# Patient Record
Sex: FEMALE | Race: BLACK OR AFRICAN AMERICAN | Employment: FULL TIME | URBAN - METROPOLITAN AREA
[De-identification: names, ages, dates, MRNs, and addresses within clinical notes are randomized per-mention and may not be internally consistent; named-entity substitution may affect disease eponyms.]

---

## 2017-01-19 ENCOUNTER — OFFICE VISIT (OUTPATIENT)
Dept: FAMILY MEDICINE CLINIC | Age: 27
End: 2017-01-19

## 2017-01-19 VITALS
OXYGEN SATURATION: 100 % | WEIGHT: 213.4 LBS | HEART RATE: 75 BPM | SYSTOLIC BLOOD PRESSURE: 112 MMHG | RESPIRATION RATE: 18 BRPM | TEMPERATURE: 98.7 F | DIASTOLIC BLOOD PRESSURE: 74 MMHG | HEIGHT: 61 IN | BODY MASS INDEX: 40.29 KG/M2

## 2017-01-19 DIAGNOSIS — F41.1 GAD (GENERALIZED ANXIETY DISORDER): Primary | ICD-10-CM

## 2017-01-19 DIAGNOSIS — Z23 ENCOUNTER FOR IMMUNIZATION: ICD-10-CM

## 2017-01-19 RX ORDER — CLONAZEPAM 0.5 MG/1
0.5 TABLET ORAL 2 TIMES DAILY
Qty: 60 TAB | Refills: 0 | Status: SHIPPED | OUTPATIENT
Start: 2017-01-19 | End: 2017-02-23 | Stop reason: SDUPTHER

## 2017-01-19 NOTE — PATIENT INSTRUCTIONS
Learning About Anxiety Disorders  What are anxiety disorders? Anxiety disorders are a type of medical problem. They cause severe anxiety. When you feel anxious, you feel that something bad is about to happen. This feeling interferes with your life. These disorders include:  · Generalized anxiety disorder. You feel worried and stressed about many everyday events and activities. This goes on for several months and disrupts your life on most days. · Panic disorder. You have repeated panic attacks. A panic attack is a sudden, intense fear or anxiety. It may make you feel short of breath. Your heart may pound. · Social anxiety disorder. You feel very anxious about what you will say or do in front of people. For example, you may be scared to talk or eat in public. This problem affects your daily life. · Phobias. You are very scared of a specific object, situation, or activity. For example, you may fear spiders, high places, or small spaces. What are the symptoms? Generalized anxiety disorder  Symptoms may include:  · Feeling worried and stressed about many things almost every day. · Feeling tired or irritable. You may have a hard time concentrating. · Having headaches or muscle aches. · Having a hard time swallowing. · Feeling shaky, sweating, or having hot flashes. Panic disorder  You may have repeated panic attacks when there is no reason for feeling afraid. You may change your daily activities because you worry that you will have another attack. Symptoms may include:  · Intense fear, terror, or anxiety. · Trouble breathing or very fast breathing. · Chest pain or tightness. · A heartbeat that races or is not regular. Social anxiety disorder  Symptoms may include:  · Fear about a social situation, such as eating in front of others or speaking in public. You may worry a lot. Or you may be afraid that something bad will happen. · Anxiety that can cause you to blush, sweat, and feel shaky.   · A heartbeat that is faster than normal.  · A hard time focusing. Phobias  Symptoms may include:  · More fear than most people of being around an object, being in a situation, or doing an activity. You might also be stressed about the chance of being around the thing you fear. · Worry about losing control, panicking, fainting, or having physical symptoms like a faster heartbeat when you are around the situation or object. How are these disorders treated? Anxiety disorders can be treated with medicines or counseling. A combination of both may be used. Medicines may include:  · Antidepressants. These may help your symptoms by keeping chemicals in your brain in balance. · Benzodiazepines. These may give you short-term relief of your symptoms. Some people use cognitive-behavioral therapy. A therapist helps you learn to change stressful or bad thoughts into helpful thoughts. Lead a healthy lifestyle  A healthy lifestyle may help you feel better. · Get at least 30 minutes of exercise on most days of the week. Walking is a good choice. · Eat a healthy diet. Include fruits, vegetables, lean proteins, and whole grains in your diet each day. · Try to go to bed at the same time every night. Try for 8 hours of sleep a night. · Find ways to manage stress. Try relaxation exercises. · Avoid alcohol and illegal drugs. Follow-up care is a key part of your treatment and safety. Be sure to make and go to all appointments, and call your doctor if you are having problems. It's also a good idea to know your test results and keep a list of the medicines you take. Where can you learn more? Go to http://alhaji-tia.info/. Enter Z796 in the search box to learn more about \"Learning About Anxiety Disorders. \"  Current as of: July 26, 2016  Content Version: 11.1  © 0689-3724 [employer].  Care instructions adapted under license by [employer] (which disclaims liability or warranty for this information). If you have questions about a medical condition or this instruction, always ask your healthcare professional. [address] any warranty or liability for your use of this information. Vaccine Information Statement    Influenza (Flu) Vaccine (Inactivated or Recombinant): What you need to know    Many Vaccine Information Statements are available in [place] and other languages. See www.immunize.org/vis  Hojas de Información Sobre Vacunas están disponibles en Español y en muchos otros idiomas. Visite www.immunize.org/vis    1. Why get vaccinated? Influenza (flu) is a contagious disease that spreads around the [place] every year, usually between October and May. Flu is caused by influenza viruses, and is spread mainly by coughing, sneezing, and close contact. Anyone can get flu. Flu strikes suddenly and can last several days. Symptoms vary by age, but can include:   fever/chills   sore throat   muscle aches   fatigue   cough   headache    runny or stuffy nose    Flu can also lead to pneumonia and blood infections, and cause diarrhea and seizures in children. If you have a medical condition, such as heart or lung disease, flu can make it worse. Flu is more dangerous for some people. Infants and young children, people 72years of age and older, pregnant women, and people with certain health conditions or a weakened immune system are at greatest risk. Each year thousands of people in the [facility] die from flu, and many more are hospitalized. Flu vaccine can:   keep you from getting flu,   make flu less severe if you do get it, and   keep you from spreading flu to your family and other people. 2. Inactivated and recombinant flu vaccines    A dose of flu vaccine is recommended every flu season. Children 6 months through 6years of age may need two doses during the same flu season. Everyone else needs only one dose each flu season. Some inactivated flu vaccines contain a very small amount of a mercury-based preservative called thimerosal. Studies have not shown thimerosal in vaccines to be harmful, but flu vaccines that do not contain thimerosal are available. There is no live flu virus in flu shots. They cannot cause the flu. There are many flu viruses, and they are always changing. Each year a new flu vaccine is made to protect against three or four viruses that are likely to cause disease in the upcoming flu season. But even when the vaccine doesnt exactly match these viruses, it may still provide some protection    Flu vaccine cannot prevent:   flu that is caused by a virus not covered by the vaccine, or   illnesses that look like flu but are not. It takes about 2 weeks for protection to develop after vaccination, and protection lasts through the flu season. 3. Some people should not get this vaccine    Tell the person who is giving you the vaccine:     If you have any severe, life-threatening allergies. If you ever had a life-threatening allergic reaction after a dose of flu vaccine, or have a severe allergy to any part of this vaccine, you may be advised not to get vaccinated. Most, but not all, types of flu vaccine contain a small amount of egg protein.  If you ever had Guillain-Barré Syndrome (also called GBS). Some people with a history of GBS should not get this vaccine. This should be discussed with your doctor.  If you are not feeling well. It is usually okay to get flu vaccine when you have a mild illness, but you might be asked to come back when you feel better. 4. Risks of a vaccine reaction    With any medicine, including vaccines, there is a chance of reactions. These are usually mild and go away on their own, but serious reactions are also possible. Most people who get a flu shot do not have any problems with it.      Minor problems following a flu shot include:  soreness, redness, or swelling where the shot was given     hoarseness   sore, red or itchy eyes   cough   fever   aches   headache   itching   fatigue  If these problems occur, they usually begin soon after the shot and last 1 or 2 days. More serious problems following a flu shot can include the following:     There may be a small increased risk of Guillain-Barré Syndrome (GBS) after inactivated flu vaccine. This risk has been estimated at 1 or 2 additional cases per million people vaccinated. This is much lower than the risk of severe complications from flu, which can be prevented by flu vaccine.  Young children who get the flu shot along with pneumococcal vaccine (PCV13) and/or DTaP vaccine at the same time might be slightly more likely to have a seizure caused by fever. Ask your doctor for more information. Tell your doctor if a child who is getting flu vaccine has ever had a seizure. Problems that could happen after any injected vaccine:      People sometimes faint after a medical procedure, including vaccination. Sitting or lying down for about 15 minutes can help prevent fainting, and injuries caused by a fall. Tell your doctor if you feel dizzy, or have vision changes or ringing in the ears.  Some people get severe pain in the shoulder and have difficulty moving the arm where a shot was given. This happens very rarely.  Any medication can cause a severe allergic reaction. Such reactions from a vaccine are very rare, estimated at about 1 in a million doses, and would happen within a few minutes to a few hours after the vaccination. As with any medicine, there is a very remote chance of a vaccine causing a serious injury or death. The safety of vaccines is always being monitored. For more information, visit: www.cdc.gov/vaccinesafety/    5. What if there is a serious reaction? What should I look for?      Look for anything that concerns you, such as signs of a severe allergic reaction, very high fever, or unusual behavior. Signs of a severe allergic reaction can include hives, swelling of the face and throat, difficulty breathing, a fast heartbeat, dizziness, and weakness  usually within a few minutes to a few hours after the vaccination. What should I do?  If you think it is a severe allergic reaction or other emergency that cant wait, call 9-1-1 and get the person to the nearest hospital. Otherwise, call your doctor.  Reactions should be reported to the Vaccine Adverse Event Reporting System (VAERS). Your doctor should file this report, or you can do it yourself through  the VAERS web site at www.vaers. [facility].gov, or by calling 1-562.651.3893. VAERS does not give medical advice. 6. The National Vaccine Injury Compensation Program    The [facility] Vaccine Injury Compensation Program (VICP) is a federal program that was created to compensate people who may have been injured by certain vaccines. Persons who believe they may have been injured by a vaccine can learn about the program and about filing a claim by calling 7-819.770.3926 or visiting the [address] [place], [place] [address] website at [email or web address].gov/vaccinecompensation. There is a time limit to file a claim for compensation. 7. How can I learn more?  Ask your healthcare provider. He or she can give you the vaccine package insert or suggest other sources of information.  Call your local or state health department.  Contact the Centers for Disease Control and Prevention (CDC):  - Call 6-773.860.4833 (1-525-BGY-INFO) or  - Visit CDCs website at www.cdc.gov/flu    Vaccine Information Statement   Inactivated Influenza Vaccine   8/7/2015  [address] [id number]    Department of Health and Human Services  Centers for Disease Control and Prevention    Office Use Only       Learning About Anxiety Disorders  What are anxiety disorders? Anxiety disorders are a type of medical problem. They cause severe anxiety.  When you feel anxious, you feel that something bad is about to happen. This feeling interferes with your life. These disorders include:  · Generalized anxiety disorder. You feel worried and stressed about many everyday events and activities. This goes on for several months and disrupts your life on most days. · Panic disorder. You have repeated panic attacks. A panic attack is a sudden, intense fear or anxiety. It may make you feel short of breath. Your heart may pound. · Social anxiety disorder. You feel very anxious about what you will say or do in front of people. For example, you may be scared to talk or eat in public. This problem affects your daily life. · Phobias. You are very scared of a specific object, situation, or activity. For example, you may fear spiders, high places, or small spaces. What are the symptoms? Generalized anxiety disorder  Symptoms may include:  · Feeling worried and stressed about many things almost every day. · Feeling tired or irritable. You may have a hard time concentrating. · Having headaches or muscle aches. · Having a hard time swallowing. · Feeling shaky, sweating, or having hot flashes. Panic disorder  You may have repeated panic attacks when there is no reason for feeling afraid. You may change your daily activities because you worry that you will have another attack. Symptoms may include:  · Intense fear, terror, or anxiety. · Trouble breathing or very fast breathing. · Chest pain or tightness. · A heartbeat that races or is not regular. Social anxiety disorder  Symptoms may include:  · Fear about a social situation, such as eating in front of others or speaking in public. You may worry a lot. Or you may be afraid that something bad will happen. · Anxiety that can cause you to blush, sweat, and feel shaky. · A heartbeat that is faster than normal.  · A hard time focusing.   Phobias  Symptoms may include:  · More fear than most people of being around an object, being in a situation, or doing an activity. You might also be stressed about the chance of being around the thing you fear. · Worry about losing control, panicking, fainting, or having physical symptoms like a faster heartbeat when you are around the situation or object. How are these disorders treated? Anxiety disorders can be treated with medicines or counseling. A combination of both may be used. Medicines may include:  · Antidepressants. These may help your symptoms by keeping chemicals in your brain in balance. · Benzodiazepines. These may give you short-term relief of your symptoms. Some people use cognitive-behavioral therapy. A therapist helps you learn to change stressful or bad thoughts into helpful thoughts. Lead a healthy lifestyle  A healthy lifestyle may help you feel better. · Get at least 30 minutes of exercise on most days of the week. Walking is a good choice. · Eat a healthy diet. Include fruits, vegetables, lean proteins, and whole grains in your diet each day. · Try to go to bed at the same time every night. Try for 8 hours of sleep a night. · Find ways to manage stress. Try relaxation exercises. · Avoid alcohol and illegal drugs. Follow-up care is a key part of your treatment and safety. Be sure to make and go to all appointments, and call your doctor if you are having problems. It's also a good idea to know your test results and keep a list of the medicines you take. Where can you learn more? Go to http://alhaji-tia.info/. Enter Z257 in the search box to learn more about \"Learning About Anxiety Disorders. \"  Current as of: July 26, 2016  Content Version: 11.1  © 1914-8910 [employer]. Care instructions adapted under license by [employer] (which disclaims liability or warranty for this information).  If you have questions about a medical condition or this instruction, always ask your healthcare professional. [address] any warranty or liability for your use of this information. Influenza (Flu) Vaccine (Inactivated or Recombinant): What You Need to Know  Why get vaccinated? Influenza (\"flu\") is a contagious disease that spreads around the [place] every winter, usually between October and May. Flu is caused by influenza viruses and is spread mainly by coughing, sneezing, and close contact. Anyone can get flu. Flu strikes suddenly and can last several days. Symptoms vary by age, but can include:  · Fever/chills. · Sore throat. · Muscle aches. · Fatigue. · Cough. · Headache. · Runny or stuffy nose. Flu can also lead to pneumonia and blood infections, and cause diarrhea and seizures in children. If you have a medical condition, such as heart or lung disease, flu can make it worse. Flu is more dangerous for some people. Infants and young children, people 72years of age and older, pregnant women, and people with certain health conditions or a weakened immune system are at greatest risk. Each year thousands of people in the [facility] die from flu, and many more are hospitalized. Flu vaccine can:  · Keep you from getting flu. · Make flu less severe if you do get it. · Keep you from spreading flu to your family and other people. Inactivated and recombinant flu vaccines  A dose of flu vaccine is recommended every flu season. Children 6 months through 6years of age may need two doses during the same flu season. Everyone else needs only one dose each flu season. Some inactivated flu vaccines contain a very small amount of a mercury-based preservative called thimerosal. Studies have not shown thimerosal in vaccines to be harmful, but flu vaccines that do not contain thimerosal are available. There is no live flu virus in flu shots. They cannot cause the flu. There are many flu viruses, and they are always changing.  Each year a new flu vaccine is made to protect against three or four viruses that are likely to cause disease in the upcoming flu season. But even when the vaccine doesn't exactly match these viruses, it may still provide some protection. Flu vaccine cannot prevent:  · Flu that is caused by a virus not covered by the vaccine. · Illnesses that look like flu but are not. Some people should not get this vaccine  Tell the person who is giving you the vaccine:  · If you have any severe (life-threatening) allergies. If you ever had a life-threatening allergic reaction after a dose of flu vaccine, or have a severe allergy to any part of this vaccine, you may be advised not to get vaccinated. Most, but not all, types of flu vaccine contain a small amount of egg protein. · If you ever had Guillain-Barré syndrome (also called GBS) Some people with a history of GBS should not get this vaccine. This should be discussed with your doctor. · If you are not feeling well. It is usually okay to get flu vaccine when you have a mild illness, but you might be asked to come back when you feel better. Risks of a vaccine reaction  With any medicine, including vaccines, there is a chance of reactions. These are usually mild and go away on their own, but serious reactions are also possible. Most people who get a flu shot do not have any problems with it. Minor problems following a flu shot include:  · Soreness, redness, or swelling where the shot was given  · Hoarseness  · Sore, red or itchy eyes  · Cough  · Fever  · Aches  · Headache  · Itching  · Fatigue  If these problems occur, they usually begin soon after the shot and last 1 or 2 days. More serious problems following a flu shot can include the following:  · There may be a small increased risk of Guillain-Barré Syndrome (GBS) after inactivated flu vaccine. This risk has been estimated at 1 or 2 additional cases per million people vaccinated. This is much lower than the risk of severe complications from flu, which can be prevented by flu vaccine.   · [facility] children who get the flu shot along with pneumococcal vaccine (PCV13) and/or DTaP vaccine at the same time might be slightly more likely to have a seizure caused by fever. Ask your doctor for more information. Tell your doctor if a child who is getting flu vaccine has ever had a seizure  Problems that could happen after any injected vaccine:  · People sometimes faint after a medical procedure, including vaccination. Sitting or lying down for about 15 minutes can help prevent fainting, and injuries caused by a fall. Tell your doctor if you feel dizzy, or have vision changes or ringing in the ears. · Some people get severe pain in the shoulder and have difficulty moving the arm where a shot was given. This happens very rarely. · Any medication can cause a severe allergic reaction. Such reactions from a vaccine are very rare, estimated at about 1 in a million doses, and would happen within a few minutes to a few hours after the vaccination. As with any medicine, there is a very remote chance of a vaccine causing a serious injury or death. The safety of vaccines is always being monitored. For more information, visit: www.cdc.gov/vaccinesafety/. What if there is a serious reaction? What should I look for? · Look for anything that concerns you, such as signs of a severe allergic reaction, very high fever, or unusual behavior. Signs of a severe allergic reaction can include hives, swelling of the face and throat, difficulty breathing, a fast heartbeat, dizziness, and weakness  usually within a few minutes to a few hours after the vaccination. What should I do? · If you think it is a severe allergic reaction or other emergency that can't wait, call 9-1-1 and get the person to the nearest hospital. Otherwise, call your doctor. · Reactions should be reported to the \"Vaccine Adverse Event Reporting System\" (VAERS). Your doctor should file this report, or you can do it yourself through the VAERS website at www.vaers. [facility]hs.gov, or by calling 6-175.386.2915. [facility] does not give medical advice. The National Vaccine Injury Compensation Program  The National Vaccine Injury Compensation Program (VICP) is a federal program that was created to compensate people who may have been injured by certain vaccines. Persons who believe they may have been injured by a vaccine can learn about the program and about filing a claim by calling 2-674.966.7951 or visiting the [employer] website at www.[facility].gov/vaccinecompensation. There is a time limit to file a claim for compensation. How can I learn more? · Ask your healthcare provider. He or she can give you the vaccine package insert or suggest other sources of information. · Call your local or state health department. · Contact the Centers for Disease Control and Prevention (CDC):  ¨ Call 0-413.282.2377 (1-800-CDC-INFO) or  ¨ Visit CDC's website at www.cdc.gov/flu  Vaccine Information Statement  Inactivated Influenza Vaccine  8/7/2015)  [address] [id number]  Department of Health and Human Services  Centers for Disease Control and Prevention  Many Vaccine Information Statements are available in [place] and other languages. See www.immunize.org/vis. Muchas hojas de información sobre vacunas están disponibles en español y en otros idiomas. Visite www.immunize.org/vis. Care instructions adapted under license by your healthcare professional. If you have questions about a medical condition or this instruction, always ask your healthcare professional. [place] [address] any warranty or liability for your use of this information.

## 2017-01-19 NOTE — PROGRESS NOTES
HISTORY OF PRESENT ILLNESS  Alexia Martínez is a 31 y.o. female. HPI   Depression with Anxiety state    Patient state that thins are not getting better: pt states that  unable to sleep, , unable to concentrate at work and at home at this time, +feeling anxius, no guilty feeling, has nl interest to do things, not depressed, nl appetite,  No Hoplessness, not wanted to heart self or others, no weight gain or loss, doesnot want to take a meds every day wanted to use it PRN, aware of side effects    In addition has excessive worry about a number of events and activities out of proportion, these have been presented for at least 6 months, In addition pt states that there is no etoh or illicit drug use, not the first time,  no hx of physical nor of mental abuse, and currently is feeling safe in general.    Current Outpatient Prescriptions   Medication Sig Dispense Refill    Blood-Glucose Meter (TRUERESULT BLOOD GLUCOSE SYSTM) monitoring kit Test blood sugar before breakfast, lunch dinner and before bedtime 1 Kit 11    glucose blood VI test strips (TRUETEST TEST STRIPS) strip Test blood sugar before breakfast, lunch, dinner and before bedtime. 100 Strip 11    glucose blood VI test strips (ADVANCED GLUC METER TEST STRIP) strip Test blood sugar before breakfast, lunch , dinner and bedtime 100 Strip 6    Lancets misc Test blood sugar before breakfast, lunch, dinner and bedtime. 1 Package 11    cyclobenzaprine (FLEXERIL) 10 mg tablet Take 1 Tab by mouth nightly. Indications: MUSCLE SPASM 30 Tab 0    naproxen (NAPROSYN) 500 mg tablet Take 1 Tab by mouth two (2) times daily (with meals). Indications: PAIN 30 Tab 0    norgestimate-ethinyl estradiol (SPRINTEC, 28,) 0.25-35 mg-mcg per tablet Take 1 tablet by mouth daily.  1 Package 12     No Known Allergies  Past Medical History   Diagnosis Date    Chronic back pain greater than 3 months duration 7/29/2015    HX OTHER MEDICAL      chlamydia age 24, treated & negative now    Hypoglycemia 3/30/2016    Ill-defined condition     Insulinoma 4/6/2016    Morbid obesity ([facility] [address]     Syncopal seizure ([facility] [address] 11/19/2015    Thyromegaly 3/30/2016    Urinary frequency 11/19/2015     Past Surgical History   Procedure Laterality Date    Pr revision cervix w preg,vag apprch  2013    Hx cholecystectomy  10/6/2014     lap. tracee    Hx other surgical       D&C with SAB 6/2011    Hx other surgical       wisdom extraction 15 years    Hx dilation and curettage  6/2011     Family History   Problem Relation Age of Onset    Hypertension Mother     Diabetes Mother     High Cholesterol Father     Other Father      high cholesterole, herniated disc    Diabetes Paternal Aunt     Hypertension Maternal Grandmother     COPD Maternal Grandmother     Emphysema Maternal Grandmother     Diabetes Paternal Grandmother      Social History   Substance Use Topics    Smoking status: Never Smoker    Smokeless tobacco: Never Used    Alcohol use No      Lab Results  Component Value Date/Time   WBC 5.7 03/30/2016 05:01 PM   Hemoglobin (POC) 12.9 06/06/2015 06:39 PM   HGB 11.3 03/30/2016 05:01 PM   Hgb, External 12.4 10/14/2013   Hematocrit (POC) 38 06/06/2015 06:39 PM   HCT 34.5 03/30/2016 05:01 PM   Hct, External 36.7 10/14/2013   PLATELET 335 73/26/7879 05:01 PM   Platelet cnt., External 304 10/14/2013   MCV 81 03/30/2016 05:01 PM       Lab Results  Component Value Date/Time   TSH 1.940 04/06/2016 09:31 AM   T4, Free 1.04 04/06/2016 09:31 AM         Review of Systems   Constitutional: Positive for malaise/fatigue. Negative for chills and fever. HENT: Negative for ear pain and nosebleeds. Eyes: Negative for blurred vision, pain and discharge. Respiratory: Negative for shortness of breath. Cardiovascular: Negative for chest pain and leg swelling. Gastrointestinal: Negative for constipation, diarrhea, nausea and vomiting. Genitourinary: Negative for frequency.    Musculoskeletal: Negative for joint pain. Skin: Negative for itching and rash. Neurological: Negative for headaches. Psychiatric/Behavioral: Positive for depression. Negative for hallucinations, memory loss, substance abuse and suicidal ideas. The patient is nervous/anxious. The patient does not have insomnia. Physical Exam   Constitutional: She is oriented to person, place, and time. She appears well-developed and well-nourished. HENT:   Head: Normocephalic and atraumatic. Eyes: Conjunctivae and EOM are normal.   Neck: Normal range of motion. Neck supple. Cardiovascular: Normal rate, regular rhythm and normal heart sounds. No murmur heard. Pulmonary/Chest: Effort normal and breath sounds normal.   Abdominal: Soft. Bowel sounds are normal. She exhibits no distension. Musculoskeletal: Normal range of motion. She exhibits no edema. Lymphadenopathy:     She has no cervical adenopathy. Neurological: She is alert and oriented to person, place, and time. Skin: No erythema. Psychiatric: Her behavior is normal.   Nursing note and vitals reviewed. ASSESSMENT and PLAN  Marsha was seen today for anxiety. Diagnoses and all orders for this visit:    TASIA (generalized anxiety disorder)  -     clonazePAM (KLONOPIN) 0.5 mg tablet; Take 1 Tab by mouth two (2) times a day. Max Daily Amount: 1 mg.  Indications: ESSENTIAL TREMOR, PANIC DISORDER  -     CBC W/O DIFF  -     TSH 3RD GENERATION    Encounter for immunization  -     Influenza virus vaccine (QUADRIVALENT PRES FREE SYRINGE) IM 3 years and older  -     MO IMMUNIZ ADMIN,1 SINGLE/COMB VAC/TOXOID    Depression with anxiety in a stable condition, not suicidal, start antianxiety and calming medication for now will reevaluate in 2 weeks for progression or the side effects of the medication, in addition Counseling , social support, spiritual belonging, inc physical activity, all offered,  compliance with meds advised, was told to call for any concern

## 2017-01-19 NOTE — MR AVS SNAPSHOT
Visit Information Date & Time Provider Department Dept. Phone Encounter #  
 1/19/2017 10:15 AM Hina Strong MD [address] [facility] [phone] [id number] Follow-up Instructions Return in about 2 weeks (around 2/2/2017), or if symptoms worsen or fail to improve. Your Appointments 2/23/2017 11:00 AM  
PHYSICAL PRE OP with Hina Strong MD  
[address] [facility] ([facility]) Appt Note: [phone] [id number] [phone] [address] [phone] Upcoming Health Maintenance Date Due INFLUENZA AGE 9 TO ADULT 8/1/2016 PAP AKA CERVICAL CYTOLOGY 1/28/2019 DTaP/Tdap/Td series (2 - Td) 2/10/2024 Allergies as of 1/19/2017  Review Complete On: 1/19/2017 By: Liu Ortiz LPN No Known Allergies Current Immunizations  Reviewed on 8/19/2015 Name Date Influenza Vaccine 1/27/2014 Influenza Vaccine (Quad) PF  Incomplete Tdap 2/10/2014 Not reviewed this visit You Were Diagnosed With   
  
 Codes Comments TASIA (generalized anxiety disorder)    -  Primary ICD-10-CM: F41.1 ICD-9-CM: 300.02 Encounter for immunization     ICD-10-CM: F33 ICD-9-CM: V03.89 Vitals BP Pulse Temp Resp Height(growth percentile) Weight(growth percentile) 112/74 (BP 1 Location: Left arm, BP Patient Position: Sitting) 75 98.7 °F (37.1 °C) (Oral) 18 5' 1\" (1.549 m) 213 lb 6.4 oz (96.8 kg) SpO2 Breastfeeding? BMI OB Status Smoking Status 100% No 40.32 kg/m2 IUD Never Smoker Vitals History BMI and BSA Data Body Mass Index Body Surface Area  
 40.32 kg/m 2 2.04 m 2 Preferred Pharmacy Pharmacy Name Phone [id number], [place] - [address] [phone] Your Updated Medication List  
  
   
 This list is accurate as of: 1/19/17 10:27 AM.  Always use your most recent med list.  
  
  
  
  
 Blood-Glucose Meter monitoring kit Commonly known as:  TRUERESULT BLOOD GLUCOSE SYSTM Test blood sugar before breakfast, lunch dinner and before bedtime  
  
 clonazePAM 0.5 mg tablet Commonly known as:  Sukhwinder Bourgeois Take 1 Tab by mouth two (2) times a day. Max Daily Amount: 1 mg. Indications: ESSENTIAL TREMOR, PANIC DISORDER  
  
 cyclobenzaprine 10 mg tablet Commonly known as:  FLEXERIL Take 1 Tab by mouth nightly. Indications: MUSCLE SPASM * glucose blood VI test strips strip Commonly known as:  ADVANCED GLUC METER TEST STRIP Test blood sugar before breakfast, lunch , dinner and bedtime * glucose blood VI test strips strip Commonly known as:  TRUETEST TEST STRIPS Test blood sugar before breakfast, lunch, dinner and before bedtime. Lancets Misc Test blood sugar before breakfast, lunch, dinner and bedtime. naproxen 500 mg tablet Commonly known as:  NAPROSYN Take 1 Tab by mouth two (2) times daily (with meals). Indications: PAIN  
  
 norgestimate-ethinyl estradiol 0.25-35 mg-mcg Tab Commonly known as:  [facility] (28) Take 1 tablet by mouth daily. * Notice: This list has 2 medication(s) that are the same as other medications prescribed for you. Read the directions carefully, and ask your doctor or other care provider to review them with you. Prescriptions Printed Refills  
 clonazePAM (KLONOPIN) 0.5 mg tablet 0 Sig: Take 1 Tab by mouth two (2) times a day. Max Daily Amount: 1 mg. Indications: ESSENTIAL TREMOR, PANIC DISORDER Class: Print Route: Oral  
  
We Performed the Following CBC W/O DIFF [26645 CPT(R)] INFLUENZA VIRUS VAC QUAD,SPLIT,PRESV FREE SYRINGE 3/> YRS IM [id number] CPT(R)] IN IMMUNIZ ADMIN,1 SINGLE/COMB VAC/TOXOID [id number] CPT(R)] TSH 3RD GENERATION [41855 CPT(R)] Follow-up Instructions Return in about 2 weeks (around 2/2/2017), or if symptoms worsen or fail to improve. Patient Instructions Learning About Anxiety Disorders What are anxiety disorders? Anxiety disorders are a type of medical problem. They cause severe anxiety. When you feel anxious, you feel that something bad is about to happen. This feeling interferes with your life. These disorders include: · Generalized anxiety disorder. You feel worried and stressed about many everyday events and activities. This goes on for several months and disrupts your life on most days. · Panic disorder. You have repeated panic attacks. A panic attack is a sudden, intense fear or anxiety. It may make you feel short of breath. Your heart may pound. · Social anxiety disorder. You feel very anxious about what you will say or do in front of people. For example, you may be scared to talk or eat in public. This problem affects your daily life. · Phobias. You are very scared of a specific object, situation, or activity. For example, you may fear spiders, high places, or small spaces. What are the symptoms? Generalized anxiety disorder Symptoms may include: · Feeling worried and stressed about many things almost every day. · Feeling tired or irritable. You may have a hard time concentrating. · Having headaches or muscle aches. · Having a hard time swallowing. · Feeling shaky, sweating, or having hot flashes. Panic disorder You may have repeated panic attacks when there is no reason for feeling afraid. You may change your daily activities because you worry that you will have another attack. Symptoms may include: 
· Intense fear, terror, or anxiety. · Trouble breathing or very fast breathing. · Chest pain or tightness. · A heartbeat that races or is not regular. Social anxiety disorder Symptoms may include: · Fear about a social situation, such as eating in front of others or speaking in public. You may worry a lot. Or you may be afraid that something bad will happen. · Anxiety that can cause you to blush, sweat, and feel shaky. · A heartbeat that is faster than normal. 
· A hard time focusing. Phobias Symptoms may include: · More fear than most people of being around an object, being in a situation, or doing an activity. You might also be stressed about the chance of being around the thing you fear. · Worry about losing control, panicking, fainting, or having physical symptoms like a faster heartbeat when you are around the situation or object. How are these disorders treated? Anxiety disorders can be treated with medicines or counseling. A combination of both may be used. Medicines may include: · Antidepressants. These may help your symptoms by keeping chemicals in your brain in balance. · Benzodiazepines. These may give you short-term relief of your symptoms. Some people use cognitive-behavioral therapy. A therapist helps you learn to change stressful or bad thoughts into helpful thoughts. Lead a healthy lifestyle A healthy lifestyle may help you feel better. · Get at least 30 minutes of exercise on most days of the week. Walking is a good choice. · Eat a healthy diet. Include fruits, vegetables, lean proteins, and whole grains in your diet each day. · Try to go to bed at the same time every night. Try for 8 hours of sleep a night. · Find ways to manage stress. Try relaxation exercises. · Avoid alcohol and illegal drugs. Follow-up care is a key part of your treatment and safety. Be sure to make and go to all appointments, and call your doctor if you are having problems. It's also a good idea to know your test results and keep a list of the medicines you take. Where can you learn more? Go to http://alhaji-tia.info/. Enter R019 in the search box to learn more about \"Learning About Anxiety Disorders. \" Current as of: July 26, 2016 Content Version: 11.1 © 0800-4819 [employer]. Care instructions adapted under license by [employer] (which disclaims liability or warranty for this information). If you have questions about a medical condition or this instruction, always ask your healthcare professional. [address] any warranty or liability for your use of this information. Vaccine Information Statement Influenza (Flu) Vaccine (Inactivated or Recombinant): What you need to know Many Vaccine Information Statements are available in [place]er and other languages. See www.immunize.org/vis Hojas de Información Sobre Vacunas están disponibles en Español y en muchos otros idiomas. Visite www.immunize.org/vis 1. Why get vaccinated? Influenza (flu) is a contagious disease that spreads around the [place] every year, usually between October and May. Flu is caused by influenza viruses, and is spread mainly by coughing, sneezing, and close contact. Anyone can get flu. Flu strikes suddenly and can last several days. Symptoms vary by age, but can include: 
 fever/chills  sore throat  muscle aches  fatigue  cough  headache  runny or stuffy nose Flu can also lead to pneumonia and blood infections, and cause diarrhea and seizures in children. If you have a medical condition, such as heart or lung disease, flu can make it worse. Flu is more dangerous for some people. Infants and young children, people 72years of age and older, pregnant women, and people with certain health conditions or a weakened immune system are at greatest risk. Each year thousands of people in the [facility] die from flu, and many more are hospitalized. Flu vaccine can: 
 keep you from getting flu, 
 make flu less severe if you do get it, and 
 keep you from spreading flu to your family and other people. 2. Inactivated and recombinant flu vaccines A dose of flu vaccine is recommended every flu season. Children 6 months through 6years of age may need two doses during the same flu season. Everyone else needs only one dose each flu season. Some inactivated flu vaccines contain a very small amount of a mercury-based preservative called thimerosal. Studies have not shown thimerosal in vaccines to be harmful, but flu vaccines that do not contain thimerosal are available. There is no live flu virus in flu shots. They cannot cause the flu. There are many flu viruses, and they are always changing. Each year a new flu vaccine is made to protect against three or four viruses that are likely to cause disease in the upcoming flu season. But even when the vaccine doesnt exactly match these viruses, it may still provide some protection Flu vaccine cannot prevent: 
 flu that is caused by a virus not covered by the vaccine, or 
 illnesses that look like flu but are not. It takes about 2 weeks for protection to develop after vaccination, and protection lasts through the flu season. 3. Some people should not get this vaccine Tell the person who is giving you the vaccine:  If you have any severe, life-threatening allergies. If you ever had a life-threatening allergic reaction after a dose of flu vaccine, or have a severe allergy to any part of this vaccine, you may be advised not to get vaccinated. Most, but not all, types of flu vaccine contain a small amount of egg protein.  If you ever had Guillain-Barré Syndrome (also called GBS). Some people with a history of GBS should not get this vaccine. This should be discussed with your doctor.  If you are not feeling well. It is usually okay to get flu vaccine when you have a mild illness, but you might be asked to come back when you feel better. 4. Risks of a vaccine reaction With any medicine, including vaccines, there is a chance of reactions. These are usually mild and go away on their own, but serious reactions are also possible. Most people who get a flu shot do not have any problems with it. Minor problems following a flu shot include:  
 soreness, redness, or swelling where the shot was given  hoarseness  sore, red or itchy eyes  cough  fever  aches  headache  itching  fatigue If these problems occur, they usually begin soon after the shot and last 1 or 2 days. More serious problems following a flu shot can include the following:  There may be a small increased risk of Guillain-Barré Syndrome (GBS) after inactivated flu vaccine. This risk has been estimated at 1 or 2 additional cases per million people vaccinated. This is much lower than the risk of severe complications from flu, which can be prevented by flu vaccine.  Young children who get the flu shot along with pneumococcal vaccine (PCV13) and/or DTaP vaccine at the same time might be slightly more likely to have a seizure caused by fever. Ask your doctor for more information. Tell your doctor if a child who is getting flu vaccine has ever had a seizure. Problems that could happen after any injected vaccine:  People sometimes faint after a medical procedure, including vaccination. Sitting or lying down for about 15 minutes can help prevent fainting, and injuries caused by a fall. Tell your doctor if you feel dizzy, or have vision changes or ringing in the ears.  Some people get severe pain in the shoulder and have difficulty moving the arm where a shot was given. This happens very rarely.  Any medication can cause a severe allergic reaction. Such reactions from a vaccine are very rare, estimated at about 1 in a million doses, and would happen within a few minutes to a few hours after the vaccination. As with any medicine, there is a very remote chance of a vaccine causing a serious injury or death. The safety of vaccines is always being monitored. For more information, visit: www.cdc.gov/vaccinesafety/ 
 
5. What if there is a serious reaction? What should I look for?  Look for anything that concerns you, such as signs of a severe allergic reaction, very high fever, or unusual behavior. Signs of a severe allergic reaction can include hives, swelling of the face and throat, difficulty breathing, a fast heartbeat, dizziness, and weakness  usually within a few minutes to a few hours after the vaccination. What should I do?  If you think it is a severe allergic reaction or other emergency that cant wait, call 9-1-1 and get the person to the nearest hospital. Otherwise, call your doctor.  Reactions should be reported to the Vaccine Adverse Event Reporting System (VAERS). Your doctor should file this report, or you can do it yourself through  the VAERS web site at www.vaers. [employer].gov, or by calling 0-120.198.7789. VAERS does not give medical advice. 6. The National Vaccine Injury Compensation Program 
 
The [facility] Vaccine Injury Compensation Program (VICP) is a federal program that was created to compensate people who may have been injured by certain vaccines. Persons who believe they may have been injured by a vaccine can learn about the program and about filing a claim by calling 5-478.870.5575 or visiting the [address] website at www.[facility].gov/vaccinecompensation. There is a time limit to file a claim for compensation. 7. How can I learn more?  Ask your healthcare provider. He or she can give you the vaccine package insert or suggest other sources of information.  Call your local or state health department.  Contact the Centers for Disease Control and Prevention (CDC): 
- Call 1-979.268.6180 (1-800-CDC-INFO) or 
- Visit CDCs website at www.cdc.gov/flu Vaccine Information Statement Inactivated Influenza Vaccine 8/7/2015 
42 [employer]ana Sample [id number]-[id number] Department of Health and [employer] Centers for Disease Control and Prevention Office Use Only Learning About Anxiety Disorders What are anxiety disorders? Anxiety disorders are a type of medical problem. They cause severe anxiety. When you feel anxious, you feel that something bad is about to happen. This feeling interferes with your life. These disorders include: · Generalized anxiety disorder. You feel worried and stressed about many everyday events and activities. This goes on for several months and disrupts your life on most days. · Panic disorder. You have repeated panic attacks. A panic attack is a sudden, intense fear or anxiety. It may make you feel short of breath. Your heart may pound. · Social anxiety disorder. You feel very anxious about what you will say or do in front of people. For example, you may be scared to talk or eat in public. This problem affects your daily life. · Phobias. You are very scared of a specific object, situation, or activity. For example, you may fear spiders, high places, or small spaces. What are the symptoms? Generalized anxiety disorder Symptoms may include: · Feeling worried and stressed about many things almost every day. · Feeling tired or irritable. You may have a hard time concentrating. · Having headaches or muscle aches. · Having a hard time swallowing. · Feeling shaky, sweating, or having hot flashes. Panic disorder You may have repeated panic attacks when there is no reason for feeling afraid. You may change your daily activities because you worry that you will have another attack. Symptoms may include: 
· Intense fear, terror, or anxiety. · Trouble breathing or very fast breathing. · Chest pain or tightness. · A heartbeat that races or is not regular. Social anxiety disorder Symptoms may include: · Fear about a social situation, such as eating in front of others or speaking in public. You may worry a lot. Or you may be afraid that something bad will happen. · Anxiety that can cause you to blush, sweat, and feel shaky. · A heartbeat that is faster than normal. 
· A hard time focusing. Phobias Symptoms may include: · More fear than most people of being around an object, being in a situation, or doing an activity. You might also be stressed about the chance of being around the thing you fear. · Worry about losing control, panicking, fainting, or having physical symptoms like a faster heartbeat when you are around the situation or object. How are these disorders treated? Anxiety disorders can be treated with medicines or counseling. A combination of both may be used. Medicines may include: · Antidepressants. These may help your symptoms by keeping chemicals in your brain in balance. · Benzodiazepines. These may give you short-term relief of your symptoms. Some people use cognitive-behavioral therapy. A therapist helps you learn to change stressful or bad thoughts into helpful thoughts. Lead a healthy lifestyle A healthy lifestyle may help you feel better. · Get at least 30 minutes of exercise on most days of the week. Walking is a good choice. · Eat a healthy diet. Include fruits, vegetables, lean proteins, and whole grains in your diet each day. · Try to go to bed at the same time every night. Try for 8 hours of sleep a night. · Find ways to manage stress. Try relaxation exercises. · Avoid alcohol and illegal drugs. Follow-up care is a key part of your treatment and safety. Be sure to make and go to all appointments, and call your doctor if you are having problems. It's also a good idea to know your test results and keep a list of the medicines you take. Where can you learn more? Go to http://alhaji-tia.info/. Enter G571 in the search box to learn more about \"Learning About Anxiety Disorders. \" Current as of: July 26, 2016 Content Version: 11.1 © 4727-6824 [employer]. Care instructions adapted under license by [employer] (which disclaims liability or warranty for this information). If you have questions about a medical condition or this instruction, always ask your healthcare professional. [address] any warranty or liability for your use of this information. Influenza (Flu) Vaccine (Inactivated or Recombinant): What You Need to Know Why get vaccinated? Influenza (\"flu\") is a contagious disease that spreads around the [place] every winter, usually between October and May. Flu is caused by influenza viruses and is spread mainly by coughing, sneezing, and close contact. Anyone can get flu. Flu strikes suddenly and can last several days. Symptoms vary by age, but can include: · Fever/chills. · Sore throat. · Muscle aches. · Fatigue. · Cough. · Headache. · Runny or stuffy nose. Flu can also lead to pneumonia and blood infections, and cause diarrhea and seizures in children. If you have a medical condition, such as heart or lung disease, flu can make it worse. Flu is more dangerous for some people. Infants and young children, people 72years of age and older, pregnant women, and people with certain health conditions or a weakened immune system are at greatest risk. Each year thousands of people in the [facility] die from flu, and many more are hospitalized. Flu vaccine can: · Keep you from getting flu. · Make flu less severe if you do get it. · Keep you from spreading flu to your family and other people. Inactivated and recombinant flu vaccines A dose of flu vaccine is recommended every flu season. Children 6 months through 6years of age may need two doses during the same flu season. Everyone else needs only one dose each flu season.  
Some inactivated flu vaccines contain a very small amount of a mercury-based preservative called thimerosal. Studies have not shown thimerosal in vaccines to be harmful, but flu vaccines that do not contain thimerosal are available. There is no live flu virus in flu shots. They cannot cause the flu. There are many flu viruses, and they are always changing. Each year a new flu vaccine is made to protect against three or four viruses that are likely to cause disease in the upcoming flu season. But even when the vaccine doesn't exactly match these viruses, it may still provide some protection. Flu vaccine cannot prevent: · Flu that is caused by a virus not covered by the vaccine. · Illnesses that look like flu but are not. Some people should not get this vaccine Tell the person who is giving you the vaccine: · If you have any severe (life-threatening) allergies. If you ever had a life-threatening allergic reaction after a dose of flu vaccine, or have a severe allergy to any part of this vaccine, you may be advised not to get vaccinated. Most, but not all, types of flu vaccine contain a small amount of egg protein. · If you ever had Guillain-Barré syndrome (also called GBS) Some people with a history of GBS should not get this vaccine. This should be discussed with your doctor. · If you are not feeling well. It is usually okay to get flu vaccine when you have a mild illness, but you might be asked to come back when you feel better. Risks of a vaccine reaction With any medicine, including vaccines, there is a chance of reactions. These are usually mild and go away on their own, but serious reactions are also possible. Most people who get a flu shot do not have any problems with it. Minor problems following a flu shot include: · Soreness, redness, or swelling where the shot was given · Hoarseness · Sore, red or itchy eyes · Cough · Fever · Aches · Headache · Itching · Fatigue If these problems occur, they usually begin soon after the shot and last 1 or 2 days. More serious problems following a flu shot can include the following: · There may be a small increased risk of Guillain-Barré Syndrome (GBS) after inactivated flu vaccine. This risk has been estimated at 1 or 2 additional cases per million people vaccinated. This is much lower than the risk of severe complications from flu, which can be prevented by flu vaccine. · Jailene Recio children who get the flu shot along with pneumococcal vaccine (PCV13) and/or DTaP vaccine at the same time might be slightly more likely to have a seizure caused by fever. Ask your doctor for more information. Tell your doctor if a child who is getting flu vaccine has ever had a seizure Problems that could happen after any injected vaccine: · People sometimes faint after a medical procedure, including vaccination. Sitting or lying down for about 15 minutes can help prevent fainting, and injuries caused by a fall. Tell your doctor if you feel dizzy, or have vision changes or ringing in the ears. · Some people get severe pain in the shoulder and have difficulty moving the arm where a shot was given. This happens very rarely. · Any medication can cause a severe allergic reaction. Such reactions from a vaccine are very rare, estimated at about 1 in a million doses, and would happen within a few minutes to a few hours after the vaccination. As with any medicine, there is a very remote chance of a vaccine causing a serious injury or death. The safety of vaccines is always being monitored. For more information, visit: www.cdc.gov/vaccinesafety/. What if there is a serious reaction? What should I look for? · Look for anything that concerns you, such as signs of a severe allergic reaction, very high fever, or unusual behavior.  
Signs of a severe allergic reaction can include hives, swelling of the face and throat, difficulty breathing, a fast heartbeat, dizziness, and weakness  usually within a few minutes to a few hours after the vaccination. What should I do? · If you think it is a severe allergic reaction or other emergency that can't wait, call 9-1-1 and get the person to the nearest hospital. Otherwise, call your doctor. · Reactions should be reported to the \"Vaccine Adverse Event Reporting System\" (VAERS). Your doctor should file this report, or you can do it yourself through the VAERS website at www.vaers. [facility].gov, or by calling 9-472.216.8632. VAERS does not give medical advice. The National Vaccine Injury Compensation Program 
The National Vaccine Injury Compensation Program (VICP) is a federal program that was created to compensate people who may have been injured by certain vaccines. Persons who believe they may have been injured by a vaccine can learn about the program and about filing a claim by calling 3-571.382.4593 or visiting the Sharely.Us website at www.Zenoss.gov/vaccinecompensation. There is a time limit to file a claim for compensation. How can I learn more? · Ask your healthcare provider. He or she can give you the vaccine package insert or suggest other sources of information. · Call your local or state health department. · Contact the Centers for Disease Control and Prevention (CDC): 
¨ Call 9-675.487.8729 (1-800-CDC-INFO) or ¨ Visit CDC's website at www.cdc.gov/flu Vaccine Information Statement Inactivated Influenza Vaccine 8/7/2015) 42 [facility] 817BV-93 [facility] and [facility] Centers for Disease Control and Prevention Many Vaccine Information Statements are available in Romanian and other languages. See www.immunize.org/vis. Muchas hojas de información sobre vacunas están disponibles en español y en otros idiomas. Visite www.immunize.org/vis.  
Care instructions adapted under license by your healthcare professional. If you have questions about a medical condition or this instruction, always ask your healthcare professional. Kalli Osborne disclaims any warranty or liability for your use of this information. Introducing [facility] HE[facility] SERVICES! Ravindra Shcrader introduces Acupera patient portal. Now you can access parts of your medical record, email your doctor's office, and request medication refills online. 1. In your internet browser, go to [email or web address] 2. Click on the First Time User? Click Here link in the Sign In box. You will see the New Member Sign Up page. 3. Enter your Acupera Access Code exactly as it appears below. You will not need to use this code after youve completed the sign-up process. If you do not sign up before the expiration date, you must request a new code. · Acupera Access Code: [email or web address] Expires: 2/15/2017  3:45 PM 
 
4. Enter the last four digits of your Social Security Number (xxxx) and Date of Birth (mm/dd/yyyy) as indicated and click Submit. You will be taken to the next sign-up page. 5. Create a Acupera ID. This will be your Acupera login ID and cannot be changed, so think of one that is secure and easy to remember. 6. Create a Acupera password. You can change your password at any time. 7. Enter your Password Reset Question and Answer. This can be used at a later time if you forget your password. 8. Enter your e-mail address. You will receive e-mail notification when new information is available in [address] 9. Click Sign Up. You can now view and download portions of your medical record. 10. Click the Download Summary menu link to download a portable copy of your medical information. If you have questions, please visit the Frequently Asked Questions section of the Acupera website. Remember, Acupera is NOT to be used for urgent needs. For medical emergencies, dial 911. Now available from your iPhone and Android! Please provide this summary of care documentation to your next provider. Your primary care clinician is listed as Claudis Phalen. If you have any questions after today's visit, please call [phone].

## 2017-01-19 NOTE — PROGRESS NOTES
Chief Complaint   Patient presents with    Anxiety     C/o moodiness and crying episodes x 4 months,

## 2017-01-20 LAB
ERYTHROCYTE [DISTWIDTH] IN BLOOD BY AUTOMATED COUNT: 13.9 % (ref 12.3–15.4)
HCT VFR BLD AUTO: 38.4 % (ref 34–46.6)
HGB BLD-MCNC: 12.8 G/DL (ref 11.1–15.9)
MCH RBC QN AUTO: 26.9 PG (ref 26.6–33)
MCHC RBC AUTO-ENTMCNC: 33.3 G/DL (ref 31.5–35.7)
MCV RBC AUTO: 81 FL (ref 79–97)
PLATELET # BLD AUTO: 331 X10E3/UL (ref 150–379)
RBC # BLD AUTO: 4.75 X10E6/UL (ref 3.77–5.28)
TSH SERPL DL<=0.005 MIU/L-ACNC: 1.33 UIU/ML (ref 0.45–4.5)
WBC # BLD AUTO: 5.2 X10E3/UL (ref 3.4–10.8)

## 2017-02-16 ENCOUNTER — TELEPHONE (OUTPATIENT)
Dept: FAMILY MEDICINE CLINIC | Age: 27
End: 2017-02-16

## 2017-02-16 NOTE — TELEPHONE ENCOUNTER
Returned call to pt. Requesting updated med list.  Med list printed and at the  for pickup.   Pt stated understanding

## 2017-02-23 ENCOUNTER — OFFICE VISIT (OUTPATIENT)
Dept: FAMILY MEDICINE CLINIC | Age: 27
End: 2017-02-23

## 2017-02-23 VITALS
TEMPERATURE: 97.1 F | HEART RATE: 80 BPM | SYSTOLIC BLOOD PRESSURE: 107 MMHG | BODY MASS INDEX: 40.06 KG/M2 | RESPIRATION RATE: 18 BRPM | HEIGHT: 61 IN | OXYGEN SATURATION: 99 % | WEIGHT: 212.2 LBS | DIASTOLIC BLOOD PRESSURE: 69 MMHG

## 2017-02-23 DIAGNOSIS — F41.1 GAD (GENERALIZED ANXIETY DISORDER): ICD-10-CM

## 2017-02-23 DIAGNOSIS — R35.0 URINARY FREQUENCY: ICD-10-CM

## 2017-02-23 DIAGNOSIS — Z72.51 HIGH-RISK SEXUAL BEHAVIOR: ICD-10-CM

## 2017-02-23 DIAGNOSIS — Z00.00 WELL WOMAN EXAM (NO GYNECOLOGICAL EXAM): ICD-10-CM

## 2017-02-23 DIAGNOSIS — Z00.00 ROUTINE GENERAL MEDICAL EXAMINATION AT A HEALTH CARE FACILITY: Primary | ICD-10-CM

## 2017-02-23 LAB
BILIRUB UR QL STRIP: NEGATIVE
GLUCOSE UR-MCNC: NEGATIVE MG/DL
KETONES P FAST UR STRIP-MCNC: NEGATIVE MG/DL
PH UR STRIP: 6 [PH] (ref 4.6–8)
PROT UR QL STRIP: NEGATIVE MG/DL
SP GR UR STRIP: 1.03 (ref 1–1.03)
UA UROBILINOGEN AMB POC: NORMAL (ref 0.2–1)
URINALYSIS CLARITY POC: CLEAR
URINALYSIS COLOR POC: YELLOW
URINE BLOOD POC: NORMAL
URINE LEUKOCYTES POC: NEGATIVE
URINE NITRITES POC: NEGATIVE

## 2017-02-23 RX ORDER — CLONAZEPAM 0.5 MG/1
0.5 TABLET ORAL 2 TIMES DAILY
Qty: 60 TAB | Refills: 0 | Status: SHIPPED | OUTPATIENT
Start: 2017-03-15 | End: 2018-04-03 | Stop reason: ALTCHOICE

## 2017-02-23 NOTE — PROGRESS NOTES
Marsha Alvarezwright      Name and  verified      Chief Complaint   Patient presents with    Well Woman

## 2017-02-23 NOTE — PROGRESS NOTES
Subjective:   28 y.o. female for Well Woman Check. Her gyne and breast care is done elsewhere by her Ob-Gyne physician. Present for CPE, last Complete Physical exam was 3 yrs ago, pt is Up todate w/ all vaccination, last tetanus vaccine was in In <5yrs ago, and flu in couple wks ago, not pregnacey at this time,  no bleeding no trouble urinatin,     last mammog was never, last pap smear normal and was in last yr,.   last colonoscopy was never,   +d&c and cervical cerclage w/ current pregnancy as her past surgical hx,  last bone dexa scan was never,   No family hx of breast cancer      ++family hx of colon cancer grandfather, mother with Fibro, father with arthrits, and high cholesterol, no fhx Of early heart Dz ++sexaully active and uses Safe sex, physically active,  compliant w/ meds, ++Rf needed for today for her meds. Current Outpatient Prescriptions   Medication Sig Dispense Refill    clonazePAM (KLONOPIN) 0.5 mg tablet Take 1 Tab by mouth two (2) times a day. Max Daily Amount: 1 mg. Indications: ESSENTIAL TREMOR, PANIC DISORDER 60 Tab 0    Blood-Glucose Meter (TRUERESULT BLOOD GLUCOSE SYSTM) monitoring kit Test blood sugar before breakfast, lunch dinner and before bedtime 1 Kit 11    glucose blood VI test strips (TRUETEST TEST STRIPS) strip Test blood sugar before breakfast, lunch, dinner and before bedtime. 100 Strip 11    glucose blood VI test strips (ADVANCED GLUC METER TEST STRIP) strip Test blood sugar before breakfast, lunch , dinner and bedtime 100 Strip 6    Lancets misc Test blood sugar before breakfast, lunch, dinner and bedtime. 1 Package 11    cyclobenzaprine (FLEXERIL) 10 mg tablet Take 1 Tab by mouth nightly. Indications: MUSCLE SPASM 30 Tab 0    naproxen (NAPROSYN) 500 mg tablet Take 1 Tab by mouth two (2) times daily (with meals). Indications: PAIN 30 Tab 0    norgestimate-ethinyl estradiol (SPRINTEC, 28,) 0.25-35 mg-mcg per tablet Take 1 tablet by mouth daily.  1 Package 12 No Known Allergies  Past Medical History:   Diagnosis Date    Chronic back pain greater than 3 months duration 7/29/2015    TASIA (generalized anxiety disorder) 1/19/2017    HX OTHER MEDICAL     chlamydia age 24, treated & negative now    Hypoglycemia 3/30/2016    Ill-defined condition     Insulinoma 4/6/2016    Morbid obesity ([facility] [address]     Syncopal seizure ([facility] [address] 11/19/2015    Thyromegaly 3/30/2016    Urinary frequency 11/19/2015     Past Surgical History:   Procedure Laterality Date    HX CHOLECYSTECTOMY  10/6/2014    lap.  tracee    HX DILATION AND CURETTAGE  6/2011    HX OTHER SURGICAL      D&C with SAB 6/2011    HX OTHER SURGICAL      wisdom extraction 15 years    REVISION CERVIX W PREG, [address]  2013     Family History   Problem Relation Age of Onset    Hypertension Mother     Diabetes Mother     High Cholesterol Father     Other Father      high cholesterole, herniated disc    Diabetes Paternal Aunt     Hypertension Maternal Grandmother     COPD Maternal Grandmother     Emphysema Maternal Grandmother     Diabetes Paternal Grandmother      Social History   Substance Use Topics    Smoking status: Never Smoker    Smokeless tobacco: Never Used    Alcohol use No        Lab Results  Component Value Date/Time   WBC 5.2 01/19/2017 10:37 AM   Hemoglobin (POC) 12.9 06/06/2015 06:39 PM   HGB 12.8 01/19/2017 10:37 AM   Hgb, External 12.4 10/14/2013   Hematocrit (POC) 38 06/06/2015 06:39 PM   HCT 38.4 01/19/2017 10:37 AM   Hct, External 36.7 10/14/2013   PLATELET 223 16/30/4951 10:37 AM   Platelet cnt., External 304 10/14/2013   MCV 81 01/19/2017 10:37 AM       Lab Results  Component Value Date/Time   GFR est  03/30/2016 05:01 PM   GFR est non- 03/30/2016 05:01 PM   Creatinine (POC) 0.7 06/06/2015 06:39 PM   Creatinine 0.65 03/30/2016 05:01 PM   BUN 7 03/30/2016 05:01 PM   BUN (POC) 5 06/06/2015 06:39 PM   Sodium (POC) 141 06/06/2015 06:39 PM   Sodium 140 03/30/2016 05:01 PM   Potassium 4.0 03/30/2016 05:01 PM   Potassium (POC) 3.8 06/06/2015 06:39 PM   Chloride (POC) 105 06/06/2015 06:39 PM   Chloride 105 03/30/2016 05:01 PM   CO2 22 03/30/2016 05:01 PM         Review of Systems    Constitutional: Negative for chills and fever, not obese okay body mass index for his age. HENT: Negative for ear head pain and nosebleeds. Eyes: Negative for blurred vision, pain and discharge. Respiratory: Negative for shortness of breath, wheezing cough sore throat. Cardiovascular: Negative for chest pain and leg swelling, racing heart . Gastrointestinal: Negative for constipation, diarrhea, nausea and vomiting. Genitourinary: Negative for frequency. Musculoskeletal: Negative for joint pain. Skin: Negative for itching, pimples or acne rash. Neurological: Negative for headaches. Psychiatric/Behavioral: Negative for depression has normal interest to do things and not depressed the patient is not nervous/anxious. Specific concerns today: the meds given last visit helped her greatly, able to sleep better, no Suicidal no homicidal ideations,    Objective: The patient appears well, alert, oriented x 3, in no distress. Visit Vitals    /69 (BP 1 Location: Left arm, BP Patient Position: At rest)    Pulse 80    Temp 97.1 °F (36.2 °C) (Oral)    Resp 18    Ht 5' 1\" (1.549 m)    Wt 212 lb 3.2 oz (96.3 kg)    LMP 02/20/2017    SpO2 99%    BMI 40.09 kg/m2     ENT normal.  Neck supple. No adenopathy or thyromegaly. ROZ. Lungs are clear, good air entry, no wheezes, rhonchi or rales. S1 and S2 normal, no murmurs, regular rate and rhythm. Abdomen soft without tenderness, guarding, mass or organomegaly. Extremities show no edema, normal peripheral pulses. Neurological is normal, no focal findings. Breast and Pelvic exams are deferred. Assessment/Plan:   Well Woman    Rita Manjarrez was seen today for well woman.     Diagnoses and all orders for this visit:    Routine general medical examination at a health care facility  -     CBC W/O DIFF  -     AMB POC URINALYSIS DIP STICK AUTO W/O MICRO  -     TSH 3RD GENERATION  -     LIPID PANEL  -     METABOLIC PANEL, COMPREHENSIVE    Well woman exam (no gynecological exam)  Comments:  [V70.0]  Orders:  -     CBC W/O DIFF  -     AMB POC URINALYSIS DIP STICK AUTO W/O MICRO  -     TSH 3RD GENERATION  -     LIPID PANEL  -     METABOLIC PANEL, COMPREHENSIVE    High-risk sexual behavior  -     HIV 1/2 AG/AB, 4TH GENERATION,W RFLX CONFIRM  -     HEPATITIS C AB  -     HEP B SURFACE AG  -     RPR  -     CT/NG/T.VAGINALIS AMPLIFICATION    Urinary frequency  -     HIV 1/2 AG/AB, 4TH GENERATION,W RFLX CONFIRM  -     RPR  -     CT/NG/T.VAGINALIS AMPLIFICATION    TASIA (generalized anxiety disorder)  -     clonazePAM (KLONOPIN) 0.5 mg tablet; Take 1 Tab by mouth two (2) times a day. Max Daily Amount: 1 mg.  Indications: ESSENTIAL TREMOR, PANIC DISORDER      lose weight, increase physical activity, limit alcohol consumption, stop secondhand smoking (advice and handout given), follow low fat diet, follow low salt diet, continue present plan, routine labs ordered, call if any problems

## 2017-02-23 NOTE — MR AVS SNAPSHOT
Visit Information Date & Time Provider Department Dept. Phone Encounter #  
 2/23/2017 11:00 AM Anne Del Angel MD [address] OFF[facility] [phone] [id number] Follow-up Instructions Return in about 3 months (around 5/23/2017). Follow-up and Disposition History Upcoming Health Maintenance Date Due  
 PAP AKA CERVICAL CYTOLOGY 1/28/2019 DTaP/Tdap/Td series (2 - Td) 2/10/2024 Allergies as of 2/23/2017  Review Complete On: 2/23/2017 By: Janeen Carpenter LPN No Known Allergies Current Immunizations  Reviewed on 2/23/2017 Name Date Influenza Vaccine 1/27/2014 Influenza Vaccine (Quad) PF 1/19/2017 Tdap 2/10/2014 Reviewed by Anne Del Angel MD on 2/23/2017 at 12:18 PM  
 Reviewed by Anne Del Angel MD on 2/23/2017 at 12:18 PM  
 Reviewed by Anne Del Angel MD on 2/23/2017 at 12:26 PM  
 Reviewed by Anne Del Angel MD on 2/23/2017 at 12:26 PM  
You Were Diagnosed With   
  
 Codes Comments Routine general medical examination at a health care facility    -  Primary ICD-10-CM: Z00.00 ICD-9-CM: V70.0 Well woman exam (no gynecological exam)     ICD-10-CM: Z00.00 ICD-9-CM: V70.0 [V70.0] High-risk sexual behavior     ICD-10-CM: Z72.51 
ICD-9-CM: V69.2 Urinary frequency     ICD-10-CM: R35.0 ICD-9-CM: 788.41   
 TASIA (generalized anxiety disorder)     ICD-10-CM: F41.1 ICD-9-CM: 300.02 Vitals BP  
  
  
  
  
  
 107/69 (BP 1 Location: Left arm, BP Patient Position: At rest) Vitals History BMI and BSA Data Body Mass Index Body Surface Area 40.09 kg/m 2 2.04 m 2 Preferred Pharmacy Pharmacy Name Phone [id number] [address] [phone] Your Updated Medication List  
  
   
This list is accurate as of: 2/23/17 12:33 PM.  Always use your most recent med list.  
  
  
  
  
 Blood-Glucose Meter monitoring kit Commonly known as:  TRUERESULT BLOOD GLUCOSE SYSTM Test blood sugar before breakfast, lunch dinner and before bedtime  
  
 clonazePAM 0.5 mg tablet Commonly known as:  Viola Bourdon Take 1 Tab by mouth two (2) times a day. Max Daily Amount: 1 mg. Indications: ESSENTIAL TREMOR, PANIC DISORDER Start taking on:  3/15/2017  
  
 cyclobenzaprine 10 mg tablet Commonly known as:  FLEXERIL Take 1 Tab by mouth nightly. Indications: MUSCLE SPASM * glucose blood VI test strips strip Commonly known as:  ADVANCED GLUC METER TEST STRIP Test blood sugar before breakfast, lunch , dinner and bedtime * glucose blood VI test strips strip Commonly known as:  TRUETEST TEST STRIPS Test blood sugar before breakfast, lunch, dinner and before bedtime. Lancets Misc Test blood sugar before breakfast, lunch, dinner and bedtime. naproxen 500 mg tablet Commonly known as:  NAPROSYN Take 1 Tab by mouth two (2) times daily (with meals). Indications: PAIN  
  
 norgestimate-ethinyl estradiol 0.25-35 mg-mcg Tab Commonly known as:  [address] [facility] (28) Take 1 tablet by mouth daily. * Notice: This list has 2 medication(s) that are the same as other medications prescribed for you. Read the directions carefully, and ask your doctor or other care provider to review them with you. Prescriptions Printed Refills  
 clonazePAM (KLONOPIN) 0.5 mg tablet 0 Starting on: 3/15/2017 Sig: Take 1 Tab by mouth two (2) times a day. Max Daily Amount: 1 mg. Indications: ESSENTIAL TREMOR, PANIC DISORDER Class: Print Route: Oral  
  
We Performed the Following AMB POC URINALYSIS DIP STICK AUTO W/O MICRO [58739 CPT(R)] CBC W/O DIFF [33985 CPT(R)] CT/NG/T.VAGINALIS AMPLIFICATION [id number] CPT(R)] HEP B SURFACE AG [id number] CPT(R)] HEPATITIS C AB [76890 CPT(R)] HIV 1/2 AG/AB, 4TH GENERATION,W RFLX CONFIRM [[id number] Custom] LIPID PANEL [70198 CPT(R)] METABOLIC PANEL, COMPREHENSIVE [93870 CPT(R)] RPR [26677 CPT(R)] TSH 3RD GENERATION [66809 CPT(R)] Follow-up Instructions Return in about 3 months (around 5/23/2017). Introducing [facility]! Anay Hoyos introduces LifeNexus patient portal. Now you can access parts of your medical record, email your doctor's office, and request medication refills online. 1. In your internet browser, go to [email or web address] 2. Click on the First Time User? Click Here link in the Sign In box. You will see the New Member Sign Up page. 3. Enter your LifeNexus Access Code exactly as it appears below. You will not need to use this code after youve completed the sign-up process. If you do not sign up before the expiration date, you must request a new code. · LifeNexus Access Code: [email or web address] Expires: 5/24/2017 12:27 PM 
 
4. Enter the last four digits of your Social Security Number (xxxx) and Date of Birth (mm/dd/yyyy) as indicated and click Submit. You will be taken to the next sign-up page. 5. Create a LifeNexus ID. This will be your LifeNexus login ID and cannot be changed, so think of one that is secure and easy to remember. 6. Create a LifeNexus password. You can change your password at any time. 7. Enter your Password Reset Question and Answer. This can be used at a later time if you forget your password. 8. Enter your e-mail address. You will receive e-mail notification when new information is available in [address] 9. Click Sign Up. You can now view and download portions of your medical record. 10. Click the Download Summary menu link to download a portable copy of your medical information. If you have questions, please visit the Frequently Asked Questions section of the LifeNexus website. Remember, LifeNexus is NOT to be used for urgent needs. For medical emergencies, dial 911. Now available from your iPhone and Android! Please provide this summary of care documentation to your next provider. Your primary care clinician is listed as Jericho King. If you have any questions after today's visit, please call [phone].

## 2017-02-24 LAB
ALBUMIN SERPL-MCNC: 4.3 G/DL (ref 3.5–5.5)
ALBUMIN/GLOB SERPL: 1.9 {RATIO} (ref 1.1–2.5)
ALP SERPL-CCNC: 55 IU/L (ref 39–117)
ALT SERPL-CCNC: 19 IU/L (ref 0–32)
AST SERPL-CCNC: 28 IU/L (ref 0–40)
BILIRUB SERPL-MCNC: 0.2 MG/DL (ref 0–1.2)
BUN SERPL-MCNC: 7 MG/DL (ref 6–20)
BUN/CREAT SERPL: 9 (ref 8–20)
CALCIUM SERPL-MCNC: 9.3 MG/DL (ref 8.7–10.2)
CHLORIDE SERPL-SCNC: 103 MMOL/L (ref 96–106)
CHOLEST SERPL-MCNC: 145 MG/DL (ref 100–199)
CO2 SERPL-SCNC: 19 MMOL/L (ref 18–29)
CREAT SERPL-MCNC: 0.77 MG/DL (ref 0.57–1)
ERYTHROCYTE [DISTWIDTH] IN BLOOD BY AUTOMATED COUNT: 14.2 % (ref 12.3–15.4)
GLOBULIN SER CALC-MCNC: 2.3 G/DL (ref 1.5–4.5)
GLUCOSE SERPL-MCNC: 73 MG/DL (ref 65–99)
HBV SURFACE AG SERPL QL IA: NEGATIVE
HCT VFR BLD AUTO: 37.9 % (ref 34–46.6)
HCV AB S/CO SERPL IA: <0.1 S/CO RATIO (ref 0–0.9)
HDLC SERPL-MCNC: 45 MG/DL
HGB BLD-MCNC: 12.3 G/DL (ref 11.1–15.9)
HIV 1+2 AB+HIV1 P24 AG SERPL QL IA: NON REACTIVE
LDLC SERPL CALC-MCNC: 88 MG/DL (ref 0–99)
MCH RBC QN AUTO: 27.2 PG (ref 26.6–33)
MCHC RBC AUTO-ENTMCNC: 32.5 G/DL (ref 31.5–35.7)
MCV RBC AUTO: 84 FL (ref 79–97)
PLATELET # BLD AUTO: 339 X10E3/UL (ref 150–379)
POTASSIUM SERPL-SCNC: 4.4 MMOL/L (ref 3.5–5.2)
PROT SERPL-MCNC: 6.6 G/DL (ref 6–8.5)
RBC # BLD AUTO: 4.52 X10E6/UL (ref 3.77–5.28)
RPR SER QL: NON REACTIVE
SODIUM SERPL-SCNC: 140 MMOL/L (ref 134–144)
TRIGL SERPL-MCNC: 62 MG/DL (ref 0–149)
TSH SERPL DL<=0.005 MIU/L-ACNC: 1.98 UIU/ML (ref 0.45–4.5)
VLDLC SERPL CALC-MCNC: 12 MG/DL (ref 5–40)
WBC # BLD AUTO: 5.4 X10E3/UL (ref 3.4–10.8)

## 2017-02-25 LAB
C TRACH RRNA SPEC QL NAA+PROBE: NEGATIVE
N GONORRHOEA RRNA SPEC QL NAA+PROBE: NEGATIVE
T VAGINALIS RRNA SPEC QL NAA+PROBE: NEGATIVE

## 2017-10-27 ENCOUNTER — TELEPHONE (OUTPATIENT)
Dept: FAMILY MEDICINE CLINIC | Age: 27
End: 2017-10-27

## 2017-10-27 NOTE — TELEPHONE ENCOUNTER
Received call from pt with c/o chest pain that radiates down left arm,  Worsens when laying down, denies SOB,  advised pt to go to ER,  Pt stated understanding

## 2018-02-19 ENCOUNTER — OFFICE VISIT (OUTPATIENT)
Dept: FAMILY MEDICINE CLINIC | Age: 28
End: 2018-02-19

## 2018-02-19 VITALS
HEART RATE: 96 BPM | RESPIRATION RATE: 14 BRPM | BODY MASS INDEX: 40.59 KG/M2 | WEIGHT: 215 LBS | OXYGEN SATURATION: 99 % | DIASTOLIC BLOOD PRESSURE: 79 MMHG | HEIGHT: 61 IN | SYSTOLIC BLOOD PRESSURE: 113 MMHG | TEMPERATURE: 98.1 F

## 2018-02-19 DIAGNOSIS — E66.01 OBESITY, MORBID (HCC): ICD-10-CM

## 2018-02-19 DIAGNOSIS — F41.1 GAD (GENERALIZED ANXIETY DISORDER): Primary | ICD-10-CM

## 2018-02-19 RX ORDER — BUPROPION HYDROCHLORIDE 150 MG/1
150 TABLET ORAL
Qty: 30 TAB | Refills: 3 | Status: SHIPPED | OUTPATIENT
Start: 2018-02-19 | End: 2018-02-23 | Stop reason: SDUPTHER

## 2018-02-19 RX ORDER — BUSPIRONE HYDROCHLORIDE 7.5 MG/1
7.5 TABLET ORAL EVERY 12 HOURS
COMMUNITY
End: 2018-05-02 | Stop reason: SDUPTHER

## 2018-02-19 NOTE — PROGRESS NOTES
HISTORY OF PRESENT ILLNESS  Robin Tran is a 33 y.o. female. HPI     Obesity  Specific concerns today for my pt is the wt concerning, the patient states that no self induced vomiting, and no binge eating,  has been thinking about the use of any laxative use for a better wt, pt also states that different available diets has been tried, does some daily exercises, tries to avoid fast food as much as possible. In addition, the patient states that eating too much is not the case and the portion are very well controlled being on the heavy side for a few yrs, very concerned about the huge wt, gives every body a bad body image,and finally stating that the best is to loose as much wt as possible. Currently on buspar prn for anxiety attacks but on no daily bases and Oral contraceptive otherwise pt is on no othr meds at this time,   Wanted to be on med to loose wt, seen her ob and was put on a meds but not covered by her insur coverage,     Current Outpatient Prescriptions   Medication Sig Dispense Refill    busPIRone (BUSPAR) 7.5 mg tablet Take 7.5 mg by mouth every twelve (12) hours.  Blood-Glucose Meter (TRUERESULT BLOOD GLUCOSE SYSTM) monitoring kit Test blood sugar before breakfast, lunch dinner and before bedtime 1 Kit 11    glucose blood VI test strips (TRUETEST TEST STRIPS) strip Test blood sugar before breakfast, lunch, dinner and before bedtime. 100 Strip 11    glucose blood VI test strips (ADVANCED GLUC METER TEST STRIP) strip Test blood sugar before breakfast, lunch , dinner and bedtime 100 Strip 6    Lancets misc Test blood sugar before breakfast, lunch, dinner and bedtime. 1 Package 11    cyclobenzaprine (FLEXERIL) 10 mg tablet Take 1 Tab by mouth nightly. Indications: MUSCLE SPASM 30 Tab 0    clonazePAM (KLONOPIN) 0.5 mg tablet Take 1 Tab by mouth two (2) times a day. Max Daily Amount: 1 mg.  Indications: ESSENTIAL TREMOR, PANIC DISORDER (Patient not taking: Reported on 2/19/2018) 60 Tab 0  naproxen (NAPROSYN) 500 mg tablet Take 1 Tab by mouth two (2) times daily (with meals). Indications: PAIN (Patient not taking: Reported on 2/19/2018) 30 Tab 0    norgestimate-ethinyl estradiol (SPRINTEC, 28,) 0.25-35 mg-mcg per tablet Take 1 tablet by mouth daily. (Patient not taking: Reported on 2/19/2018) 1 Package 12     No Known Allergies  Past Medical History:   Diagnosis Date    Chronic back pain greater than 3 months duration 7/29/2015    TASIA (generalized anxiety disorder) 1/19/2017    High-risk sexual behavior 2/23/2017    HX OTHER MEDICAL     chlamydia age 24, treated & negative now    Hypoglycemia 3/30/2016    Ill-defined condition     Insulinoma 4/6/2016    Morbid obesity ([address]     Syncopal seizure ([address] 11/19/2015    Thyromegaly 3/30/2016    Urinary frequency 11/19/2015     Past Surgical History:   Procedure Laterality Date    HX CHOLECYSTECTOMY  10/6/2014    lap.  tracee    HX DILATION AND CURETTAGE  6/2011    HX OTHER SURGICAL      D&C with SAB 6/2011    HX OTHER SURGICAL      wisdom extraction 15 years    REVISION CERVIX [place] [facility] [address]  2013     Family History   Problem Relation Age of Onset    Hypertension Mother     Diabetes Mother     High Cholesterol Father     Other Father      high cholesterole, herniated disc    Diabetes Paternal Aunt     Hypertension Maternal Grandmother     COPD Maternal Grandmother     Emphysema Maternal Grandmother     Diabetes Paternal Grandmother      Social History   Substance Use Topics    Smoking status: Never Smoker    Smokeless tobacco: Never Used    Alcohol use No      Lab Results  Component Value Date/Time   WBC 5.4 02/23/2017 12:37 PM   HGB 12.3 02/23/2017 12:37 PM   Hemoglobin (POC) 12.9 06/06/2015 06:39 PM   HCT 37.9 02/23/2017 12:37 PM   Hematocrit (POC) 38 06/06/2015 06:39 PM   PLATELET 961 75/08/6273 12:37 PM   MCV 84 02/23/2017 12:37 PM   Hgb, External 12.4 10/14/2013   Hct, External 36.7 10/14/2013   Platelet cnt., External 304 10/14/2013     Lab Results  Component Value Date/Time   TSH 1.980 02/23/2017 12:37 PM   T4, Free 1.04 04/06/2016 09:31 AM         Review of Systems   Constitutional: Negative for chills, fever and malaise/fatigue. HENT: Negative for congestion and nosebleeds. Eyes: Negative for blurred vision and pain. Respiratory: Negative for shortness of breath and wheezing. Cardiovascular: Negative for chest pain, palpitations and leg swelling. Gastrointestinal: Negative for constipation, diarrhea, nausea and vomiting. Genitourinary: Negative for dysuria and frequency. Musculoskeletal: Negative for joint pain and myalgias. Skin: Negative for itching and rash. Neurological: Negative for dizziness, loss of consciousness and headaches. Endo/Heme/Allergies: Does not bruise/bleed easily. Psychiatric/Behavioral: Negative for depression. The patient is not nervous/anxious and does not have insomnia. All other systems reviewed and are negative. Physical Exam   Constitutional: She is oriented to person, place, and time. She appears well-developed and well-nourished. HENT:   Head: Normocephalic and atraumatic. Eyes: Conjunctivae and EOM are normal. Pupils are equal, round, and reactive to light. Neck: No JVD present. No thyromegaly present. Cardiovascular: Normal rate, regular rhythm, normal heart sounds and intact distal pulses. Exam reveals no gallop and no friction rub. No murmur heard. Pulmonary/Chest: Effort normal and breath sounds normal. No stridor. No respiratory distress. She has no wheezes. She has no rales. Abdominal: Soft. Bowel sounds are normal. She exhibits no distension and no mass. There is no tenderness. Musculoskeletal: Normal range of motion. She exhibits no edema or tenderness. Lymphadenopathy:     She has no cervical adenopathy. Neurological: She is alert and oriented to person, place, and time. She has normal reflexes. No cranial nerve deficit.    Skin: No rash noted. No erythema. Psychiatric: She has a normal mood and affect. Her behavior is normal.   Nursing note and vitals reviewed. ASSESSMENT and PLAN  Diagnoses and all orders for this visit:    1. TASIA (generalized anxiety disorder)  -     buPROPion XL (WELLBUTRIN XL) 150 mg tablet; Take 1 Tab by mouth every morning. 2. Obesity, morbid (HCC)  -     buPROPion XL (WELLBUTRIN XL) 150 mg tablet; Take 1 Tab by mouth every morning. 3. BMI 40.0-44.9, adult (HCC)  -     buPROPion XL (WELLBUTRIN XL) 150 mg tablet; Take 1 Tab by mouth every morning.          I have recommended the following steps for improving the current weight, pt was counseled on to try to Decrease calori intake by 500per day, limit the amount of intake and portion the meals, The patient is advised to avoid second hand exposure, begin progressive daily aerobic exercise program at least x 5 per week,  Attend weight loss classes and lectures, Pt agreed with all the recommendations

## 2018-02-19 NOTE — MR AVS SNAPSHOT
[address] [facility] [id number] [phone] Patient: Jessica Little MRN: [id number] EI Visit Information Date & Time Provider Department Dept. Phone Encounter #  
 2018 11:15 AM Tara Galindo MD [address] [place] [address] OFFICE-[facility] [phone] [id number] Follow-up Instructions Return in about 3 months (around 2018). Upcoming Health Maintenance Date Due Influenza Age 5 to Adult 2017 PAP AKA CERVICAL CYTOLOGY 2019 DTaP/Tdap/Td series (2 - Td) 2/10/2024 Allergies as of 2018  Review Complete On: 2018 By: Tara Galindo MD  
 No Known Allergies Current Immunizations  Reviewed on 2017 Name Date Influenza Vaccine 2014 Influenza Vaccine (Quad) PF 2017 Tdap 2/10/2014 Not reviewed this visit You Were Diagnosed With   
  
 Codes Comments TASIA (generalized anxiety disorder)    -  Primary ICD-10-CM: F41.1 ICD-9-CM: 300.02 Obesity, morbid ([facility]     ICD-10-CM: E66.01 
ICD-9-CM: 278.01   
 BMI 40.0-44.9, adult (HCC)     ICD-10-CM: Z68.41 
ICD-9-CM: V85.41 Vitals BP Pulse Temp Resp Height(growth percentile) Weight(growth percentile) 113/79 (BP 1 Location: Left arm, BP Patient Position: At rest) 96 98.1 °F (36.7 °C) (Oral) 14 5' 1\" (1.549 m) 215 lb (97.5 kg) SpO2 BMI OB Status Smoking Status 99% 40.62 kg/m2 IUD Never Smoker BMI and BSA Data Body Mass Index Body Surface Area  
 40.62 kg/m 2 2.05 m 2 Preferred Pharmacy Pharmacy Name Phone [facility] #[id number]- [place], [place] - [address] [phone] Your Updated Medication List  
  
   
This list is accurate as of: 18 12:16 PM.  Always use your most recent med list.  
  
  
  
  
 Blood-Glucose Meter monitoring kit Commonly known as:  TRUERESULT BLOOD GLUCOSE SYSTM Test blood sugar before breakfast, lunch dinner and before bedtime buPROPion  mg tablet Commonly known as:  Vidal Balm Take 1 Tab by mouth every morning. busPIRone 7.5 mg tablet Commonly known as:  BUSPAR Take 7.5 mg by mouth every twelve (12) hours. clonazePAM 0.5 mg tablet Commonly known as:  Hansen Door Take 1 Tab by mouth two (2) times a day. Max Daily Amount: 1 mg. Indications: ESSENTIAL TREMOR, PANIC DISORDER  
  
 cyclobenzaprine 10 mg tablet Commonly known as:  FLEXERIL Take 1 Tab by mouth nightly. Indications: MUSCLE SPASM * glucose blood VI test strips strip Commonly known as:  ADVANCED GLUC METER TEST STRIP Test blood sugar before breakfast, lunch , dinner and bedtime * glucose blood VI test strips strip Commonly known as:  TRUETEST TEST STRIPS Test blood sugar before breakfast, lunch, dinner and before bedtime. Lancets Misc Test blood sugar before breakfast, lunch, dinner and bedtime. naproxen 500 mg tablet Commonly known as:  NAPROSYN Take 1 Tab by mouth two (2) times daily (with meals). Indications: PAIN  
  
 norgestimate-ethinyl estradiol 0.25-35 mg-mcg Tab Commonly known as:  [facility] [facility] (28) Take 1 tablet by mouth daily. * Notice: This list has 2 medication(s) that are the same as other medications prescribed for you. Read the directions carefully, and ask your doctor or other care provider to review them with you. Prescriptions Sent to Pharmacy Refills buPROPion XL (WELLBUTRIN XL) 150 mg tablet 3 Sig: Take 1 Tab by mouth every morning. Class: Normal  
 Pharmacy: [facility]/pharmacy [id number] [address]  #: [phone] Route: Oral  
  
Follow-up Instructions Return in about 3 months (around 5/19/2018). Patient Instructions Learning About Low-Carbohydrate Diets for Weight Loss What is a low-carbohydrate diet? Low-carb diets avoid foods that are high in carbohydrate. These high-carb foods include pasta, bread, rice, cereal, fruits, and starchy vegetables. Instead, these diets usually have you eat foods that are high in fat and protein. Many people lose weight quickly on a low-carb diet. But the early weight loss is water. People on this diet often gain the weight back after they start eating carbs again. Not all diet plans are safe or work well. A lot of the evidence shows that low-carb diets aren't healthy. That's because these diets often don't include healthy foods like fruits and vegetables. Losing weight safely means balancing protein, fat, and carbs with every meal and snack. And low-carb diets don't always provide the vitamins, minerals, and fiber you need. If you have a serious medical condition, talk to your doctor before you try any diet. These conditions include kidney disease, heart disease, type 2 diabetes, high cholesterol, and high blood pressure. If you are pregnant, it may not be safe for your baby if you are on a low-carb diet. How can you lose weight safely? You might have heard that a diet plan helped another person lose weight. But that doesn't mean that it will work for you. It is very hard to stay on a diet that includes lots of big changes in your eating habits. If you want to get to a healthy weight and stay there, making healthy lifestyle changes will often work better than dieting. These steps can help. · Make a plan for change. Work with your doctor to create a plan that is right for you. · See a dietitian. He or she can show you how to make healthy changes in your eating habits. · Manage stress. If you have a lot of stress in your life, it can be hard to focus on making healthy changes to your daily habits. · Track your food and activity. You are likely to do better at losing weight if you keep track of what you eat and what you do. Follow-up care is a key part of your treatment and safety. Be sure to make and go to all appointments, and call your doctor if you are having problems. It's also a good idea to know your test results and keep a list of the medicines you take. Where can you learn more? Go to http://alhaji-tia.info/. Enter A121 in the search box to learn more about \"Learning About Low-Carbohydrate Diets for Weight Loss. \" Current as of: May 12, 2017 Content Version: 11.4 © 1740-8059 [employer] Care instructions adapted under license by [employer] (which disclaims liability or warranty for this information). If you have questions about a medical condition or this instruction, always ask your healthcare professional. [address] any warranty or liability for your use of this information. Learning About Carbohydrates What are carbohydrates? Carbohydrates are an important nutrient you get from food. It's a great source of energy for your body and helps your brain and nervous system work properly. How does your body use carbohydrates? After you eat food with carbs in it, your body digests the carbohydrates and turns them into a kind of sugar that goes into your blood. The blood carries this sugar to the cells in your body. The cells use the sugar to give you energy. Extra sugar is stored in the cells for later use. If it isn't used, it turns into fat. Where do carbohydrates come from? The healthiest carbohydrate choices are breads, cereals, and pastas made with whole grains; brown rice; low-fat dairy products; vegetables; legumes such as peas, lentils, and beans; and fruits. Foods made from refined flour, including bread, pasta, doughnuts, cookies, and desserts, also contain carbohydrates. So do sweets such as candy and soda. How can you get the right kind and amount of carbs? Eating too much of anything can lead to weight gain. And that can lead to other health problems. Here are some tips to help you eat the right amount of the right kind of carbs so you have the nutrition and the energy you need: 
· Eat 3 to 8 servings of grains (breads, cereals, rice, pasta) each day. For example, a serving is 1 slice of bread, 1 cup of boxed cereal, or ½ cup of cooked rice, cooked pasta, or cooked cereal. Go to www. choosemyplate.gov to learn how many servings you need. ¨ Buy bread that lists whole wheat (or other whole grains), stone-ground wheat, or cracked wheat as the first ingredient. ¨ Eat brown rice, bulgur, or millet instead of white rice. ¨ Eat pasta and cereals made from whole grain flour instead of refined flour. · Eat several servings a day of fresh fruits and vegetables. These include raspberries, apples, figs, oranges, pears, prunes, broccoli, brussels sprouts, carrots, corn, peas, and beans. And there are lots of other fruits and vegetables to choose from. · Limit the amount of candy, desserts, and soda in your diet. Where can you learn more? Go to http://alhaji-tia.info/. Enter F199 in the search box to learn more about \"Learning About Carbohydrates. \" Current as of: May 12, 2017 Content Version: 11.4 © 6399-5807 [employer]. Care instructions adapted under license by [employer] (which disclaims liability or warranty for this information). If you have questions about a medical condition or this instruction, always ask your healthcare professional. [place] [address] any warranty or liability for your use of this information. Learning About Changing a Habit by Setting Goals How can you change a habit?  
 
If you've decided to change a habit-whether it's quitting smoking, lowering your blood pressure, becoming more active, or doing something else to improve your health-congratulations! Making that decision is the first step toward making a change. What happens next? Have a reason. Set goals you can reach. Prepare for slip-ups. And get support. What's your reason? Your reason for wanting to change a habit is really important. Maybe you want to quit smoking so that you can avoid future health problems. Or maybe you want to eat a healthier diet so you can lose weight. If you have high blood pressure, your reason may be clear: to lower your blood pressure. Maybe you smoke and want to save money on cigarettes. You need to feel ready to make a change. If you don't feel ready now, that's okay. You can still be thinking and planning. When you truly want to make changes, you're ready for the next step. It's not easy to change habits-but you can do it. Taking the time to really think about what will motivate or inspire you will help you reach your goals. How do you set goals? · Focus on small goals. This will help you reach larger goals over time. With smaller goals, you'll have success more often, which will help you stay with it. For example, your large goal may be to lose 50 pounds. Your small goal could be to lose 5. 
· Write down your goals. This will help you remember, and you'll have a clearer idea of what you want to achieve. Use a journal or notebook to record your goals. Hang up your plan where you will see it often as a reminder of what you're trying to do. · Make your goals specific. Specific goals help you measure your progress. For example, setting a goal to eat 5 helpings of fruits and vegetables 5 days a week is better than a general goal to \"eat more vegetables. \" 
· Focus on one goal at a time. By doing this, you're less likely to feel overwhelmed and then give up. · When you reach a goal, reward yourself.   Celebrate your new behavior and success for several days, and then think about setting your next goal. 
 How can you prepare for slip-ups? It's perfectly normal to try to change a habit, go along fine for a while, and then have a setback. Lots of people try and try again before they reach their goals. What are the things that might cause a setback for you? If you have tried to change a habit before, think about what helped you and what got in your way. By thinking about these barriers now, you can plan ahead for how to deal with them if they happen. There will be times when you slip up and don't make your goal for the week. When that happens, don't get mad at yourself. Learn from the experience. Ask yourself what got in the way of reaching your goal. Positive thinking goes a long way when you're making lifestyle changes. How can you get support? · Get a partner. It's motivating to know that someone is trying to make the same change that you're making, like being more active or changing your eating habits. You have someone who is counting on you to help him or her succeed. That person can also remind you how far you've come. · Get friends and family involved. They can exercise with you or encourage you by saying how they admire what you are doing. Family members can join you in your healthy eating efforts. Don't be afraid to tell family and friends that their encouragement makes a big difference to you. · Join a class or support group. People in these groups often have some of the same barriers you have. They can give you support when you don't feel like staying with your plan. They can boost your morale when you need a lift. Rachna Thapa also find a number of online support groups. · Encourage yourself. When you feel like giving up, don't waste energy feeling bad about yourself. Remember your reason for wanting to change, think about the progress you've made, and give yourself a pep talk and a pat on the back. · Get professional help.  A dietitian can help you make your diet healthier while still allowing you to eat foods that you enjoy. A  or physical therapist can help design an exercise program that is fun and easy to stay on. A counselor, a , or your doctor can help you overcome hurdles, reduce stress, or quit smoking. Where can you learn more? Go to http://alhaji-tia.info/. Enter S522 in the search box to learn more about \"Learning About Changing a Habit by Setting Goals. \" Current as of: May 12, 2017 Content Version: 11.4 © 8547-1920 [employer]. Care instructions adapted under license by [employer] (which disclaims liability or warranty for this information). If you have questions about a medical condition or this instruction, always ask your healthcare professional. [address] any warranty or liability for your use of this information. Introducing [facility]! [facility] introduces [employer] patient portal. Now you can access parts of your medical record, email your doctor's office, and request medication refills online. 1. In your internet browser, go to [email or web address] 2. Click on the First Time User? Click Here link in the Sign In box. You will see the New Member Sign Up page. 3. Enter your [employer] Access Code exactly as it appears below. You will not need to use this code after youve completed the sign-up process. If you do not sign up before the expiration date, you must request a new code. · [employer] Access Code: [email or web address] Expires: 5/20/2018 12:15 PM 
 
4. Enter the last four digits of your Social Security Number (xxxx) and Date of Birth (mm/dd/yyyy) as indicated and click Submit. You will be taken to the next sign-up page. 5. Create a [employer] ID. This will be your [employer] login ID and cannot be changed, so think of one that is secure and easy to remember. 6. Create a [employer] password. You can change your password at any time. 7. Enter your Password Reset Question and Answer. This can be used at a later time if you forget your password. 8. Enter your e-mail address. You will receive e-mail notification when new information is available in [address] 9. Click Sign Up. You can now view and download portions of your medical record. 10. Click the Download Summary menu link to download a portable copy of your medical information. If you have questions, please visit the Frequently Asked Questions section of the [employer] website. Remember, [employer] is NOT to be used for urgent needs. For medical emergencies, dial 911. Now available from your iPhone and Android! Please provide this summary of care documentation to your next provider. Your primary care clinician is listed as Irene Lao. If you have any questions after today's visit, please call [phone].

## 2018-02-19 NOTE — PATIENT INSTRUCTIONS
Learning About Low-Carbohydrate Diets for Weight Loss  What is a low-carbohydrate diet? Low-carb diets avoid foods that are high in carbohydrate. These high-carb foods include pasta, bread, rice, cereal, fruits, and starchy vegetables. Instead, these diets usually have you eat foods that are high in fat and protein. Many people lose weight quickly on a low-carb diet. But the early weight loss is water. People on this diet often gain the weight back after they start eating carbs again. Not all diet plans are safe or work well. A lot of the evidence shows that low-carb diets aren't healthy. That's because these diets often don't include healthy foods like fruits and vegetables. Losing weight safely means balancing protein, fat, and carbs with every meal and snack. And low-carb diets don't always provide the vitamins, minerals, and fiber you need. If you have a serious medical condition, talk to your doctor before you try any diet. These conditions include kidney disease, heart disease, type 2 diabetes, high cholesterol, and high blood pressure. If you are pregnant, it may not be safe for your baby if you are on a low-carb diet. How can you lose weight safely? You might have heard that a diet plan helped another person lose weight. But that doesn't mean that it will work for you. It is very hard to stay on a diet that includes lots of big changes in your eating habits. If you want to get to a healthy weight and stay there, making healthy lifestyle changes will often work better than dieting. These steps can help. · Make a plan for change. Work with your doctor to create a plan that is right for you. · See a dietitian. He or she can show you how to make healthy changes in your eating habits. · Manage stress. If you have a lot of stress in your life, it can be hard to focus on making healthy changes to your daily habits. · Track your food and activity.  You are likely to do better at losing weight if you keep track of what you eat and what you do. Follow-up care is a key part of your treatment and safety. Be sure to make and go to all appointments, and call your doctor if you are having problems. It's also a good idea to know your test results and keep a list of the medicines you take. Where can you learn more? Go to http://alhaji-tia.info/. Enter A121 in the search box to learn more about \"Learning About Low-Carbohydrate Diets for Weight Loss. \"  Current as of: May 12, 2017  Content Version: 11.4  © 4426-0032 [employer]. Care instructions adapted under license by [employer] (which disclaims liability or warranty for this information). If you have questions about a medical condition or this instruction, always ask your healthcare professional. [address] any warranty or liability for your use of this information. Learning About Carbohydrates  What are carbohydrates? Carbohydrates are an important nutrient you get from food. It's a great source of energy for your body and helps your brain and nervous system work properly. How does your body use carbohydrates? After you eat food with carbs in it, your body digests the carbohydrates and turns them into a kind of sugar that goes into your blood. The blood carries this sugar to the cells in your body. The cells use the sugar to give you energy. Extra sugar is stored in the cells for later use. If it isn't used, it turns into fat. Where do carbohydrates come from? The healthiest carbohydrate choices are breads, cereals, and pastas made with whole grains; brown rice; low-fat dairy products; vegetables; legumes such as peas, lentils, and beans; and fruits. Foods made from refined flour, including bread, pasta, doughnuts, cookies, and desserts, also contain carbohydrates. So do sweets such as candy and soda. How can you get the right kind and amount of carbs?   Eating too much of anything can lead to weight gain. And that can lead to other health problems. Here are some tips to help you eat the right amount of the right kind of carbs so you have the nutrition and the energy you need:  · Eat 3 to 8 servings of grains (breads, cereals, rice, pasta) each day. For example, a serving is 1 slice of bread, 1 cup of boxed cereal, or ½ cup of cooked rice, cooked pasta, or cooked cereal. Go to www. choosemyplate.gov to learn how many servings you need. ¨ Buy bread that lists whole wheat (or other whole grains), stone-ground wheat, or cracked wheat as the first ingredient. ¨ Eat brown rice, bulgur, or millet instead of white rice. ¨ Eat pasta and cereals made from whole grain flour instead of refined flour. · Eat several servings a day of fresh fruits and vegetables. These include raspberries, apples, figs, oranges, pears, prunes, broccoli, brussels sprouts, carrots, corn, peas, and beans. And there are lots of other fruits and vegetables to choose from. · Limit the amount of candy, desserts, and soda in your diet. Where can you learn more? Go to http://alhaji-tia.info/. Enter F199 in the search box to learn more about \"Learning About Carbohydrates. \"  Current as of: May 12, 2017  Content Version: 11.4  © 1864-8397 [employer]. Care instructions adapted under license by [employer] (which disclaims liability or warranty for this information). If you have questions about a medical condition or this instruction, always ask your healthcare professional. [place] [address] any warranty or liability for your use of this information. Learning About Changing a Habit by Setting Goals  How can you change a habit? If you've decided to change a habit-whether it's quitting smoking, lowering your blood pressure, becoming more active, or doing something else to improve your health-congratulations!  Making that decision is the first step toward making a change. What happens next? Have a reason. Set goals you can reach. Prepare for slip-ups. And get support. What's your reason? Your reason for wanting to change a habit is really important. Maybe you want to quit smoking so that you can avoid future health problems. Or maybe you want to eat a healthier diet so you can lose weight. If you have high blood pressure, your reason may be clear: to lower your blood pressure. Maybe you smoke and want to save money on cigarettes. You need to feel ready to make a change. If you don't feel ready now, that's okay. You can still be thinking and planning. When you truly want to make changes, you're ready for the next step. It's not easy to change habits-but you can do it. Taking the time to really think about what will motivate or inspire you will help you reach your goals. How do you set goals? · Focus on small goals. This will help you reach larger goals over time. With smaller goals, you'll have success more often, which will help you stay with it. For example, your large goal may be to lose 50 pounds. Your small goal could be to lose 5.  · Write down your goals. This will help you remember, and you'll have a clearer idea of what you want to achieve. Use a journal or notebook to record your goals. Hang up your plan where you will see it often as a reminder of what you're trying to do. · Make your goals specific. Specific goals help you measure your progress. For example, setting a goal to eat 5 helpings of fruits and vegetables 5 days a week is better than a general goal to \"eat more vegetables. \"  · Focus on one goal at a time. By doing this, you're less likely to feel overwhelmed and then give up. · When you reach a goal, reward yourself. Celebrate your new behavior and success for several days, and then think about setting your next goal.  How can you prepare for slip-ups?   It's perfectly normal to try to change a habit, go along fine for a while, and then have a setback. Lots of people try and try again before they reach their goals. What are the things that might cause a setback for you? If you have tried to change a habit before, think about what helped you and what got in your way. By thinking about these barriers now, you can plan ahead for how to deal with them if they happen. There will be times when you slip up and don't make your goal for the week. When that happens, don't get mad at yourself. Learn from the experience. Ask yourself what got in the way of reaching your goal. Positive thinking goes a long way when you're making lifestyle changes. How can you get support? · Get a partner. It's motivating to know that someone is trying to make the same change that you're making, like being more active or changing your eating habits. You have someone who is counting on you to help him or her succeed. That person can also remind you how far you've come. · Get friends and family involved. They can exercise with you or encourage you by saying how they admire what you are doing. Family members can join you in your healthy eating efforts. Don't be afraid to tell family and friends that their encouragement makes a big difference to you. · Join a class or support group. People in these groups often have some of the same barriers you have. They can give you support when you don't feel like staying with your plan. They can boost your morale when you need a lift. Choco Myles also find a number of online support groups. · Encourage yourself. When you feel like giving up, don't waste energy feeling bad about yourself. Remember your reason for wanting to change, think about the progress you've made, and give yourself a pep talk and a pat on the back. · Get professional help. A dietitian can help you make your diet healthier while still allowing you to eat foods that you enjoy. A  or physical therapist can help design an exercise program that is fun and easy to stay on.  A counselor, a , or your doctor can help you overcome hurdles, reduce stress, or quit smoking. Where can you learn more? Go to http://alhaji-tia.info/. Enter T822 in the search box to learn more about \"Learning About Changing a Habit by Setting Goals. \"  Current as of: May 12, 2017  Content Version: 11.4  © 7697-7858 "[employer]. Care instructions adapted under license by [employer] (which disclaims liability or warranty for this information). If you have questions about a medical condition or this instruction, always ask your healthcare professional. [place] [address] any warranty or liability for your use of this information.

## 2018-02-23 DIAGNOSIS — E66.01 OBESITY, MORBID (HCC): ICD-10-CM

## 2018-02-23 DIAGNOSIS — F41.1 GAD (GENERALIZED ANXIETY DISORDER): ICD-10-CM

## 2018-02-23 NOTE — TELEPHONE ENCOUNTER
Patient called stating that she would like a call back regarding her prescription buPROPion XL (WELLBUTRIN XL) 150 mg tablet. Patient can be reached at [phone].

## 2018-02-28 RX ORDER — BUPROPION HYDROCHLORIDE 150 MG/1
150 TABLET ORAL
Qty: 30 TAB | Refills: 3 | Status: SHIPPED | OUTPATIENT
Start: 2018-02-28 | End: 2018-04-03 | Stop reason: ALTCHOICE

## 2018-04-03 ENCOUNTER — OFFICE VISIT (OUTPATIENT)
Dept: FAMILY MEDICINE CLINIC | Age: 28
End: 2018-04-03

## 2018-04-03 VITALS
TEMPERATURE: 98.8 F | WEIGHT: 206 LBS | BODY MASS INDEX: 38.89 KG/M2 | OXYGEN SATURATION: 100 % | DIASTOLIC BLOOD PRESSURE: 78 MMHG | SYSTOLIC BLOOD PRESSURE: 116 MMHG | HEART RATE: 80 BPM | HEIGHT: 61 IN | RESPIRATION RATE: 16 BRPM

## 2018-04-03 DIAGNOSIS — F41.1 GAD (GENERALIZED ANXIETY DISORDER): Primary | ICD-10-CM

## 2018-04-03 RX ORDER — NORETHINDRONE ACETATE AND ETHINYL ESTRADIOL 1.5-30(21)
1 KIT ORAL DAILY
COMMUNITY
End: 2020-08-04 | Stop reason: ALTCHOICE

## 2018-04-03 NOTE — MR AVS SNAPSHOT
[facility] [id number] [phone] Patient: Ainsley Escalante MRN: [id number] UBU:[birth year] Visit Information Date & Time Provider Department Dept. Phone Encounter #  
 4/3/2018  4:30 PM Joce Eller MD [address] [facility] OFFICE-[facility] [phone] [id number] Your Appointments 5/23/2018 10:30 AM  
ROUTINE CARE with Joce Eller MD  
[facility] OFFICE[facility] ([address]) Appt Note: 3 mo f/u  
 [address] [phone] [facility] [phone] Upcoming Health Maintenance Date Due Influenza Age 5 to Adult 8/1/2017 PAP AKA CERVICAL CYTOLOGY 1/28/2019 DTaP/Tdap/Td series (2 - Td) 2/10/2024 Allergies as of 4/3/2018  Review Complete On: 4/3/2018 By: Joce Eller MD  
 No Known Allergies Current Immunizations  Reviewed on 2/23/2017 Name Date Influenza Vaccine 1/27/2014 Influenza Vaccine (Quad) PF 1/19/2017 Tdap 2/10/2014 Not reviewed this visit You Were Diagnosed With   
  
 Codes Comments TASIA (generalized anxiety disorder)    -  Primary ICD-10-CM: F41.1 ICD-9-CM: 300.02 Vitals BP Pulse Temp Resp Height(growth percentile) Weight(growth percentile) 116/78 (BP 1 Location: Right arm, BP Patient Position: Sitting) 80 98.8 °F (37.1 °C) (Oral) 16 5' 1\" (1.549 m) 206 lb (93.4 kg) LMP SpO2 BMI OB Status Smoking Status 03/19/2018 100% 38.92 kg/m2 Having regular periods Never Smoker BMI and BSA Data Body Mass Index Body Surface Area  
 38.92 kg/m 2 2 m 2 Preferred Pharmacy Pharmacy Name Phone [id number] [address] [phone] Your Updated Medication List  
  
   
 This list is accurate as of 4/3/18  5:22 PM.  Always use your most recent med list.  
  
  
  
  
 Blood-Glucose Meter monitoring kit Commonly known as:  TRUERESULT BLOOD GLUCOSE SYSTM Test blood sugar before breakfast, lunch dinner and before bedtime buPROPion  mg tablet Commonly known as:  Trinh Silvia Take 1 Tab by mouth every morning. busPIRone 7.5 mg tablet Commonly known as:  BUSPAR Take 7.5 mg by mouth every twelve (12) hours. clonazePAM 0.5 mg tablet Commonly known as:  Cyrilla Mayans Take 1 Tab by mouth two (2) times a day. Max Daily Amount: 1 mg. Indications: ESSENTIAL TREMOR, PANIC DISORDER  
  
 cyclobenzaprine 10 mg tablet Commonly known as:  FLEXERIL Take 1 Tab by mouth nightly. Indications: MUSCLE SPASM * glucose blood VI test strips strip Commonly known as:  ADVANCED GLUC METER TEST STRIP Test blood sugar before breakfast, lunch , dinner and bedtime * glucose blood VI test strips strip Commonly known as:  TRUETEST TEST STRIPS Test blood sugar before breakfast, lunch, dinner and before bedtime. JUNEL FE 1.5/30 (28) 1.5 mg-30 mcg (21)/75 mg (7) Tab Generic drug:  norethindrone-ethinyl estradiol-iron Take 1 Tab by mouth daily. Lancets Misc Test blood sugar before breakfast, lunch, dinner and bedtime. liraglutide 3 mg/0.5 mL (18 mg/3 mL) pen Commonly known as:  SAXENDA  
0.5 mL by SubCUTAneous route daily. Indications: WEIGHT LOSS MANAGEMENT FOR OBESE PATIENT (BMI >= 30)  
  
 naproxen 500 mg tablet Commonly known as:  NAPROSYN Take 1 Tab by mouth two (2) times daily (with meals). Indications: PAIN  
  
 norgestimate-ethinyl estradiol 0.25-35 mg-mcg Tab Commonly known as:  [facility] (28) Take 1 tablet by mouth daily. * Notice: This list has 2 medication(s) that are the same as other medications prescribed for you.  Read the directions carefully, and ask your doctor or other care provider to review them with you. Prescriptions Printed Refills  
 liraglutide (SAXENDA) 3 mg/0.5 mL (18 mg/3 mL) pen 6 Si.5 mL by SubCUTAneous route daily. Indications: WEIGHT LOSS MANAGEMENT FOR OBESE PATIENT (BMI >= 30) Class: Print Route: SubCUTAneous Introducing [facility]! Rodrick Peoples introduces [employer] patient portal. Now you can access parts of your medical record, email your doctor's office, and request medication refills online. 1. In your internet browser, go to [email or web address] 2. Click on the First Time User? Click Here link in the Sign In box. You will see the New Member Sign Up page. 3. Enter your [employer] Access Code exactly as it appears below. You will not need to use this code after youve completed the sign-up process. If you do not sign up before the expiration date, you must request a new code. · [employer] Access Code: [email or web address] Expires: 2018  1:15 PM 
 
4. Enter the last four digits of your Social Security Number (xxxx) and Date of Birth (mm/dd/yyyy) as indicated and click Submit. You will be taken to the next sign-up page. 5. Create a [employer] ID. This will be your [employer] login ID and cannot be changed, so think of one that is secure and easy to remember. 6. Create a [employer] password. You can change your password at any time. 7. Enter your Password Reset Question and Answer. This can be used at a later time if you forget your password. 8. Enter your e-mail address. You will receive e-mail notification when new information is available in [address] 9. Click Sign Up. You can now view and download portions of your medical record. 10. Click the Download Summary menu link to download a portable copy of your medical information. If you have questions, please visit the Frequently Asked Questions section of the [employer] website.  Remember, [employer] is NOT to be used for urgent needs. For medical emergencies, dial 911. Now available from your iPhone and Android! Please provide this summary of care documentation to your next provider. Your primary care clinician is listed as Irina Hurl. If you have any questions after today's visit, please call [phone].

## 2018-04-03 NOTE — PATIENT INSTRUCTIONS
Learning About Low-Carbohydrate Diets for Weight Loss  What is a low-carbohydrate diet? Low-carb diets avoid foods that are high in carbohydrate. These high-carb foods include pasta, bread, rice, cereal, fruits, and starchy vegetables. Instead, these diets usually have you eat foods that are high in fat and protein. Many people lose weight quickly on a low-carb diet. But the early weight loss is water. People on this diet often gain the weight back after they start eating carbs again. Not all diet plans are safe or work well. A lot of the evidence shows that low-carb diets aren't healthy. That's because these diets often don't include healthy foods like fruits and vegetables. Losing weight safely means balancing protein, fat, and carbs with every meal and snack. And low-carb diets don't always provide the vitamins, minerals, and fiber you need. If you have a serious medical condition, talk to your doctor before you try any diet. These conditions include kidney disease, heart disease, type 2 diabetes, high cholesterol, and high blood pressure. If you are pregnant, it may not be safe for your baby if you are on a low-carb diet. How can you lose weight safely? You might have heard that a diet plan helped another person lose weight. But that doesn't mean that it will work for you. It is very hard to stay on a diet that includes lots of big changes in your eating habits. If you want to get to a healthy weight and stay there, making healthy lifestyle changes will often work better than dieting. These steps can help. · Make a plan for change. Work with your doctor to create a plan that is right for you. · See a dietitian. He or she can show you how to make healthy changes in your eating habits. · Manage stress. If you have a lot of stress in your life, it can be hard to focus on making healthy changes to your daily habits. · Track your food and activity.  You are likely to do better at losing weight if you keep track of what you eat and what you do. Follow-up care is a key part of your treatment and safety. Be sure to make and go to all appointments, and call your doctor if you are having problems. It's also a good idea to know your test results and keep a list of the medicines you take. Where can you learn more? Go to http://alhaji-tia.info/. Enter A121 in the search box to learn more about \"Learning About Low-Carbohydrate Diets for Weight Loss. \"  Current as of: May 12, 2017  Content Version: 11.4  © 5432-5621 [employer]wise, [employer]. Care instructions adapted under license by [employer]alidea (which disclaims liability or warranty for this information). If you have questions about a medical condition or this instruction, always ask your healthcare professional. [address] any warranty or liability for your use of this information.

## 2018-04-03 NOTE — PROGRESS NOTES
Bettejane Brunner is a 33 y.o. female    Chief Complaint   Patient presents with    Anxiety     follow up     1. Have you been to the ER, urgent care clinic since your last visit? Hospitalized since your last visit? No    2. Have you seen or consulted any other health care providers outside of the [address] [place] [address] since your last visit? Include any pap smears or colon screening.  No     Health Maintenance Due   Topic Date Due    Influenza Age 5 to Adult  08/01/2017     Patient states didn't get influenza shot and didn't want it today

## 2018-04-03 NOTE — PROGRESS NOTES
HISTORY OF PRESENT ILLNESS  Macario Haque is a 33 y.o. female. HPI   Depression with the anxiety and panic states of mind and high BMI    Not controlled with the current meds, took it for 4 wks and then it double the dose state that it was helping with the craving and appetite,  States that she is on meds at this time except for buspar as needed and contraceptive, Patient state that it isnot getting better: pt states and reports of feeling  anxius, guilty feeling,  No Hoplessness, no feeling of  Wanting to heart self or others, less trouble with weight gain for which is her main concern and she wanted to loose as much as wt as possible, no tendency of etoh or illicit drug use, more ability of sleep, stable ablitiy  to concentrate at work( she is only able to work 8-12 hrs per week at this time) and at home with the current medications,and all together a safe feeling at home and at work      Current Outpatient Prescriptions   Medication Sig Dispense Refill    norethindrone-ethinyl estradiol-iron (JUNEL FE 1.5/30, 28,) 1.5 mg-30 mcg (21)/75 mg (7) tab Take 1 Tab by mouth daily.  buPROPion XL (WELLBUTRIN XL) 150 mg tablet Take 1 Tab by mouth every morning. 30 Tab 3    busPIRone (BUSPAR) 7.5 mg tablet Take 7.5 mg by mouth every twelve (12) hours.  clonazePAM (KLONOPIN) 0.5 mg tablet Take 1 Tab by mouth two (2) times a day. Max Daily Amount: 1 mg. Indications: ESSENTIAL TREMOR, PANIC DISORDER (Patient not taking: Reported on 2/19/2018) 60 Tab 0    Blood-Glucose Meter (TRUERESULT BLOOD GLUCOSE SYSTM) monitoring kit Test blood sugar before breakfast, lunch dinner and before bedtime 1 Kit 11    glucose blood VI test strips (TRUETEST TEST STRIPS) strip Test blood sugar before breakfast, lunch, dinner and before bedtime.  100 Strip 11    glucose blood VI test strips (ADVANCED GLUC METER TEST STRIP) strip Test blood sugar before breakfast, lunch , dinner and bedtime 100 Strip 6    Lancets misc Test blood sugar before breakfast, lunch, dinner and bedtime. 1 Package 11    cyclobenzaprine (FLEXERIL) 10 mg tablet Take 1 Tab by mouth nightly. Indications: MUSCLE SPASM 30 Tab 0    naproxen (NAPROSYN) 500 mg tablet Take 1 Tab by mouth two (2) times daily (with meals). Indications: PAIN (Patient not taking: Reported on 2/19/2018) 30 Tab 0    norgestimate-ethinyl estradiol (SPRINTEC, 28,) 0.25-35 mg-mcg per tablet Take 1 tablet by mouth daily. (Patient not taking: Reported on 2/19/2018) 1 Package 12     No Known Allergies  Past Medical History:   Diagnosis Date    BMI 40.0-44.9, adult ([facility] [address] 2/19/2018    Chronic back pain greater than 3 months duration 7/29/2015    TASIA (generalized anxiety disorder) 1/19/2017    High-risk sexual behavior 2/23/2017    HX OTHER MEDICAL     chlamydia age 24, treated & negative now    Hypoglycemia 3/30/2016    Ill-defined condition     Insulinoma 4/6/2016    Morbid obesity ([facility] [address]     Syncopal seizure ([facility] [address] 11/19/2015    Thyromegaly 3/30/2016    Urinary frequency 11/19/2015     Past Surgical History:   Procedure Laterality Date    HX CHOLECYSTECTOMY  10/6/2014    lap.  tracee    HX DILATION AND CURETTAGE  6/2011    HX OTHER SURGICAL      D&C with SAB 6/2011    HX OTHER SURGICAL      wisdom extraction 14 years    REVISION CERVIX [facility] [address]  2013     Family History   Problem Relation Age of Onset    Hypertension Mother     Diabetes Mother     High Cholesterol Father     Other Father      high cholesterole, herniated disc    Diabetes Paternal Aunt     Hypertension Maternal Grandmother     COPD Maternal Grandmother     Emphysema Maternal Grandmother     Diabetes Paternal Grandmother      Social History   Substance Use Topics    Smoking status: Never Smoker    Smokeless tobacco: Never Used    Alcohol use No      Lab Results  Component Value Date/Time   WBC 5.4 02/23/2017 12:37 PM   HGB 12.3 02/23/2017 12:37 PM   Hemoglobin (POC) 12.9 06/06/2015 06:39 PM   HCT 37.9 02/23/2017 12:37 PM   Hematocrit (POC) 38 06/06/2015 06:39 PM   PLATELET 713 97/85/3929 12:37 PM   MCV 84 02/23/2017 12:37 PM   Hgb, External 12.4 10/14/2013   Hct, External 36.7 10/14/2013   Platelet cnt., External 304 10/14/2013     Lab Results  Component Value Date/Time   Hemoglobin A1c 6.2 (H) 03/30/2016 05:01 PM   Hemoglobin A1c 6.4 (H) 02/07/2014 11:37 AM   Glucose 73 02/23/2017 12:37 PM   Glucose (POC) 96 06/06/2015 06:39 PM   Glucose  04/06/2016 09:31 AM   LDL, calculated 88 02/23/2017 12:37 PM   Creatinine (POC) 0.7 06/06/2015 06:39 PM   Creatinine 0.77 02/23/2017 12:37 PM      Lab Results  Component Value Date/Time   Cholesterol, total 145 02/23/2017 12:37 PM   HDL Cholesterol 45 02/23/2017 12:37 PM   LDL, calculated 88 02/23/2017 12:37 PM   Triglyceride 62 02/23/2017 12:37 PM     Lab Results  Component Value Date/Time   ALT (SGPT) 19 02/23/2017 12:37 PM   AST (SGOT) 28 02/23/2017 12:37 PM   Alk. phosphatase 55 02/23/2017 12:37 PM   Bilirubin, direct 0.05 04/08/2014 11:00 AM   Bilirubin, total 0.2 02/23/2017 12:37 PM   Albumin 4.3 02/23/2017 12:37 PM   Protein, total 6.6 02/23/2017 12:37 PM   PLATELET 746 31/08/5809 12:37 PM   Platelet cnt., External 304 10/14/2013       Lab Results  Component Value Date/Time   TSH 1.980 02/23/2017 12:37 PM   T4, Free 1.04 04/06/2016 09:31 AM         Review of Systems   Constitutional: Negative for chills, fever and malaise/fatigue. HENT: Negative for congestion and nosebleeds. Eyes: Negative for blurred vision and pain. Respiratory: Negative for shortness of breath and wheezing. Cardiovascular: Negative for chest pain, palpitations and leg swelling. Gastrointestinal: Negative for constipation, diarrhea, nausea and vomiting. Genitourinary: Negative for dysuria and frequency. Musculoskeletal: Negative for joint pain and myalgias. Skin: Negative for itching and rash. Neurological: Negative for dizziness, loss of consciousness and headaches. Endo/Heme/Allergies: Does not bruise/bleed easily. Psychiatric/Behavioral: Negative for depression. The patient is not nervous/anxious and does not have insomnia. All other systems reviewed and are negative. Physical Exam   Constitutional: She is oriented to person, place, and time. She appears well-developed and well-nourished. HENT:   Head: Normocephalic and atraumatic. Eyes: Conjunctivae and EOM are normal. Pupils are equal, round, and reactive to light. Neck: No JVD present. No thyromegaly present. Cardiovascular: Normal rate, regular rhythm, normal heart sounds and intact distal pulses. Exam reveals no gallop and no friction rub. No murmur heard. Pulmonary/Chest: Effort normal and breath sounds normal. No stridor. No respiratory distress. She has no wheezes. She has no rales. Abdominal: Soft. Bowel sounds are normal. She exhibits no distension and no mass. There is no tenderness. Musculoskeletal: Normal range of motion. She exhibits no edema or tenderness. Lymphadenopathy:     She has no cervical adenopathy. Neurological: She is alert and oriented to person, place, and time. She has normal reflexes. No cranial nerve deficit. Skin: No rash noted. No erythema. Psychiatric: She has a normal mood and affect. Her behavior is normal.   Nursing note and vitals reviewed. ASSESSMENT and PLAN  Diagnoses and all orders for this visit:    1. TASIA (generalized anxiety disorder)    2. BMI 40.0-44.9, adult ([facility] [address]    3. BMI 38.0-38.9,adult    Other orders  -     liraglutide (SAXENDA) 3 mg/0.5 mL (18 mg/3 mL) pen; 0.5 mL by SubCUTAneous route daily.  Indications: WEIGHT LOSS MANAGEMENT FOR OBESE PATIENT (BMI >= 30)       I have recommended the following steps for improving the current weight, pt was counseled on to try to Decrease calori intake by 500per day, limit the amount of intake and portion the meals, T  Pt agreed with all the recommendations

## 2018-04-04 RX ORDER — PHENTERMINE HYDROCHLORIDE 37.5 MG/1
37.5 TABLET ORAL
Qty: 30 TAB | Refills: 0 | Status: SHIPPED | OUTPATIENT
Start: 2018-04-04 | End: 2018-05-02 | Stop reason: SDUPTHER

## 2018-04-04 RX ORDER — PHENTERMINE HYDROCHLORIDE 37.5 MG/1
37.5 TABLET ORAL
Status: CANCELLED | OUTPATIENT
Start: 2018-04-04

## 2018-05-02 ENCOUNTER — OFFICE VISIT (OUTPATIENT)
Dept: FAMILY MEDICINE CLINIC | Age: 28
End: 2018-05-02

## 2018-05-02 VITALS
HEIGHT: 61 IN | RESPIRATION RATE: 14 BRPM | BODY MASS INDEX: 37.61 KG/M2 | HEART RATE: 91 BPM | SYSTOLIC BLOOD PRESSURE: 115 MMHG | TEMPERATURE: 97.3 F | DIASTOLIC BLOOD PRESSURE: 74 MMHG | WEIGHT: 199.2 LBS | OXYGEN SATURATION: 100 %

## 2018-05-02 DIAGNOSIS — F41.1 GAD (GENERALIZED ANXIETY DISORDER): ICD-10-CM

## 2018-05-02 DIAGNOSIS — E66.01 OBESITY, MORBID (HCC): ICD-10-CM

## 2018-05-02 PROBLEM — F41.9 CHRONIC ANXIETY: Status: ACTIVE | Noted: 2017-12-14

## 2018-05-02 PROBLEM — E66.9 OBESITY: Status: ACTIVE | Noted: 2018-02-19

## 2018-05-02 PROBLEM — R73.03 PREDIABETES: Status: ACTIVE | Noted: 2017-12-14

## 2018-05-02 RX ORDER — PHENTERMINE HYDROCHLORIDE 37.5 MG/1
37.5 TABLET ORAL
Qty: 30 TAB | Refills: 0 | Status: SHIPPED | OUTPATIENT
Start: 2018-05-02 | End: 2018-05-30 | Stop reason: ALTCHOICE

## 2018-05-02 RX ORDER — PHENTERMINE HYDROCHLORIDE 37.5 MG/1
37.5 TABLET ORAL
Qty: 30 TAB | Refills: 0 | Status: SHIPPED | OUTPATIENT
Start: 2018-07-02 | End: 2018-05-30 | Stop reason: ALTCHOICE

## 2018-05-02 RX ORDER — TOPIRAMATE 25 MG/1
25 TABLET ORAL 2 TIMES DAILY WITH MEALS
Qty: 60 TAB | Refills: 3 | Status: SHIPPED | OUTPATIENT
Start: 2018-05-02 | End: 2018-09-04 | Stop reason: ALTCHOICE

## 2018-05-02 RX ORDER — PHENTERMINE HYDROCHLORIDE 37.5 MG/1
37.5 TABLET ORAL
Qty: 30 TAB | Refills: 0 | Status: SHIPPED | OUTPATIENT
Start: 2018-06-02 | End: 2018-05-30 | Stop reason: ALTCHOICE

## 2018-05-02 RX ORDER — BUSPIRONE HYDROCHLORIDE 7.5 MG/1
7.5 TABLET ORAL EVERY 12 HOURS
Qty: 60 TAB | Refills: 3 | Status: SHIPPED | OUTPATIENT
Start: 2018-05-02 | End: 2018-09-10 | Stop reason: SDUPTHER

## 2018-05-02 NOTE — PROGRESS NOTES
HISTORY OF PRESENT ILLNESS  Georgia Landaverde is a 33 y.o. female. HPI   Obesity  The pt is feeling very good on this meds, feeling less tired and fatigued, becoming to be more concentrated at work and at home, getting along very well with family member and the work place, in addition the pt is becoming to be more active and having daily multiple healthy different diets,  Is more involved with the weekly routine exercises, not a fast fooder, states that the pt does not eat too much as used to do and the portion are very well controlled, likes very much to continue on this medication despite knowing that it is not a safe meds, and  needs to be monitored q3 months and if any thing of unusual, the pt was told if any needs to call us and let us know of any  concern regarding the current meds, hopefully has not had any concern so far,     Current Outpatient Prescriptions   Medication Sig Dispense Refill    phentermine (ADIPEX-P) 37.5 mg tablet Take 1 Tab by mouth every morning. Max Daily Amount: 37.5 mg. 30 Tab 0    norethindrone-ethinyl estradiol-iron (JUNEL FE 1.5/30, 28,) 1.5 mg-30 mcg (21)/75 mg (7) tab Take 1 Tab by mouth daily.  busPIRone (BUSPAR) 7.5 mg tablet Take 7.5 mg by mouth every twelve (12) hours.  Blood-Glucose Meter (TRUERESULT BLOOD GLUCOSE SYSTM) monitoring kit Test blood sugar before breakfast, lunch dinner and before bedtime 1 Kit 11    glucose blood VI test strips (TRUETEST TEST STRIPS) strip Test blood sugar before breakfast, lunch, dinner and before bedtime. 100 Strip 11    glucose blood VI test strips (ADVANCED GLUC METER TEST STRIP) strip Test blood sugar before breakfast, lunch , dinner and bedtime 100 Strip 6    Lancets misc Test blood sugar before breakfast, lunch, dinner and bedtime. 1 Package 11    naproxen (NAPROSYN) 500 mg tablet Take 1 Tab by mouth two (2) times daily (with meals).  Indications: PAIN (Patient not taking: Reported on 2/19/2018) 30 Tab 0    norgestimate-ethinyl estradiol ([facility], ,) 0.25-35 mg-mcg per tablet Take 1 tablet by mouth daily. (Patient not taking: Reported on 2/19/2018) 1 Package 12     No Known Allergies  Past Medical History:   Diagnosis Date    BMI 38.0-38.9,adult 4/3/2018    BMI 40.0-44.9, adult ([address] 2/19/2018    Chronic back pain greater than 3 months duration 7/29/2015    TASIA (generalized anxiety disorder) 1/19/2017    High-risk sexual behavior 2/23/2017    HX OTHER MEDICAL     chlamydia age 24, treated & negative now    Hypoglycemia 3/30/2016    Ill-defined condition     Insulinoma 4/6/2016    Morbid obesity ([address]     Syncopal seizure ([facility] [address] 11/19/2015    Thyromegaly 3/30/2016    Urinary frequency 11/19/2015     Past Surgical History:   Procedure Laterality Date    HX CHOLECYSTECTOMY  10/6/2014    lap.  tracee    HX DILATION AND CURETTAGE  6/2011    HX OTHER SURGICAL      D&C with SAB 6/2011    HX OTHER SURGICAL      wisdom extraction 15 years    REVISION CERVIX [address]  2013     Family History   Problem Relation Age of Onset    Hypertension Mother     Diabetes Mother     High Cholesterol Father     Other Father      high cholesterole, herniated disc    Diabetes Paternal Aunt     Hypertension Maternal Grandmother     COPD Maternal Grandmother     Emphysema Maternal Grandmother     Diabetes Paternal Grandmother      Social History   Substance Use Topics    Smoking status: Never Smoker    Smokeless tobacco: Never Used    Alcohol use No      Lab Results  Component Value Date/Time   WBC 5.4 02/23/2017 12:37 PM   HGB 12.3 02/23/2017 12:37 PM   Hemoglobin (POC) 12.9 06/06/2015 06:39 PM   HCT 37.9 02/23/2017 12:37 PM   Hematocrit (POC) 38 06/06/2015 06:39 PM   PLATELET 926 59/99/2722 12:37 PM   MCV 84 02/23/2017 12:37 PM   Hgb, External 12.4 10/14/2013   Hct, External 36.7 10/14/2013   Platelet cnt., External 304 10/14/2013     Lab Results  Component Value Date/Time   Hemoglobin A1c 6.2 (H) 03/30/2016 05:01 PM   Hemoglobin A1c 6.4 (H) 02/07/2014 11:37 AM   Glucose 73 02/23/2017 12:37 PM   Glucose (POC) 96 06/06/2015 06:39 PM   Glucose  04/06/2016 09:31 AM   LDL, calculated 88 02/23/2017 12:37 PM   Creatinine (POC) 0.7 06/06/2015 06:39 PM   Creatinine 0.77 02/23/2017 12:37 PM      Lab Results  Component Value Date/Time   Cholesterol, total 145 02/23/2017 12:37 PM   HDL Cholesterol 45 02/23/2017 12:37 PM   LDL, calculated 88 02/23/2017 12:37 PM   Triglyceride 62 02/23/2017 12:37 PM     Lab Results  Component Value Date/Time   TSH 1.980 02/23/2017 12:37 PM   T4, Free 1.04 04/06/2016 09:31 AM         Review of Systems   Constitutional: Negative for chills, fever and malaise/fatigue. HENT: Negative for congestion and nosebleeds. Eyes: Negative for blurred vision and pain. Respiratory: Negative for shortness of breath and wheezing. Cardiovascular: Negative for chest pain, palpitations and leg swelling. Gastrointestinal: Negative for constipation, diarrhea, nausea and vomiting. Genitourinary: Negative for dysuria and frequency. Musculoskeletal: Negative for joint pain and myalgias. Skin: Negative for itching and rash. Neurological: Negative for dizziness, loss of consciousness and headaches. Endo/Heme/Allergies: Does not bruise/bleed easily. Psychiatric/Behavioral: Negative for depression. The patient is not nervous/anxious and does not have insomnia. All other systems reviewed and are negative. Physical Exam   Constitutional: She is oriented to person, place, and time. She appears well-developed and well-nourished. HENT:   Head: Normocephalic and atraumatic. Eyes: Conjunctivae and EOM are normal. Pupils are equal, round, and reactive to light. Neck: No JVD present. No thyromegaly present. Cardiovascular: Normal rate, regular rhythm, normal heart sounds and intact distal pulses. Exam reveals no gallop and no friction rub. No murmur heard.   Pulmonary/Chest: Effort normal and breath sounds normal. No stridor. No respiratory distress. She has no wheezes. She has no rales. Abdominal: Soft. Bowel sounds are normal. She exhibits no distension and no mass. There is no tenderness. Musculoskeletal: Normal range of motion. She exhibits no edema or tenderness. Lymphadenopathy:     She has no cervical adenopathy. Neurological: She is alert and oriented to person, place, and time. She has normal reflexes. No cranial nerve deficit. Skin: No rash noted. No erythema. Psychiatric: She has a normal mood and affect. Her behavior is normal.   Nursing note and vitals reviewed. ASSESSMENT and PLAN  Diagnoses and all orders for this visit:    1. TASIA (generalized anxiety disorder)  -     topiramate (TOPAMAX) 25 mg tablet; Take 1 Tab by mouth two (2) times daily (with meals). -     phentermine (ADIPEX-P) 37.5 mg tablet; Take 1 Tab by mouth every morning. Max Daily Amount: 37.5 mg.  -     phentermine (ADIPEX-P) 37.5 mg tablet; Take 1 Tab by mouth every morning. Max Daily Amount: 37.5 mg.  -     phentermine (ADIPEX-P) 37.5 mg tablet; Take 1 Tab by mouth every morning. Max Daily Amount: 37.5 mg.  -     busPIRone (BUSPAR) 7.5 mg tablet; Take 1 Tab by mouth every twelve (12) hours. 2. BMI 40.0-44.9, adult (HCC)  -     topiramate (TOPAMAX) 25 mg tablet; Take 1 Tab by mouth two (2) times daily (with meals). -     phentermine (ADIPEX-P) 37.5 mg tablet; Take 1 Tab by mouth every morning. Max Daily Amount: 37.5 mg.  -     phentermine (ADIPEX-P) 37.5 mg tablet; Take 1 Tab by mouth every morning. Max Daily Amount: 37.5 mg.  -     phentermine (ADIPEX-P) 37.5 mg tablet; Take 1 Tab by mouth every morning. Max Daily Amount: 37.5 mg.  -     busPIRone (BUSPAR) 7.5 mg tablet; Take 1 Tab by mouth every twelve (12) hours. 3. BMI 38.0-38.9,adult  -     topiramate (TOPAMAX) 25 mg tablet; Take 1 Tab by mouth two (2) times daily (with meals). -     phentermine (ADIPEX-P) 37.5 mg tablet;  Take 1 Tab by mouth every morning. Max Daily Amount: 37.5 mg.  -     phentermine (ADIPEX-P) 37.5 mg tablet; Take 1 Tab by mouth every morning. Max Daily Amount: 37.5 mg.  -     phentermine (ADIPEX-P) 37.5 mg tablet; Take 1 Tab by mouth every morning. Max Daily Amount: 37.5 mg.    4. Obesity, morbid (HCC)  -     busPIRone (BUSPAR) 7.5 mg tablet; Take 1 Tab by mouth every twelve (12) hours. I have recommended the following steps for improving the current weight, pt was counseled on to try to Decrease calori intake by 500per day, continue current healthy lifestyle patterns.  Attend weight loss classes and lectures, Pt agreed with all the recommendations

## 2018-05-02 NOTE — PATIENT INSTRUCTIONS
Learning About Low-Carbohydrate Diets for Weight Loss  What is a low-carbohydrate diet? Low-carb diets avoid foods that are high in carbohydrate. These high-carb foods include pasta, bread, rice, cereal, fruits, and starchy vegetables. Instead, these diets usually have you eat foods that are high in fat and protein. Many people lose weight quickly on a low-carb diet. But the early weight loss is water. People on this diet often gain the weight back after they start eating carbs again. Not all diet plans are safe or work well. A lot of the evidence shows that low-carb diets aren't healthy. That's because these diets often don't include healthy foods like fruits and vegetables. Losing weight safely means balancing protein, fat, and carbs with every meal and snack. And low-carb diets don't always provide the vitamins, minerals, and fiber you need. If you have a serious medical condition, talk to your doctor before you try any diet. These conditions include kidney disease, heart disease, type 2 diabetes, high cholesterol, and high blood pressure. If you are pregnant, it may not be safe for your baby if you are on a low-carb diet. How can you lose weight safely? You might have heard that a diet plan helped another person lose weight. But that doesn't mean that it will work for you. It is very hard to stay on a diet that includes lots of big changes in your eating habits. If you want to get to a healthy weight and stay there, making healthy lifestyle changes will often work better than dieting. These steps can help. · Make a plan for change. Work with your doctor to create a plan that is right for you. · See a dietitian. He or she can show you how to make healthy changes in your eating habits. · Manage stress. If you have a lot of stress in your life, it can be hard to focus on making healthy changes to your daily habits. · Track your food and activity.  You are likely to do better at losing weight if you keep track of what you eat and what you do. Follow-up care is a key part of your treatment and safety. Be sure to make and go to all appointments, and call your doctor if you are having problems. It's also a good idea to know your test results and keep a list of the medicines you take. Where can you learn more? Go to http://alhaji-tia.info/. Enter A121 in the search box to learn more about \"Learning About Low-Carbohydrate Diets for Weight Loss. \"  Current as of: May 12, 2017  Content Version: 11.4  © 4181-9096 [employer], [employer]. Care instructions adapted under license by [employer] (which disclaims liability or warranty for this information). If you have questions about a medical condition or this instruction, always ask your healthcare professional. [address] any warranty or liability for your use of this information.

## 2018-05-02 NOTE — MR AVS SNAPSHOT
[address] [facility] [place] [id number] [phone] Patient: Francine Mcgowan MRN: [id number] Visit Information Date & Time Provider Department Dept. Phone Encounter #  
 5/2/2018  4:15 PM Juancarlos Leiva MD [facility] OFFICE-ANNEX [phone] [id number] Follow-up Instructions Return in about 3 months (around 8/2/2018), or if symptoms worsen or fail to improve. Upcoming Health Maintenance Date Due Influenza Age 5 to Adult 8/1/2018 PAP AKA CERVICAL CYTOLOGY 1/28/2019 DTaP/Tdap/Td series (2 - Td) 2/10/2024 Allergies as of 5/2/2018  Review Complete On: 5/2/2018 By: Juancarlos Leiva MD  
 No Known Allergies Current Immunizations  Reviewed on 2/23/2017 Name Date Influenza Vaccine 1/27/2014 Influenza Vaccine (Quad) PF 1/19/2017 Tdap 2/10/2014 Not reviewed this visit You Were Diagnosed With   
  
 Codes Comments TASIA (generalized anxiety disorder)     ICD-10-CM: F41.1 ICD-9-CM: 300.02 BMI 40.0-44.9, adult ([facility]     ICD-10-CM: Z68.41 
ICD-9-CM: V85.41   
 BMI 38.0-38.9,adult     ICD-10-CM: R33.98 
ICD-9-CM: V85.38 Vitals BP Pulse Temp Resp Height(growth percentile) Weight(growth percentile) 115/74 (BP 1 Location: Left arm, BP Patient Position: At rest) 91 97.3 °F (36.3 °C) (Oral) 14 5' 1\" (1.549 m) 199 lb 3.2 oz (90.4 kg) LMP SpO2 BMI OB Status Smoking Status 04/15/2018 100% 37.64 kg/m2 Having regular periods Never Smoker Vitals History BMI and BSA Data Body Mass Index Body Surface Area  
 37.64 kg/m 2 1.97 m 2 Preferred Pharmacy Pharmacy [facility] Phone [facility] [address] [phone] Your Updated Medication List  
  
   
This list is accurate as of 5/2/18  4:55 PM.  Always use your most recent med list.  
  
  
  
  
 Blood-Glucose Meter monitoring kit Commonly known as:  TRUERESULT BLOOD GLUCOSE SYSTM Test blood sugar before breakfast, lunch dinner and before bedtime  
  
 busPIRone 7.5 mg tablet Commonly known as:  BUSPAR Take 7.5 mg by mouth every twelve (12) hours. * glucose blood VI test strips strip Commonly known as:  ADVANCED GLUC METER TEST STRIP Test blood sugar before breakfast, lunch , dinner and bedtime * glucose blood VI test strips strip Commonly known as:  TRUETEST TEST STRIPS Test blood sugar before breakfast, lunch, dinner and before bedtime. JUNEL FE 1.5/30 (28) 1.5 mg-30 mcg (21)/75 mg (7) Tab Generic drug:  norethindrone-ethinyl estradiol-iron Take 1 Tab by mouth daily. Lancets Misc Test blood sugar before breakfast, lunch, dinner and bedtime. naproxen 500 mg tablet Commonly known as:  NAPROSYN Take 1 Tab by mouth two (2) times daily (with meals). Indications: PAIN  
  
 norgestimate-ethinyl estradiol 0.25-35 mg-mcg Tab Commonly known as:  [facility] (28) Take 1 tablet by mouth daily. * phentermine 37.5 mg tablet Commonly known as:  ADIPEX-P Take 1 Tab by mouth every morning. Max Daily Amount: 37.5 mg.  
  
 * phentermine 37.5 mg tablet Commonly known as:  ADIPEX-P Take 1 Tab by mouth every morning. Max Daily Amount: 37.5 mg.  
Start taking on:  6/2/2018 * phentermine 37.5 mg tablet Commonly known as:  ADIPEX-P Take 1 Tab by mouth every morning. Max Daily Amount: 37.5 mg.  
Start taking on:  7/2/2018  
  
 topiramate 25 mg tablet Commonly known as:  TOPAMAX Take 1 Tab by mouth two (2) times daily (with meals). * Notice: This list has 5 medication(s) that are the same as other medications prescribed for you. Read the directions carefully, and ask your doctor or other care provider to review them with you. Prescriptions Printed  Refills  
 phentermine (ADIPEX-P) 37.5 mg tablet 0  
 Starting on: 7/2/2018 Sig: Take 1 Tab by mouth every morning. Max Daily Amount: 37.5 mg.  
 Class: Print Route: Oral  
 phentermine (ADIPEX-P) 37.5 mg tablet 0 Starting on: 6/2/2018 Sig: Take 1 Tab by mouth every morning. Max Daily Amount: 37.5 mg.  
 Class: Print Route: Oral  
 phentermine (ADIPEX-P) 37.5 mg tablet 0 Sig: Take 1 Tab by mouth every morning. Max Daily Amount: 37.5 mg.  
 Class: Print Route: Oral  
  
Prescriptions Sent to Pharmacy Refills  
 topiramate (TOPAMAX) 25 mg tablet 3 Sig: Take 1 Tab by mouth two (2) times daily (with meals). Class: Normal  
 Pharmacy: [address] [place] [address] #: [phone] Route: Oral  
  
Follow-up Instructions Return in about 3 months (around 8/2/2018), or if symptoms worsen or fail to improve. Patient Instructions Learning About Low-Carbohydrate Diets for Weight Loss What is a low-carbohydrate diet? Low-carb diets avoid foods that are high in carbohydrate. These high-carb foods include pasta, bread, rice, cereal, fruits, and starchy vegetables. Instead, these diets usually have you eat foods that are high in fat and protein. Many people lose weight quickly on a low-carb diet. But the early weight loss is water. People on this diet often gain the weight back after they start eating carbs again. Not all diet plans are safe or work well. A lot of the evidence shows that low-carb diets aren't healthy. That's because these diets often don't include healthy foods like fruits and vegetables. Losing weight safely means balancing protein, fat, and carbs with every meal and snack. And low-carb diets don't always provide the vitamins, minerals, and fiber you need. If you have a serious medical condition, talk to your doctor before you try any diet. These conditions include kidney disease, heart disease, type 2 diabetes, high cholesterol, and high blood pressure. If you are pregnant, it may not be safe for your baby if you are on a low-carb diet. How can you lose weight safely? You might have heard that a diet plan helped another person lose weight. But that doesn't mean that it will work for you. It is very hard to stay on a diet that includes lots of big changes in your eating habits. If you want to get to a healthy weight and stay there, making healthy lifestyle changes will often work better than dieting. These steps can help. · Make a plan for change. Work with your doctor to create a plan that is right for you. · See a dietitian. He or she can show you how to make healthy changes in your eating habits. · Manage stress. If you have a lot of stress in your life, it can be hard to focus on making healthy changes to your daily habits. · Track your food and activity. You are likely to do better at losing weight if you keep track of what you eat and what you do. Follow-up care is a key part of your treatment and safety. Be sure to make and go to all appointments, and call your doctor if you are having problems. It's also a good idea to know your test results and keep a list of the medicines you take. Where can you learn more? Go to http://alhaji-tia.info/. Enter A121 in the search box to learn more about \"Learning About Low-Carbohydrate Diets for Weight Loss. \" Current as of: May 12, 2017 Content Version: 11.4 © 6106-9916 Audiosocket. Care instructions adapted under license by [employer] (which disclaims liability or warranty for this information). If you have questions about a medical condition or this instruction, always ask your healthcare professional. [address] any warranty or liability for your use of this information. Introducing hospitals & HEALTH SERVICES!    
 Paras Persaud introduces [employer] patient portal. Now you can access parts of your medical record, email your doctor's office, and request medication refills online. 1. In your internet browser, go to [email or web address] 2. Click on the First Time User? Click Here link in the Sign In box. You will see the New Member Sign Up page. 3. Enter your Cortexyme Access Code exactly as it appears below. You will not need to use this code after youve completed the sign-up process. If you do not sign up before the expiration date, you must request a new code. · Cortexyme Access Code: [email or web address] Expires: 5/20/2018  1:15 PM 
 
4. Enter the last four digits of your Social Security Number (xxxx) and Date of Birth (mm/dd/yyyy) as indicated and click Submit. You will be taken to the next sign-up page. 5. Create a Cortexyme ID. This will be your Cortexyme login ID and cannot be changed, so think of one that is secure and easy to remember. 6. Create a Cortexyme password. You can change your password at any time. 7. Enter your Password Reset Question and Answer. This can be used at a later time if you forget your password. 8. Enter your e-mail address. You will receive e-mail notification when new information is available in [address] 9. Click Sign Up. You can now view and download portions of your medical record. 10. Click the Download Summary menu link to download a portable copy of your medical information. If you have questions, please visit the Frequently Asked Questions section of the Cortexyme website. Remember, Cortexyme is NOT to be used for urgent needs. For medical emergencies, dial 911. Now available from your iPhone and Android! Please provide this summary of care documentation to your next provider. Your primary care clinician is listed as Gissell Caraballo. If you have any questions after today's visit, please call [phone].

## 2018-05-02 NOTE — PROGRESS NOTES
Name and  verified      Chief Complaint   Patient presents with    Weight Management     f/u       1. Have you been to the ER, urgent care clinic since your last visit? Hospitalized since your last visit? No    2. Have you seen or consulted any other health care providers outside of the [address] [place] [address] since your last visit? Include any pap smears or colon screening.  No

## 2018-05-29 ENCOUNTER — TELEPHONE (OUTPATIENT)
Dept: FAMILY MEDICINE CLINIC | Age: 28
End: 2018-05-29

## 2018-05-29 DIAGNOSIS — F41.9 CHRONIC ANXIETY: Primary | ICD-10-CM

## 2018-05-29 NOTE — TELEPHONE ENCOUNTER
Received call from pt. Requesting referral for psych,  Needs assistance with coping with her anxiety.   Order placed for pysch referral , per Verbal Order from Dr. Castillo Mayes on 5/29/2018 due to anxiety

## 2018-05-30 ENCOUNTER — OFFICE VISIT (OUTPATIENT)
Dept: FAMILY MEDICINE CLINIC | Age: 28
End: 2018-05-30

## 2018-05-30 VITALS
HEIGHT: 61 IN | HEART RATE: 94 BPM | OXYGEN SATURATION: 100 % | WEIGHT: 193.4 LBS | BODY MASS INDEX: 36.51 KG/M2 | DIASTOLIC BLOOD PRESSURE: 65 MMHG | TEMPERATURE: 98 F | SYSTOLIC BLOOD PRESSURE: 115 MMHG | RESPIRATION RATE: 16 BRPM

## 2018-05-30 DIAGNOSIS — F41.1 GAD (GENERALIZED ANXIETY DISORDER): Primary | ICD-10-CM

## 2018-05-30 NOTE — PROGRESS NOTES
Name and  verified      Chief Complaint   Patient presents with    Documentation     Patient requesting a letter for job related stress. Health Maintenance reviewed-discussed with patient. 1. Have you been to the ER, urgent care clinic since your last visit? Hospitalized since your last visit? No    2. Have you seen or consulted any other health care providers outside of the [address] [place] [address] since your last visit? Include any pap smears or colon screening.  No

## 2018-05-30 NOTE — LETTER
NOTIFICATION RETURN TO WORK / SCHOOL 
 
5/30/2018 12:33 PM 
 
Ms. Julio Jordan [address] [place] [address] To Whom It May Concern: 
 
Julio Jordan is currently under the care of Benedict Contreras [facility] She will return to work/school on: 5/31/18 If there are questions or concerns please have the patient contact our office.  
 
 
 
Sincerely, 
 
 
Franco Bunch MD

## 2018-05-30 NOTE — MR AVS SNAPSHOT
[address] [facility] [id number] [phone] Patient: Azam Henning MRN: [id number] Visit Information Date & Time Provider Department Dept. Phone Encounter #  
 5/30/2018 11:15 AM Phil Tyler MD [address] OFFICE-ANNEX [phone] [id number] Upcoming Health Maintenance Date Due Influenza Age 5 to Adult 8/1/2018 PAP AKA CERVICAL CYTOLOGY 1/28/2019 DTaP/Tdap/Td series (2 - Td) 2/10/2024 Allergies as of 5/30/2018  Review Complete On: 5/30/2018 By: Phil Tyler MD  
 No Known Allergies Current Immunizations  Reviewed on 2/23/2017 Name Date Influenza Vaccine 1/27/2014 Influenza Vaccine (Quad) PF 1/19/2017 Tdap 2/10/2014 Not reviewed this visit You Were Diagnosed With   
  
 Codes Comments TASIA (generalized anxiety disorder)    -  Primary ICD-10-CM: F41.1 ICD-9-CM: 300.02 Vitals BP Pulse Temp Resp Height(growth percentile) Weight(growth percentile)  
 115/65 (BP 1 Location: Left arm, BP Patient Position: At rest) 94 98 °F (36.7 °C) (Oral) 16 5' 1\" (1.549 m) 193 lb 6.4 oz (87.7 kg) LMP SpO2 BMI OB Status Smoking Status 05/16/2018 100% 36.54 kg/m2 Having regular periods Never Smoker Vitals History BMI and BSA Data Body Mass Index Body Surface Area  
 36.54 kg/m 2 1.94 m 2 Preferred Pharmacy [facility] Phone [facility] [address] [phone] Your Updated Medication List  
  
   
This list is accurate as of 5/30/18 12:26 PM.  Always use your most recent med list.  
  
  
  
  
 Blood-Glucose Meter monitoring kit Commonly known as:  TRUERESULT BLOOD GLUCOSE SYSTM Test blood sugar before breakfast, lunch dinner and before bedtime  
  
 busPIRone 7.5 mg tablet Commonly known as:  BUSPAR Take 1 Tab by mouth every twelve (12) hours. * glucose blood VI test strips strip Commonly known as:  ADVANCED GLUC METER TEST STRIP Test blood sugar before breakfast, lunch , dinner and bedtime * glucose blood VI test strips strip Commonly known as:  TRUETEST TEST STRIPS Test blood sugar before breakfast, lunch, dinner and before bedtime. JUNEL FE 1.5/30 (28) 1.5 mg-30 mcg (21)/75 mg (7) Tab Generic drug:  norethindrone-ethinyl estradiol-iron Take 1 Tab by mouth daily. Lancets Misc Test blood sugar before breakfast, lunch, dinner and bedtime. naproxen 500 mg tablet Commonly known as:  NAPROSYN Take 1 Tab by mouth two (2) times daily (with meals). Indications: PAIN  
  
 norgestimate-ethinyl estradiol 0.25-35 mg-mcg Tab Commonly known as:  [facility] (28) Take 1 tablet by mouth daily. topiramate 25 mg tablet Commonly known as:  TOPAMAX Take 1 Tab by mouth two (2) times daily (with meals). * Notice: This list has 2 medication(s) that are the same as other medications prescribed for you. Read the directions carefully, and ask your doctor or other care provider to review them with you. We Performed the Following REFERRAL TO PSYCHIATRY [REF91 Custom] Comments:  
 Please evaluate patient for stressed out at her work REFERRAL TO PSYCHOLOGY [RKW43 Custom] Referral Information Referral ID Referred By Referred To  
  
 [id number] Emmett Mensaho Not Available Visits Status Start Date End Date 1 New Request 5/30/18 5/30/19 If your referral has a status of pending review or denied, additional information will be sent to support the outcome of this decision. Referral ID Referred By Referred To  
 [id number] Emmett Cobos Not Available Visits Status Start Date End Date 1 New Request 5/30/18 5/30/19 If your referral has a status of pending review or denied, additional information will be sent to support the outcome of this decision. Patient Instructions Learning About Low-Carbohydrate Diets for Weight Loss What is a low-carbohydrate diet? Low-carb diets avoid foods that are high in carbohydrate. These high-carb foods include pasta, bread, rice, cereal, fruits, and starchy vegetables. Instead, these diets usually have you eat foods that are high in fat and protein. Many people lose weight quickly on a low-carb diet. But the early weight loss is water. People on this diet often gain the weight back after they start eating carbs again. Not all diet plans are safe or work well. A lot of the evidence shows that low-carb diets aren't healthy. That's because these diets often don't include healthy foods like fruits and vegetables. Losing weight safely means balancing protein, fat, and carbs with every meal and snack. And low-carb diets don't always provide the vitamins, minerals, and fiber you need. If you have a serious medical condition, talk to your doctor before you try any diet. These conditions include kidney disease, heart disease, type 2 diabetes, high cholesterol, and high blood pressure. If you are pregnant, it may not be safe for your baby if you are on a low-carb diet. How can you lose weight safely? You might have heard that a diet plan helped another person lose weight. But that doesn't mean that it will work for you. It is very hard to stay on a diet that includes lots of big changes in your eating habits. If you want to get to a healthy weight and stay there, making healthy lifestyle changes will often work better than dieting. These steps can help. · Make a plan for change. Work with your doctor to create a plan that is right for you. · See a dietitian. He or she can show you how to make healthy changes in your eating habits. · Manage stress. If you have a lot of stress in your life, it can be hard to focus on making healthy changes to your daily habits. · Track your food and activity.  You are likely to do better at losing weight if you keep track of what you eat and what you do. Follow-up care is a key part of your treatment and safety. Be sure to make and go to all appointments, and call your doctor if you are having problems. It's also a good idea to know your test results and keep a list of the medicines you take. Where can you learn more? Go to http://alhaji-tia.info/. Enter A121 in the search box to learn more about \"Learning About Low-Carbohydrate Diets for Weight Loss. \" Current as of: May 12, 2017 Content Version: 11.4 © 6177-5500 LeadiD. Care instructions adapted under license by [employer] (which disclaims liability or warranty for this information). If you have questions about a medical condition or this instruction, always ask your healthcare professional. [address] any warranty or liability for your use of this information. Introducing [facility] & HEALTH SERVICES! [employer] introduces [employer] patient portal. Now you can access parts of your medical record, email your doctor's office, and request medication refills online. 1. In your internet browser, go to [email or web address] 2. Click on the First Time User? Click Here link in the Sign In box. You will see the New Member Sign Up page. 3. Enter your [employer] Access Code exactly as it appears below. You will not need to use this code after youve completed the sign-up process. If you do not sign up before the expiration date, you must request a new code. · [employer] Access Code: [email or web address] Expires: 8/28/2018 12:20 PM 
 
4. Enter the last four digits of your Social Security Number (xxxx) and Date of Birth (mm/dd/yyyy) as indicated and click Submit. You will be taken to the next sign-up page. 5. Create a [employer] ID. This will be your [employer] login ID and cannot be changed, so think of one that is secure and easy to remember. 6. Create a One Moja password. You can change your password at any time. 7. Enter your Password Reset Question and Answer. This can be used at a later time if you forget your password. 8. Enter your e-mail address. You will receive e-mail notification when new information is available in [address] 9. Click Sign Up. You can now view and download portions of your medical record. 10. Click the Download Summary menu link to download a portable copy of your medical information. If you have questions, please visit the Frequently Asked Questions section of the One Moja website. Remember, One [employer]ja is NOT to be used for urgent needs. For medical emergencies, dial 911. Now available from your iPhone and Android! Please provide this summary of care documentation to your next provider. Your primary care clinician is listed as Sarai Knox. If you have any questions after today's visit, please call [phone].

## 2018-05-30 NOTE — PROGRESS NOTES
HISTORY OF PRESENT ILLNESS  Amira Herrera is a 33 y.o. female. 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, pt has been bothered, not able to handle her job, wants a letter so that her job can accommodate her in a better job situation, does not want to cont on with her job, she does direct support provider as a nurse, stating that if [address] people have anxiety disorders she has them as well and it getting worse, not S not H ideation,  States that she just wanted to leave the place and through something at walls, gts really agitated, not seen any counselor nor any specialist did not  talk to the HR neither, states that they do not care about the people who work there, states that she works overnight and it has been a while that she want to change it to the day job, but was told that they do not have any opening, pt seems and have been acting very agitated today, states that she wants to resgin at her job, and states that she wants to act professional       Current Outpatient Prescriptions   Medication Sig Dispense Refill    topiramate (TOPAMAX) 25 mg tablet Take 1 Tab by mouth two (2) times daily (with meals). 60 Tab 3    [START ON 7/2/2018] phentermine (ADIPEX-P) 37.5 mg tablet Take 1 Tab by mouth every morning. Max Daily Amount: 37.5 mg. 30 Tab 0    [START ON 6/2/2018] phentermine (ADIPEX-P) 37.5 mg tablet Take 1 Tab by mouth every morning. Max Daily Amount: 37.5 mg. 30 Tab 0    phentermine (ADIPEX-P) 37.5 mg tablet Take 1 Tab by mouth every morning. Max Daily Amount: 37.5 mg. 30 Tab 0    busPIRone (BUSPAR) 7.5 mg tablet Take 1 Tab by mouth every twelve (12) hours. 60 Tab 3    norethindrone-ethinyl estradiol-iron (JUNEL FE 1.5/30, 28,) 1.5 mg-30 mcg (21)/75 mg (7) tab Take 1 Tab by mouth daily.       Blood-Glucose Meter (TRUERESULT BLOOD GLUCOSE SYSTM) monitoring kit Test blood sugar before breakfast, lunch dinner and before bedtime 1 Kit 11    glucose blood VI test strips (TRUETEST TEST STRIPS) strip Test blood sugar before breakfast, lunch, dinner and before bedtime. 100 Strip 11    glucose blood VI test strips (ADVANCED GLUC METER TEST STRIP) strip Test blood sugar before breakfast, lunch , dinner and bedtime 100 Strip 6    Lancets misc Test blood sugar before breakfast, lunch, dinner and bedtime. 1 Package 11    naproxen (NAPROSYN) 500 mg tablet Take 1 Tab by mouth two (2) times daily (with meals). Indications: PAIN (Patient not taking: Reported on 2/19/2018) 30 Tab 0    norgestimate-ethinyl estradiol (SPRINTEC, 28,) 0.25-35 mg-mcg per tablet Take 1 tablet by mouth daily. (Patient not taking: Reported on 2/19/2018) 1 Package 12     No Known Allergies  Past Medical History:   Diagnosis Date    BMI 38.0-38.9,adult 4/3/2018    BMI 40.0-44.9, adult ([address] 2/19/2018    Chronic back pain greater than 3 months duration 7/29/2015    TASIA (generalized anxiety disorder) 1/19/2017    High-risk sexual behavior 2/23/2017    HX OTHER MEDICAL     chlamydia age 24, treated & negative now    Hypoglycemia 3/30/2016    Ill-defined condition     Insulinoma 4/6/2016    Morbid obesity ([address]     Syncopal seizure ([address] 11/19/2015    Thyromegaly 3/30/2016    Urinary frequency 11/19/2015     Past Surgical History:   Procedure Laterality Date    HX CHOLECYSTECTOMY  10/6/2014    lap.  tracee    HX DILATION AND CURETTAGE  6/2011    HX OTHER SURGICAL      D&C with SAB 6/2011    HX OTHER SURGICAL      wisdom extraction 14 years    REVISION CERVIX [address], [address]  2013     Family History   Problem Relation Age of Onset    Hypertension Mother     Diabetes Mother     High Cholesterol Father     Other Father      high cholesterole, herniated disc    Diabetes Paternal Aunt     Hypertension Maternal Grandmother     COPD Maternal Grandmother     Emphysema Maternal Grandmother     Diabetes Paternal Grandmother      Social History   Substance Use Topics    Smoking status: Never Smoker    Smokeless tobacco: Never Used    Alcohol use No      Lab Results  Component Value Date/Time   WBC 5.4 02/23/2017 12:37 PM   HGB 12.3 02/23/2017 12:37 PM   Hemoglobin (POC) 12.9 06/06/2015 06:39 PM   HCT 37.9 02/23/2017 12:37 PM   Hematocrit (POC) 38 06/06/2015 06:39 PM   PLATELET 811 77/49/3769 12:37 PM   MCV 84 02/23/2017 12:37 PM   Hgb, External 12.4 10/14/2013   Hct, External 36.7 10/14/2013   Platelet cnt., External 304 10/14/2013     Lab Results  Component Value Date/Time   TSH 1.980 02/23/2017 12:37 PM   T4, Free 1.04 04/06/2016 09:31 AM         Review of Systems   Constitutional: Negative for chills, fever and malaise/fatigue. HENT: Negative for congestion and nosebleeds. Eyes: Negative for blurred vision and pain. Respiratory: Negative for shortness of breath and wheezing. Cardiovascular: Negative for chest pain, palpitations and leg swelling. Gastrointestinal: Negative for constipation, diarrhea, nausea and vomiting. Genitourinary: Negative for dysuria and frequency. Musculoskeletal: Negative for joint pain and myalgias. Skin: Negative for itching and rash. Neurological: Negative for dizziness, loss of consciousness and headaches. Endo/Heme/Allergies: Does not bruise/bleed easily. Psychiatric/Behavioral: Negative for depression. The patient is not nervous/anxious and does not have insomnia. All other systems reviewed and are negative. Physical Exam   Constitutional: She is oriented to person, place, and time. She appears well-developed and well-nourished. HENT:   Head: Normocephalic and atraumatic. Eyes: Conjunctivae and EOM are normal. Pupils are equal, round, and reactive to light. Neck: No JVD present. No thyromegaly present. Cardiovascular: Normal rate, regular rhythm, normal heart sounds and intact distal pulses. Exam reveals no gallop and no friction rub. No murmur heard. Pulmonary/Chest: Effort normal and breath sounds normal. No stridor.  No respiratory distress. She has no wheezes. She has no rales. Abdominal: Soft. Bowel sounds are normal. She exhibits no distension and no mass. There is no tenderness. Musculoskeletal: Normal range of motion. She exhibits no edema or tenderness. Lymphadenopathy:     She has no cervical adenopathy. Neurological: She is alert and oriented to person, place, and time. She has normal reflexes. No cranial nerve deficit. Skin: No rash noted. No erythema. Psychiatric: She has a normal mood and affect. Her behavior is normal.   Nursing note and vitals reviewed. ASSESSMENT and PLAN  Diagnoses and all orders for this visit:    1.  TASIA (generalized anxiety disorder)  -     REFERRAL TO PSYCHIATRY  -     REFERRAL TO PSYCHOLOGY      Depression with anxiety in a stable condition, not suicidal, start antianxiety and calming medication for now will reevaluate in 4 weeks for progression or the side effects of the medication, in addition Counseling , social support, spiritual belonging, inc physical activity, all offered,  compliance with meds advised, was told to call for any concern

## 2018-05-30 NOTE — PATIENT INSTRUCTIONS
Learning About Low-Carbohydrate Diets for Weight Loss  What is a low-carbohydrate diet? Low-carb diets avoid foods that are high in carbohydrate. These high-carb foods include pasta, bread, rice, cereal, fruits, and starchy vegetables. Instead, these diets usually have you eat foods that are high in fat and protein. Many people lose weight quickly on a low-carb diet. But the early weight loss is water. People on this diet often gain the weight back after they start eating carbs again. Not all diet plans are safe or work well. A lot of the evidence shows that low-carb diets aren't healthy. That's because these diets often don't include healthy foods like fruits and vegetables. Losing weight safely means balancing protein, fat, and carbs with every meal and snack. And low-carb diets don't always provide the vitamins, minerals, and fiber you need. If you have a serious medical condition, talk to your doctor before you try any diet. These conditions include kidney disease, heart disease, type 2 diabetes, high cholesterol, and high blood pressure. If you are pregnant, it may not be safe for your baby if you are on a low-carb diet. How can you lose weight safely? You might have heard that a diet plan helped another person lose weight. But that doesn't mean that it will work for you. It is very hard to stay on a diet that includes lots of big changes in your eating habits. If you want to get to a healthy weight and stay there, making healthy lifestyle changes will often work better than dieting. These steps can help. · Make a plan for change. Work with your doctor to create a plan that is right for you. · See a dietitian. He or she can show you how to make healthy changes in your eating habits. · Manage stress. If you have a lot of stress in your life, it can be hard to focus on making healthy changes to your daily habits. · Track your food and activity.  You are likely to do better at losing weight if you keep track of what you eat and what you do. Follow-up care is a key part of your treatment and safety. Be sure to make and go to all appointments, and call your doctor if you are having problems. It's also a good idea to know your test results and keep a list of the medicines you take. Where can you learn more? Go to http://alhaji-tia.info/. Enter A121 in the search box to learn more about \"Learning About Low-Carbohydrate Diets for Weight Loss. \"  Current as of: May 12, 2017  Content Version: 11.4  © 6664-7149 [employer], [employer]. Care instructions adapted under license by [employer] (which disclaims liability or warranty for this information). If you have questions about a medical condition or this instruction, always ask your healthcare professional. [address] any warranty or liability for your use of this information.

## 2018-06-10 ENCOUNTER — HOSPITAL ENCOUNTER (EMERGENCY)
Age: 28
Discharge: HOME | End: 2018-06-10
Attending: EMERGENCY MEDICINE
Payer: MEDICAID

## 2018-06-10 VITALS
HEART RATE: 97 BPM | SYSTOLIC BLOOD PRESSURE: 134 MMHG | BODY MASS INDEX: 37.66 KG/M2 | RESPIRATION RATE: 16 BRPM | TEMPERATURE: 99 F | DIASTOLIC BLOOD PRESSURE: 86 MMHG | HEIGHT: 60 IN | OXYGEN SATURATION: 100 % | WEIGHT: 191.8 LBS

## 2018-06-10 DIAGNOSIS — S39.012A BACK STRAIN, INITIAL ENCOUNTER: Primary | ICD-10-CM

## 2018-06-10 DIAGNOSIS — M51.36 DEGENERATIVE DISC DISEASE, LUMBAR: ICD-10-CM

## 2018-06-10 DIAGNOSIS — R82.90 MALODOROUS URINE: ICD-10-CM

## 2018-06-10 LAB
APPEARANCE UR: ABNORMAL
BACTERIA URNS QL MICRO: NEGATIVE /HPF
BILIRUB UR QL: NEGATIVE
CLUE CELLS VAG QL WET PREP: NORMAL
COLOR UR: ABNORMAL
EPITH CASTS URNS QL MICRO: ABNORMAL /LPF
GLUCOSE UR STRIP.AUTO-MCNC: NEGATIVE MG/DL
HCG UR QL: NEGATIVE
HGB UR QL STRIP: NEGATIVE
HYALINE CASTS URNS QL MICRO: ABNORMAL /LPF (ref 0–5)
KETONES UR QL STRIP.AUTO: NEGATIVE MG/DL
KOH PREP SPEC: NORMAL
LEUKOCYTE ESTERASE UR QL STRIP.AUTO: ABNORMAL
NITRITE UR QL STRIP.AUTO: NEGATIVE
PH UR STRIP: 7 [PH] (ref 5–8)
PROT UR STRIP-MCNC: NEGATIVE MG/DL
RBC #/AREA URNS HPF: ABNORMAL /HPF (ref 0–5)
SERVICE CMNT-IMP: NORMAL
SP GR UR REFRACTOMETRY: 1.02 (ref 1–1.03)
T VAGINALIS VAG QL WET PREP: NORMAL
UA: UC IF INDICATED,UAUC: ABNORMAL
UROBILINOGEN UR QL STRIP.AUTO: 0.2 EU/DL (ref 0.2–1)
WBC URNS QL MICRO: ABNORMAL /HPF (ref 0–4)

## 2018-06-10 PROCEDURE — 81025 URINE PREGNANCY TEST: CPT | Performed by: EMERGENCY MEDICINE

## 2018-06-10 PROCEDURE — 81001 URINALYSIS AUTO W/SCOPE: CPT | Performed by: EMERGENCY MEDICINE

## 2018-06-10 PROCEDURE — 99284 EMERGENCY DEPT VISIT MOD MDM: CPT

## 2018-06-10 PROCEDURE — 87491 CHLMYD TRACH DNA AMP PROBE: CPT | Performed by: PHYSICIAN ASSISTANT

## 2018-06-10 PROCEDURE — 87210 SMEAR WET MOUNT SALINE/INK: CPT | Performed by: PHYSICIAN ASSISTANT

## 2018-06-10 RX ORDER — HYDROCODONE BITARTRATE AND ACETAMINOPHEN 5; 325 MG/1; MG/1
1 TABLET ORAL
Qty: 10 TAB | Refills: 0 | Status: SHIPPED | OUTPATIENT
Start: 2018-06-10 | End: 2018-09-10 | Stop reason: ALTCHOICE

## 2018-06-10 RX ORDER — PREDNISONE 5 MG/1
TABLET ORAL
Qty: 21 TAB | Refills: 0 | Status: SHIPPED | OUTPATIENT
Start: 2018-06-10 | End: 2018-09-10 | Stop reason: ALTCHOICE

## 2018-06-10 RX ORDER — NAPROXEN 500 MG/1
500 TABLET ORAL
Qty: 20 TAB | Refills: 0 | Status: SHIPPED | OUTPATIENT
Start: 2018-06-10 | End: 2018-09-10 | Stop reason: ALTCHOICE

## 2018-06-10 NOTE — LETTER
[address] 
[facility] EMERGENCY DEPT 
[address] [place] [address] [address] [id number] [phone] Work/School Note Date: 6/10/2018 To Whom It May concern: 
 
Winnie Bates was seen and treated today in the emergency room by the following provider(s): 
Attending Provider: Gian Eller MD 
Physician Assistant: BROCK Pinzon. Winnie Bates may return to work on 6/13/18 or sooner, if feeling better. Sincerely, Lisa Castillo PA

## 2018-06-11 LAB
C TRACH DNA SPEC QL NAA+PROBE: NEGATIVE
N GONORRHOEA DNA SPEC QL NAA+PROBE: NEGATIVE
SAMPLE TYPE: NORMAL
SERVICE CMNT-IMP: NORMAL
SPECIMEN SOURCE: NORMAL

## 2018-06-11 NOTE — ED PROVIDER NOTES
EMERGENCY DEPARTMENT HISTORY AND PHYSICAL EXAM      Date: 6/10/2018  Patient Name: Bettejane Brunner    History of Presenting Illness     Chief Complaint   Patient presents with    Bladder Infection     reports lower back pain and foul smelling odor to urine x 2 days       History Provided By: Patient    HPI: Bettejane Brunner, 33 y.o. female with PMHx significant for TASIA, presents ambulatory to the ED with cc of new onset bilateral lower back pain x 2 days with associated foul smelling urine. Pt describes her back pain as a sore type of pain. 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 She states the pt is in her wheelchair and is able to help to a degree but pt has to do most of the lifting. Pt states her urine smells like ammonia or \"cat pee\". She does report eating new types of salads. She also has a concern for STD. She reports a recent sexual encounter but denies any vaginal symptoms. Of note pt did try a homemade enema of salt and vinegar and thinks this may be the cause of her pain. She has tried 800 mg of Motrin with no significant relief. Pt denies any dysuria, hematuria, urinary frequency, abdominal pain, nausea, vomiting, diarrhea, CP, SOB, fever. Social Hx: - Tobacco (-), - EtOH (-), - illicit drug use (-)    There are no other complaints, changes, or physical findings at this time. PCP: Melvin Lopez MD    Current Outpatient Prescriptions   Medication Sig Dispense Refill    HYDROcodone-acetaminophen (NORCO) 5-325 mg per tablet Take 1 Tab by mouth every six (6) hours as needed for Pain. Max Daily Amount: 4 Tabs. 10 Tab 0    naproxen (NAPROSYN) 500 mg tablet Take 1 Tab by mouth every twelve (12) hours as needed for Pain. 20 Tab 0    predniSONE (STERAPRED) 5 mg dose pack See administration instruction per 5mg dose pack 21 Tab 0    topiramate (TOPAMAX) 25 mg tablet Take 1 Tab by mouth two (2) times daily (with meals).  60 Tab 3    busPIRone (BUSPAR) 7.5 mg tablet Take 1 Tab by mouth every twelve (12) hours. 60 Tab 3    norethindrone-ethinyl estradiol-iron (JUNEL FE 1.5/30, 28,) 1.5 mg-30 mcg (21)/75 mg (7) tab Take 1 Tab by mouth daily.  Blood-Glucose Meter (TRUERESULT BLOOD GLUCOSE SYSTM) monitoring kit Test blood sugar before breakfast, lunch dinner and before bedtime 1 Kit 11    glucose blood VI test strips (TRUETEST TEST STRIPS) strip Test blood sugar before breakfast, lunch, dinner and before bedtime. 100 Strip 11    glucose blood VI test strips (ADVANCED GLUC METER TEST STRIP) strip Test blood sugar before breakfast, lunch , dinner and bedtime 100 Strip 6    Lancets misc Test blood sugar before breakfast, lunch, dinner and bedtime. 1 Package 11    norgestimate-ethinyl estradiol (SPRINTEC, 28,) 0.25-35 mg-mcg per tablet Take 1 tablet by mouth daily. (Patient not taking: Reported on 2/19/2018) 1 Package 12       Past History     Past Medical History:  Past Medical History:   Diagnosis Date    BMI 38.0-38.9,adult 4/3/2018    BMI 40.0-44.9, adult ([address] 2/19/2018    Chronic back pain greater than 3 months duration 7/29/2015    TASIA (generalized anxiety disorder) 1/19/2017    High-risk sexual behavior 2/23/2017    HX OTHER MEDICAL     chlamydia age 24, treated & negative now    Hypoglycemia 3/30/2016    Ill-defined condition     Insulinoma 4/6/2016    Morbid obesity ([address]     Syncopal seizure ([address] 11/19/2015    Thyromegaly 3/30/2016    Urinary frequency 11/19/2015       Past Surgical History:  Past Surgical History:   Procedure Laterality Date    HX CHOLECYSTECTOMY  10/6/2014    lap.  tracee    HX DILATION AND CURETTAGE  6/2011    HX OTHER SURGICAL      D&C with SAB 6/2011    HX OTHER SURGICAL      wisdom extraction 15 years    REVISION CERVIX [address]  2013       Family History:  Family History   Problem Relation Age of Onset    Hypertension Mother     Diabetes Mother     High Cholesterol Father     Other Father      high cholesterole, herniated disc    Diabetes Paternal Aunt     Hypertension Maternal Grandmother     COPD Maternal Grandmother     Emphysema Maternal Grandmother     Diabetes Paternal Grandmother        Social History:  Social History   Substance Use Topics    Smoking status: Never Smoker    Smokeless tobacco: Never Used    Alcohol use No       Allergies:  No Known Allergies      Review of Systems   Review of Systems   Constitutional: Negative. Negative for fever. HENT: Negative. Eyes: Negative. Respiratory: Negative. Cardiovascular: Negative. Gastrointestinal: Negative. Negative for constipation, diarrhea, nausea and vomiting. Denies liver disease   Endocrine:        Denies thyroid disease   Genitourinary: Negative for dysuria, hematuria, urgency, vaginal bleeding, vaginal discharge and vaginal pain. Denies kidney disease  + foul smelling urine. Musculoskeletal: Positive for back pain. Skin: Negative. Neurological: Negative. All other systems reviewed and are negative. Physical Exam   Physical Exam   Constitutional: She is oriented to person, place, and time. She appears well-developed and well-nourished. No distress. HENT:   Head: Normocephalic and atraumatic. Right Ear: External ear normal.   Left Ear: External ear normal.   Nose: Nose normal.   Mouth/Throat: Oropharynx is clear and moist. No oropharyngeal exudate. Eyes: Conjunctivae and EOM are normal. Pupils are equal, round, and reactive to light. Right eye exhibits no discharge. Left eye exhibits no discharge. No scleral icterus. Neck: Normal range of motion. Neck supple. No tracheal deviation present. Cardiovascular: Normal rate, regular rhythm, normal heart sounds and intact distal pulses. Exam reveals no gallop and no friction rub. No murmur heard. Pulmonary/Chest: Effort normal and breath sounds normal. No respiratory distress. She has no wheezes. She has no rales.  She exhibits no tenderness. Abdominal: Soft. Bowel sounds are normal. She exhibits no distension and no mass. There is no tenderness. There is no rebound and no guarding. Genitourinary:   Genitourinary Comments: PELVIC EXAM:  Small amount of white exudate on external vagina. Mucosa is pink and moist.   No blood in the canal, there is white exudate in the canal.   There is no erythema or lesions of the cervix. The Os is closed. There is no tenderness of the uterus or adnexa on bimanual.   There is no CNT. Musculoskeletal: She exhibits no edema or tenderness. No rashes or redness to the back. Back is nontender. Lymphadenopathy:     She has no cervical adenopathy. Neurological: She is alert and oriented to person, place, and time. No cranial nerve deficit. Skin: Skin is warm and dry. No rash noted. No erythema. Psychiatric: She has a normal mood and affect. Her behavior is normal.   Nursing note and vitals reviewed.       Diagnostic Study Results     Labs -     Recent Results (from the past 12 hour(s))   URINALYSIS W/ REFLEX CULTURE    Collection Time: 06/10/18  8:32 PM   Result Value Ref Range    Color YELLOW/STRAW      Appearance CLOUDY (A) CLEAR      Specific gravity 1.019 1.003 - 1.030      pH (UA) 7.0 5.0 - 8.0      Protein NEGATIVE  NEG mg/dL    Glucose NEGATIVE  NEG mg/dL    Ketone NEGATIVE  NEG mg/dL    Bilirubin NEGATIVE  NEG      Blood NEGATIVE  NEG      Urobilinogen 0.2 0.2 - 1.0 EU/dL    Nitrites NEGATIVE  NEG      Leukocyte Esterase TRACE (A) NEG      WBC 0-4 0 - 4 /hpf    RBC 0-5 0 - 5 /hpf    Epithelial cells FEW FEW /lpf    Bacteria NEGATIVE  NEG /hpf    UA:UC IF INDICATED CULTURE NOT INDICATED BY UA RESULT CNI      Hyaline cast 0-2 0 - 5 /lpf   HCG URINE, QL    Collection Time: 06/10/18  8:32 PM   Result Value Ref Range    HCG urine, QL NEGATIVE  NEG     KOH, OTHER SOURCES    Collection Time: 06/10/18  9:28 PM   Result Value Ref Range    Special Requests: NO SPECIAL REQUESTS      KOH NO YEAST SEEN     WET PREP    Collection Time: 06/10/18  9:28 PM   Result Value Ref Range    Clue cells CLUE CELLS ABSENT      Wet prep NO TRICHOMONAS SEEN       Medical Decision Making   I am the first provider for this patient. I reviewed the vital signs, available nursing notes, past medical history, past surgical history, family history and social history. Vital Signs-Reviewed the patient's vital signs. Patient Vitals for the past 12 hrs:   Temp Pulse Resp BP SpO2   06/10/18 2022 99 °F (37.2 °C) 97 16 134/86 100 %     Records Reviewed: Nursing Notes, Old Medical Records, Previous Radiology Studies and Previous Laboratory Studies    Provider Notes (Medical Decision Making):   DDx: UTI, STI, BV. ED Course:   Initial assessment performed. The patients presenting problems have been discussed, and they are in agreement with the care plan formulated and outlined with them. I have encouraged them to ask questions as they arise throughout their visit. Procedure Note - Pelvic Exam:    10:13 PM  Performed by: Elaine Nino  Chaperoned by: Jenna Lama RN. Pelvic exam was performed using bimanual and speculum. Further findings noted in physical exam.   The procedure took 1-15 minutes, and pt tolerated well. Critical Care Time:   None. Disposition:  DISCHARGE NOTE  10:55 PM  The patient has been re-evaluated and is ready for discharge. Reviewed available results with patient. Counseled pt on diagnosis and care plan. Pt has expressed understanding, and all questions have been answered. Pt agrees with plan and agrees to F/U as recommended, or return to the ED if their sxs worsen. Discharge instructions have been provided and explained to the pt, along with reasons to return to the ED. PLAN:  1. Current Discharge Medication List      START taking these medications    Details   HYDROcodone-acetaminophen (NORCO) 5-325 mg per tablet Take 1 Tab by mouth every six (6) hours as needed for Pain.  Max Daily Amount: 4 Tabs. Qty: 10 Tab, Refills: 0    Associated Diagnoses: Degenerative disc disease, lumbar; Back strain, initial encounter      predniSONE (STERAPRED) 5 mg dose pack See administration instruction per 5mg dose pack  Qty: 21 Tab, Refills: 0         CONTINUE these medications which have CHANGED    Details   naproxen (NAPROSYN) 500 mg tablet Take 1 Tab by mouth every twelve (12) hours as needed for Pain. Qty: 20 Tab, Refills: 0           2. Follow-up Information     Follow up With Details Comments Contact Info    MD Shauna Richard [address] [phone]      Your Pain Managment Specialist Schedule an appointment as soon as possible for a visit      [facility] EMERGENCY DEPT  If symptoms worsen [address] [place] [address]  [phone]        Return to ED if worse     Diagnosis     Clinical Impression:   1. Back strain, initial encounter    2. Degenerative disc disease, lumbar    3. Malodorous urine        Attestations: This note is prepared by Delia Maki acting as Scribe for TONE Tse : The scribe's documentation has been prepared under my direction and personally reviewed by me in its entirety. I confirm that the note above accurately reflects all work, treatment, procedures, and medical decision making performed by me.

## 2018-06-11 NOTE — DISCHARGE INSTRUCTIONS
Back Strain: Care Instructions  Your Care Instructions    Back strain happens when you overstretch, or pull, a muscle in your back. You may hurt your back in an accident or when you exercise or lift something. Most back pain will get better with rest and time. You can take care of yourself at home to help your back heal.  Follow-up care is a key part of your treatment and safety. Be sure to make and go to all appointments, and call your doctor if you are having problems. It's also a good idea to know your test results and keep a list of the medicines you take. How can you care for yourself at home? · Try to stay as active as you can, but stop or reduce any activity that causes pain. · Put ice or a cold pack on the sore muscle for 10 to 20 minutes at a time to stop swelling. Try this every 1 to 2 hours for 3 days (when you are awake) or until the swelling goes down. Put a thin cloth between the ice pack and your skin. · After 2 or 3 days, apply a heating pad on low or a warm cloth to your back. Some doctors suggest that you go back and forth between hot and cold treatments. · Take pain medicines exactly as directed. ¨ If the doctor gave you a prescription medicine for pain, take it as prescribed. ¨ If you are not taking a prescription pain medicine, ask your doctor if you can take an over-the-counter medicine. · Try sleeping on your side with a pillow between your legs. Or put a pillow under your knees when you lie on your back. These measures can ease pain in your lower back. · Return to your usual level of activity slowly. When should you call for help? Call 911 anytime you think you may need emergency care. For example, call if:  ? · You are unable to move a leg at all. ?Call your doctor now or seek immediate medical care if:  ? · You have new or worse symptoms in your legs, belly, or buttocks. Symptoms may include:  ¨ Numbness or tingling. ¨ Weakness. ¨ Pain.    ? · You lose bladder or bowel control. ? Watch closely for changes in your health, and be sure to contact your doctor if you are not getting better as expected. Where can you learn more? Go to http://alhaji-tia.info/. Enter H278 in the search box to learn more about \"Back Strain: Care Instructions. \"  Current as of: March 21, 2017  Content Version: 11.4  © 9833-1762 [employer]. Care instructions adapted under license by [employer] (which disclaims liability or warranty for this information). If you have questions about a medical condition or this instruction, always ask your healthcare professional. [place] [address] any warranty or liability for your use of this information. Learning About Degenerative Disc Disease  What is degenerative disc disease? Degenerative disc disease is not really a disease. It's a term used to describe the normal changes in your spinal discs as you age. Spinal discs are small, spongy discs that separate the bones (vertebrae) that make up the spine. The discs act as shock absorbers for the spine, so it can flex, bend, and twist.  Degenerative disc disease can take place in one or more places along the spine. It most often occurs in the discs in the lower back and the neck. What causes it? As we age, our spinal discs break down, or degenerate. This breakdown causes the symptoms of degenerative disc disease in some people. When the discs break down, they can lose fluid and dry out, and their outer layers can have tiny cracks or tears. This leads to less padding and less space between the bones in the spine. The body reacts to this by making bony growths on the spine called bone spurs. These spurs can press on the spinal nerve roots or spinal cord. This can cause pain and can affect how well the nerves work. What are the symptoms? Many people with degenerative disc disease have no pain.  But others have severe pain or other symptoms that limit their activities. Some of the most common symptoms are:  · Pain in the back or neck. Where the pain occurs depends on which discs are affected. · Pain that gets worse when you move, such as bending over, reaching up, or twisting. · Pain that may occur in the rear end (buttocks), arm, or leg if a nerve is pinched. · Numbness or tingling in your arm or leg. How is it diagnosed? A doctor can often diagnose degenerative disc disease while doing a physical exam. If your exam shows no signs of a serious condition, imaging tests (such as an X-ray) aren't likely to help your doctor find the cause of your symptoms. Sometimes degenerative disc disease is found when an X-ray is taken for another reason, such as an injury or other health problem. But even if the doctor finds degenerative disc disease, that doesn't always mean that you will have symptoms. How is it treated? There are several things you can do at home to manage pain from this problem. · To relieve pain, use ice or heat (whichever feels better) on the affected area. ¨ Put ice or a cold pack on the area for 10 to 20 minutes at a time. Put a thin cloth between the ice and your skin. ¨ Put a warm water bottle, a heating pad set on low, or a warm cloth on your back. Put a thin cloth between the heating pad and your skin. Do not go to sleep with a heating pad on your skin. · Ask your doctor if you can take acetaminophen (such as Tylenol) or nonsteroidal anti-inflammatory drugs, such as ibuprofen or naproxen. Your doctor can prescribe stronger medicines if needed. Be safe with medicines. Read and follow all instructions on the label. · Get some exercise every day. Exercise is one of the best ways to help your back feel better and stay better. It's best to start each exercise slowly. You may notice a little soreness, and that's okay. But if an exercise makes your pain worse, stop doing it. Here are things you can try:  ¨ Walking.  It's the simplest and maybe the best activity for your back. It gets your blood moving and helps your muscles stay strong. ¨ Exercises that gently stretch and strengthen your stomach, back, and leg muscles. The stronger those muscles are, the better they're able to protect your back. If you have constant or severe pain in your back or spine, you may need other treatments, such as physical therapy. In some cases, your doctor may suggest surgery. Follow-up care is a key part of your treatment and safety. Be sure to make and go to all appointments, and call your doctor if you are having problems. It's also a good idea to know your test results and keep a list of the medicines you take. Where can you learn more? Go to http://alhaji-tia.info/. Enter L682 in the search box to learn more about \"Learning About Degenerative Disc Disease. \"  Current as of: March 21, 2017  Content Version: 11.5  © 5329-2112 [employer], [employer] Care instructions adapted under license by [employer] (which disclaims liability or warranty for this information). If you have questions about a medical condition or this instruction, always ask your healthcare professional. [place] [address] any warranty or liability for your use of this information.

## 2018-06-21 ENCOUNTER — HOSPITAL ENCOUNTER (OUTPATIENT)
Dept: MRI IMAGING | Age: 28
Discharge: HOME | End: 2018-06-21
Attending: SPECIALIST
Payer: MEDICAID

## 2018-06-21 DIAGNOSIS — M54.16 LUMBAR RADICULOPATHY: ICD-10-CM

## 2018-06-21 PROCEDURE — 72148 MRI LUMBAR SPINE W/O DYE: CPT

## 2018-09-04 ENCOUNTER — OFFICE VISIT (OUTPATIENT)
Dept: FAMILY MEDICINE CLINIC | Age: 28
End: 2018-09-04

## 2018-09-04 VITALS
HEART RATE: 96 BPM | BODY MASS INDEX: 37.11 KG/M2 | RESPIRATION RATE: 16 BRPM | HEIGHT: 60 IN | OXYGEN SATURATION: 100 % | DIASTOLIC BLOOD PRESSURE: 79 MMHG | WEIGHT: 189 LBS | SYSTOLIC BLOOD PRESSURE: 118 MMHG | TEMPERATURE: 97 F

## 2018-09-04 DIAGNOSIS — E16.2 HYPOGLYCEMIA: ICD-10-CM

## 2018-09-04 DIAGNOSIS — E01.0 THYROMEGALY: ICD-10-CM

## 2018-09-04 RX ORDER — PHENTERMINE HYDROCHLORIDE 37.5 MG/1
37.5 TABLET ORAL
Qty: 30 TAB | Refills: 0 | Status: SHIPPED | OUTPATIENT
Start: 2018-10-04 | End: 2018-10-24 | Stop reason: SDUPTHER

## 2018-09-04 RX ORDER — PHENTERMINE HYDROCHLORIDE 37.5 MG/1
37.5 TABLET ORAL
Qty: 30 TAB | Refills: 0 | Status: SHIPPED | OUTPATIENT
Start: 2018-09-04 | End: 2018-10-24 | Stop reason: ALTCHOICE

## 2018-09-04 NOTE — PROGRESS NOTES
HISTORY OF PRESENT ILLNESS  Rod Morrison is a 33 y.o. female. HPI   Obesity  The pt is feeling very good on this meds, feeling less tired and fatigued, becoming to be more concentrated at work and at home, getting along very well with family member and the work place, in addition the pt is becoming to be more active and having daily multiple healthy different diets,  Is more involved with the weekly routine exercises, not a fast fooder, states that the pt does not eat too much as used to do and the portion are very well controlled, likes very much to continue on this medication despite knowing that it is not a safe meds, and  needs to be monitored q3 months and if any thing of unusual, the pt was told if any needs to call us and let us know of any  concern regarding the current meds, hopefully has not had any concern so far,       Current Outpatient Prescriptions   Medication Sig Dispense Refill    HYDROcodone-acetaminophen (NORCO) 5-325 mg per tablet Take 1 Tab by mouth every six (6) hours as needed for Pain. Max Daily Amount: 4 Tabs. 10 Tab 0    busPIRone (BUSPAR) 7.5 mg tablet Take 1 Tab by mouth every twelve (12) hours. 60 Tab 3    norethindrone-ethinyl estradiol-iron (JUNEL FE 1.5/30, 28,) 1.5 mg-30 mcg (21)/75 mg (7) tab Take 1 Tab by mouth daily.  naproxen (NAPROSYN) 500 mg tablet Take 1 Tab by mouth every twelve (12) hours as needed for Pain. (Patient not taking: Reported on 9/4/2018) 20 Tab 0    predniSONE (STERAPRED) 5 mg dose pack See administration instruction per 5mg dose pack 21 Tab 0    topiramate (TOPAMAX) 25 mg tablet Take 1 Tab by mouth two (2) times daily (with meals).  (Patient not taking: Reported on 9/4/2018) 60 Tab 3    Blood-Glucose Meter (TRUERESULT BLOOD GLUCOSE SYSTM) monitoring kit Test blood sugar before breakfast, lunch dinner and before bedtime 1 Kit 11    glucose blood VI test strips (TRUETEST TEST STRIPS) strip Test blood sugar before breakfast, lunch, dinner and before bedtime. 100 Strip 11    glucose blood VI test strips (ADVANCED GLUC METER TEST STRIP) strip Test blood sugar before breakfast, lunch , dinner and bedtime 100 Strip 6    Lancets misc Test blood sugar before breakfast, lunch, dinner and bedtime. 1 Package 11    norgestimate-ethinyl estradiol (SPRINTEC, 28,) 0.25-35 mg-mcg per tablet Take 1 tablet by mouth daily. (Patient not taking: Reported on 2/19/2018) 1 Package 12     No Known Allergies  Past Medical History:   Diagnosis Date    BMI 38.0-38.9,adult 4/3/2018    BMI 40.0-44.9, adult ([address] 2/19/2018    Chronic back pain greater than 3 months duration 7/29/2015    TASIA (generalized anxiety disorder) 1/19/2017    High-risk sexual behavior 2/23/2017    HX OTHER MEDICAL     chlamydia age 24, treated & negative now    Hypoglycemia 3/30/2016    Ill-defined condition     Insulinoma 4/6/2016    Morbid obesity ([address]     Syncopal seizure ([address] 11/19/2015    Thyromegaly 3/30/2016    Urinary frequency 11/19/2015     Past Surgical History:   Procedure Laterality Date    HX CHOLECYSTECTOMY  10/6/2014    lap.  tracee    HX DILATION AND CURETTAGE  6/2011    HX OTHER SURGICAL      D&C with SAB 6/2011    HX OTHER SURGICAL      wisdom extraction 15 years    REVISION CERVIX Devoria Manges [address]  2013     Family History   Problem Relation Age of Onset    Hypertension Mother     Diabetes Mother     High Cholesterol Father     Other Father      high cholesterole, herniated disc    Diabetes Paternal Aunt     Hypertension Maternal Grandmother     COPD Maternal Grandmother     Emphysema Maternal Grandmother     Diabetes Paternal Grandmother      Social History   Substance Use Topics    Smoking status: Never Smoker    Smokeless tobacco: Never Used    Alcohol use No      Lab Results  Component Value Date/Time   WBC 5.4 02/23/2017 12:37 PM   HGB 12.3 02/23/2017 12:37 PM   Hemoglobin (POC) 12.9 06/06/2015 06:39 PM   HCT 37.9 02/23/2017 12:37 PM   Hematocrit (POC) 38 06/06/2015 06:39 PM   PLATELET 706 14/43/8434 12:37 PM   MCV 84 02/23/2017 12:37 PM   Hgb, External 12.4 10/14/2013   Hct, External 36.7 10/14/2013   Platelet cnt., External 304 10/14/2013     Lab Results  Component Value Date/Time   GFR est non- 02/23/2017 12:37 PM   GFRNA, POC >60 06/06/2015 06:39 PM   GFR est  02/23/2017 12:37 PM   GFRAA, POC >60 06/06/2015 06:39 PM   Creatinine 0.77 02/23/2017 12:37 PM   Creatinine (POC) 0.7 06/06/2015 06:39 PM   BUN 7 02/23/2017 12:37 PM   BUN (POC) 5 (L) 06/06/2015 06:39 PM   Sodium 140 02/23/2017 12:37 PM   Sodium (POC) 141 06/06/2015 06:39 PM   Potassium 4.4 02/23/2017 12:37 PM   Potassium (POC) 3.8 06/06/2015 06:39 PM   Chloride 103 02/23/2017 12:37 PM   Chloride (POC) 105 06/06/2015 06:39 PM   CO2 19 02/23/2017 12:37 PM        Review of Systems   Constitutional: Negative for chills and fever. HENT: Negative for congestion and nosebleeds. Eyes: Negative for blurred vision and pain. Respiratory: Negative for cough, shortness of breath and wheezing. Cardiovascular: Negative for chest pain and leg swelling. Gastrointestinal: Negative for constipation, diarrhea, nausea and vomiting. Genitourinary: Negative for dysuria and frequency. Musculoskeletal: Negative for joint pain and myalgias. Skin: Negative for itching and rash. Neurological: Negative for dizziness, loss of consciousness and headaches. Psychiatric/Behavioral: Negative for depression. The patient is not nervous/anxious and does not have insomnia. Physical Exam   Constitutional: She is oriented to person, place, and time. She appears well-developed and well-nourished. HENT:   Head: Normocephalic and atraumatic. Eyes: Conjunctivae and EOM are normal. Pupils are equal, round, and reactive to light. Neck: No JVD present. No thyromegaly present. Cardiovascular: Normal rate, regular rhythm, normal heart sounds and intact distal pulses.   Exam reveals no gallop and no friction rub. No murmur heard. Pulmonary/Chest: Effort normal and breath sounds normal. No stridor. No respiratory distress. She has no wheezes. She has no rales. Abdominal: Soft. Bowel sounds are normal. She exhibits no distension and no mass. There is no tenderness. Musculoskeletal: Normal range of motion. She exhibits no edema or tenderness. Lymphadenopathy:     She has no cervical adenopathy. Neurological: She is alert and oriented to person, place, and time. She has normal reflexes. No cranial nerve deficit. Skin: No rash noted. No erythema. Psychiatric: She has a normal mood and affect. Her behavior is normal.   Nursing note and vitals reviewed. ASSESSMENT and PLAN  Diagnoses and all orders for this visit:    1. BMI 36.0-36.9,adult  -     phentermine (ADIPEX-P) 37.5 mg tablet; Take 1 Tab by mouth every morning. Max Daily Amount: 37.5 mg.  -     phentermine (ADIPEX-P) 37.5 mg tablet; Take 1 Tab by mouth every morning. Max Daily Amount: 37.5 mg.  -     CBC W/O DIFF  -     METABOLIC PANEL, BASIC  -     VITAMIN B12 & FOLATE  -     VITAMIN D, 25 HYDROXY    2. Hypoglycemia  -     phentermine (ADIPEX-P) 37.5 mg tablet; Take 1 Tab by mouth every morning. Max Daily Amount: 37.5 mg.  -     phentermine (ADIPEX-P) 37.5 mg tablet; Take 1 Tab by mouth every morning. Max Daily Amount: 37.5 mg.  -     CBC W/O DIFF  -     METABOLIC PANEL, BASIC  -     VITAMIN B12 & FOLATE  -     VITAMIN D, 25 HYDROXY    3. Thyromegaly  -     phentermine (ADIPEX-P) 37.5 mg tablet; Take 1 Tab by mouth every morning. Max Daily Amount: 37.5 mg.  -     phentermine (ADIPEX-P) 37.5 mg tablet; Take 1 Tab by mouth every morning.  Max Daily Amount: 37.5 mg.  -     CBC W/O DIFF  -     METABOLIC PANEL, BASIC  -     VITAMIN B12 & FOLATE  -     VITAMIN D, 25 HYDROXY         I have recommended the following steps for improving the current weight, pt was counseled on to try to Decrease calori intake by 500per day, limit the amount of intake and portion the meals, The patient is advised to avoid second hand exposure, begin progressive daily aerobic exercise program at least x 5 per week,  follow a low fat, low cholesterol diet, attempt to keep dairy records of  Weight and the daily intake, reduce salt in diet and cooking, reduce exposure to stress, improve dietary compliance, continue current healthy lifestyle patterns.  Attend weight loss classes and lectures, Pt agreed with all the recommendations

## 2018-09-04 NOTE — MR AVS SNAPSHOT
[facility] [id number] [phone] Patient: Princess Evans MRN: [id number] FLK:[birth year] Visit Information Date & Time Provider Department Dept. Phone Encounter #  
 9/4/2018 10:00 AM Magen Ricic MD [address] [facility] [phone] [id number] Your Appointments 9/10/2018 11:00 AM  
New Patient with Ben Langford NP [facility] ([facility]) Appt Note: NP TASIA/ pt sched/ told to arrive 30 mins early [address] [facility] [address] [facility] [address]  
  
   
 [address] [address] Upcoming Health Maintenance Date Due Influenza Age 5 to Adult 8/1/2018 PAP AKA CERVICAL CYTOLOGY 1/28/2019 DTaP/Tdap/Td series (2 - Td) 2/10/2024 Allergies as of 9/4/2018  Review Complete On: 9/4/2018 By: Sedrick Clinton No Known Allergies Current Immunizations  Reviewed on 2/23/2017 Name Date Influenza Vaccine 1/27/2014 Influenza Vaccine (Quad) PF 1/19/2017 Tdap 2/10/2014 Not reviewed this visit You Were Diagnosed With   
  
 Codes Comments BMI 36.0-36.9,adult    -  Primary ICD-10-CM: G26.58 
ICD-9-CM: V85.36 Vitals BP Pulse Temp Resp Height(growth percentile) Weight(growth percentile) 118/79 (BP 1 Location: Left arm, BP Patient Position: Sitting) 96 97 °F (36.1 °C) (Oral) 16 5' (1.524 m) 189 lb (85.7 kg) LMP SpO2 BMI OB Status Smoking Status 08/28/2018 100% 36.91 kg/m2 Having regular periods Never Smoker BMI and BSA Data Body Mass Index Body Surface Area  
 36.91 kg/m 2 1.9 m 2 Preferred Pharmacy Pharmacy Name Phone [facility] [address] [place] [address] [phone] Your Updated Medication List  
  
   
This list is accurate as of 9/4/18 10:57 AM.  Always use your most recent med list.  
  
  
  
  
 Blood-Glucose Meter monitoring kit Commonly known as:  TRUERESULT BLOOD GLUCOSE SYSTM Test blood sugar before breakfast, lunch dinner and before bedtime  
  
 busPIRone 7.5 mg tablet Commonly known as:  BUSPAR Take 1 Tab by mouth every twelve (12) hours. * glucose blood VI test strips strip Commonly known as:  ADVANCED GLUC METER TEST STRIP Test blood sugar before breakfast, lunch , dinner and bedtime * glucose blood VI test strips strip Commonly known as:  TRUETEST TEST STRIPS Test blood sugar before breakfast, lunch, dinner and before bedtime. HYDROcodone-acetaminophen 5-325 mg per tablet Commonly known as:  [place] Take 1 Tab by mouth every six (6) hours as needed for Pain. Max Daily Amount: 4 Tabs. JUNEL FE 1.5/30 (28) 1.5 mg-30 mcg (21)/75 mg (7) Tab Generic drug:  norethindrone-ethinyl estradiol-iron Take 1 Tab by mouth daily. Lancets Misc Test blood sugar before breakfast, lunch, dinner and bedtime. naproxen 500 mg tablet Commonly known as:  NAPROSYN Take 1 Tab by mouth every twelve (12) hours as needed for Pain.  
  
 norgestimate-ethinyl estradiol 0.25-35 mg-mcg Tab Commonly known as:  [facility] (28) Take 1 tablet by mouth daily. * phentermine 37.5 mg tablet Commonly known as:  ADIPEX-P Take 1 Tab by mouth every morning. Max Daily Amount: 37.5 mg.  
  
 * phentermine 37.5 mg tablet Commonly known as:  ADIPEX-P Take 1 Tab by mouth every morning. Max Daily Amount: 37.5 mg.  
Start taking on:  10/4/2018  
  
 predniSONE 5 mg dose pack Commonly known as:  STERAPRED See administration instruction per 5mg dose pack * Notice: This list has 4 medication(s) that are the same as other medications prescribed for you. Read the directions carefully, and ask your doctor or other care provider to review them with you. Prescriptions Printed  Refills  
 phentermine (ADIPEX-P) 37.5 mg tablet 0  
 Sig: Take 1 Tab by mouth every morning. Max Daily Amount: 37.5 mg.  
 Class: Print Route: Oral  
 phentermine (ADIPEX-P) 37.5 mg tablet 0 Starting on: 10/4/2018 Sig: Take 1 Tab by mouth every morning. Max Daily Amount: 37.5 mg.  
 Class: Print Route: Oral  
  
We Performed the Following CBC W/O DIFF [89443 CPT(R)] METABOLIC PANEL, BASIC [72607 CPT(R)] VITAMIN B12 & FOLATE [51032 CPT(R)] Introducing [facility]! [employer] introduces [employer] patient portal. Now you can access parts of your medical record, email your doctor's office, and request medication refills online. 1. In your internet browser, go to [email or web address] 2. Click on the First Time User? Click Here link in the Sign In box. You will see the New Member Sign Up page. 3. Enter your Rivono Access Code exactly as it appears below. You will not need to use this code after youve completed the sign-up process. If you do not sign up before the expiration date, you must request a new code. · Rivono Access Code: [email or web address] Expires: 12/3/2018 10:57 AM 
 
4. Enter the last four digits of your Social Security Number (xxxx) and Date of Birth (mm/dd/yyyy) as indicated and click Submit. You will be taken to the next sign-up page. 5. Create a Rivono ID. This will be your Rivono login ID and cannot be changed, so think of one that is secure and easy to remember. 6. Create a Rivono password. You can change your password at any time. 7. Enter your Password Reset Question and Answer. This can be used at a later time if you forget your password. 8. Enter your e-mail address. You will receive e-mail notification when new information is available in [address] 9. Click Sign Up. You can now view and download portions of your medical record. 10. Click the Download Summary menu link to download a portable copy of your medical information. If you have questions, please visit the Frequently Asked Questions section of the [employer] website. Remember, [employer] is NOT to be used for urgent needs. For medical emergencies, dial 911. Now available from your iPhone and Android! Please provide this summary of care documentation to your next provider. Your primary care clinician is listed as Shane Mendez. If you have any questions after today's visit, please call [phone].

## 2018-09-04 NOTE — PROGRESS NOTES
Chief Complaint   Patient presents with    Weight Management     request lab for vitamin level    Back Pain     request for pain control medication for soreness of lumbar     1. Have you been to the ER, urgent care clinic since your last visit? Hospitalized since your last visit? No    2. Have you seen or consulted any other health care providers outside of the [address] [place] [address] since your last visit? Include any pap smears or colon screening.  No

## 2018-09-05 LAB
25(OH)D3+25(OH)D2 SERPL-MCNC: 26.9 NG/ML (ref 30–100)
BUN SERPL-MCNC: 8 MG/DL (ref 6–20)
BUN/CREAT SERPL: 9 (ref 9–23)
CALCIUM SERPL-MCNC: 9.5 MG/DL (ref 8.7–10.2)
CHLORIDE SERPL-SCNC: 106 MMOL/L (ref 96–106)
CO2 SERPL-SCNC: 21 MMOL/L (ref 20–29)
CREAT SERPL-MCNC: 0.85 MG/DL (ref 0.57–1)
ERYTHROCYTE [DISTWIDTH] IN BLOOD BY AUTOMATED COUNT: 15 % (ref 12.3–15.4)
FOLATE SERPL-MCNC: 14.1 NG/ML
GLUCOSE SERPL-MCNC: 74 MG/DL (ref 65–99)
HCT VFR BLD AUTO: 41.3 % (ref 34–46.6)
HGB BLD-MCNC: 13.5 G/DL (ref 11.1–15.9)
MCH RBC QN AUTO: 27.9 PG (ref 26.6–33)
MCHC RBC AUTO-ENTMCNC: 32.7 G/DL (ref 31.5–35.7)
MCV RBC AUTO: 85 FL (ref 79–97)
PLATELET # BLD AUTO: 376 X10E3/UL (ref 150–379)
POTASSIUM SERPL-SCNC: 4.5 MMOL/L (ref 3.5–5.2)
RBC # BLD AUTO: 4.84 X10E6/UL (ref 3.77–5.28)
SODIUM SERPL-SCNC: 141 MMOL/L (ref 134–144)
VIT B12 SERPL-MCNC: 828 PG/ML (ref 232–1245)
WBC # BLD AUTO: 5.5 X10E3/UL (ref 3.4–10.8)

## 2018-09-05 NOTE — PATIENT INSTRUCTIONS

## 2018-09-10 ENCOUNTER — OFFICE VISIT (OUTPATIENT)
Dept: BEHAVIORAL/MENTAL HEALTH CLINIC | Age: 28
End: 2018-09-10

## 2018-09-10 VITALS
HEART RATE: 92 BPM | HEIGHT: 60 IN | DIASTOLIC BLOOD PRESSURE: 82 MMHG | WEIGHT: 188 LBS | BODY MASS INDEX: 36.91 KG/M2 | SYSTOLIC BLOOD PRESSURE: 122 MMHG

## 2018-09-10 DIAGNOSIS — E66.01 OBESITY, MORBID (HCC): ICD-10-CM

## 2018-09-10 DIAGNOSIS — F33.1 MODERATE EPISODE OF RECURRENT MAJOR DEPRESSIVE DISORDER (HCC): Primary | ICD-10-CM

## 2018-09-10 DIAGNOSIS — F41.1 GAD (GENERALIZED ANXIETY DISORDER): ICD-10-CM

## 2018-09-10 RX ORDER — BUSPIRONE HYDROCHLORIDE 15 MG/1
15 TABLET ORAL EVERY 12 HOURS
Qty: 60 TAB | Refills: 1 | Status: SHIPPED | OUTPATIENT
Start: 2018-09-10 | End: 2018-10-22 | Stop reason: SDUPTHER
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
## 2018-09-12 ENCOUNTER — TELEPHONE (OUTPATIENT)
Dept: BEHAVIORAL/MENTAL HEALTH CLINIC | Age: 28
End: 2018-09-12

## 2018-09-12 NOTE — TELEPHONE ENCOUNTER
Pt called and asked for a letter that she needs for her employer regarding some adjustments that need to be made so that she can continue working. She did not tell me exactly what it was she stated she only wanted to tell you.

## 2018-09-19 ENCOUNTER — TELEPHONE (OUTPATIENT)
Dept: BEHAVIORAL/MENTAL HEALTH CLINIC | Age: 28
End: 2018-09-19

## 2018-09-19 NOTE — TELEPHONE ENCOUNTER
Pt called and said that the antidepressant that she is on only makes her feel down all the time. She says that she doesn't feel normal. She still doesn't want to try SSRIs but she feels she needs something else. She doesn't remember experiencing this way until this year.  She wants to know if there is anything that can help

## 2018-09-20 ENCOUNTER — DOCUMENTATION ONLY (OUTPATIENT)
Dept: BEHAVIORAL/MENTAL HEALTH CLINIC | Age: 28
End: 2018-09-20

## 2018-09-20 NOTE — TELEPHONE ENCOUNTER
Spoke to the pt.  She is going to research the medications in that class and call us back with an answer

## 2018-09-20 NOTE — PROGRESS NOTES
To : She is not on an antidepressant- on Buspar for anxiety. If she is having more depression then she will need to start an antidepressant, which will include a serotonergic medication like an SSRI. If she would like to start this then please make her a sooner appt.  Thanks

## 2018-09-21 ENCOUNTER — OFFICE VISIT (OUTPATIENT)
Dept: BEHAVIORAL/MENTAL HEALTH CLINIC | Age: 28
End: 2018-09-21

## 2018-09-21 VITALS
TEMPERATURE: 98.3 F | SYSTOLIC BLOOD PRESSURE: 112 MMHG | WEIGHT: 186.6 LBS | HEART RATE: 95 BPM | RESPIRATION RATE: 18 BRPM | HEIGHT: 60 IN | BODY MASS INDEX: 36.63 KG/M2 | DIASTOLIC BLOOD PRESSURE: 87 MMHG

## 2018-09-21 DIAGNOSIS — F32.1 MODERATE MAJOR DEPRESSION (HCC): Primary | ICD-10-CM

## 2018-09-21 DIAGNOSIS — F41.1 GAD (GENERALIZED ANXIETY DISORDER): ICD-10-CM

## 2018-09-21 RX ORDER — SERTRALINE HYDROCHLORIDE 50 MG/1
TABLET, FILM COATED ORAL
Qty: 30 TAB | Refills: 1 | Status: SHIPPED | OUTPATIENT
Start: 2018-09-21 | End: 2018-10-22

## 2018-09-21 NOTE — PROGRESS NOTES
Chief Complaint   Patient presents with   [address] Problem     follow up     1. Have you been to the ER, urgent care clinic since your last visit? Hospitalized since your last visit? no    2. Have you seen or consulted any other health care providers outside of the [address] [place] [address] since your last visit? Include any pap smears or colon screening.   No    Visit Vitals    /87 (BP 1 Location: Left arm, BP Patient Position: Sitting)    Pulse 95    Temp 98.3 °F (36.8 °C) (Oral)    Resp 18    Ht 5' (1.524 m)    Wt 84.6 kg (186 lb 9.6 oz)    LMP 08/28/2018    BMI 36.44 kg/m2

## 2018-09-22 LAB
ALBUMIN SERPL-MCNC: 4.2 G/DL (ref 3.5–5.5)
ALBUMIN/GLOB SERPL: 1.4 {RATIO} (ref 1.2–2.2)
ALP SERPL-CCNC: 52 IU/L (ref 39–117)
ALT SERPL-CCNC: 23 IU/L (ref 0–32)
AST SERPL-CCNC: 24 IU/L (ref 0–40)
BILIRUB SERPL-MCNC: <0.2 MG/DL (ref 0–1.2)
BUN SERPL-MCNC: 8 MG/DL (ref 6–20)
BUN/CREAT SERPL: 9 (ref 9–23)
CALCIUM SERPL-MCNC: 9.6 MG/DL (ref 8.7–10.2)
CHLORIDE SERPL-SCNC: 104 MMOL/L (ref 96–106)
CO2 SERPL-SCNC: 20 MMOL/L (ref 20–29)
CREAT SERPL-MCNC: 0.88 MG/DL (ref 0.57–1)
GLOBULIN SER CALC-MCNC: 3 G/DL (ref 1.5–4.5)
GLUCOSE SERPL-MCNC: 71 MG/DL (ref 65–99)
POTASSIUM SERPL-SCNC: 4.5 MMOL/L (ref 3.5–5.2)
PROT SERPL-MCNC: 7.2 G/DL (ref 6–8.5)
SODIUM SERPL-SCNC: 137 MMOL/L (ref 134–144)
TSH SERPL DL<=0.005 MIU/L-ACNC: 1.41 UIU/ML (ref 0.45–4.5)

## 2018-10-01 ENCOUNTER — TELEPHONE (OUTPATIENT)
Dept: BEHAVIORAL/MENTAL HEALTH CLINIC | Age: 28
End: 2018-10-01

## 2018-10-02 NOTE — TELEPHONE ENCOUNTER
Attempted to call, no answer. Generic voicemail, left generic message to call back.  Her labs all came back normal.

## 2018-10-11 ENCOUNTER — OFFICE VISIT (OUTPATIENT)
Dept: BEHAVIORAL/MENTAL HEALTH CLINIC | Age: 28
End: 2018-10-11

## 2018-10-11 DIAGNOSIS — F43.10 PTSD (POST-TRAUMATIC STRESS DISORDER): ICD-10-CM

## 2018-10-11 DIAGNOSIS — F32.1 MODERATE MAJOR DEPRESSION (HCC): Primary | ICD-10-CM

## 2018-10-11 DIAGNOSIS — F41.1 GAD (GENERALIZED ANXIETY DISORDER): ICD-10-CM

## 2018-10-11 PROBLEM — F41.9 CHRONIC ANXIETY: Status: RESOLVED | Noted: 2017-12-14 | Resolved: 2018-10-11
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
## 2018-10-22 ENCOUNTER — OFFICE VISIT (OUTPATIENT)
Dept: BEHAVIORAL/MENTAL HEALTH CLINIC | Age: 28
End: 2018-10-22

## 2018-10-22 VITALS
SYSTOLIC BLOOD PRESSURE: 129 MMHG | HEART RATE: 91 BPM | HEIGHT: 60 IN | BODY MASS INDEX: 36.91 KG/M2 | WEIGHT: 188 LBS | DIASTOLIC BLOOD PRESSURE: 83 MMHG

## 2018-10-22 DIAGNOSIS — E66.01 OBESITY, MORBID (HCC): ICD-10-CM

## 2018-10-22 DIAGNOSIS — F32.1 MODERATE MAJOR DEPRESSION (HCC): ICD-10-CM

## 2018-10-22 DIAGNOSIS — F41.1 GAD (GENERALIZED ANXIETY DISORDER): Primary | ICD-10-CM

## 2018-10-22 RX ORDER — BUSPIRONE HYDROCHLORIDE 15 MG/1
15 TABLET ORAL EVERY 12 HOURS
Qty: 60 TAB | Refills: 2 | Status: SHIPPED | OUTPATIENT
Start: 2018-10-22 | End: 2019-01-22
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
## 2018-10-24 ENCOUNTER — OFFICE VISIT (OUTPATIENT)
Dept: FAMILY MEDICINE CLINIC | Age: 28
End: 2018-10-24

## 2018-10-24 VITALS
HEART RATE: 97 BPM | RESPIRATION RATE: 17 BRPM | SYSTOLIC BLOOD PRESSURE: 129 MMHG | OXYGEN SATURATION: 99 % | DIASTOLIC BLOOD PRESSURE: 89 MMHG | BODY MASS INDEX: 36.95 KG/M2 | TEMPERATURE: 97.2 F | HEIGHT: 60 IN | WEIGHT: 188.2 LBS

## 2018-10-24 DIAGNOSIS — F41.1 GAD (GENERALIZED ANXIETY DISORDER): Primary | ICD-10-CM

## 2018-10-24 DIAGNOSIS — Z11.1 SCREENING-PULMONARY TB: ICD-10-CM

## 2018-10-24 DIAGNOSIS — L65.9 HAIR LOSS DISORDER: ICD-10-CM

## 2018-10-24 DIAGNOSIS — Z02.89 ENCOUNTER FOR COMPLETION OF FORM WITH PATIENT: ICD-10-CM

## 2018-10-24 NOTE — PROGRESS NOTES
Chief Complaint   Patient presents with    Complete Physical     Here for a physical and PPD test.  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 She would also like a referral to dermatologist.  No other concerns at this time. 1. Have you been to the ER, urgent care clinic since your last visit? Hospitalized since your last visit? No    2. Have you seen or consulted any other health care providers outside of the [address] [place] [address] since your last visit? Include any pap smears or colon screening.  No

## 2018-10-24 NOTE — PROGRESS NOTES
HISTORY OF PRESENT ILLNESS  Elena Castro is a 33 y.o. female. HPI   Encounter for 2 applications forms to be refilled one is for her STS and the other for foster care, having one girl 4yo at school doing well, working as well, good family support to help and provide care, the age that she islooking is around the same as her 4yo kid so that keep each other company, her kid feel lonwly also has been involved with foster care with her mother at her house, pt with TASIA stable and sees the psychiatrist often every once a month, Depression with the anxiety and panic states of mind    nicley controlled with the current meds Patient state that it is getting better: pt states and reports of feeling less anxius, less guilty feeling,  less Hoplessness,ns/nh,ni,nh, less trouble with weight gain or loss, no tendency of etoh or illicit drug use, more ability of sleep, more ablitiy  to concentrate at work( she is only able to work 8-12 hrs per week at this time) and at home with the current medications,and all together a safe feeling at home and at work      Current Outpatient Medications   Medication Sig Dispense Refill    busPIRone (BUSPAR) 15 mg tablet Take 1 Tab by mouth every twelve (12) hours. 60 Tab 2    phentermine (ADIPEX-P) 37.5 mg tablet Take 1 Tab by mouth every morning. Max Daily Amount: 37.5 mg. 30 Tab 0    norethindrone-ethinyl estradiol-iron (JUNEL FE 1.5/30, 28,) 1.5 mg-30 mcg (21)/75 mg (7) tab Take 1 Tab by mouth daily.  Blood-Glucose Meter (TRUERESULT BLOOD GLUCOSE SYSTM) monitoring kit Test blood sugar before breakfast, lunch dinner and before bedtime (Patient not taking: Reported on 9/21/2018) 1 Kit 11    glucose blood VI test strips (TRUETEST TEST STRIPS) strip Test blood sugar before breakfast, lunch, dinner and before bedtime. (Patient not taking: Reported on 9/21/2018) 100 Strip 11    Lancets misc Test blood sugar before breakfast, lunch, dinner and bedtime.  (Patient not taking: Reported on 9/21/2018) 1 Package 11     No Known Allergies  Past Medical History:   Diagnosis Date    BMI 38.0-38.9,adult 4/3/2018    BMI 40.0-44.9, adult ([facility] [address] 2/19/2018    Chronic back pain greater than 3 months duration 7/29/2015    TASIA (generalized anxiety disorder) 1/19/2017    High-risk sexual behavior 2/23/2017    HX OTHER MEDICAL     chlamydia age 24, treated & negative now    Hypoglycemia 3/30/2016    Ill-defined condition     Insulinoma 4/6/2016    Morbid obesity ([facility] [address]     Syncopal seizure ([facility] [address] 11/19/2015    Thyromegaly 3/30/2016    Urinary frequency 11/19/2015     Past Surgical History:   Procedure Laterality Date    HX CHOLECYSTECTOMY  10/6/2014    lap.  tracee    HX DILATION AND CURETTAGE  6/2011    HX OTHER SURGICAL      D&C with SAB 6/2011    HX OTHER SURGICAL      wisdom extraction 14 years    REVISION CERVIX W [facility], [address]  2013     Family History   Problem Relation Age of Onset    Hypertension Mother     Diabetes Mother     High Cholesterol Father     Other Father         high cholesterole, herniated disc    Diabetes Paternal Aunt     Hypertension Maternal Grandmother     COPD Maternal Grandmother     Emphysema Maternal Grandmother     Diabetes Paternal Grandmother      Social History     Tobacco Use    Smoking status: Never Smoker    Smokeless tobacco: Never Used   Substance Use Topics    Alcohol use: No      Lab Results   Component Value Date/Time    WBC 5.5 09/04/2018 11:00 AM    HGB 13.5 09/04/2018 11:00 AM    Hemoglobin (POC) 12.9 06/06/2015 06:39 PM    HCT 41.3 09/04/2018 11:00 AM    Hematocrit (POC) 38 06/06/2015 06:39 PM    PLATELET 157 51/35/1333 11:00 AM    MCV 85 09/04/2018 11:00 AM    Hgb, External 12.4 10/14/2013    Hct, External 36.7 10/14/2013    Platelet cnt., External 304 10/14/2013     Lab Results   Component Value Date/Time    TSH 1.410 09/21/2018 10:57 AM    T4, Free 1.04 04/06/2016 09:31 AM      Lab Results   Component Value Date/Time    Glucose 71 09/21/2018 10:57 AM    Glucose (POC) 96 06/06/2015 06:39 PM    Glucose  04/06/2016 09:31 AM         Review of Systems   Constitutional: Negative for chills and fever. HENT: Negative for congestion and nosebleeds. Eyes: Negative for blurred vision and pain. Respiratory: Negative for cough, shortness of breath and wheezing. Cardiovascular: Negative for chest pain and leg swelling. Gastrointestinal: Negative for constipation, diarrhea, nausea and vomiting. Genitourinary: Negative for dysuria and frequency. Musculoskeletal: Negative for joint pain and myalgias. Skin: Negative for itching and rash. Neurological: Negative for dizziness, loss of consciousness and headaches. Psychiatric/Behavioral: Negative for depression. The patient is not nervous/anxious and does not have insomnia. Physical Exam   Constitutional: She is oriented to person, place, and time. She appears well-developed and well-nourished. HENT:   Head: Normocephalic and atraumatic. Eyes: Conjunctivae and EOM are normal. Pupils are equal, round, and reactive to light. Neck: No JVD present. No thyromegaly present. Cardiovascular: Normal rate, regular rhythm, normal heart sounds and intact distal pulses. Exam reveals no gallop and no friction rub. No murmur heard. Pulmonary/Chest: Effort normal and breath sounds normal. No stridor. No respiratory distress. She has no wheezes. She has no rales. Abdominal: Soft. Bowel sounds are normal. She exhibits no distension and no mass. There is no tenderness. Musculoskeletal: Normal range of motion. She exhibits no edema or tenderness. Lymphadenopathy:     She has no cervical adenopathy. Neurological: She is alert and oriented to person, place, and time. She has normal reflexes. No cranial nerve deficit. Skin: No rash noted. No erythema. Psychiatric: She has a normal mood and affect. Her behavior is normal.   Nursing note and vitals reviewed.       ASSESSMENT and PLAN  Diagnoses and all orders for this visit:    1. TASIA (generalized anxiety disorder)  -     AMB POC TUBERCULOSIS, INTRADERMAL (SKIN TEST)    2. Encounter for completion of form with patient  -     AMB POC TUBERCULOSIS, INTRADERMAL (SKIN TEST)    3. Screening-pulmonary TB  -     AMB POC TUBERCULOSIS, INTRADERMAL (SKIN TEST)    4. Hair loss disorder  -     REFERRAL TO DERMATOLOGY      At this time patient was told to lose weight, so that the current body mass index would get into a close to 25 or between 20-25, patient was told that the patient can achieve this by starting an active life style modifications, working on the diet, increase physical activity, limit alcohol consumption, stop secondhand tobacco exposure,    for which includes creating a an interesting delightful to do list,  such as start of a light physical activity with a brisk daily walking 30 minutes most days of the week, most likely to total of 150 minutes per week, then the patient was told to try to avoid fatty fast foods, have a low-fat low-cholesterol diet, include seafood such as adding fatty fish   rtc in 2 days if ++ then need treatment , pt unable to do if +because she would need tx fo 6-9 m but ig neg she should be stable enough to be a good foster mom,   Routine labs ordered, and the needed abnormal labs will be discussed soon and they can be repeated in 3-6 months. In addition relevant handouts were given to the patient for a better understanding,    patient was told to call if any problems. Patient acknowledged understanding and  agreed with today's recommendations.

## 2018-10-24 NOTE — PROGRESS NOTES
PPD Placement note  Willam Ganser, 33 y.o. female is here today for placement of PPD test  Reason for PPD test:  Foster care paperwork  Pt taken PPD test before: no  Verified in allergy area and with patient that they are not allergic to the products PPD is made of (Phenol or Tween). Yes  Is patient taking any oral or IV steroid medication now or have they taken it in the last month? no  Has the patient ever received the BCG vaccine?: no  Has the patient been in recent contact with anyone known or suspected of having active TB disease?: no       Date of exposure (if applicable): n/a       Name of person they were exposed to (if applicable): n/a  Patient's Country of origin?:[place]  O: Alert and oriented in NAD. P:  PPD placed on 10/24/2018. Patient advised to return for reading within 48-72 hours.

## 2018-11-06 ENCOUNTER — OFFICE VISIT (OUTPATIENT)
Dept: FAMILY MEDICINE CLINIC | Age: 28
End: 2018-11-06

## 2018-11-06 VITALS
OXYGEN SATURATION: 99 % | RESPIRATION RATE: 18 BRPM | SYSTOLIC BLOOD PRESSURE: 123 MMHG | DIASTOLIC BLOOD PRESSURE: 70 MMHG | HEART RATE: 93 BPM | TEMPERATURE: 97.3 F | BODY MASS INDEX: 37.54 KG/M2 | WEIGHT: 191.2 LBS | HEIGHT: 60 IN

## 2018-11-06 DIAGNOSIS — Z11.1 SCREENING FOR TUBERCULOSIS: ICD-10-CM

## 2018-11-06 DIAGNOSIS — E66.9 CLASS 2 OBESITY WITHOUT SERIOUS COMORBIDITY WITH BODY MASS INDEX (BMI) OF 37.0 TO 37.9 IN ADULT, UNSPECIFIED OBESITY TYPE: Primary | ICD-10-CM

## 2018-11-06 RX ORDER — BUSPIRONE HYDROCHLORIDE 7.5 MG/1
TABLET ORAL
COMMUNITY
End: 2018-11-06 | Stop reason: ALTCHOICE

## 2018-11-06 RX ORDER — PEN NEEDLE, DIABETIC 30 GX3/16"
NEEDLE, DISPOSABLE MISCELLANEOUS
Qty: 1 PACKAGE | Refills: 11 | Status: SHIPPED | OUTPATIENT
Start: 2018-11-06 | End: 2019-11-15 | Stop reason: SDUPTHER

## 2018-11-06 RX ORDER — SERTRALINE HYDROCHLORIDE 50 MG/1
TABLET, FILM COATED ORAL
Refills: 1 | COMMUNITY
Start: 2018-10-24 | End: 2018-11-06 | Stop reason: ALTCHOICE

## 2018-11-06 RX ORDER — PHENTERMINE HYDROCHLORIDE 37.5 MG/1
TABLET ORAL
Refills: 0 | COMMUNITY
Start: 2018-10-11 | End: 2019-01-22

## 2018-11-06 RX ORDER — BUSPIRONE HYDROCHLORIDE 7.5 MG/1
TABLET ORAL
Refills: 3 | COMMUNITY
Start: 2018-08-16 | End: 2018-11-06 | Stop reason: DRUGHIGH

## 2018-11-06 NOTE — PROGRESS NOTES
Name and  verified      Chief Complaint   Patient presents with    Medication Refill         Health Maintenance reviewed-discussed with patient. 1. Have you been to the ER, urgent care clinic since your last visit? Hospitalized since your last visit? no    2. Have you seen or consulted any other health care providers outside of the [address] [place] [address] since your last visit? Include any pap smears or colon screening. Yes, Psychiatry patient stated 2-3 weeks ago.

## 2018-11-06 NOTE — PROGRESS NOTES
HISTORY OF PRESENT ILLNESS  Alena Ramos is a 33 y.o. female.   HPI patient present for refill of her medication last visit was for a completion of the forearm regarding patient capacity and ability to be a foster care at that time patient has no complaint no concern presented herself as a very stable individual referral was completed today present stating that she worried about her weight and she requesting to be on a medication that would provide the best for her patient denies any suicidal homicidal ideation at this time does not see does not hear anything compliant with her other medications stating that she feels great and she has no complaint but worries about her weight and the weight has been giving her a bad body image regardless patient stating that current medication has been helping her greatly recently patient also has seen and sent refer to psychologist and psychiatrist for better outcome for which she has stayed compliant with the medical advice,   Obesity  The pt is feeling very good on this meds, feeling less tired and fatigued, becoming to be more concentrated at work and at home,currently on Buspar 15mg bid, last time taken this AM has been on it >6months,  getting along very well with family member and the work place, in addition the pt is becoming to be more active and having daily multiple healthy different diets,  Is more involved with the weekly routine exercises, not a fast fooder, states that the pt does not eat too much as used to do and the portion are very well controlled, likes very much to continue on this medication despite knowing that it is not a safe meds, and  needs to be monitored q3 months and if any thing of unusual, the pt was told if any needs to call us and let us know of any  concern regarding the current meds, hopefully has not had any concern so far,   Vitals 11/6/2018 10/24/2018 10/22/2018 9/21/2018 9/10/2018   Weight 191 lb 3.2 oz 188 lb 3.2 oz 188 lb 186 lb 9.6 oz 188 lb     Vitals 9/4/2018 6/10/2018 5/30/2018 5/2/2018 5/2/2018   Weight 189 lb 191 lb 12.8 oz 193 lb 6.4 oz  199 lb 3.2 oz     Vitals 4/3/2018 2/19/2018   Weight 206 lb 215 lb       Current Outpatient Medications   Medication Sig Dispense Refill    phentermine (ADIPEX-P) 37.5 mg tablet TAKE ONE TAB BY MOUTH EVERY MORNING  0    busPIRone (BUSPAR) 15 mg tablet Take 1 Tab by mouth every twelve (12) hours. 60 Tab 2    norethindrone-ethinyl estradiol-iron (JUNEL FE 1.5/30, 28,) 1.5 mg-30 mcg (21)/75 mg (7) tab Take 1 Tab by mouth daily.  Blood-Glucose Meter (TRUERESULT BLOOD GLUCOSE SYSTM) monitoring kit Test blood sugar before breakfast, lunch dinner and before bedtime (Patient not taking: Reported on 9/21/2018) 1 Kit 11    glucose blood VI test strips (TRUETEST TEST STRIPS) strip Test blood sugar before breakfast, lunch, dinner and before bedtime. (Patient not taking: Reported on 9/21/2018) 100 Strip 11    Lancets misc Test blood sugar before breakfast, lunch, dinner and bedtime. (Patient not taking: Reported on 9/21/2018) 1 Package 11     No Known Allergies  Past Medical History:   Diagnosis Date    BMI 38.0-38.9,adult 4/3/2018    BMI 40.0-44.9, adult ([address] 2/19/2018    Chronic back pain greater than 3 months duration 7/29/2015    TASIA (generalized anxiety disorder) 1/19/2017    High-risk sexual behavior 2/23/2017    HX OTHER MEDICAL     chlamydia age 24, treated & negative now    Hypoglycemia 3/30/2016    Ill-defined condition     Insulinoma 4/6/2016    Morbid obesity ([address]     Syncopal seizure ([address] 11/19/2015    Thyromegaly 3/30/2016    Urinary frequency 11/19/2015     Past Surgical History:   Procedure Laterality Date    HX CHOLECYSTECTOMY  10/6/2014    lap.  tracee    HX DILATION AND CURETTAGE  6/2011    HX OTHER SURGICAL      D&C with SAB 6/2011    HX OTHER SURGICAL      wisdom extraction 14 years    [facility]  2013     Family History   Problem Relation Age of Onset    Hypertension Mother     Diabetes Mother     High Cholesterol Father     Other Father         high cholesterole, herniated disc    Diabetes Paternal Aunt     Hypertension Maternal Grandmother     COPD Maternal Grandmother     Emphysema Maternal Grandmother     Diabetes Paternal Grandmother      Social History     Tobacco Use    Smoking status: Never Smoker    Smokeless tobacco: Never Used   Substance Use Topics    Alcohol use: No      Lab Results   Component Value Date/Time    WBC 5.5 09/04/2018 11:00 AM    HGB 13.5 09/04/2018 11:00 AM    Hemoglobin (POC) 12.9 06/06/2015 06:39 PM    HCT 41.3 09/04/2018 11:00 AM    Hematocrit (POC) 38 06/06/2015 06:39 PM    PLATELET 542 00/96/0661 11:00 AM    MCV 85 09/04/2018 11:00 AM    Hgb, External 12.4 10/14/2013    Hct, External 36.7 10/14/2013    Platelet cnt., External 304 10/14/2013     Lab Results   Component Value Date/Time    GFR est non-AA 89 09/21/2018 10:57 AM    GFRNA, POC >60 06/06/2015 06:39 PM    GFR est  09/21/2018 10:57 AM    GFRAA, POC >60 06/06/2015 06:39 PM    Creatinine 0.88 09/21/2018 10:57 AM    Creatinine (POC) 0.7 06/06/2015 06:39 PM    BUN 8 09/21/2018 10:57 AM    BUN (POC) 5 (L) 06/06/2015 06:39 PM    Sodium 137 09/21/2018 10:57 AM    Sodium (POC) 141 06/06/2015 06:39 PM    Potassium 4.5 09/21/2018 10:57 AM    Potassium (POC) 3.8 06/06/2015 06:39 PM    Chloride 104 09/21/2018 10:57 AM    Chloride (POC) 105 06/06/2015 06:39 PM    CO2 20 09/21/2018 10:57 AM        Review of Systems   Constitutional: Negative for chills and fever. HENT: Negative for congestion and nosebleeds. Eyes: Negative for blurred vision and pain. Respiratory: Negative for cough, shortness of breath and wheezing. Cardiovascular: Negative for chest pain and leg swelling. Gastrointestinal: Negative for constipation, diarrhea, nausea and vomiting. Genitourinary: Negative for dysuria and frequency.    Musculoskeletal: Negative for joint pain and myalgias. Skin: Negative for itching and rash. Neurological: Negative for dizziness, loss of consciousness and headaches. Psychiatric/Behavioral: Negative for depression. The patient is not nervous/anxious and does not have insomnia. Physical Exam   Constitutional: She is oriented to person, place, and time. She appears well-developed and well-nourished. HENT:   Head: Normocephalic and atraumatic. Eyes: Conjunctivae and EOM are normal. Pupils are equal, round, and reactive to light. Neck: No JVD present. No thyromegaly present. Cardiovascular: Normal rate, regular rhythm, normal heart sounds and intact distal pulses. Exam reveals no gallop and no friction rub. No murmur heard. Pulmonary/Chest: Effort normal and breath sounds normal. No stridor. No respiratory distress. She has no wheezes. She has no rales. Abdominal: Soft. Bowel sounds are normal. She exhibits no distension and no mass. There is no tenderness. Musculoskeletal: Normal range of motion. She exhibits no edema or tenderness. Lymphadenopathy:     She has no cervical adenopathy. Neurological: She is alert and oriented to person, place, and time. She has normal reflexes. No cranial nerve deficit. Skin: No rash noted. No erythema. Psychiatric: She has a normal mood and affect. Her behavior is normal.   Nursing note and vitals reviewed. ASSESSMENT and PLAN  Diagnoses and all orders for this visit:    1. Class 2 obesity without serious comorbidity with body mass index (BMI) of 37.0 to 37.9 in adult, unspecified obesity type  -     Insulin Needles, Disposable, 31 gauge x 5/16\" ndle; Inject saxenda once daily    2. Screening for tuberculosis  -     AMB POC TUBERCULOSIS, INTRADERMAL (SKIN TEST)    Other orders  -     liraglutide (SAXENDA) 3 mg/0.5 mL (18 mg/3 mL) pen; 0.5 mL by SubCUTAneous route daily.   -     naltrexone-buPROPion (CONTRAVE) 8-90 mg TbER ER tablet; Week 1: 1 tab by mouth every morning , Week 2; 1 tab orally  every morning  1 tab orally every night, Week 3,  2 tabs every morning 1 tab every night,  Week 4, 2 tabs by mouth in the morning and 2 tabs by mouth nightly  -     naltrexone-buPROPion (CONTRAVE) 8-90 mg TbER ER tablet; Week 5 and Beyond:  2 Tab AM, 2 Tab PM      At this time patient was told to lose weight, so that the current body mass index would get into a close to 25 or between 20-25, patient was told that the patient can achieve this by starting an active life style modifications, working on the diet, increase physical activity, limit alcohol consumption, stop secondhand tobacco exposure,    for which includes creating a an interesting delightful to do list,  such as start of a light physical activity with a brisk daily walking 30 minutes most days of the week, most likely to total of 150 minutes per week, patient was told to call if any problems. Patient acknowledged understanding and  agreed with today's recommendations.

## 2018-11-06 NOTE — PATIENT INSTRUCTIONS
Starting a Weight Loss Plan: Care Instructions  Your Care Instructions    If you are thinking about losing weight, it can be hard to know where to start. Your doctor can help you set up a weight loss plan that best meets your needs. You may want to take a class on nutrition or exercise, or join a weight loss support group. If you have questions about how to make changes to your eating or exercise habits, ask your doctor about seeing a registered dietitian or an exercise specialist.  It can be a big challenge to lose weight. But you do not have to make huge changes at once. Make small changes, and stick with them. When those changes become habit, add a few more changes. If you do not think you are ready to make changes right now, try to pick a date in the future. Make an appointment to see your doctor to discuss whether the time is right for you to start a plan. Follow-up care is a key part of your treatment and safety. Be sure to make and go to all appointments, and call your doctor if you are having problems. It's also a good idea to know your test results and keep a list of the medicines you take. How can you care for yourself at home? · Set realistic goals. Many people expect to lose much more weight than is likely. A weight loss of 5% to 10% of your body weight may be enough to improve your health. · Get family and friends involved to provide support. Talk to them about why you are trying to lose weight, and ask them to help. They can help by participating in exercise and having meals with you, even if they may be eating something different. · Find what works best for you. If you do not have time or do not like to cook, a program that offers meal replacement bars or shakes may be better for you. Or if you like to prepare meals, finding a plan that includes daily menus and recipes may be best.  · Ask your doctor about other health professionals who can help you achieve your weight loss goals. ?  A dietitian can help you make healthy changes in your diet. ? An exercise specialist or  can help you develop a safe and effective exercise program.  ? A counselor or psychiatrist can help you cope with issues such as depression, anxiety, or family problems that can make it hard to focus on weight loss. · Consider joining a support group for people who are trying to lose weight. Your doctor can suggest groups in your area. Where can you learn more? Go to http://alhaji-tia.info/. Enter K483 in the search box to learn more about \"Starting a Weight Loss Plan: Care Instructions. \"  Current as of: June 26, 2018  Content Version: 11.8  © 0432-1473 [employer], [employer]. Care instructions adapted under license by [employer] (which disclaims liability or warranty for this information). If you have questions about a medical condition or this instruction, always ask your healthcare professional. [address] any warranty or liability for your use of this information.

## 2018-11-06 NOTE — PROGRESS NOTES
PPD Placement note  Mekhi April, 33 y.o. female is here today for placement of PPD test  Reason for PPD test: foster care form  Pt taken PPD test before: yes  Verified in allergy area and with patient that they are not allergic to the products PPD is made of (Phenol or Tween). Yes  Is patient taking any oral or IV steroid medication now or have they taken it in the last month? no  Has the patient ever received the BCG vaccine?: no  Has the patient been in recent contact with anyone known or suspected of having active TB disease?: no       Date of exposure (if applicable): n/a       Name of person they were exposed to (if applicable): n/a  Patient's Country of origin?: [place]  O: Alert and oriented in NAD. P:  PPD placed on 11/6/2018. Patient advised to return for reading within 48-72 hours. After obtaining consent, and per orders of , tuberculin screening  given to  Left forearm ID  . Patient instructed to remain in clinic for 15 minutes afterwards, and to report any adverse reaction to me immediately.  Patient did not have any adverse reactions during this office visit

## 2018-11-08 LAB
MM INDURATION POC: 0 MM (ref 0–5)
PPD POC: NORMAL NEGATIVE

## 2018-11-08 NOTE — PROGRESS NOTES
PPD Reading Note  PPD read and results entered in [address]. Result: 0 mm induration.   Interpretation: negative  If test not read within 48-72 hours of initial placement, patient advised to repeat in other arm 1-3 weeks after this test.  Allergic reaction: no

## 2019-01-22 ENCOUNTER — OFFICE VISIT (OUTPATIENT)
Dept: BEHAVIORAL/MENTAL HEALTH CLINIC | Age: 29
End: 2019-01-22

## 2019-01-22 VITALS
HEART RATE: 94 BPM | DIASTOLIC BLOOD PRESSURE: 83 MMHG | WEIGHT: 183 LBS | BODY MASS INDEX: 35.93 KG/M2 | SYSTOLIC BLOOD PRESSURE: 128 MMHG | HEIGHT: 60 IN

## 2019-01-22 DIAGNOSIS — F32.1 MODERATE MAJOR DEPRESSION (HCC): Primary | ICD-10-CM

## 2019-01-22 DIAGNOSIS — F41.1 GAD (GENERALIZED ANXIETY DISORDER): ICD-10-CM

## 2019-01-22 DIAGNOSIS — F43.10 PTSD (POST-TRAUMATIC STRESS DISORDER): ICD-10-CM
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
## 2019-05-20 ENCOUNTER — OFFICE VISIT (OUTPATIENT)
Dept: BEHAVIORAL/MENTAL HEALTH CLINIC | Age: 29
End: 2019-05-20

## 2019-05-20 VITALS
HEIGHT: 60 IN | BODY MASS INDEX: 35.73 KG/M2 | SYSTOLIC BLOOD PRESSURE: 123 MMHG | DIASTOLIC BLOOD PRESSURE: 73 MMHG | HEART RATE: 92 BPM | WEIGHT: 182 LBS

## 2019-05-20 DIAGNOSIS — F43.10 PTSD (POST-TRAUMATIC STRESS DISORDER): Primary | ICD-10-CM

## 2019-05-20 DIAGNOSIS — F41.1 GAD (GENERALIZED ANXIETY DISORDER): ICD-10-CM

## 2019-05-20 DIAGNOSIS — F32.1 MODERATE MAJOR DEPRESSION (HCC): ICD-10-CM

## 2019-05-20 RX ORDER — SERTRALINE HYDROCHLORIDE 50 MG/1
TABLET, FILM COATED ORAL
Qty: 30 TAB | Refills: 3 | Status: SHIPPED | OUTPATIENT
Start: 2019-05-20 | End: 2019-08-05 | Stop reason: ALTCHOICE

## 2019-05-20 RX ORDER — BUSPIRONE HYDROCHLORIDE 15 MG/1
15 TABLET ORAL 2 TIMES DAILY
Qty: 60 TAB | Refills: 3 | Status: SHIPPED | OUTPATIENT
Start: 2019-05-20 | End: 2019-08-05 | Stop reason: SDUPTHER

## 2019-05-20 NOTE — PROGRESS NOTES
CHIEF COMPLAINT:  Debbie Dugan is a 26 y.o. female and was seen today for follow-up of psychiatric condition and psychotropic medication management. HPI:    Chriss Andre reports the following psychiatric symptoms:  depression and anxiety. The symptoms have been present for months and are of moderate/high severity. The symptoms occur more frequently and more severely overall. Pt reports she has noticed increased depression and anxiety symptoms. She states she has been using groups to manage stress but symptoms are still exacerbated. Pt here today to review current treatment plan. FAMILY/SOCIAL HX: work stressors    REVIEW OF SYSTEMS:  Psychiatric:  depression, anxiety  Appetite:fair   Sleep: poor with DIMS (difficulty initiating & maintaining sleep)   Neuro: denies    Visit Vitals  /73   Pulse 92   Ht 5' (1.524 m)   Wt 82.6 kg (182 lb)   BMI 35.54 kg/m²       Side Effects:  none    MENTAL STATUS EXAM:   Sensorium  oriented to time, place and person   Relations cooperative   Appearance:  age appropriate and casually dressed   Motor Behavior:  gait stable and within normal limits   Speech:  normal volume   Thought Process: goal directed   Thought Content free of delusions and free of hallucinations   Suicidal ideations no intention   Homicidal ideations no intention   Mood:  anxious and depressed   Affect:  anxious and depressed   Memory recent  adequate   Memory remote:  adequate   Concentration:  adequate   Abstraction:  abstract   Insight:  limited   Reliability fair   Judgment:  fair     MEDICAL DECISION MAKING:  Problems addressed today:    ICD-10-CM ICD-9-CM    1. PTSD (post-traumatic stress disorder) F43.10 309.81    2. 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 296.22    3. TASIA (generalized anxiety disorder) F41.1 300.02        Assessment:   Marsha is not responding to treatment currently. Pt has not been on medications as she was trying to work on self strategies and group therapy.  Pt reports she has been experiencing increased depression and anxiety. Reviewed options and will continue with Buspar and Zoloft which she has used in the past and tolerated well. Discussed at length the contributing factors to depression and rationale for treating symptoms. Discussed treatment plan and timeline for improvement. Answered questions and provided support. Discussed use of CBD oil for sleep. Pt has found benefit but was concerned about risks associated with use. Shared that my understanding is that the [facility] has been removed and CBD is non-addictive. Agreed to share more info with her as I learn more. Informed her at this time the general consensus is that it is safe to use. Plan:   1. Current Outpatient Medications   Medication Sig Dispense Refill    sertraline (ZOLOFT) 50 mg tablet TAKE A 1/2 TAB BY MOUTH EVERY DAY FOR 1 WEEK THEN TAKE 1 WHOLE TAB EVERY DAY. 30 Tab 3    busPIRone (BUSPAR) 15 mg tablet Take 1 Tab by mouth two (2) times a day. 60 Tab 3    liraglutide (SAXENDA) 3 mg/0.5 mL (18 mg/3 mL) pen 0.5 mL by SubCUTAneous route daily. 1 Adjustable Dose Pre-filled Pen Syringe 6    Insulin Needles, Disposable, 31 gauge x 5/16\" ndle Inject saxenda once daily 1 Package 11    norethindrone-ethinyl estradiol-iron (JUNEL FE 1.5/30, 28,) 1.5 mg-30 mcg (21)/75 mg (7) tab Take 1 Tab by mouth daily.  Blood-Glucose Meter (TRUERESULT BLOOD GLUCOSE SYSTM) monitoring kit Test blood sugar before breakfast, lunch dinner and before bedtime 1 Kit 11    glucose blood VI test strips (TRUETEST TEST STRIPS) strip Test blood sugar before breakfast, lunch, dinner and before bedtime. 100 Strip 11    Lancets misc Test blood sugar before breakfast, lunch, dinner and bedtime. 1 Package 11          medication changes made today: restart zoloft 50 mg, restart buspar 15 mg BID    2.   Counseling and coordination of care including instructions for treatment, risks/benefits, risk factor reduction and patient/family education. She agrees with the plan. Patient instructed to call with any side effects, questions or issues. 3.    Follow-up and Dispositions    · Return in about 3 months (around 8/20/2019).          5/20/2019  Tanisha Sultana NP

## 2019-07-01 ENCOUNTER — OFFICE VISIT (OUTPATIENT)
Dept: FAMILY MEDICINE CLINIC | Age: 29
End: 2019-07-01

## 2019-07-01 VITALS
OXYGEN SATURATION: 100 % | RESPIRATION RATE: 20 BRPM | WEIGHT: 180 LBS | SYSTOLIC BLOOD PRESSURE: 105 MMHG | TEMPERATURE: 97.6 F | HEIGHT: 60 IN | DIASTOLIC BLOOD PRESSURE: 69 MMHG | BODY MASS INDEX: 35.34 KG/M2 | HEART RATE: 97 BPM

## 2019-07-01 DIAGNOSIS — K58.0 IRRITABLE BOWEL SYNDROME WITH DIARRHEA: ICD-10-CM

## 2019-07-01 DIAGNOSIS — F41.1 GAD (GENERALIZED ANXIETY DISORDER): ICD-10-CM

## 2019-07-01 DIAGNOSIS — B37.2 CANDIDA INFECTION OF FLEXURAL SKIN: ICD-10-CM

## 2019-07-01 DIAGNOSIS — F32.1 MODERATE MAJOR DEPRESSION (HCC): Primary | ICD-10-CM

## 2019-07-01 RX ORDER — NYSTATIN 100000 U/G
CREAM TOPICAL 2 TIMES DAILY
Qty: 30 G | Refills: 3 | Status: SHIPPED | OUTPATIENT
Start: 2019-07-01 | End: 2020-08-04

## 2019-07-01 NOTE — PROGRESS NOTES
Name and  verified      Chief Complaint   Patient presents with    Depression         Health Maintenance reviewed-discussed with patient. 1. Have you been to the ER, urgent care clinic since your last visit? Hospitalized since your last visit? Yes, Psychiatry office visit two month ago patient reported. 2. Have you seen or consulted any other health care providers outside of the [address] [place] [address] since your last visit? Include any pap smears or colon screening.  no

## 2019-07-01 NOTE — PROGRESS NOTES
HISTORY OF PRESENT ILLNESS  Carlyle Melchor is a 26 y.o. female. HPI   Rash on the abd  Started few days ago not better, the patient has tried alcohol washing and OTC antibiotic ointments,  no family member have the same problem, it does tingles and it is pain full, states that is expanding red, and is swelled up, like a lump  Patient complaining of bloating a lot of gas also with loose bowel movement sometimes with some abdominal pain states that she also has loose bowel movement none of her symptoms happens at night while asleep patient was evaluated and was told to have irritable bowel syndrome  Obesity  The pt is feeling very good on this meds,in addition the pt is becoming to be more active and having daily multiple healthy different diets,  Is more involved with the weekly routine exercises, not a fast fooder, states that the pt does not eat too much as used to do and the portion are very well controlled,  Patient states since the weight loss she has abdominal flat hanging over the pelvic area it has been bothersome and it has been causing problem like to see surgeon so that the condition can be addressed and it can be repaired    Patient with generalized anxiety disorder stating that Zoloft helping also taking BuSpar has been compliant patient also seen a counselor and psychiatrist does not need refill denies any suicidal homicidal ideation at this time    Current Outpatient Medications   Medication Sig Dispense Refill    sertraline (ZOLOFT) 50 mg tablet TAKE A 1/2 TAB BY MOUTH EVERY DAY FOR 1 WEEK THEN TAKE 1 WHOLE TAB EVERY DAY. 30 Tab 3    busPIRone (BUSPAR) 15 mg tablet Take 1 Tab by mouth two (2) times a day. 60 Tab 3    liraglutide (SAXENDA) 3 mg/0.5 mL (18 mg/3 mL) pen 0.5 mL by SubCUTAneous route daily.  1 Adjustable Dose Pre-filled Pen Syringe 6    Insulin Needles, Disposable, 31 gauge x 5/16\" ndle Inject saxenda once daily 1 Package 11    norethindrone-ethinyl estradiol-iron (Thom[place] Drought FE 1.5/30, 28,) 1.5 mg-30 mcg (21)/75 mg (7) tab Take 1 Tab by mouth daily.  Blood-Glucose Meter (TRUERESULT BLOOD GLUCOSE SYSTM) monitoring kit Test blood sugar before breakfast, lunch dinner and before bedtime 1 Kit 11    glucose blood VI test strips (TRUETEST TEST STRIPS) strip Test blood sugar before breakfast, lunch, dinner and before bedtime. 100 Strip 11    Lancets misc Test blood sugar before breakfast, lunch, dinner and bedtime. 1 Package 11     No Known Allergies  Past Medical History:   Diagnosis Date    BMI 38.0-38.9,adult 4/3/2018    BMI 40.0-44.9, adult ([address] 2/19/2018    Chronic back pain greater than 3 months duration 7/29/2015    TASIA (generalized anxiety disorder) 1/19/2017    High-risk sexual behavior 2/23/2017    HX OTHER MEDICAL     chlamydia age 24, treated & negative now    Hypoglycemia 3/30/2016    Ill-defined condition     Insulinoma 4/6/2016    Morbid obesity ([address]     Syncopal seizure ([address] 11/19/2015    Thyromegaly 3/30/2016    Urinary frequency 11/19/2015     Past Surgical History:   Procedure Laterality Date    HX CHOLECYSTECTOMY  10/6/2014    lap.  tracee    HX DILATION AND CURETTAGE  6/2011    HX OTHER SURGICAL      D&C with SAB 6/2011    HX OTHER SURGICAL      wisdom extraction 15 years    REVISION CERVIX [address]  2013     Family History   Problem Relation Age of Onset    Hypertension Mother     Diabetes Mother     High Cholesterol Father     Other Father         high cholesterole, herniated disc    Diabetes Paternal Aunt     Hypertension Maternal Grandmother     COPD Maternal Grandmother     Emphysema Maternal Grandmother     Diabetes Paternal Grandmother      Social History     Tobacco Use    Smoking status: Never Smoker    Smokeless tobacco: Never Used   Substance Use Topics    Alcohol use: No      Lab Results   Component Value Date/Time    WBC 5.5 09/04/2018 11:00 AM    HGB 13.5 09/04/2018 11:00 AM    Hemoglobin (POC) 12.9 06/06/2015 06:39 PM    HCT 41.3 09/04/2018 11:00 AM    Hematocrit (POC) 38 06/06/2015 06:39 PM    PLATELET 866 30/97/7300 11:00 AM    MCV 85 09/04/2018 11:00 AM    Hgb, External 12.4 10/14/2013    Hct, External 36.7 10/14/2013    Platelet cnt., External 304 10/14/2013     Lab Results   Component Value Date/Time    ALT (SGPT) 23 09/21/2018 10:57 AM    AST (SGOT) 24 09/21/2018 10:57 AM    Alk. phosphatase 52 09/21/2018 10:57 AM    Bilirubin, direct 0.05 04/08/2014 11:00 AM    Bilirubin, total <0.2 09/21/2018 10:57 AM    Albumin 4.2 09/21/2018 10:57 AM    Protein, total 7.2 09/21/2018 10:57 AM    PLATELET 650 93/86/4642 11:00 AM    Platelet cnt., External 304 10/14/2013        Review of Systems   Constitutional: Negative for chills and fever. HENT: Negative for ear pain and nosebleeds. Eyes: Negative for blurred vision, pain and discharge. Respiratory: Negative for shortness of breath. Cardiovascular: Negative for chest pain and leg swelling. Gastrointestinal: Negative for constipation, diarrhea, nausea and vomiting. Genitourinary: Negative for frequency. Musculoskeletal: Negative for joint pain. Skin: Positive for itching and rash. Neurological: Negative for headaches. Psychiatric/Behavioral: Negative for depression. The patient is not nervous/anxious. Physical Exam   Constitutional: She is oriented to person, place, and time. She appears well-developed and well-nourished. HENT:   Head: Normocephalic and atraumatic. Eyes: Pupils are equal, round, and reactive to light. Conjunctivae and EOM are normal.   Neck: No JVD present. No thyromegaly present. Cardiovascular: Normal rate, regular rhythm, normal heart sounds and intact distal pulses. Exam reveals no gallop and no friction rub. No murmur heard. Pulmonary/Chest: Effort normal and breath sounds normal. No stridor. No respiratory distress. She has no wheezes. She has no rales. Abdominal: Soft.  Bowel sounds are normal. She exhibits no distension and no mass. There is no tenderness. Musculoskeletal: Normal range of motion. She exhibits no edema or tenderness. Lymphadenopathy:     She has no cervical adenopathy. Neurological: She is alert and oriented to person, place, and time. She has normal reflexes. No cranial nerve deficit. Skin: Skin is dry. Rash noted. There is erythema. Psychiatric: She has a normal mood and affect. Her behavior is normal.   Nursing note and vitals reviewed. ASSESSMENT and PLAN  Diagnoses and all orders for this visit:    1. Moderate major depression (HCC)  -     liraglutide (SAXENDA) 3 mg/0.5 mL (18 mg/3 mL) pen; 0.5 mL by SubCUTAneous route daily.  -     REFERRAL TO BARIATRIC SURGERY    2. TASIA (generalized anxiety disorder)  -     liraglutide (SAXENDA) 3 mg/0.5 mL (18 mg/3 mL) pen; 0.5 mL by SubCUTAneous route daily.  -     REFERRAL TO BARIATRIC SURGERY    3. BMI 35.0-35.9,adult  -     liraglutide (SAXENDA) 3 mg/0.5 mL (18 mg/3 mL) pen; 0.5 mL by SubCUTAneous route daily.  -     REFERRAL TO BARIATRIC SURGERY    4. Candida infection of flexural skin  -     nystatin (MYCOSTATIN) topical cream; Apply  to affected area two (2) times a day. 5. Irritable bowel syndrome with diarrhea  -     rifAXIMin (XIFAXAN) 550 mg tablet; Take 1 Tab by mouth three (3) times daily for 14 days.          I have recommended the following steps for improving the current weight, pt was counseled on to try to Decrease calori intake by 500per day, limit the amount of intake and portion the meals, The patient is advised to avoid second hand exposure, begin progressive daily aerobic exercise program at least x 5 per week,  follow a low fat, low cholesterol diet, attempt to keep dairy records of  Weight and the daily intake, reduce salt in diet and cooking,   Pt agreed with all the recommendations

## 2019-07-26 ENCOUNTER — TELEPHONE (OUTPATIENT)
Dept: BEHAVIORAL/MENTAL HEALTH CLINIC | Age: 29
End: 2019-07-26

## 2019-07-29 NOTE — TELEPHONE ENCOUNTER
Returned call. No answer, left VM. Please let pt know if she calls back we will need to review her question at her next appt. I do not rx CBD oil and I'm not sure of possible drug to drug interactions with current medications.

## 2019-08-05 ENCOUNTER — OFFICE VISIT (OUTPATIENT)
Dept: FAMILY MEDICINE CLINIC | Age: 29
End: 2019-08-05

## 2019-08-05 VITALS
OXYGEN SATURATION: 100 % | TEMPERATURE: 98.4 F | DIASTOLIC BLOOD PRESSURE: 75 MMHG | SYSTOLIC BLOOD PRESSURE: 120 MMHG | RESPIRATION RATE: 18 BRPM | BODY MASS INDEX: 35.81 KG/M2 | WEIGHT: 182.4 LBS | HEIGHT: 60 IN | HEART RATE: 100 BPM

## 2019-08-05 DIAGNOSIS — R55 SYNCOPE AND COLLAPSE: ICD-10-CM

## 2019-08-05 DIAGNOSIS — F41.1 GAD (GENERALIZED ANXIETY DISORDER): ICD-10-CM

## 2019-08-05 DIAGNOSIS — F32.1 MODERATE MAJOR DEPRESSION (HCC): ICD-10-CM

## 2019-08-05 RX ORDER — AZITHROMYCIN 250 MG/1
TABLET, FILM COATED ORAL
COMMUNITY
End: 2019-08-05 | Stop reason: ALTCHOICE

## 2019-08-05 RX ORDER — SERTRALINE HYDROCHLORIDE 50 MG/1
50 TABLET, FILM COATED ORAL DAILY
COMMUNITY
End: 2019-08-05 | Stop reason: SDUPTHER

## 2019-08-05 RX ORDER — SERTRALINE HYDROCHLORIDE 50 MG/1
50 TABLET, FILM COATED ORAL DAILY
Qty: 30 TAB | Refills: 3
Start: 2019-08-05 | End: 2020-05-28 | Stop reason: SDUPTHER

## 2019-08-05 RX ORDER — BUSPIRONE HYDROCHLORIDE 15 MG/1
15 TABLET ORAL 2 TIMES DAILY
Qty: 60 TAB | Refills: 3
Start: 2019-08-05 | End: 2019-09-12 | Stop reason: ALTCHOICE

## 2019-08-05 NOTE — PROGRESS NOTES
HISTORY OF PRESENT ILLNESS  Alexys Wu is a 26 y.o. female. HPI   Today visit is with the patient with current medical history of moderate depression class II obesity,  with no comorbid past medical history is presenting for transitional care secondary to most recent questionable cerebrovascular accident, patient is post  hospitalization visit at [facility], fortunately , patient speech and comprehension  is normal, without facial asymmetry, patient denies any upper lower extremity or facial weakness patient was also given correct discharged information to make sure that there is medication compliance,  was admitted on 7/28/2019 and discharged on 7/29/2019, admitted for one day, nl work up including CTA, CTA neck of the head CT heat w/out contrast DXR, EKG nl labs, had >5 hrs of outdoor activity in a a hot day, and some MJ,     patient currently driving walking talking without any difficulties, today's medications were reviewed and the correction has been given,   Patient  is feeling well, not feeling depressed not anxious sleeping well denies any suicidal homicidal ideation and denies fever chills no night sweat, no breathing problem on inspiration or expiration, no leg pain nor swelling,    patient is stating that she has no dyspnea no chest pain at rest,  pt currently has no sign of gross bleeding,   patient is currently not constipated, unable to be as active as the past,  pain is nicely controlled at 0/10,    there is no incision no ulcerations nor decubitus ulcer skin is dry and intact,   having good family support at this time,  having no other concern, or complaint today, not nauseated bowl/ appetite is back,    Patient does state that she has not seen a neurologist yet,    Current Outpatient Medications   Medication Sig Dispense Refill    sertraline (ZOLOFT) 50 mg tablet Take 50 mg by mouth daily.  liraglutide (SAXENDA) 3 mg/0.5 mL (18 mg/3 mL) pen 0.5 mL by SubCUTAneous route daily.  1 Adjustable Dose Pre-filled Pen Syringe 6    busPIRone (BUSPAR) 15 mg tablet Take 1 Tab by mouth two (2) times a day. 60 Tab 3    Insulin Needles, Disposable, 31 gauge x 5/16\" ndle Inject saxenda once daily 1 Package 11    Blood-Glucose Meter (TRUERESULT BLOOD GLUCOSE SYSTM) monitoring kit Test blood sugar before breakfast, lunch dinner and before bedtime 1 Kit 11    glucose blood VI test strips (TRUETEST TEST STRIPS) strip Test blood sugar before breakfast, lunch, dinner and before bedtime. 100 Strip 11    Lancets misc Test blood sugar before breakfast, lunch, dinner and bedtime. 1 Package 11    nystatin (MYCOSTATIN) topical cream Apply  to affected area two (2) times a day. 30 g 3    norethindrone-ethinyl estradiol-iron (JUNEL FE 1.5/30, 28,) 1.5 mg-30 mcg (21)/75 mg (7) tab Take 1 Tab by mouth daily. No Known Allergies  Past Medical History:   Diagnosis Date    BMI 38.0-38.9,adult 4/3/2018    BMI 40.0-44.9, adult ([address] 2/19/2018    Chronic back pain greater than 3 months duration 7/29/2015    TASIA (generalized anxiety disorder) 1/19/2017    High-risk sexual behavior 2/23/2017    HX OTHER MEDICAL     chlamydia age 24, treated & negative now    Hypoglycemia 3/30/2016    Ill-defined condition     Insulinoma 4/6/2016    Morbid obesity ([address]     Syncopal [address] [facility] 11/19/2015    Thyromegaly 3/30/2016    Urinary frequency 11/19/2015     Past Surgical History:   Procedure Laterality Date    HX CHOLECYSTECTOMY  10/6/2014    lap.  tracee    HX DILATION AND CURETTAGE  6/2011    HX OTHER SURGICAL      D&C with SAB 6/2011    HX OTHER SURGICAL      wisdom extraction 15 years    REVISION CERVIX [address]  2013     Family History   Problem Relation Age of Onset    Hypertension Mother     Diabetes Mother     High Cholesterol Father     Other Father         high cholesterole, herniated disc    Diabetes Paternal Aunt     Hypertension Maternal Grandmother     COPD Maternal Grandmother     Emphysema Maternal Grandmother     Diabetes Paternal Grandmother      Social History     Tobacco Use    Smoking status: Never Smoker    Smokeless tobacco: Never Used   Substance Use Topics    Alcohol use: No      Lab Results   Component Value Date/Time    WBC 5.5 09/04/2018 11:00 AM    HGB 13.5 09/04/2018 11:00 AM    Hemoglobin (POC) 12.9 06/06/2015 06:39 PM    HCT 41.3 09/04/2018 11:00 AM    Hematocrit (POC) 38 06/06/2015 06:39 PM    PLATELET 608 08/40/4789 11:00 AM    MCV 85 09/04/2018 11:00 AM    Hgb, External 12.4 10/14/2013    Hct, External 36.7 10/14/2013    Platelet cnt., External 304 10/14/2013     Lab Results   Component Value Date/Time    Hemoglobin A1c 6.2 (H) 03/30/2016 05:01 PM    Hemoglobin A1c 6.4 (H) 02/07/2014 11:37 AM    Glucose 71 09/21/2018 10:57 AM    Glucose (POC) 96 06/06/2015 06:39 PM    Glucose  04/06/2016 09:31 AM    LDL, calculated 88 02/23/2017 12:37 PM    Creatinine (POC) 0.7 06/06/2015 06:39 PM    Creatinine 0.88 09/21/2018 10:57 AM      Lab Results   Component Value Date/Time    Cholesterol, total 145 02/23/2017 12:37 PM    HDL Cholesterol 45 02/23/2017 12:37 PM    LDL, calculated 88 02/23/2017 12:37 PM    Triglyceride 62 02/23/2017 12:37 PM     Lab Results   Component Value Date/Time    GFR est non-AA 89 09/21/2018 10:57 AM    GFRNA, POC >60 06/06/2015 06:39 PM    GFR est  09/21/2018 10:57 AM    GFRAA, POC >60 06/06/2015 06:39 PM    Creatinine 0.88 09/21/2018 10:57 AM    Creatinine (POC) 0.7 06/06/2015 06:39 PM    BUN 8 09/21/2018 10:57 AM    BUN (POC) 5 (L) 06/06/2015 06:39 PM    Sodium 137 09/21/2018 10:57 AM    Sodium (POC) 141 06/06/2015 06:39 PM    Potassium 4.5 09/21/2018 10:57 AM    Potassium (POC) 3.8 06/06/2015 06:39 PM    Chloride 104 09/21/2018 10:57 AM    Chloride (POC) 105 06/06/2015 06:39 PM    CO2 20 09/21/2018 10:57 AM        Review of Systems   Constitutional: Negative for chills, fever and malaise/fatigue. HENT: Negative for nosebleeds.     Eyes: Negative for pain. Respiratory: Negative for cough and wheezing. Cardiovascular: Negative for chest pain and leg swelling. Gastrointestinal: Negative for constipation, diarrhea and nausea. Genitourinary: Negative for frequency. Musculoskeletal: Negative for joint pain and myalgias. Skin: Negative for rash. Neurological: Negative for loss of consciousness. Endo/Heme/Allergies: Does not bruise/bleed easily. Psychiatric/Behavioral: Negative for depression. The patient is not nervous/anxious and does not have insomnia. All other systems reviewed and are negative. Physical Exam   Constitutional: She is oriented to person, place, and time. She appears well-developed and well-nourished. HENT:   Head: Normocephalic and atraumatic. Eyes: Conjunctivae and EOM are normal.   Neck: Normal range of motion. Neck supple. Cardiovascular: Normal rate, regular rhythm and normal heart sounds. No murmur heard. Pulmonary/Chest: Effort normal and breath sounds normal.   Abdominal: Soft. Bowel sounds are normal. She exhibits no distension. Musculoskeletal: Normal range of motion. She exhibits no edema. Neurological: She is alert and oriented to person, place, and time. Skin: No erythema. Psychiatric: Her behavior is normal.   Nursing note and vitals reviewed. ASSESSMENT and PLAN  Diagnoses and all orders for this visit:    1. Transition of care performed with sharing of clinical summary  -     sertraline (ZOLOFT) 50 mg tablet; Take 1 Tab by mouth daily. -     liraglutide (SAXENDA) 3 mg/0.5 mL (18 mg/3 mL) pen; 0.5 mL by SubCUTAneous route daily. -     busPIRone (BUSPAR) 15 mg tablet; Take 1 Tab by mouth two (2) times a day. -     REFERRAL TO NEUROLOGY  -     REFERRAL TO CARDIOLOGY    2. Moderate major depression (HCC)  -     sertraline (ZOLOFT) 50 mg tablet; Take 1 Tab by mouth daily. -     liraglutide (SAXENDA) 3 mg/0.5 mL (18 mg/3 mL) pen; 0.5 mL by SubCUTAneous route daily.   - busPIRone (BUSPAR) 15 mg tablet; Take 1 Tab by mouth two (2) times a day. 3. TASIA (generalized anxiety disorder)  -     sertraline (ZOLOFT) 50 mg tablet; Take 1 Tab by mouth daily. -     liraglutide (SAXENDA) 3 mg/0.5 mL (18 mg/3 mL) pen; 0.5 mL by SubCUTAneous route daily. -     busPIRone (BUSPAR) 15 mg tablet; Take 1 Tab by mouth two (2) times a day. 4. BMI 35.0-35.9,adult  -     sertraline (ZOLOFT) 50 mg tablet; Take 1 Tab by mouth daily. -     liraglutide (SAXENDA) 3 mg/0.5 mL (18 mg/3 mL) pen; 0.5 mL by SubCUTAneous route daily. -     busPIRone (BUSPAR) 15 mg tablet; Take 1 Tab by mouth two (2) times a day.     5. Syncope and collapse  -     REFERRAL TO NEUROLOGY  -     REFERRAL TO CARDIOLOGY      pt doing well post hospitalization her pain is nicely controlled and there was no sign sxs of dvt, infection nor bleeding, she is compliant with her meds and has hd no c, was advised to do ambulation, elevation, call for any sign of bleeding, fever or chills,or any worsening pain or leg swelling including problem with cp, and Sob, At this time to repeat blood pressure was normal

## 2019-08-05 NOTE — PROGRESS NOTES
Name and  verified      Chief Complaint   Patient presents with    Follow-up     ED Visit on 2019 DX Reason for Visit CVA at [facility] Maintenance reviewed-discussed with patient. 1. Have you been to the ER, urgent care clinic since your last visit? Hospitalized since your last visit? No    2. Have you seen or consulted any other health care providers outside of the [address] [place] [address] since your last visit? Include any pap smears or colon screening. Yes, See Above Note. Patient stated last PAP EXAM  at [facility].

## 2019-08-05 NOTE — PATIENT INSTRUCTIONS
Lightheadedness or Faintness: Care Instructions  Your Care Instructions  Lightheadedness is a feeling that you are about to faint or \"pass out. \" You do not feel as if you or your surroundings are moving. It is different from vertigo, which is the feeling that you or things around you are spinning or tilting. Lightheadedness usually goes away or gets better when you lie down. If lightheadedness gets worse, it can lead to a fainting spell. It is common to feel lightheaded from time to time. Lightheadedness usually is not caused by a serious problem. It often is caused by a short-lasting drop in blood pressure and blood flow to your head that occurs when you get up too quickly from a seated or lying position. Follow-up care is a key part of your treatment and safety. Be sure to make and go to all appointments, and call your doctor if you are having problems. It's also a good idea to know your test results and keep a list of the medicines you take. How can you care for yourself at home? · Lie down for 1 or 2 minutes when you feel lightheaded. After lying down, sit up slowly and remain sitting for 1 to 2 minutes before slowly standing up. · Avoid movements, positions, or activities that have made you lightheaded in the past.  · Get plenty of rest, especially if you have a cold or flu, which can cause lightheadedness. · Make sure you drink plenty of fluids, especially if you have a fever or have been sweating. · Do not drive or put yourself and others in danger while you feel lightheaded. When should you call for help? Call 911 anytime you think you may need emergency care. For example, call if:    · You have symptoms of a stroke. These may include:  ? Sudden numbness, tingling, weakness, or loss of movement in your face, arm, or leg, especially on only one side of your body. ? Sudden vision changes. ? Sudden trouble speaking. ? Sudden confusion or trouble understanding simple statements.   ? Sudden problems with walking or balance. ? A sudden, severe headache that is different from past headaches.     · You have symptoms of a heart attack. These may include:  ? Chest pain or pressure, or a strange feeling in the chest.  ? Sweating. ? Shortness of breath. ? Nausea or vomiting. ? Pain, pressure, or a strange feeling in the back, neck, jaw, or upper belly or in one or both shoulders or arms. ? Lightheadedness or sudden weakness. ? A fast or irregular heartbeat. After you call 911, the  may tell you to chew 1 adult-strength or 2 to 4 low-dose aspirin. Wait for an ambulance. Do not try to drive yourself.    Watch closely for changes in your health, and be sure to contact your doctor if:    · Your lightheadedness gets worse or does not get better with home care. Where can you learn more? Go to http://alhaji-tia.info/. Enter N027 in the search box to learn more about \"Lightheadedness or Faintness: Care Instructions. \"  Current as of: September 23, 2018  Content Version: 12.1  © 8134-9210 [employer]. Care instructions adapted under license by [employer] (which disclaims liability or warranty for this information). If you have questions about a medical condition or this instruction, always ask your healthcare professional. [address] any warranty or liability for your use of this information. Fainting: Care Instructions  Your Care Instructions    When you faint, or pass out, you lose consciousness for a short time. A brief drop in blood flow to the brain often causes it. When you fall or lie down, more blood flows to your brain and you regain consciousness. Emotional stress, pain, or overheating--especially if you have been standing--can make you faint. In these cases, fainting is usually not serious. But fainting can be a sign of a more serious problem.  Your doctor may want you to have more tests to rule out other causes. The treatment you need depends on the reason why you fainted. The doctor has checked you carefully, but problems can develop later. If you notice any problems or new symptoms, get medical treatment right away. Follow-up care is a key part of your treatment and safety. Be sure to make and go to all appointments, and call your doctor if you are having problems. It's also a good idea to know your test results and keep a list of the medicines you take. How can you care for yourself at home? · Drink plenty of fluids to prevent dehydration. If you have kidney, heart, or liver disease and have to limit fluids, talk with your doctor before you increase your fluid intake. When should you call for help? Call 911 anytime you think you may need emergency care. For example, call if:    · You have symptoms of a heart problem. These may include:  ? Chest pain or pressure. ? Severe trouble breathing. ? A fast or irregular heartbeat. ? Lightheadedness or sudden weakness. ? Coughing up pink, foamy mucus. ? Passing out. After you call 911, the  may tell you to chew 1 adult-strength or 2 to 4 low-dose aspirin. Wait for an ambulance. Do not try to drive yourself.     · You have symptoms of a stroke. These may include:  ? Sudden numbness, tingling, weakness, or loss of movement in your face, arm, or leg, especially on only one side of your body. ? Sudden vision changes. ? Sudden trouble speaking. ? Sudden confusion or trouble understanding simple statements. ? Sudden problems with walking or balance. ? A sudden, severe headache that is different from past headaches.     · You passed out (lost consciousness) again.    Watch closely for changes in your health, and be sure to contact your doctor if:    · You do not get better as expected. Where can you learn more? Go to http://alhaji-tia.info/. Enter Y627 in the search box to learn more about \"Fainting: Care Instructions. \"  Current as of: September 23, 2018  Content Version: 12.1  © 3259-5983 [employer]. Care instructions adapted under license by [employer] (which disclaims liability or warranty for this information). If you have questions about a medical condition or this instruction, always ask your healthcare professional. [address] any warranty or liability for your use of this information.

## 2019-08-12 ENCOUNTER — OFFICE VISIT (OUTPATIENT)
Dept: NEUROLOGY | Age: 29
End: 2019-08-12

## 2019-08-12 VITALS
OXYGEN SATURATION: 98 % | BODY MASS INDEX: 35.34 KG/M2 | HEART RATE: 100 BPM | SYSTOLIC BLOOD PRESSURE: 110 MMHG | WEIGHT: 180 LBS | HEIGHT: 60 IN | DIASTOLIC BLOOD PRESSURE: 82 MMHG

## 2019-08-12 DIAGNOSIS — F41.9 ANXIETY: Primary | ICD-10-CM

## 2019-08-12 DIAGNOSIS — R73.03 PREDIABETES: ICD-10-CM

## 2019-08-12 DIAGNOSIS — M62.81 LEFT-SIDED MUSCLE WEAKNESS: ICD-10-CM
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
## 2019-08-12 NOTE — PATIENT INSTRUCTIONS

## 2019-08-22 ENCOUNTER — TELEPHONE (OUTPATIENT)
Dept: NEUROLOGY | Age: 29
End: 2019-08-22

## 2019-08-23 ENCOUNTER — HOSPITAL ENCOUNTER (OUTPATIENT)
Dept: ULTRASOUND IMAGING | Age: 29
Discharge: HOME | End: 2019-08-23
Attending: SPECIALIST
Payer: MEDICAID

## 2019-08-23 DIAGNOSIS — E03.9 HYPOTHYROIDISM: ICD-10-CM

## 2019-08-23 PROCEDURE — 76536 US EXAM OF HEAD AND NECK: CPT

## 2019-09-12 ENCOUNTER — OFFICE VISIT (OUTPATIENT)
Dept: FAMILY MEDICINE CLINIC | Age: 29
End: 2019-09-12

## 2019-09-12 VITALS
OXYGEN SATURATION: 100 % | SYSTOLIC BLOOD PRESSURE: 114 MMHG | WEIGHT: 182.4 LBS | HEIGHT: 60 IN | DIASTOLIC BLOOD PRESSURE: 84 MMHG | BODY MASS INDEX: 35.81 KG/M2 | TEMPERATURE: 96.6 F | RESPIRATION RATE: 20 BRPM | HEART RATE: 100 BPM

## 2019-09-12 DIAGNOSIS — E66.9 OBESITY (BMI 35.0-39.9 WITHOUT COMORBIDITY): Primary | ICD-10-CM

## 2019-09-12 DIAGNOSIS — F32.1 MODERATE MAJOR DEPRESSION (HCC): ICD-10-CM

## 2019-09-12 RX ORDER — PHENTERMINE HYDROCHLORIDE 37.5 MG/1
37.5 TABLET ORAL
Qty: 30 TAB | Refills: 0 | Status: SHIPPED | OUTPATIENT
Start: 2019-10-12 | End: 2019-11-14 | Stop reason: SDUPTHER

## 2019-09-12 RX ORDER — PHENTERMINE HYDROCHLORIDE 37.5 MG/1
37.5 TABLET ORAL
Qty: 30 TAB | Refills: 0 | Status: SHIPPED | OUTPATIENT
Start: 2019-09-12 | End: 2019-11-14 | Stop reason: SDUPTHER

## 2019-09-12 RX ORDER — VALACYCLOVIR HYDROCHLORIDE 500 MG/1
TABLET, FILM COATED ORAL
COMMUNITY
Start: 2019-09-11 | End: 2021-06-28

## 2019-09-12 NOTE — PROGRESS NOTES
Name and  verified      Chief Complaint   Patient presents with    Weight Management         Health Maintenance reviewed-discussed with patient. 1. Have you been to the ER, urgent care clinic since your last visit? Hospitalized since your last visit? no    2. Have you seen or consulted any other health care providers outside of the [address] [place] [address] since your last visit? Include any pap smears or colon screening. No      Patient stated last PAP EXAM .

## 2019-09-12 NOTE — PROGRESS NOTES
HISTORY OF PRESENT ILLNESS  Brenden Ryder is a 26 y.o. female. HPI Obesity  Was told that Based on his insur coverage pt not a candidate till the bmi <30, The pt is feeling very good on this meds, feeling less tired and fatigued, becoming to be more concentrated at work and at home, getting along very well with family member and the work place, in addition the pt is becoming to be more active and having daily multiple healthy different diets,  Is more involved with the weekly routine exercises, not a fast fooder, states that the pt does not eat too much as used to do and the portion are very well controlled, likes very much to continue on this medication despite knowing that it is not a safe meds, and  needs to be monitored q3 months and if any thing of unusual, the pt was told if any needs to call us and let us know of any  concern regarding the current meds, hopefully has not had any concern so far, patient is stress level has been also improved currently taking Zoloft, patient has not been taking BuSpar denies any suicidal homicidal ideation patient also has been seen by specialist for further care    Current Outpatient Medications   Medication Sig Dispense Refill    sertraline (ZOLOFT) 50 mg tablet Take 1 Tab by mouth daily. 30 Tab 3    liraglutide (SAXENDA) 3 mg/0.5 mL (18 mg/3 mL) pen 0.5 mL by SubCUTAneous route daily. 1 Adjustable Dose Pre-filled Pen Syringe 6    busPIRone (BUSPAR) 15 mg tablet Take 1 Tab by mouth two (2) times a day. 60 Tab 3    Insulin Needles, Disposable, 31 gauge x 5/16\" ndle Inject saxenda once daily 1 Package 11    Blood-Glucose Meter (TRUERESULT BLOOD GLUCOSE SYSTM) monitoring kit Test blood sugar before breakfast, lunch dinner and before bedtime 1 Kit 11    glucose blood VI test strips (TRUETEST TEST STRIPS) strip Test blood sugar before breakfast, lunch, dinner and before bedtime.  100 Strip 11    Lancets misc Test blood sugar before breakfast, lunch, dinner and bedtime. 1 Package 11    nystatin (MYCOSTATIN) topical cream Apply  to affected area two (2) times a day. 30 g 3    norethindrone-ethinyl estradiol-iron (JUNEL FE 1.5/30, 28,) 1.5 mg-30 mcg (21)/75 mg (7) tab Take 1 Tab by mouth daily. No Known Allergies  Past Medical History:   Diagnosis Date    BMI 38.0-38.9,adult 4/3/2018    BMI 40.0-44.9, adult ([address] 2/19/2018    Chronic back pain greater than 3 months duration 7/29/2015    TASIA (generalized anxiety disorder) 1/19/2017    High-risk sexual behavior 2/23/2017    HX OTHER MEDICAL     chlamydia age 24, treated & negative now    Hypoglycemia 3/30/2016    Ill-defined condition     Insulinoma 4/6/2016    Morbid obesity ([address]     Syncopal [facility] 11/19/2015    Thyromegaly 3/30/2016    Urinary frequency 11/19/2015     Past Surgical History:   Procedure Laterality Date    HX CHOLECYSTECTOMY  10/6/2014    lap. tracee    HX DILATION AND CURETTAGE  6/2011    HX OTHER SURGICAL      D&C with SAB 6/2011    HX OTHER SURGICAL      wisdom extraction 15 years    REVISION CERVIX Theone Aimee [address]  2013     Family History   Problem Relation Age of Onset    Hypertension Mother     Diabetes Mother     High Cholesterol Father     Other Father         high cholesterole, herniated disc    Diabetes Paternal Aunt     Hypertension Maternal Grandmother     COPD Maternal Grandmother     Emphysema Maternal Grandmother     Diabetes Paternal Grandmother      Social History     Tobacco Use    Smoking status: Never Smoker    Smokeless tobacco: Never Used   Substance Use Topics    Alcohol use: No      Lab Results   Component Value Date/Time    Cholesterol, total 145 02/23/2017 12:37 PM    HDL Cholesterol 45 02/23/2017 12:37 PM    LDL, calculated 88 02/23/2017 12:37 PM    Triglyceride 62 02/23/2017 12:37 PM     Lab Results   Component Value Date/Time    ALT (SGPT) 23 09/21/2018 10:57 AM    AST (SGOT) 24 09/21/2018 10:57 AM    Alk.  phosphatase 52 09/21/2018 10:57 AM    Bilirubin, direct 0.05 04/08/2014 11:00 AM    Bilirubin, total <0.2 09/21/2018 10:57 AM    Albumin 4.2 09/21/2018 10:57 AM    Protein, total 7.2 09/21/2018 10:57 AM    PLATELET 090 12/48/2522 11:00 AM    Platelet cnt., External 304 10/14/2013        Review of Systems   Constitutional: Negative for chills, fever and malaise/fatigue. HENT: Negative for nosebleeds. Eyes: Negative for pain. Respiratory: Negative for cough and wheezing. Cardiovascular: Negative for chest pain and leg swelling. Gastrointestinal: Negative for constipation, diarrhea and nausea. Genitourinary: Negative for frequency. Musculoskeletal: Negative for joint pain and myalgias. Skin: Negative for rash. Neurological: Negative for loss of consciousness. Endo/Heme/Allergies: Does not bruise/bleed easily. Psychiatric/Behavioral: Negative for depression. The patient is not nervous/anxious and does not have insomnia. All other systems reviewed and are negative. Physical Exam   Constitutional: She is oriented to person, place, and time. She appears well-developed and well-nourished. HENT:   Head: Normocephalic and atraumatic. Eyes: Conjunctivae and EOM are normal.   Neck: Normal range of motion. Neck supple. Cardiovascular: Normal rate, regular rhythm and normal heart sounds. No murmur heard. Pulmonary/Chest: Effort normal and breath sounds normal.   Abdominal: Soft. Bowel sounds are normal. She exhibits no distension. Musculoskeletal: Normal range of motion. She exhibits no edema. Lymphadenopathy:     She has no cervical adenopathy. Neurological: She is alert and oriented to person, place, and time. Skin: No erythema. Psychiatric: Her behavior is normal.   Nursing note and vitals reviewed. ASSESSMENT and PLAN  Diagnoses and all orders for this visit:    1. Obesity (BMI 35.0-39.9 without comorbidity)  -     phentermine (ADIPEX-P) 37.5 mg tablet; Take 1 Tab by mouth every morning.  Max Daily Amount: 37.5 mg.  -     phentermine (ADIPEX-P) 37.5 mg tablet; Take 1 Tab by mouth every morning. Max Daily Amount: 37.5 mg.    2. Moderate major depression (HCC)  -     phentermine (ADIPEX-P) 37.5 mg tablet; Take 1 Tab by mouth every morning. Max Daily Amount: 37.5 mg.  -     phentermine (ADIPEX-P) 37.5 mg tablet; Take 1 Tab by mouth every morning. Max Daily Amount: 37.5 mg.       Patient was told to decrease calorie intake increase physical activity maintain ideal diet medication side effect was noted dependency advised patient was also advised not to mix this medication for with any other illicit drug also was told patient cannot drink any alcoholic beverages with such medication patient was also told to keep the medication is safe place away from children patient acknowledged understanding and agreed with today's recommendation

## 2019-09-12 NOTE — PATIENT INSTRUCTIONS
Starting a Weight Loss Plan: Care Instructions  Your Care Instructions    If you are thinking about losing weight, it can be hard to know where to start. Your doctor can help you set up a weight loss plan that best meets your needs. You may want to take a class on nutrition or exercise, or join a weight loss support group. If you have questions about how to make changes to your eating or exercise habits, ask your doctor about seeing a registered dietitian or an exercise specialist.  It can be a big challenge to lose weight. But you do not have to make huge changes at once. Make small changes, and stick with them. When those changes become habit, add a few more changes. If you do not think you are ready to make changes right now, try to pick a date in the future. Make an appointment to see your doctor to discuss whether the time is right for you to start a plan. Follow-up care is a key part of your treatment and safety. Be sure to make and go to all appointments, and call your doctor if you are having problems. It's also a good idea to know your test results and keep a list of the medicines you take. How can you care for yourself at home? · Set realistic goals. Many people expect to lose much more weight than is likely. A weight loss of 5% to 10% of your body weight may be enough to improve your health. · Get family and friends involved to provide support. Talk to them about why you are trying to lose weight, and ask them to help. They can help by participating in exercise and having meals with you, even if they may be eating something different. · Find what works best for you. If you do not have time or do not like to cook, a program that offers meal replacement bars or shakes may be better for you. Or if you like to prepare meals, finding a plan that includes daily menus and recipes may be best.  · Ask your doctor about other health professionals who can help you achieve your weight loss goals. ?  A dietitian can help you make healthy changes in your diet. ? An exercise specialist or  can help you develop a safe and effective exercise program.  ? A counselor or psychiatrist can help you cope with issues such as depression, anxiety, or family problems that can make it hard to focus on weight loss. · Consider joining a support group for people who are trying to lose weight. Your doctor can suggest groups in your area. Where can you learn more? Go to http://alhaji-tia.info/. Enter Y751 in the search box to learn more about \"Starting a Weight Loss Plan: Care Instructions. \"  Current as of: March 28, 2019  Content Version: 12.1  © 8321-7707 [employer]. Care instructions adapted under license by [employer] (which disclaims liability or warranty for this information). If you have questions about a medical condition or this instruction, always ask your healthcare professional. [place] [address] any warranty or liability for your use of this information. Recovering From Depression: Care Instructions  Your Care Instructions    Taking good care of yourself is important as you recover from depression. In time, your symptoms will fade as your treatment takes hold. Do not give up. Instead, focus your energy on getting better. Your mood will improve. It just takes some time. Focus on things that can help you feel better, such as being with friends and family, eating well, and getting enough rest. But take things slowly. Do not do too much too soon. You will begin to feel better gradually. Follow-up care is a key part of your treatment and safety. Be sure to make and go to all appointments, and call your doctor if you are having problems. It's also a good idea to know your test results and keep a list of the medicines you take. How can you care for yourself at home?   Be realistic  · If you have a large task to do, break it up into smaller steps you can handle, and just do what you can. · You may want to put off important decisions until your depression has lifted. If you have plans that will have a major impact on your life, such as marriage, divorce, or a job change, try to wait a bit. Talk it over with friends and loved ones who can help you look at the overall picture first.  · Reaching out to people for help is important. Do not isolate yourself. Let your family and friends help you. Find someone you can trust and confide in, and talk to that person. · Be patient, and be kind to yourself. Remember that depression is not your fault and is not something you can overcome with willpower alone. Treatment is necessary for depression, just like for any other illness. Feeling better takes time, and your mood will improve little by little. Stay active  · Stay busy and get outside. Take a walk, or try some other light exercise. · Talk with your doctor about an exercise program. Exercise can help with mild depression. · Go to a movie or concert. Take part in a Quaker activity or other social gathering. Go to a ball game. · Ask a friend to have dinner with you. Take care of yourself  · Eat a balanced diet with plenty of fresh fruits and vegetables, whole grains, and lean protein. If you have lost your appetite, eat small snacks rather than large meals. · Avoid drinking alcohol or using illegal drugs. Do not take medicines that have not been prescribed for you. They may interfere with medicines you may be taking for depression, or they may make your depression worse. · Take your medicines exactly as they are prescribed. You may start to feel better within 1 to 3 weeks of taking antidepressant medicine. But it can take as many as 6 to 8 weeks to see more improvement. If you have questions or concerns about your medicines, or if you do not notice any improvement by 3 weeks, talk to your doctor.   · If you have any side effects from your medicine, tell your doctor. Antidepressants can make you feel tired, dizzy, or nervous. Some people have dry mouth, constipation, headaches, sexual problems, or diarrhea. Many of these side effects are mild and will go away on their own after you have been taking the medicine for a few weeks. Some may last longer. Talk to your doctor if side effects are bothering you too much. You might be able to try a different medicine. · Get enough sleep. If you have problems sleeping:  ? Go to bed at the same time every night, and get up at the same time every morning. ? Keep your bedroom dark and quiet. ? Do not exercise after 5:00 p.m.  ? Avoid drinks with caffeine after 5:00 p.m. · Avoid sleeping pills unless they are prescribed by the doctor treating your depression. Sleeping pills may make you groggy during the day, and they may interact with other medicine you are taking. · If you have any other illnesses, such as diabetes, heart disease, or high blood pressure, make sure to continue with your treatment. Tell your doctor about all of the medicines you take, including those with or without a prescription. · Keep the numbers for these national suicide hotlines: 1-921-315-TALK (7-277.832.4392) and 5-022-QDHDOMD (3-302.931.5674). If you or someone you know talks about suicide or feeling hopeless, get help right away. When should you call for help? Call 911 anytime you think you may need emergency care. For example, call if:    · You feel like hurting yourself or someone else.     · Someone you know has depression and is about to attempt or is attempting suicide.   [facility] your doctor now or seek immediate medical care if:    · You hear voices.     · Someone you know has depression and:  ? Starts to give away his or her possessions. ? Uses illegal drugs or drinks alcohol heavily. ? Talks or writes about death, including writing suicide notes or talking about guns, knives, or pills.   ? Starts to spend a lot of time alone.  ? Acts very aggressively or suddenly appears calm.    Watch closely for changes in your health, and be sure to contact your doctor if:    · You do not get better as expected. Where can you learn more? Go to http://alhaji-tia.info/. Enter C127 in the search box to learn more about \"Recovering From Depression: Care Instructions. \"  Current as of: September 11, 2018  Content Version: 12.1  © 2619-1263 [employer]. Care instructions adapted under license by [employer] ??? (which disclaims liability or warranty for this information). If you have questions about a medical condition or this instruction, always ask your healthcare professional. [place] [address] any warranty or liability for your use of this information.

## 2019-09-23 RX ORDER — SERTRALINE HYDROCHLORIDE 50 MG/1
TABLET, FILM COATED ORAL
Qty: 30 TAB | Refills: 3 | OUTPATIENT
Start: 2019-09-23

## 2019-10-14 RX ORDER — SERTRALINE HYDROCHLORIDE 50 MG/1
TABLET, FILM COATED ORAL
Qty: 30 TAB | Refills: 3 | OUTPATIENT
Start: 2019-10-14

## 2019-10-28 RX ORDER — BUSPIRONE HYDROCHLORIDE 15 MG/1
TABLET ORAL
Qty: 60 TAB | Refills: 3 | OUTPATIENT
Start: 2019-10-28

## 2019-11-14 ENCOUNTER — OFFICE VISIT (OUTPATIENT)
Dept: FAMILY MEDICINE CLINIC | Age: 29
End: 2019-11-14

## 2019-11-14 VITALS
WEIGHT: 178.3 LBS | TEMPERATURE: 98.1 F | HEIGHT: 60 IN | BODY MASS INDEX: 35.01 KG/M2 | SYSTOLIC BLOOD PRESSURE: 115 MMHG | RESPIRATION RATE: 20 BRPM | OXYGEN SATURATION: 98 % | HEART RATE: 88 BPM | DIASTOLIC BLOOD PRESSURE: 71 MMHG

## 2019-11-14 DIAGNOSIS — Z23 ENCOUNTER FOR IMMUNIZATION: ICD-10-CM

## 2019-11-14 DIAGNOSIS — F41.1 GAD (GENERALIZED ANXIETY DISORDER): ICD-10-CM

## 2019-11-14 DIAGNOSIS — E66.9 OBESITY (BMI 35.0-39.9 WITHOUT COMORBIDITY): ICD-10-CM

## 2019-11-14 DIAGNOSIS — F32.1 MODERATE MAJOR DEPRESSION (HCC): ICD-10-CM

## 2019-11-14 DIAGNOSIS — E66.01 OBESITY, MORBID (HCC): ICD-10-CM

## 2019-11-14 RX ORDER — PHENTERMINE HYDROCHLORIDE 37.5 MG/1
37.5 TABLET ORAL
Qty: 30 TAB | Refills: 0 | Status: SHIPPED | OUTPATIENT
Start: 2019-11-14 | End: 2020-03-18 | Stop reason: SDUPTHER

## 2019-11-14 RX ORDER — PHENTERMINE HYDROCHLORIDE 37.5 MG/1
37.5 TABLET ORAL
Qty: 30 TAB | Refills: 0 | Status: SHIPPED | OUTPATIENT
Start: 2020-01-14 | End: 2020-03-18 | Stop reason: ALTCHOICE

## 2019-11-14 RX ORDER — PHENTERMINE HYDROCHLORIDE 37.5 MG/1
37.5 TABLET ORAL
Qty: 30 TAB | Refills: 0 | Status: SHIPPED | OUTPATIENT
Start: 2019-12-14 | End: 2020-03-18 | Stop reason: ALTCHOICE

## 2019-11-14 NOTE — PROGRESS NOTES
Name and  verified      Chief Complaint   Patient presents with    Weight Management         Health Maintenance reviewed-discussed with patient. 1. Have you been to the ER, urgent care clinic since your last visit? Hospitalized since your last visit? no    2. Have you seen or consulted any other health care providers outside of the [address] [place] [address] since your last visit? Include any pap smears or colon screening. Yes, IUD Placement on 2019. Order placed for flu vaccine, per Verbal Order from Dr. Micheal Paulson on 2019 due to HM. After obtaining consent, and per orders of Dr Micheal Paulson, flu vaccine was given to  Left deltoid. Patient was instructed to remain in clinic for 15 minutes afterwards, and to report any adverse reaction to me immediately. Patient did not have any adverse reactions during this office visit.

## 2019-11-14 NOTE — PROGRESS NOTES
HISTORY OF PRESENT ILLNESS  Ariel Porter is a 26 y.o. female. HPI   Obesity  The pt is feeling very good on this meds, feeling less tired and fatigued, becoming to be more concentrated at work and at home, getting along very well with family member and the work place, in addition the pt is becoming to be more active and having daily multiple healthy different diets,  Is more involved with the weekly routine exercises, not a fast fooder, states that the pt does not eat too much as used to do and the portion are very well controlled, likes very much to continue on this medication despite knowing that it is not a safe meds, and  needs to be monitored q3 months and if any thing of unusual, the pt was told if any needs to call us and let us know of any  concern regarding the current meds, hopefully has not had any concern so far,       Current Outpatient Medications   Medication Sig Dispense Refill    copper (PARAGARD T 380A) 380 square mm IUD by IntraUTERine route.  phentermine (ADIPEX-P) 37.5 mg tablet Take 1 Tab by mouth every morning. Max Daily Amount: 37.5 mg. 30 Tab 0    [START ON 12/14/2019] phentermine (ADIPEX-P) 37.5 mg tablet Take 1 Tab by mouth every morning. Max Daily Amount: 37.5 mg. 30 Tab 0    liraglutide (SAXENDA) 3 mg/0.5 mL (18 mg/3 mL) pen 0.5 mL by SubCUTAneous route daily. 1 Adjustable Dose Pre-filled Pen Syringe 6    [START ON 1/14/2020] phentermine (ADIPEX-P) 37.5 mg tablet Take 1 Tab by mouth every morning. Max Daily Amount: 37.5 mg. 30 Tab 0    sertraline (ZOLOFT) 50 mg tablet Take 1 Tab by mouth daily. 30 Tab 3    Insulin Needles, Disposable, 31 gauge x 5/16\" ndle Inject saxenda once daily 1 Package 11    valACYclovir (VALTREX) 500 mg tablet       nystatin (MYCOSTATIN) topical cream Apply  to affected area two (2) times a day. 30 g 3    norethindrone-ethinyl estradiol-iron (JUNEL FE 1.5/30, 28,) 1.5 mg-30 mcg (21)/75 mg (7) tab Take 1 Tab by mouth daily.       Blood-Glucose Meter (TRUERESULT BLOOD GLUCOSE SYSTM) monitoring kit Test blood sugar before breakfast, lunch dinner and before bedtime 1 Kit 11    glucose blood VI test strips (TRUETEST TEST STRIPS) strip Test blood sugar before breakfast, lunch, dinner and before bedtime. 100 Strip 11    Lancets misc Test blood sugar before breakfast, lunch, dinner and bedtime. 1 Package 11     No Known Allergies  Past Medical History:   Diagnosis Date    BMI 38.0-38.9,adult 4/3/2018    BMI 40.0-44.9, adult ([address] 2/19/2018    Chronic back pain greater than 3 months duration 7/29/2015    TASIA (generalized anxiety disorder) 1/19/2017    High-risk sexual behavior 2/23/2017    HX OTHER MEDICAL     chlamydia age 24, treated & negative now    Hypoglycemia 3/30/2016    Ill-defined condition     Insulinoma 4/6/2016    Morbid obesity [facility]     Obesity (BMI 35.0-39.9 without comorbidity) 9/12/2019    Syncopal seizure ([facility] 11/19/2015    Thyromegaly 3/30/2016    Urinary frequency 11/19/2015     Past Surgical History:   Procedure Laterality Date    HX CHOLECYSTECTOMY  10/6/2014    lap.  tracee    HX DILATION AND CURETTAGE  6/2011    HX OTHER SURGICAL      D&C with SAB 6/2011    HX OTHER SURGICAL      wisdom extraction 15 years    REVISION CERVIX Iline Mungo [address]  2013     Family History   Problem Relation Age of Onset    Hypertension Mother     Diabetes Mother     High Cholesterol Father     Other Father         high cholesterole, herniated disc    Diabetes Paternal Aunt     Hypertension Maternal Grandmother     COPD Maternal Grandmother     Emphysema Maternal Grandmother     Diabetes Paternal Grandmother      Social History     Tobacco Use    Smoking status: Never Smoker    Smokeless tobacco: Never Used   Substance Use Topics    Alcohol use: No      Lab Results   Component Value Date/Time    Hemoglobin A1c 6.2 (H) 03/30/2016 05:01 PM    Hemoglobin A1c 6.4 (H) 02/07/2014 11:37 AM    Glucose 71 09/21/2018 10:57 AM Glucose (POC) 96 06/06/2015 06:39 PM    Glucose  04/06/2016 09:31 AM    LDL, calculated 88 02/23/2017 12:37 PM    Creatinine (POC) 0.7 06/06/2015 06:39 PM    Creatinine 0.88 09/21/2018 10:57 AM      Lab Results   Component Value Date/Time    Cholesterol, total 145 02/23/2017 12:37 PM    HDL Cholesterol 45 02/23/2017 12:37 PM    LDL, calculated 88 02/23/2017 12:37 PM    Triglyceride 62 02/23/2017 12:37 PM        Review of Systems   Constitutional: Negative for chills and fever. HENT: Negative for ear pain and nosebleeds. Eyes: Negative for blurred vision, pain and discharge. Respiratory: Negative for shortness of breath. Cardiovascular: Negative for chest pain and leg swelling. Gastrointestinal: Negative for constipation, diarrhea, nausea and vomiting. Genitourinary: Negative for frequency. Musculoskeletal: Negative for joint pain. Skin: Negative for itching and rash. Neurological: Negative for headaches. Psychiatric/Behavioral: Negative for depression. The patient is not nervous/anxious. Physical Exam   Constitutional: She is oriented to person, place, and time. She appears well-developed and well-nourished. HENT:   Head: Normocephalic and atraumatic. Eyes: Conjunctivae and EOM are normal.   Neck: Normal range of motion. Neck supple. Cardiovascular: Normal rate, regular rhythm and normal heart sounds. No murmur heard. Pulmonary/Chest: Effort normal and breath sounds normal.   Abdominal: Soft. Bowel sounds are normal. She exhibits no distension. Musculoskeletal: Normal range of motion. She exhibits no edema. Lymphadenopathy:     She has no cervical adenopathy. Neurological: She is alert and oriented to person, place, and time. Skin: No erythema. Psychiatric: Her behavior is normal.   Nursing note and vitals reviewed. ASSESSMENT and PLAN  Diagnoses and all orders for this visit:    1.  Encounter for immunization  -     INFLUENZA VIRUS Radha Sender FREE SYRINGE IM  -     VA IMMUNIZ ADMIN,1 SINGLE/COMB VAC/TOXOID    2. Moderate major depression (HCC)  -     phentermine (ADIPEX-P) 37.5 mg tablet; Take 1 Tab by mouth every morning. Max Daily Amount: 37.5 mg.  -     phentermine (ADIPEX-P) 37.5 mg tablet; Take 1 Tab by mouth every morning. Max Daily Amount: 37.5 mg.  -     liraglutide (SAXENDA) 3 mg/0.5 mL (18 mg/3 mL) pen; 0.5 mL by SubCUTAneous route daily. -     phentermine (ADIPEX-P) 37.5 mg tablet; Take 1 Tab by mouth every morning. Max Daily Amount: 37.5 mg.    3. Obesity (BMI 35.0-39.9 without comorbidity)  -     phentermine (ADIPEX-P) 37.5 mg tablet; Take 1 Tab by mouth every morning. Max Daily Amount: 37.5 mg.  -     phentermine (ADIPEX-P) 37.5 mg tablet; Take 1 Tab by mouth every morning. Max Daily Amount: 37.5 mg.  -     phentermine (ADIPEX-P) 37.5 mg tablet; Take 1 Tab by mouth every morning. Max Daily Amount: 37.5 mg.    4. TASIA (generalized anxiety disorder)  -     liraglutide (SAXENDA) 3 mg/0.5 mL (18 mg/3 mL) pen; 0.5 mL by SubCUTAneous route daily. -     phentermine (ADIPEX-P) 37.5 mg tablet; Take 1 Tab by mouth every morning. Max Daily Amount: 37.5 mg.    5. BMI 35.0-35.9,adult  -     liraglutide (SAXENDA) 3 mg/0.5 mL (18 mg/3 mL) pen; 0.5 mL by SubCUTAneous route daily. -     phentermine (ADIPEX-P) 37.5 mg tablet; Take 1 Tab by mouth every morning. Max Daily Amount: 37.5 mg.    6. Transition of care performed with sharing of clinical summary  -     liraglutide (SAXENDA) 3 mg/0.5 mL (18 mg/3 mL) pen; 0.5 mL by SubCUTAneous route daily.

## 2019-11-14 NOTE — PATIENT INSTRUCTIONS
Vaccine Information Statement    Influenza (Flu) Vaccine (Inactivated or Recombinant): What You Need to Know    Many Vaccine Information Statements are available in Irish and other languages. See www.immunize.org/vis  Hojas de información sobre vacunas están disponibles en español y en muchos otros idiomas. Visite www.immunize.org/vis    1. Why get vaccinated? Influenza vaccine can prevent influenza (flu). Flu is a contagious disease that spreads around the [facility] every year, usually between October and May. Anyone can get the flu, but it is more dangerous for some people. Infants and young children, people 72years of age and older, pregnant women, and people with certain health conditions or a weakened immune system are at greatest risk of flu complications. Pneumonia, bronchitis, sinus infections and ear infections are examples of flu-related complications. If you have a medical condition, such as heart disease, cancer or diabetes, flu can make it worse. Flu can cause fever and chills, sore throat, muscle aches, fatigue, cough, headache, and runny or stuffy nose. Some people may have vomiting and diarrhea, though this is more common in children than adults. Each year thousands of people in the [facility] die from flu, and many more are hospitalized. Flu vaccine prevents millions of illnesses and flu-related visits to the doctor each year. 2. Influenza vaccines     CDC recommends everyone 10months of age and older get vaccinated every flu season. Children 6 months through 6years of age may need 2 doses during a single flu season. Everyone else needs only 1 dose each flu season. It takes about 2 weeks for protection to develop after vaccination. There are many flu viruses, and they are always changing. Each year a new flu vaccine is made to protect against three or four viruses that are likely to cause disease in the upcoming flu season.  Even when the vaccine doesnt exactly match these viruses, it may still provide some protection. Influenza vaccine does not cause flu. Influenza vaccine may be given at the same time as other vaccines. 3. Talk with your health care provider    Tell your vaccine provider if the person getting the vaccine:   Has had an allergic reaction after a previous dose of influenza vaccine, or has any severe, life-threatening allergies.  Has ever had Guillain-Barré Syndrome (also called GBS). In some cases, your health care provider may decide to postpone influenza vaccination to a future visit. People with minor illnesses, such as a cold, may be vaccinated. People who are moderately or severely ill should usually wait until they recover before getting influenza vaccine. Your health care provider can give you more information. 4. Risks of a reaction     Soreness, redness, and swelling where shot is given, fever, muscle aches, and headache can happen after influenza vaccine.  There may be a very small increased risk of Guillain-Barré Syndrome (GBS) after inactivated influenza vaccine (the flu shot). [facility] children who get the flu shot along with pneumococcal vaccine (PCV13), and/or DTaP vaccine at the same time might be slightly more likely to have a seizure caused by fever. Tell your health care provider if a child who is getting flu vaccine has ever had a seizure. People sometimes faint after medical procedures, including vaccination. Tell your provider if you feel dizzy or have vision changes or ringing in the ears. As with any medicine, there is a very remote chance of a vaccine causing a severe allergic reaction, other serious injury, or death. 5. What if there is a serious problem? An allergic reaction could occur after the vaccinated person leaves the clinic.  If you see signs of a severe allergic reaction (hives, swelling of the face and throat, difficulty breathing, a fast heartbeat, dizziness, or weakness), call 9-1-1 and get the person to the nearest hospital.    For other signs that concern you, call your health care provider. Adverse reactions should be reported to the Vaccine Adverse Event Reporting System (VAERS). Your health care provider will usually file this report, or you can do it yourself. Visit the VAERS website at www.vaers. [facility].gov or call 4-572.889.8501. VAERS is only for reporting reactions, and VAERS staff do not give medical advice. 6. The National Vaccine Injury Compensation Program    The [facility] Vaccine Injury Compensation Program (VICP) is a federal program that was created to compensate people who may have been injured by certain vaccines. Visit the VICP website at www.[facility].gov/vaccinecompensation or call 7-221.208.3128 to learn about the program and about filing a claim. There is a time limit to file a claim for compensation. 7. How can I learn more?  Ask your health care provider.  Call your local or state health department.  Contact the Centers for Disease Control and Prevention (CDC):  - Call 6-777.597.1509 (1-800-CDC-INFO) or  - Visit CDCs influenza website at www.cdc.gov/flu    Vaccine Information Statement (Interim)  Inactivated Influenza Vaccine   8/15/2019  [address] CHRISTIE Leigh [id number]   Department of Health and Human Services  Centers for Disease Control and Prevention    Office Use Only

## 2019-11-15 DIAGNOSIS — E66.9 CLASS 2 OBESITY WITHOUT SERIOUS COMORBIDITY WITH BODY MASS INDEX (BMI) OF 37.0 TO 37.9 IN ADULT, UNSPECIFIED OBESITY TYPE: ICD-10-CM

## 2019-11-15 RX ORDER — PEN NEEDLE, DIABETIC 30 GX3/16"
NEEDLE, DISPOSABLE MISCELLANEOUS
Qty: 100 PACKAGE | Refills: 8 | Status: SHIPPED | OUTPATIENT
Start: 2019-11-15 | End: 2021-06-28

## 2020-02-06 ENCOUNTER — TELEPHONE (OUTPATIENT)
Dept: FAMILY MEDICINE CLINIC | Age: 30
End: 2020-02-06

## 2020-02-06 NOTE — TELEPHONE ENCOUNTER
Received call from pt with c/o diarrhea, chills and bodyaches x 2 days, declined appt or meds sent to pharmacy due to no transportation. Requesting advcie only,  Advised to rest stomach, drink warm teas, broth and soup, no spicy or fatty foods  Pt stated understanding.

## 2020-02-20 ENCOUNTER — OFFICE VISIT (OUTPATIENT)
Dept: BEHAVIORAL/MENTAL HEALTH CLINIC | Age: 30
End: 2020-02-20

## 2020-02-20 VITALS
HEART RATE: 95 BPM | BODY MASS INDEX: 35.14 KG/M2 | DIASTOLIC BLOOD PRESSURE: 81 MMHG | WEIGHT: 179 LBS | HEIGHT: 60 IN | SYSTOLIC BLOOD PRESSURE: 120 MMHG

## 2020-02-20 DIAGNOSIS — F41.1 GAD (GENERALIZED ANXIETY DISORDER): ICD-10-CM

## 2020-02-20 DIAGNOSIS — F32.1 MODERATE MAJOR DEPRESSION (HCC): Primary | ICD-10-CM

## 2020-02-20 DIAGNOSIS — F43.10 PTSD (POST-TRAUMATIC STRESS DISORDER): ICD-10-CM

## 2020-02-20 NOTE — PROGRESS NOTES
CHIEF COMPLAINT:  Jonathan Long is a 31 y.o. female and was seen today for follow-up of psychiatric condition and psychotropic medication management. HPI:    Ava Romero reports the following psychiatric symptoms:  depression and anxiety. The symptoms have been present for months and are of moderate/high severity. The symptoms occur more frequently and more severely overall. Pt reports she stopped medications. She is here today to review current treatment plan. PHQ-9: 16, positive screen, moderate to high severity  HAM-A: 18, mild anxiety    FAMILY/SOCIAL HX: numerous stressors    REVIEW OF SYSTEMS:  Psychiatric:  depression, anxiety  Appetite:decreased   Sleep: improved   Neuro: denies    Visit Vitals  /81 (BP 1 Location: Left arm, BP Patient Position: Sitting)   Pulse 95   Ht 5' (1.524 m)   Wt 81.2 kg (179 lb)   BMI 34.96 kg/m²       Side Effects:  none    MENTAL STATUS EXAM:   Sensorium  oriented to time, place and person   Relations cooperative   Appearance:  age appropriate and casually dressed   Motor Behavior:  gait stable and within normal limits   Speech:  normal volume   Thought Process: goal directed   Thought Content free of delusions and free of hallucinations   Suicidal ideations no intention   Homicidal ideations no intention   Mood:  Depressed, anxious   Affect:  depressed   Memory recent  adequate   Memory remote:  adequate   Concentration:  adequate   Abstraction:  abstract   Insight:  fair and good   Reliability good and fair   Judgment:  fair and good     MEDICAL DECISION MAKING:  Problems addressed today:    ICD-10-CM ICD-9-CM    1. 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 296.22    2. TASIA (generalized anxiety disorder) F41.1 300.02    3. PTSD (post-traumatic stress disorder) F43.10 309.81        Assessment:   Marsha is responding to treatment overall. She reports she went off of medication and she has been working on strategies for management of depression and anxiety. Discussed recent stressors and current exacerbartion. Reviewed risk vs benefit of re-starting medicaiton. Pt prefers not to go back on a antidepressant at this time. Reviewed comprehensive approach to depression and anxiety management. Pt has used some hemp oil and reports benefit. Encouraged pt to monitor for secondary effects such as mood changes. She plans to monitor and will f/u prn. Plan:   1. Current Outpatient Medications   Medication Sig Dispense Refill    Insulin Needles, Disposable, (BD ULTRA-FINE SHORT PEN NEEDLE) 31 gauge x 5/16\" ndle USE TO INJECT SAXENDA ONCE DAILY 100 Package 8    copper (PARAGARD T 380A) 380 square mm IUD by IntraUTERine route.  phentermine (ADIPEX-P) 37.5 mg tablet Take 1 Tab by mouth every morning. Max Daily Amount: 37.5 mg. 30 Tab 0    phentermine (ADIPEX-P) 37.5 mg tablet Take 1 Tab by mouth every morning. Max Daily Amount: 37.5 mg. 30 Tab 0    liraglutide (SAXENDA) 3 mg/0.5 mL (18 mg/3 mL) pen 0.5 mL by SubCUTAneous route daily. 1 Adjustable Dose Pre-filled Pen Syringe 6    phentermine (ADIPEX-P) 37.5 mg tablet Take 1 Tab by mouth every morning. Max Daily Amount: 37.5 mg. 30 Tab 0    valACYclovir (VALTREX) 500 mg tablet       sertraline (ZOLOFT) 50 mg tablet Take 1 Tab by mouth daily. 30 Tab 3    nystatin (MYCOSTATIN) topical cream Apply  to affected area two (2) times a day. 30 g 3    norethindrone-ethinyl estradiol-iron (JUNEL FE 1.5/30, 28,) 1.5 mg-30 mcg (21)/75 mg (7) tab Take 1 Tab by mouth daily.  Blood-Glucose Meter (TRUERESULT BLOOD GLUCOSE SYSTM) monitoring kit Test blood sugar before breakfast, lunch dinner and before bedtime 1 Kit 11    glucose blood VI test strips (TRUETEST TEST STRIPS) strip Test blood sugar before breakfast, lunch, dinner and before bedtime. 100 Strip 11    Lancets misc Test blood sugar before breakfast, lunch, dinner and bedtime. 1 Package 11          medication changes made today: none    2.   Counseling and coordination of care including instructions for treatment, risks/benefits, risk factor reduction and patient/family education. She agrees with the plan. Patient instructed to call with any side effects, questions or issues. 3.    Follow-up and Dispositions    · Return if symptoms worsen or fail to improve.        Pt seen for 20/25 minutes and >50% times was spent in counseling and coordination of care: management of depression    2/20/2020  Amilcar Timmons NP

## 2020-03-03 ENCOUNTER — TELEPHONE (OUTPATIENT)
Dept: FAMILY MEDICINE CLINIC | Age: 30
End: 2020-03-03

## 2020-03-03 NOTE — TELEPHONE ENCOUNTER
Returned call to pt,  Requesting appt for weight management.   appt scheduled for Thursday, March 12, 2020 08:30 AM

## 2020-03-18 ENCOUNTER — OFFICE VISIT (OUTPATIENT)
Dept: FAMILY MEDICINE CLINIC | Age: 30
End: 2020-03-18

## 2020-03-18 VITALS
TEMPERATURE: 98.5 F | RESPIRATION RATE: 16 BRPM | OXYGEN SATURATION: 98 % | DIASTOLIC BLOOD PRESSURE: 77 MMHG | WEIGHT: 183.3 LBS | SYSTOLIC BLOOD PRESSURE: 109 MMHG | HEIGHT: 60 IN | HEART RATE: 102 BPM | BODY MASS INDEX: 35.99 KG/M2

## 2020-03-18 DIAGNOSIS — F41.1 GAD (GENERALIZED ANXIETY DISORDER): ICD-10-CM

## 2020-03-18 DIAGNOSIS — E66.9 OBESITY (BMI 35.0-39.9 WITHOUT COMORBIDITY): ICD-10-CM

## 2020-03-18 DIAGNOSIS — F32.1 MODERATE MAJOR DEPRESSION (HCC): ICD-10-CM

## 2020-03-18 RX ORDER — PHENTERMINE HYDROCHLORIDE 37.5 MG/1
37.5 TABLET ORAL
Qty: 30 TAB | Refills: 0 | Status: SHIPPED | OUTPATIENT
Start: 2020-03-18 | End: 2020-03-18 | Stop reason: SDUPTHER

## 2020-03-18 RX ORDER — PHENTERMINE HYDROCHLORIDE 37.5 MG/1
37.5 TABLET ORAL
Qty: 30 TAB | Refills: 0 | Status: SHIPPED | OUTPATIENT
Start: 2020-04-18 | End: 2020-03-18 | Stop reason: SDUPTHER

## 2020-03-18 RX ORDER — PHENTERMINE HYDROCHLORIDE 37.5 MG/1
37.5 TABLET ORAL
Qty: 30 TAB | Refills: 0 | Status: SHIPPED | OUTPATIENT
Start: 2020-05-18 | End: 2020-03-18 | Stop reason: ALTCHOICE

## 2020-03-18 NOTE — PROGRESS NOTES
HISTORY OF PRESENT ILLNESS  Adonis Park is a 31 y.o. female. HPI    Obesity  The pt is feeling very good on this meds, feeling less tired and fatigued, becoming to be more concentrated at work and at home, getting along very well with family member and the work place, in addition the pt is becoming to be more active and having daily multiple healthy different diets,  Is more involved with the weekly routine exercises, not a fast fooder, states that the pt does not eat too much as used to do and the portion are very well controlled, likes very much to continue on this medication despite knowing that it is not a safe meds, and  needs to be monitored q3 months and if any thing of unusual, the pt was told if any needs to call us and let us know of any  concern regarding the current meds, hopefully has not had any concern so far,         Current Outpatient Medications   Medication Sig Dispense Refill    [START ON 5/18/2020] phentermine (ADIPEX-P) 37.5 mg tablet Take 1 Tab by mouth every morning. Max Daily Amount: 37.5 mg. 30 Tab 0    sertraline (ZOLOFT) 50 mg tablet Take 1 Tab by mouth daily. 30 Tab 3    norethindrone-ethinyl estradiol-iron (JUNEL FE 1.5/30, 28,) 1.5 mg-30 mcg (21)/75 mg (7) tab Take 1 Tab by mouth daily.  Insulin Needles, Disposable, (BD ULTRA-FINE SHORT PEN NEEDLE) 31 gauge x 5/16\" ndle USE TO INJECT SAXENDA ONCE DAILY (Patient not taking: Reported on 3/18/2020) 100 Package 8    copper (PARAGARD T 380A) 380 square mm IUD by IntraUTERine route.  liraglutide (SAXENDA) 3 mg/0.5 mL (18 mg/3 mL) pen 0.5 mL by SubCUTAneous route daily. (Patient not taking: Reported on 3/18/2020) 1 Adjustable Dose Pre-filled Pen Syringe 6    valACYclovir (VALTREX) 500 mg tablet       nystatin (MYCOSTATIN) topical cream Apply  to affected area two (2) times a day.  (Patient not taking: Reported on 3/18/2020) 30 g 3    Blood-Glucose Meter (TRUERESULT BLOOD GLUCOSE SYSTM) monitoring kit Test blood sugar before breakfast, lunch dinner and before bedtime (Patient not taking: Reported on 3/18/2020) 1 Kit 11    glucose blood VI test strips (TRUETEST TEST STRIPS) strip Test blood sugar before breakfast, lunch, dinner and before bedtime. (Patient not taking: Reported on 3/18/2020) 100 Strip 11    Lancets misc Test blood sugar before breakfast, lunch, dinner and bedtime. (Patient not taking: Reported on 3/18/2020) 1 Package 11     No Known Allergies  Past Medical History:   Diagnosis Date    BMI 38.0-38.9,adult 4/3/2018    BMI 40.0-44.9, adult ([address] 2/19/2018    Chronic back pain greater than 3 months duration 7/29/2015    TASIA (generalized anxiety disorder) 1/19/2017    High-risk sexual behavior 2/23/2017    HX OTHER MEDICAL     chlamydia age 24, treated & negative now    Hypoglycemia 3/30/2016    Ill-defined condition     Insulinoma 4/6/2016    Morbid obesity ([address]     Obesity (BMI 35.0-39.9 without comorbidity) 9/12/2019    Syncopal seizure ([address] 11/19/2015    Thyromegaly 3/30/2016    Urinary frequency 11/19/2015     Past Surgical History:   Procedure Laterality Date    HX CHOLECYSTECTOMY  10/6/2014    lap.  tracee    HX DILATION AND CURETTAGE  6/2011    HX OTHER SURGICAL      D&C with SAB 6/2011    HX OTHER SURGICAL      wisdom extraction 15 years    REVISION CERVIX Maribell Mimes [address]  2013     Family History   Problem Relation Age of Onset    Hypertension Mother     Diabetes Mother     High Cholesterol Father     Other Father         high cholesterole, herniated disc    Diabetes Paternal Aunt     Hypertension Maternal Grandmother     COPD Maternal Grandmother     Emphysema Maternal Grandmother     Diabetes Paternal Grandmother      Social History     Tobacco Use    Smoking status: Never Smoker    Smokeless tobacco: Never Used   Substance Use Topics    Alcohol use: No      Lab Results   Component Value Date/Time    WBC 5.5 09/04/2018 11:00 AM    HGB 13.5 09/04/2018 11:00 AM    Hemoglobin (POC) 12.9 06/06/2015 06:39 PM    HCT 41.3 09/04/2018 11:00 AM    Hematocrit (POC) 38 06/06/2015 06:39 PM    PLATELET 563 64/21/8157 11:00 AM    MCV 85 09/04/2018 11:00 AM    Hgb, External 12.4 10/14/2013    Hct, External 36.7 10/14/2013    Platelet cnt., External 304 10/14/2013     Lab Results   Component Value Date/Time    TSH 1.410 09/21/2018 10:57 AM    T4, Free 1.04 04/06/2016 09:31 AM         Review of Systems   Constitutional: Negative for chills and fever. HENT: Negative for congestion and nosebleeds. Eyes: Negative for blurred vision and pain. Respiratory: Negative for cough, shortness of breath and wheezing. Cardiovascular: Negative for chest pain and leg swelling. Gastrointestinal: Negative for constipation, diarrhea, nausea and vomiting. Genitourinary: Negative for dysuria and frequency. Musculoskeletal: Negative for joint pain and myalgias. Skin: Negative for itching and rash. Neurological: Negative for dizziness, loss of consciousness and headaches. Psychiatric/Behavioral: Negative for depression. The patient is not nervous/anxious and does not have insomnia. Physical Exam  Vitals signs and nursing note reviewed. Constitutional:       Appearance: She is well-developed. HENT:      Head: Normocephalic and atraumatic. Eyes:      Conjunctiva/sclera: Conjunctivae normal.      Pupils: Pupils are equal, round, and reactive to light. Neck:      Thyroid: No thyromegaly. Vascular: No JVD. Cardiovascular:      Rate and Rhythm: Normal rate and regular rhythm. Heart sounds: Normal heart sounds. No murmur. No friction rub. No gallop. Pulmonary:      Effort: Pulmonary effort is normal. No respiratory distress. Breath sounds: Normal breath sounds. No stridor. No wheezing or rales. Abdominal:      General: Bowel sounds are normal. There is no distension. Palpations: Abdomen is soft. There is no mass. Tenderness: There is no abdominal tenderness. Musculoskeletal: Normal range of motion. General: No tenderness. Lymphadenopathy:      Cervical: No cervical adenopathy. Skin:     Findings: No erythema or rash. Neurological:      Mental Status: She is alert and oriented to person, place, and time. Cranial Nerves: No cranial nerve deficit. Deep Tendon Reflexes: Reflexes are normal and symmetric. Psychiatric:         Behavior: Behavior normal.         ASSESSMENT and PLAN  Diagnoses and all orders for this visit:    1. BMI 35.0-35.9,adult  -     liraglutide, weight loss, (SAXENDA) 3 mg/0.5 mL (18 mg/3 mL) pen; 0.5 mL by SubCUTAneous route daily. 2. Moderate major depression (HCC)  -     liraglutide, weight loss, (SAXENDA) 3 mg/0.5 mL (18 mg/3 mL) pen; 0.5 mL by SubCUTAneous route daily. 3. Obesity (BMI 35.0-39.9 without comorbidity)    4. TASIA (generalized anxiety disorder)  -     liraglutide, weight loss, (SAXENDA) 3 mg/0.5 mL (18 mg/3 mL) pen; 0.5 mL by SubCUTAneous route daily.

## 2020-03-19 ENCOUNTER — TELEPHONE (OUTPATIENT)
Dept: BEHAVIORAL/MENTAL HEALTH CLINIC | Age: 30
End: 2020-03-19

## 2020-03-19 NOTE — TELEPHONE ENCOUNTER
Patient requesting to talk to provider. 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 She says she thinks she will need a letter for work?

## 2020-03-19 NOTE — TELEPHONE ENCOUNTER
Returned phone call. No answer left VM. Pt was asking for a letter for work. Will provide if needed. Pt did call back and so reviewed request. Did to exacerbated symptoms will provide a letter for work.

## 2020-03-19 NOTE — LETTER
3/19/2020 4:25 PM 
 
[address]. [address] [address] To Whom It May Concern: 
 
 
 
Due to exacerbated symptoms Ms. Knapp Sarah is not able to work at this time.  
 
 
 
 
 
 
Sincerely, 
 
 
 
 
Susu Dover PhD, Davies campusNP-BC

## 2020-03-24 ENCOUNTER — DOCUMENTATION ONLY (OUTPATIENT)
Dept: FAMILY MEDICINE CLINIC | Age: 30
End: 2020-03-24

## 2020-05-28 ENCOUNTER — VIRTUAL VISIT (OUTPATIENT)
Dept: BEHAVIORAL/MENTAL HEALTH CLINIC | Age: 30
End: 2020-05-28

## 2020-05-28 DIAGNOSIS — F43.10 PTSD (POST-TRAUMATIC STRESS DISORDER): ICD-10-CM

## 2020-05-28 DIAGNOSIS — F33.0 MILD EPISODE OF RECURRENT MAJOR DEPRESSIVE DISORDER (HCC): ICD-10-CM

## 2020-05-28 DIAGNOSIS — F41.1 GAD (GENERALIZED ANXIETY DISORDER): Primary | ICD-10-CM

## 2020-05-28 RX ORDER — SERTRALINE HYDROCHLORIDE 50 MG/1
50 TABLET, FILM COATED ORAL DAILY
Qty: 30 TAB | Refills: 3 | Status: SHIPPED | OUTPATIENT
Start: 2020-05-28 | End: 2020-08-04 | Stop reason: ALTCHOICE

## 2020-05-28 NOTE — PROGRESS NOTES
CHIEF COMPLAINT:  Antoni Menjivar is a 31 y.o. female and was seen today for follow-up of psychiatric condition and psychotropic medication management. HPI:    Ld Morillo reports the following psychiatric symptoms:  depression and anxiety. The symptoms have been present for months and are of moderate/high severity. Depression and anxiety symptoms occur more frequently and more severely. Pt reports she has had a steady increase in severity. Met with pt via video telehealth for appt today. Pt provided verbal permission for Zacarias Silva RN, NP student, to participate in session today. FAMILY/SOCIAL HX: school and social stressors    REVIEW OF SYSTEMS:  Psychiatric:  depression, anxiety  Appetite:good   Sleep: poor with DIMS (difficulty initiating & maintaining sleep)   Neuro: no changes reported      Side Effects:  none, not taking SSRI currently    MENTAL STATUS EXAM:   Sensorium  oriented to time, place and person   Relations cooperative   Appearance:  age appropriate and casually dressed   Motor Behavior:  gait stable and within normal limits   Speech:  normal volume   Thought Process: goal directed   Thought Content free of delusions and free of hallucinations   Suicidal ideations no intention   Homicidal ideations no intention   Mood:  anxious and depressed   Affect:  anxious and depressed   Memory recent  adequate   Memory remote:  adequate   Concentration:  adequate   Abstraction:  abstract   Insight:  fair and good   Reliability fair   Judgment:  fair     MEDICAL DECISION MAKING:  Problems addressed today:    ICD-10-CM ICD-9-CM    1. 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 296.22    2. TASIA (generalized anxiety disorder) F41.1 300.02    3. PTSD (post-traumatic stress disorder) F43.10 309.81        Assessment:   Marsha is not responding to treatment at this time. She has not been on SSRI as she was trying to manage symptoms with self care and other health strategies.  Pt also has used THC intermittently. Discussed psychoactive nature of THC and possible contributing effect to increased depression and anxiety. Reviewed tx goals and will re-start SSRI. Reviewed strategies for health management. Discussed importance of ind therapy for processing stressors. Plan:   1. Current Outpatient Medications   Medication Sig Dispense Refill    liraglutide, weight loss, (SAXENDA) 3 mg/0.5 mL (18 mg/3 mL) pen 0.5 mL by SubCUTAneous route daily. 1 Adjustable Dose Pre-filled Pen Syringe 6    Insulin Needles, Disposable, (BD ULTRA-FINE SHORT PEN NEEDLE) 31 gauge x 5/16\" ndle USE TO INJECT SAXENDA ONCE DAILY (Patient not taking: Reported on 3/18/2020) 100 Package 8    copper (PARAGARD T 380A) 380 square mm IUD by IntraUTERine route.  valACYclovir (VALTREX) 500 mg tablet       sertraline (ZOLOFT) 50 mg tablet Take 1 Tab by mouth daily. 30 Tab 3    nystatin (MYCOSTATIN) topical cream Apply  to affected area two (2) times a day. (Patient not taking: Reported on 3/18/2020) 30 g 3    norethindrone-ethinyl estradiol-iron (JUNEL FE 1.5/30, 28,) 1.5 mg-30 mcg (21)/75 mg (7) tab Take 1 Tab by mouth daily.  Blood-Glucose Meter (TRUERESULT BLOOD GLUCOSE SYSTM) monitoring kit Test blood sugar before breakfast, lunch dinner and before bedtime (Patient not taking: Reported on 3/18/2020) 1 Kit 11    glucose blood VI test strips (TRUETEST TEST STRIPS) strip Test blood sugar before breakfast, lunch, dinner and before bedtime. (Patient not taking: Reported on 3/18/2020) 100 Strip 11    Lancets misc Test blood sugar before breakfast, lunch, dinner and bedtime. (Patient not taking: Reported on 3/18/2020) 1 Package 11          medication changes made today: restart zoloft    2. Counseling and coordination of care including instructions for treatment, risks/benefits, risk factor reduction and patient/family education. She agrees with the plan. Patient instructed to call with any side effects, questions or issues. 3.    Follow-up and Dispositions    · Return in about 3 months (around 8/28/2020). Alan Romeo is a 31 y.o. female who was seen by synchronous (real-time) audio-video technology on 5/28/2020. Consent: Alan Romeo, who was seen by synchronous (real-time) audio-video technology, and/or her healthcare decision maker, is aware that this patient-initiated, Telehealth encounter on 5/28/2020 is a billable service, with coverage as determined by her insurance carrier. She is aware that she may receive a bill and has provided verbal consent to proceed: Yes. We discussed the expected course, resolution and complications of the diagnosis(es) in detail. Medication risks, benefits, costs, interactions, and alternatives were discussed as indicated. I advised her to contact the office if her condition worsens, changes or fails to improve as anticipated. She expressed understanding with the diagnosis(es) and plan. Alan Romeo is a 31 y.o. female who was evaluated by a video visit encounter for concerns as above. Patient identification was verified prior to start of the visit. A caregiver was present when appropriate. Due to this being a TeleHealth encounter (During FGETZ-58 public [facility] emergency), evaluation of the following organ systems was limited: Vitals/Constitutional/EENT/Resp/CV/GI//MS/Neuro/Skin/Heme-Lymph-Imm. Pursuant to the emergency declaration under the [facility], [facility] waiver authority and the [employer] General Act, this Virtual  Visit was conducted, with patient's (and/or legal guardian's) consent, to reduce the patient's risk of exposure to COVID-19 and provide necessary medical care. Services were provided through a video synchronous discussion virtually to substitute for in-person clinic visit.   Patient and provider were located at their individual homes.    5/28/2020  Pam Ovalle, NP

## 2020-06-02 ENCOUNTER — VIRTUAL VISIT (OUTPATIENT)
Dept: FAMILY MEDICINE CLINIC | Age: 30
End: 2020-06-02

## 2020-06-02 VITALS — BODY MASS INDEX: 35.93 KG/M2 | HEIGHT: 60 IN | WEIGHT: 183 LBS

## 2020-06-02 DIAGNOSIS — Z71.89 ADVICE GIVEN ABOUT COVID-19 VIRUS INFECTION: ICD-10-CM

## 2020-06-02 DIAGNOSIS — J02.9 ACUTE PHARYNGITIS, UNSPECIFIED ETIOLOGY: Primary | ICD-10-CM

## 2020-06-02 RX ORDER — AZITHROMYCIN 250 MG/1
TABLET, FILM COATED ORAL
Qty: 6 TAB | Refills: 0 | Status: SHIPPED | OUTPATIENT
Start: 2020-06-02 | End: 2020-08-04 | Stop reason: ALTCHOICE

## 2020-06-02 RX ORDER — PROMETHAZINE HYDROCHLORIDE AND PHENYLEPHRINE HYDROCHLORIDE 6.25; 5 MG/5ML; MG/5ML
5 SYRUP ORAL
Qty: 120 ML | Refills: 0 | Status: SHIPPED | OUTPATIENT
Start: 2020-06-02 | End: 2020-06-09

## 2020-06-02 NOTE — PROGRESS NOTES
Chief Complaint   Patient presents with    Sore Throat     1. Have you been to the ER, urgent care clinic since your last visit? Hospitalized since your last visit? No    2. Have you seen or consulted any other health care providers outside of the [address] [place] [address] since your last visit? Include any pap smears or colon screening. No    Call placed to pt. Verified patient with two type of identifiers. c/o sore throat x 1-2  Months,  Feels a little swollen at times with pain, only starts when she goes outside. Denies difficulty swallowing,  Not taking any meds for allergies.

## 2020-06-02 NOTE — PATIENT INSTRUCTIONS
Learning About Coronavirus [phone]) Coronavirus (TXHXT-51): Overview What is coronavirus (XAYJT-46)? The coronavirus disease (COVID-19) is caused by a virus. It is an illness that was first found in [place], [place], in December 2019. It has since spread worldwide. The virus can cause fever, cough, and trouble breathing. In severe cases, it can cause pneumonia and make it hard to breathe without help. It can cause death. Coronaviruses are a large group of viruses. They cause the common cold. They also cause more serious illnesses like Middle East respiratory syndrome (MERS) and severe acute respiratory syndrome (SARS). COVID-19 is caused by a novel coronavirus. That means it's a new type that has not been seen in people before. This virus spreads person-to-person through droplets from coughing and sneezing. It can also spread when you are close to someone who is infected. And it can spread when you touch something that has the virus on it, such as a doorknob or a tabletop. What can you do to protect yourself from coronavirus (COVID-19)? The best way to protect yourself from getting sick is to: · Avoid areas where there is an outbreak. · Avoid contact with people who may be infected. · Wash your hands often with soap or alcohol-based hand sanitizers. · Avoid crowds and try to stay at least 6 feet away from other people. · Wash your hands often, especially after you cough or sneeze. Use soap and water, and scrub for at least 20 seconds. If soap and water aren't available, use an alcohol-based hand . · Avoid touching your mouth, nose, and eyes. What can you do to avoid spreading the virus to others? To help avoid spreading the virus to others: 
· Cover your mouth with a tissue when you cough or sneeze. Then throw the tissue in the trash. · Use a disinfectant to clean things that you touch often. · Wear a cloth face cover if you have to go to public areas. · Stay home if you are sick or have been exposed to the virus. Don't go to school, work, or public areas. And don't use public transportation, ride-shares, or taxis unless you have no choice. · If you are sick: 
? Leave your home only if you need to get medical care. But call the doctor's office first so they know you're coming. And wear a face cover. ? Wear the face cover whenever you're around other people. It can help stop the spread of the virus when you cough or sneeze. ? Clean and disinfect your home every day. Use household  and disinfectant wipes or sprays. Take special care to clean things that you grab with your hands. These include doorknobs, remote controls, phones, and handles on your refrigerator and microwave. And don't forget countertops, tabletops, bathrooms, and computer keyboards. When to call for help [id number] anytime you think you may need emergency care. For example, call if: 
· You have severe trouble breathing. (You can't talk at all.) · You have constant chest pain or pressure. · You are severely dizzy or lightheaded. · You are confused or can't think clearly. · Your face and lips have a blue color. · You pass out (lose consciousness) or are very hard to wake up. Call your doctor now if you develop symptoms such as: · Shortness of breath. · Fever. · Cough. If you need to get care, call ahead to the doctor's office for instructions before you go. Make sure you wear a face cover to prevent exposing other people to the virus. Where can you get the latest information? The following health organizations are tracking and studying this virus. Their websites contain the most up-to-date information. Maevechristopher Synt[employer]ia also learn what to do if you think you may have been exposed to the virus. · U.S. Centers for Disease Control and Prevention (CDC): The CDC provides updated news about the disease and travel advice.  The website also tells you how to prevent the spread of infection. www.cdc.gov · World Health Organization [facility]): WHO offers information about the virus outbreaks. WHO also has travel advice. www.who.int 
Current as of: May 8, 2020               Content Version: 12.5 © 2006-2020 Healthwise[employer]orporated. Care instructions adapted under license by [employer] (which disclaims liability or warranty for this information). If you have questions about a medical condition or this instruction, always ask your healthcare professional. [address] any warranty or liability for your use of this information.

## 2020-06-04 ENCOUNTER — TELEPHONE (OUTPATIENT)
Dept: FAMILY MEDICINE CLINIC | Age: 30
End: 2020-06-04

## 2020-06-04 NOTE — TELEPHONE ENCOUNTER
----- Message from Nicole Leiva sent at 6/4/2020 10:41 AM EDT -----  Regarding: Dr. Afshan Fermin Message/Vendor Calls    Caller's first and last name:Marsha Roger      Reason for call: labs and thyroid testing      Callback required yes/no and why:yes      Best contact number(s):[phone]      Details to clarify the request:Patient's COVid 19 testing was negative so she wants to set up labs and thyroid testing      Nicole Leiva

## 2020-06-05 DIAGNOSIS — E66.9 OBESITY (BMI 35.0-39.9 WITHOUT COMORBIDITY): Primary | ICD-10-CM

## 2020-07-21 ENCOUNTER — TELEPHONE (OUTPATIENT)
Dept: FAMILY MEDICINE CLINIC | Age: 30
End: 2020-07-21

## 2020-07-21 NOTE — TELEPHONE ENCOUNTER
----- Message from Michael Scott sent at 7/21/2020  2:29 PM EDT -----  Regarding: Dr. Xin Smith Message/Vendor Calls    Caller's first and last name: Walkerrafael Adan      Reason for call: pre authorization for the medicine Vicente Cantu required yes/no and why:yes      Best contact number(s):[phone]      Details to clarify the [facility] authorization for [facility]

## 2020-08-04 ENCOUNTER — VIRTUAL VISIT (OUTPATIENT)
Dept: FAMILY MEDICINE CLINIC | Age: 30
End: 2020-08-04
Payer: MEDICAID

## 2020-08-04 DIAGNOSIS — F41.1 GAD (GENERALIZED ANXIETY DISORDER): ICD-10-CM

## 2020-08-04 DIAGNOSIS — Z71.89 ADVICE GIVEN ABOUT COVID-19 VIRUS INFECTION: Primary | ICD-10-CM

## 2020-08-04 DIAGNOSIS — F32.1 MODERATE MAJOR DEPRESSION (HCC): ICD-10-CM

## 2020-08-04 DIAGNOSIS — R73.03 PREDIABETES: ICD-10-CM

## 2020-08-04 DIAGNOSIS — E66.9 CLASS 2 OBESITY WITHOUT SERIOUS COMORBIDITY WITH BODY MASS INDEX (BMI) OF 37.0 TO 37.9 IN ADULT, UNSPECIFIED OBESITY TYPE: ICD-10-CM

## 2020-08-04 PROCEDURE — 99214 OFFICE O/P EST MOD 30 MIN: CPT | Performed by: FAMILY MEDICINE

## 2020-08-04 RX ORDER — TOPIRAMATE 25 MG/1
25 TABLET ORAL
Qty: 30 TAB | Refills: 2 | Status: SHIPPED | OUTPATIENT
Start: 2020-08-04 | End: 2021-03-31 | Stop reason: SDUPTHER

## 2020-08-04 RX ORDER — BUPROPION HYDROCHLORIDE 150 MG/1
150 TABLET ORAL
Qty: 30 TAB | Refills: 0 | Status: SHIPPED | OUTPATIENT
Start: 2020-08-04 | End: 2020-11-05 | Stop reason: SINTOL

## 2020-08-04 NOTE — PROGRESS NOTES
HISTORY OF PRESENT ILLNESS  Leopold Arnold is a 31 y.o. female. HPI   Today's Pt main concerns were provided on virtual visit and Video telemed format,  Present on VV for the concern of the current medical conditions,  pt is w/ comorbid history and unaware if the pt has been exposed to covid-19 individual,   Pt Have been staying at home for couple of wks,  pt has no fever no cough no dyspnea, no ha, not dizzy, nl smell nl taste, no N/V/D, no body ache. Obesity with current hs of depression,   Specific concerns today for my pt is the wt concerning, the patient states that no self induced vomiting, and no binge eating,  has been thinking about the use of any laxative use for a better wt, pt also states that different available diets has been tried, does some daily exercises, tries to avoid fast food as much as possible. In addition, the patient states that eating too much is not the case and the portion are very well controlled being on the heavy side for a few yrs, very concerned about the huge wt, gives every body a bad body image,and finally stating that the best is to loose as much wt as possible. Saxenda not covered any more by her [address], requesting diff tx, currently states that she is on no meds, no s no h ideation , no illusion no hallucination presents not guilty not sleeping well stating that the current weight making her more depressed feeling tired and fatigue       Current Outpatient Medications   Medication Sig Dispense Refill    sertraline (ZOLOFT) 50 mg tablet Take 1 Tab by mouth daily. (Patient not taking: Reported on 6/2/2020) 30 Tab 3    liraglutide, weight loss, (SAXENDA) 3 mg/0.5 mL (18 mg/3 mL) pen 0.5 mL by SubCUTAneous route daily.  (Patient not taking: Reported on 8/4/2020) 1 Adjustable Dose Pre-filled Pen Syringe 6    Insulin Needles, Disposable, (BD ULTRA-FINE SHORT PEN NEEDLE) 31 gauge x 5/16\" ndle USE TO INJECT SAXENDA ONCE DAILY (Patient not taking: Reported on 3/18/2020) 100 Package 8    copper (PARAGARD T 380A) 380 square mm IUD by IntraUTERine route.  valACYclovir (VALTREX) 500 mg tablet       nystatin (MYCOSTATIN) topical cream Apply  to affected area two (2) times a day. (Patient not taking: Reported on 3/18/2020) 30 g 3    norethindrone-ethinyl estradiol-iron (JUNEL FE 1.5/30, 28,) 1.5 mg-30 mcg (21)/75 mg (7) tab Take 1 Tab by mouth daily.  Blood-Glucose Meter (TRUERESULT BLOOD GLUCOSE SYSTM) monitoring kit Test blood sugar before breakfast, lunch dinner and before bedtime (Patient not taking: Reported on 3/18/2020) 1 Kit 11    glucose blood VI test strips (TRUETEST TEST STRIPS) strip Test blood sugar before breakfast, lunch, dinner and before bedtime. (Patient not taking: Reported on 3/18/2020) 100 Strip 11    Lancets misc Test blood sugar before breakfast, lunch, dinner and bedtime. (Patient not taking: Reported on 3/18/2020) 1 Package 11     No Known Allergies  Past Medical History:   Diagnosis Date    BMI 38.0-38.9,adult 4/3/2018    BMI 40.0-44.9, adult ([address] 2/19/2018    Chronic back pain greater than 3 months duration 7/29/2015    TASIA (generalized anxiety disorder) 1/19/2017    High-risk sexual behavior 2/23/2017    HX OTHER MEDICAL     chlamydia age 24, treated & negative now    Hypoglycemia 3/30/2016    Ill-defined condition     Insulinoma 4/6/2016    Morbid obesity ([address]     Obesity (BMI 35.0-39.9 without comorbidity) 9/12/2019    Syncopal seizure ([address] 11/19/2015    Thyromegaly 3/30/2016    Urinary frequency 11/19/2015     Past Surgical History:   Procedure Laterality Date    HX CHOLECYSTECTOMY  10/6/2014    lap.  tracee    HX DILATION AND CURETTAGE  6/2011    HX OTHER SURGICAL      D&C with SAB 6/2011    HX OTHER SURGICAL      wisdom extraction 14 years    REVISION CERVIX Irene Labella [address]  2013     Family History   Problem Relation Age of Onset    Hypertension Mother     Diabetes Mother     High Cholesterol Father     Other Father         high cholesterole, herniated disc    Diabetes Paternal Aunt     Hypertension Maternal Grandmother     COPD Maternal Grandmother     Emphysema Maternal Grandmother     Diabetes Paternal Grandmother      Social History     Tobacco Use    Smoking status: Never Smoker    Smokeless tobacco: Never Used   Substance Use Topics    Alcohol use: No      Lab Results   Component Value Date/Time    WBC 5.5 09/04/2018 11:00 AM    HGB 13.5 09/04/2018 11:00 AM    Hemoglobin (POC) 12.9 06/06/2015 06:39 PM    HCT 41.3 09/04/2018 11:00 AM    Hematocrit (POC) 38 06/06/2015 06:39 PM    PLATELET 550 57/12/1516 11:00 AM    MCV 85 09/04/2018 11:00 AM    Hgb, External 12.4 10/14/2013    Hct, External 36.7 10/14/2013    Platelet cnt., External 304 10/14/2013     Lab Results   Component Value Date/Time    TSH 1.410 09/21/2018 10:57 AM    T4, Free 1.04 04/06/2016 09:31 AM         Review of Systems   Constitutional: Positive for malaise/fatigue. Negative for chills and fever. HENT: Negative for nosebleeds. Eyes: Negative for pain. Respiratory: Negative for cough and wheezing. Cardiovascular: Negative for chest pain and leg swelling. Gastrointestinal: Negative for constipation, diarrhea and nausea. Genitourinary: Negative for frequency. Musculoskeletal: Negative for joint pain and myalgias. Skin: Negative for rash. Neurological: Positive for headaches. Negative for loss of consciousness. Endo/Heme/Allergies: Does not bruise/bleed easily. Psychiatric/Behavioral: Negative for depression. The patient is not nervous/anxious and does not have insomnia. All other systems reviewed and are negative. Physical Exam  Constitutional:       Appearance: She is obese. She is not ill-appearing or toxic-appearing. HENT:      Head: Normocephalic and atraumatic.       Mouth/Throat:      Mouth: Mucous membranes are moist.   Neurological:      Mental Status: She is alert and oriented to person, place, and time.   Psychiatric:         Mood and Affect: Mood normal.         Behavior: Behavior normal.         Thought Content: Thought content normal.         Judgment: Judgment normal.         ASSESSMENT and PLAN  Diagnoses and all orders for this visit:    1. Advice given about COVID-19 virus infection  -     buPROPion XL (WELLBUTRIN XL) 150 mg tablet; Take 1 Tab by mouth every morning.  -     topiramate (TOPAMAX) 25 mg tablet; Take 1 Tab by mouth daily (with breakfast). 2. TASIA (generalized anxiety disorder)  -     buPROPion XL (WELLBUTRIN XL) 150 mg tablet; Take 1 Tab by mouth every morning.  -     topiramate (TOPAMAX) 25 mg tablet; Take 1 Tab by mouth daily (with breakfast). 3. Moderate major depression (HCC)  -     buPROPion XL (WELLBUTRIN XL) 150 mg tablet; Take 1 Tab by mouth every morning.  -     topiramate (TOPAMAX) 25 mg tablet; Take 1 Tab by mouth daily (with breakfast). 4. Class 2 obesity without serious comorbidity with body mass index (BMI) of 37.0 to 37.9 in adult, unspecified obesity type  -     buPROPion XL (WELLBUTRIN XL) 150 mg tablet; Take 1 Tab by mouth every morning.  -     topiramate (TOPAMAX) 25 mg tablet; Take 1 Tab by mouth daily (with breakfast). 5. Prediabetes  -     buPROPion XL (WELLBUTRIN XL) 150 mg tablet; Take 1 Tab by mouth every morning.  -     topiramate (TOPAMAX) 25 mg tablet; Take 1 Tab by mouth daily (with breakfast).       Depression with anxiety in a stable condition, not suicidal, start antianxiety and calming medication for now will reevaluate in 3 w for progression   in addition Counseling , social support, spiritual belonging, inc physical activity, all offered,  compliance advised, patient was told that should not mix any of current medication with any other illicit drugs, should not drink any alcoholic beverages while on such medication patient was also told to not operate any machinery while under the influence patient acknowledged understanding and agreed with today's recommendation in addition patient was told to call for any concern           Concern abdout COVID-19 addressed and detailed, pt was told that the best way to prevent illness is by protection, to Wear a facemask , having social distance, and to get tested if possible, Pursuant to the emergency declaration under the [address] and [address] authority and the [employer] and [employer] Act, this Virtual Visit was conducted, with patient's consent, to reduce the patient's risk of exposure to COVID-19 and provide continuity of care for an established patient. Pt was also told if develop dyspnea needs to call 911 or go to er, call for delmy advise, pt agreed with todays recommendations, Services were provided through a Video synchronous discussion virtually to substitute for in-person appointment.

## 2020-08-04 NOTE — PROGRESS NOTES
Chief Complaint   Patient presents with    Weight Management     1. Have you been to the ER, urgent care clinic since your last visit? Hospitalized since your last visit? No    2. Have you seen or consulted any other health care providers outside of the [address] [place] [address] since your last visit? Include any pap smears or colon screening. No    Call placed to pt. Verified patient with two type of identifiers. Stated insurance is no longer covering saxenda.   Requesting med for weight loss

## 2020-08-04 NOTE — PATIENT INSTRUCTIONS
Learning About Healthy Weight What is a healthy weight? A healthy weight is the weight at which you feel good about yourself and have energy for work and play. It's also one that lowers your risk for health problems. What can you do to stay at a healthy weight? It can be hard to stay at a healthy weight, especially when fast food, vending-machine snacks, and processed foods are so easy to find. And with your busy lifestyle, activity may be low on your list of things to do. But staying at a healthy weight may be easier than you think. Here are some dos and don'ts for staying at a healthy weight: 
Do eat healthy foods The kinds of foods you eat have a big impact on both your weight and your health. Reaching and staying at a healthy weight is not about going on a diet. It's about making healthier food choices every day and changing your diet for good. Healthy eating means eating a variety of foods so that you get all the nutrients you need. Your body needs protein, carbohydrate, and fats for energy. They keep your heart beating, your brain active, and your muscles working. On most days, try to eat from each food group. This means eating a variety of: · Whole grains, such as whole wheat breads and pastas. · Fruits and vegetables. · Dairy products, such as low-fat milk, yogurt, and cheese. · Lean proteins, such as all types of fish, chicken without the skin, and beans. Don't have too much or too little of one thing. All foods, if eaten in moderation, can be part of healthy eating. Even sweets can be okay. If your favorite foods are high in fat, salt, sugar, or calories, limit how often you eat them. Eat smaller servings, or look for healthy substitutes. Do watch what you eat Many people eat more than their bodies need. Part of staying at a healthy weight means learning how much food you really need from day to day and not eating more than that.  Even with healthy foods, eating too much can make you gain weight. Having a well-balanced diet means that you eat enough, but not too much, and that your food gives you the nutrients you need to stay healthy. So listen to your body. Eat when you're hungry. Stop when you feel satisfied. It's a good idea to have healthy snacks ready for when you get hungry. Keep healthy snacks with you at work, in your car, and at home. If you have a healthy snack easily available, you'll be less likely to pick a candy bar or bag of chips from a vending machine instead. Some healthy snacks you might want to keep on hand are fruit, low-fat yogurt, string cheese, low-fat microwave popcorn, raisins and other dried fruit, nuts, whole wheat crackers, pretzels, carrots, celery sticks, and broccoli. Do some physical activity A big part of reaching and staying at a healthy weight is being active. When you're active, you burn calories. This makes it easier to reach and stay at a healthy weight. When you're active on a regular basis, your body burns more calories, even when you're at rest. Being active helps you lose fat and build lean muscle. Try to be active for at least 1 hour every day. This may sound like a lot, but it's okay to be active in smaller blocks of time that add up to 1 hour a day. Any activity that makes your heart beat faster and keeps it there for a while counts. A brisk walk, run, or swim will get your heart beating faster. So will climbing stairs, shooting baskets, or cycling. Even some household chores like vacuuming and mowing the lawn will get your heart rate up. Pick activities that you enjoyones that make your heart beat faster, your muscles stronger, and your muscles and joints more flexible. If you find more than one thing you like doing, do them all. You don't have to do the same thing every day. Don't diet Diets don't work. Diets are temporary.  Because you give up so much when you diet, you may be hungry and think about food all the time. And after you stop dieting, you also may overeat to make up for what you missed. Most people who diet end up gaining back the pounds they lostand more. Remember that healthy bodies come in lots of shapes and sizes. Everyone can get healthier by eating better and being more active. Where can you learn more? Go to http://www.gray.com/ Enter 449 [id number] in the search box to learn more about \"Learning About Healthy Weight. \" Current as of: December 11, 2019               Content Version: 12.5 © 6390-9815 [employer]. Care instructions adapted under license by [employer] (which disclaims liability or warranty for this information). If you have questions about a medical condition or this instruction, always ask your healthcare professional. [address] any warranty or liability for your use of this information.

## 2020-08-26 VITALS
SYSTOLIC BLOOD PRESSURE: 122 MMHG | OXYGEN SATURATION: 97 % | TEMPERATURE: 97.9 F | DIASTOLIC BLOOD PRESSURE: 72 MMHG | HEART RATE: 84 BPM | RESPIRATION RATE: 18 BRPM

## 2020-08-26 RX ORDER — LIRAGLUTIDE 6 MG/ML
3 INJECTION, SOLUTION SUBCUTANEOUS DAILY
COMMUNITY
End: 2021-06-28

## 2020-10-21 ENCOUNTER — VIRTUAL VISIT (OUTPATIENT)
Dept: FAMILY MEDICINE CLINIC | Age: 30
End: 2020-10-21
Payer: MEDICAID

## 2020-10-21 DIAGNOSIS — Z71.89 ADVICE GIVEN ABOUT COVID-19 VIRUS INFECTION: ICD-10-CM

## 2020-10-21 DIAGNOSIS — E66.9 CLASS 2 OBESITY WITHOUT SERIOUS COMORBIDITY WITH BODY MASS INDEX (BMI) OF 37.0 TO 37.9 IN ADULT, UNSPECIFIED OBESITY TYPE: Primary | ICD-10-CM

## 2020-10-21 PROCEDURE — 99213 OFFICE O/P EST LOW 20 MIN: CPT | Performed by: FAMILY MEDICINE

## 2020-10-21 NOTE — PROGRESS NOTES
HISTORY OF PRESENT ILLNESS  Dav Fisher is a 31 y.o. female. HPI   Pt's main concerns were provided on virtual visit, a telemed format,  pt is w/ comorbid medical history and unaware of been exposed to covid-19 individual, Pt Have been staying at home for couple of wks,  pt has no fever no cough no dyspnea, no ha, not dizzy, nl smell nl taste, no N/V/D, no body ache. Obesity wt 202lb   Specific concerns today for my pt is the wt concerning, the patient states that no self induced vomiting, and no binge eating,  has been thinking about the use of any laxative use for a better wt, pt also states that different available diets has been tried, does some daily exercises, tries to avoid fast food as much as possible. In addition, the patient states that eating too much is not the case and the portion are very well controlled being on the heavy side for a few yrs, very concerned about the huge wt, gives every body a bad body image, and finally stating that the best is to loose as much wt as possible. Current Outpatient Medications   Medication Sig Dispense Refill    BIOTIN PO Take  by mouth.  liraglutide, weight loss, (Saxenda) 3 mg/0.5 mL (18 mg/3 mL) pen 3 mg by SubCUTAneous route daily.  buPROPion XL (WELLBUTRIN XL) 150 mg tablet Take 1 Tab by mouth every morning. 30 Tab 0    topiramate (TOPAMAX) 25 mg tablet Take 1 Tab by mouth daily (with breakfast).  30 Tab 2    Insulin Needles, Disposable, (BD ULTRA-FINE SHORT PEN NEEDLE) 31 gauge x 5/16\" ndle USE TO INJECT SAXENDA ONCE DAILY (Patient not taking: Reported on 3/18/2020) 100 Package 8    valACYclovir (VALTREX) 500 mg tablet       Blood-Glucose Meter (TRUERESULT BLOOD GLUCOSE SYSTM) monitoring kit Test blood sugar before breakfast, lunch dinner and before bedtime (Patient not taking: Reported on 3/18/2020) 1 Kit 11    glucose blood VI test strips (TRUETEST TEST STRIPS) strip Test blood sugar before breakfast, lunch, dinner and before bedtime. (Patient not taking: Reported on 3/18/2020) 100 Strip 11    Lancets misc Test blood sugar before breakfast, lunch, dinner and bedtime. (Patient not taking: Reported on 3/18/2020) 1 Package 11     No Known Allergies  Past Medical History:   Diagnosis Date    BMI 38.0-38.9,adult 4/3/2018    BMI 40.0-44.9, adult ([facility] 2/19/2018    Chronic back pain greater than 3 months duration 7/29/2015    TASIA (generalized anxiety disorder) 1/19/2017    High-risk sexual behavior 2/23/2017    HX OTHER MEDICAL     chlamydia age 24, treated & negative now    Hypoglycemia 3/30/2016    Ill-defined condition     Insulinoma 4/6/2016    Morbid obesity ([address]     Obesity (BMI 35.0-39.9 without comorbidity) 9/12/2019    Syncopal seizure ([facility] 11/19/2015    Thyromegaly 3/30/2016    Urinary frequency 11/19/2015     Past Surgical History:   Procedure Laterality Date    HX CHOLECYSTECTOMY  10/6/2014    lap.  tracee    HX DILATION AND CURETTAGE  6/2011    HX OTHER SURGICAL      D&C with SAB 6/2011    HX OTHER SURGICAL      wisdom extraction 15 years    REVISION CERVIX W PREG[address]  2013     Family History   Problem Relation Age of Onset    Hypertension Mother     Diabetes Mother     High Cholesterol Father     Other Father         high cholesterole, herniated disc    Diabetes Paternal Aunt     Hypertension Maternal Grandmother     COPD Maternal Grandmother     Emphysema Maternal Grandmother     Diabetes Paternal Grandmother      Social History     Tobacco Use    Smoking status: Never Smoker    Smokeless tobacco: Never Used   Substance Use Topics    Alcohol use: No      Lab Results   Component Value Date/Time    WBC 5.5 09/04/2018 11:00 AM    HGB 13.5 09/04/2018 11:00 AM    Hemoglobin (POC) 12.9 06/06/2015 06:39 PM    HCT 41.3 09/04/2018 11:00 AM    Hematocrit (POC) 38 06/06/2015 06:39 PM    PLATELET 336 74/98/6573 11:00 AM    MCV 85 09/04/2018 11:00 AM    Hgb, External 12.4 10/14/2013    Hct, External 36.7 10/14/2013    Platelet cnt., External 304 10/14/2013     Lab Results   Component Value Date/Time    TSH 1.410 09/21/2018 10:57 AM    T4, Free 1.04 04/06/2016 09:31 AM         Review of Systems   Constitutional: Positive for malaise/fatigue. Negative for chills and fever. HENT: Negative for nosebleeds. Eyes: Negative for pain. Respiratory: Negative for cough and wheezing. Cardiovascular: Negative for chest pain and leg swelling. Gastrointestinal: Negative for constipation, diarrhea and nausea. Genitourinary: Negative for frequency. Musculoskeletal: Negative for joint pain and myalgias. Skin: Negative for rash. Neurological: Negative for loss of consciousness and headaches. Endo/Heme/Allergies: Does not bruise/bleed easily. Psychiatric/Behavioral: Negative for depression. The patient is not nervous/anxious and does not have insomnia. All other systems reviewed and are negative. Physical Exam  Constitutional:       Appearance: She is obese. She is not ill-appearing or toxic-appearing. HENT:      Head: Normocephalic and atraumatic. Mouth/Throat:      Mouth: Mucous membranes are moist.   Neurological:      Mental Status: She is alert and oriented to person, place, and time. Psychiatric:         Mood and Affect: Mood normal.         Behavior: Behavior normal.         Thought Content: Thought content normal.         Judgment: Judgment normal.         ASSESSMENT and PLAN  Diagnoses and all orders for this visit:    1. Class 2 obesity without serious comorbidity with body mass index (BMI) of 37.0 to 37.9 in adult, unspecified obesity type  -     REFERRAL TO BARIATRIC SURGERY    2.  Advice given about COVID-19 virus infection           I have recommended the following steps for improving the current weight, pt was counseled on to try to Decrease calori intake by 500per day, limit the amount of intake and portion the meals, The patient is advised to avoid second hand exposure, begin progressive daily aerobic exercise program at least x 5 per week,  Attend weight loss classes and lectures, Pt agreed with all the recommendations    Patient advised to have the mask on most of the time, social distance and handwashing avoid crowded area pursuant to the emergency declaration under the [employer] and [facility], [facility] waiver authority and the [employer] and [employer] Act, this Virtual Visit was conducted, with patient's consent, to reduce the patient's risk of exposure to COVID-19 and provide continuity of care for an established patient  Services were provided through a Video synchronous discussion virtually to substitute for in-person appointment.

## 2020-11-04 ENCOUNTER — VIRTUAL VISIT (OUTPATIENT)
Dept: FAMILY MEDICINE CLINIC | Age: 30
End: 2020-11-04
Payer: COMMERCIAL

## 2020-11-04 DIAGNOSIS — E78.2 MIXED HYPERLIPIDEMIA: ICD-10-CM

## 2020-11-04 DIAGNOSIS — E11.9 DIET-CONTROLLED DIABETES MELLITUS (HCC): ICD-10-CM

## 2020-11-04 DIAGNOSIS — F41.1 GAD (GENERALIZED ANXIETY DISORDER): Primary | ICD-10-CM

## 2020-11-04 PROCEDURE — 99214 OFFICE O/P EST MOD 30 MIN: CPT | Performed by: FAMILY MEDICINE

## 2020-11-04 NOTE — LETTER
NOTIFICATION RETURN TO WORK / SCHOOL 
 
 
11/4/2020 3:30 PM 
 
[address] [id number] To Whom It May Concern: 
 
Sade Adorno is currently under the care of [address] [facility]. I have been caring for Ms Paula Modi for duration of greater than 6 years  . She has struggled with multiple attempts at weight loss by diet and exercises. She has the following co-morbidities strongly associated with morbid obesity which I feel only be resolved by undergoing weight-loss surgery 1. Diabetic State 2. Class II obesity 3. Hypercholestrelimia I am writing this letter in full support of weight reduction surgery and feels that this patient is suitable for dysplastic or in this patient is medically okay cleared for surgery up in my pre-op examination. If there are questions or concerns please have the patient contact our office.  
 
 
Sincerely, 
 
 
Cristina Sanchez MD

## 2020-11-04 NOTE — PROGRESS NOTES
Chief Complaint   Patient presents with    Weight Management     1. Have you been to the ER, urgent care clinic since your last visit? Hospitalized since your last visit? No    2. Have you seen or consulted any other health care providers outside of the [address] [place] [address] since your last visit? Include any pap smears or colon screening. No    Call placed to pt. Verified patient with two type of identifiers.  Requesting letter for bariatric surgery

## 2020-11-04 NOTE — PROGRESS NOTES
HISTORY OF PRESENT ILLNESS  Alexia Martínez is a 31 y.o. female. Pt's main concerns were provided on virtual visit, a telemed format,  pt is w/ comorbid medical history and unaware of been exposed to covid-19 individual, Pt Have been staying at home for couple of wks,  pt has no fever no cough no dyspnea, no ha, not dizzy, nl smell nl taste, no N/V/D, no body ache. Wt loss since the last visit pt has no binging no laxative not working out no exercise daily, eating at least 3 small portioned meals has no snacks b/w, the postions are small, no other complaint , no n/v/d/c, pt with No Deliberate weight loss to a low weight, has No Fear of fatness, has no Excessive concern with body shape, No Restricted diet, has no purging (eg, self-induced vomiting or abuse of diuretics) patient stating that she needs a letter on her behalf stating that she could be a good candidate for gastric bypass she has seen a psychiatrist also she has seen endocrinologist for further care,    Current Outpatient Medications   Medication Sig Dispense Refill    BIOTIN PO Take  by mouth.  buPROPion XL (WELLBUTRIN XL) 150 mg tablet Take 1 Tab by mouth every morning. 30 Tab 0    topiramate (TOPAMAX) 25 mg tablet Take 1 Tab by mouth daily (with breakfast). 30 Tab 2    Insulin Needles, Disposable, (BD ULTRA-FINE SHORT PEN NEEDLE) 31 gauge x 5/16\" ndle USE TO INJECT SAXENDA ONCE DAILY 100 Package 8    valACYclovir (VALTREX) 500 mg tablet       glucose blood VI test strips (TRUETEST TEST STRIPS) strip Test blood sugar before breakfast, lunch, dinner and before bedtime. 100 Strip 11    Lancets misc Test blood sugar before breakfast, lunch, dinner and bedtime. 1 Package 11    liraglutide, weight loss, (Saxenda) 3 mg/0.5 mL (18 mg/3 mL) pen 3 mg by SubCUTAneous route daily.       Blood-Glucose Meter (TRUERESULT BLOOD GLUCOSE SYSTM) monitoring kit Test blood sugar before breakfast, lunch dinner and before bedtime (Patient not taking: Reported on 3/18/2020) 1 Kit 11     No Known Allergies  Past Medical History:   Diagnosis Date    BMI 38.0-38.9,adult 4/3/2018    BMI 40.0-44.9, adult ([facility] 2/19/2018    Chronic back pain greater than 3 months duration 7/29/2015    TASIA (generalized anxiety disorder) 1/19/2017    High-risk sexual behavior 2/23/2017    HX OTHER MEDICAL     chlamydia age 24, treated & negative now    Hypoglycemia 3/30/2016    Ill-defined condition     Insulinoma 4/6/2016    Morbid obesity ([facility]     Obesity (BMI 35.0-39.9 without comorbidity) 9/12/2019    Syncopal seizure ([facility] 11/19/2015    Thyromegaly 3/30/2016    Urinary frequency 11/19/2015     Past Surgical History:   Procedure Laterality Date    HX CHOLECYSTECTOMY  10/6/2014    lap.  tracee    HX DILATION AND CURETTAGE  6/2011    HX OTHER SURGICAL      D&C with SAB 6/2011    HX OTHER SURGICAL      wisdom extraction 15 years    REVISION CERVIX W PREG, [address]  2013     Family History   Problem Relation Age of Onset    Hypertension Mother     Diabetes Mother     High Cholesterol Father     Other Father         high cholesterole, herniated disc    Diabetes Paternal Aunt     Hypertension Maternal Grandmother     COPD Maternal Grandmother     Emphysema Maternal Grandmother     Diabetes Paternal Grandmother      Social History     Tobacco Use    Smoking status: Never Smoker    Smokeless tobacco: Never Used   Substance Use Topics    Alcohol use: No      Lab Results   Component Value Date/Time    WBC 5.5 09/04/2018 11:00 AM    HGB 13.5 09/04/2018 11:00 AM    Hemoglobin (POC) 12.9 06/06/2015 06:39 PM    HCT 41.3 09/04/2018 11:00 AM    Hematocrit (POC) 38 06/06/2015 06:39 PM    PLATELET 371 85/34/0867 11:00 AM    MCV 85 09/04/2018 11:00 AM    Hgb, External 12.4 10/14/2013    Hct, External 36.7 10/14/2013    Platelet cnt., External 304 10/14/2013     Lab Results   Component Value Date/Time    TSH 1.410 09/21/2018 10:57 AM    T4, Free 1.04 04/06/2016 09:31 AM Review of Systems   Constitutional: Negative for chills and fever. HENT: Negative for congestion and nosebleeds. Eyes: Negative for blurred vision and pain. Respiratory: Negative for cough, shortness of breath and wheezing. Cardiovascular: Negative for chest pain and leg swelling. Gastrointestinal: Negative for constipation, diarrhea, nausea and vomiting. Genitourinary: Negative for dysuria and frequency. Musculoskeletal: Negative for joint pain and myalgias. Skin: Negative for itching and rash. Neurological: Negative for dizziness, loss of consciousness and headaches. Psychiatric/Behavioral: Negative for depression. The patient is not nervous/anxious and does not have insomnia. 6/2/2020   1115  3/18/2020   1622  3/18/2020   1602  2/20/2020   1426  11/14/2019   1447       Weight:  183 lb (83 kg)  as of 6/2/2020    183 lb (83 kg)    183 lb 4.8 oz (83.1 kg)  179 lb (81.2 kg)  178 lb 4.8 oz (80.9 kg)   Today weight is 200s  Physical Exam  Constitutional:       Appearance: She is obese. She is not ill-appearing or toxic-appearing. HENT:      Head: Normocephalic and atraumatic. Mouth/Throat:      Mouth: Mucous membranes are moist.   Neurological:      Mental Status: She is alert and oriented to person, place, and time. Psychiatric:         Mood and Affect: Mood normal.         Behavior: Behavior normal.         Thought Content: Thought content normal.         Judgment: Judgment normal.         ASSESSMENT and PLAN  Diagnoses and all orders for this visit:    1. TASIA (generalized anxiety disorder)    2. BMI 34.0-34.9,adult    3. BMI 35.0-35.9,adult    4. Diet-controlled diabetes mellitus ([facility]    5.  Mixed hyperlipidemia        Patient advised to continue to increase physical activity calorie count decrease intake follow-up with a bariatric surgeon for further care    Secondary to the patient acute medical conditions, a letter on the pt's behalf was completed, pt's multiple questions answered to the best knowledge,  will keep a copy in the chart for further correspondence, patient was informed about the safety and  regulations regarding this condition patient was also advised to follow-up with HR for further guidelines patient acknowledged understanding and agreed with today's recommendations    Patient advised to have the mask on most of the time, social distance and handwashing avoid crowded area pursuant to the emergency declaration under the [address] and [address] authority and the [employer] and [employer] Act, this Virtual Visit was conducted, with patient's consent, to reduce the patient's risk of exposure to COVID-19 and provide continuity of care for an established patient  Services were provided through a Video synchronous discussion virtually to substitute for in-person appointment.

## 2020-11-04 NOTE — LETTER
NOTIFICATION  
 
11/4/2020 4:34 PM 
 
Ms. Herman Tj Freedman [id number] To Whom It May Concern: 
 
Chantel Helton is currently under the care of [facility]. To Whom It May Concern: 
 
Chantel Helton is currently under the care of [facility]. I have been caring for Ms Rajesh Salazar for duration of greater than 6 years  . She has struggled with multiple attempts at weight loss by diet and exercises. She has the following co-morbidities strongly associated with morbid obesity which I feel only be resolved by undergoing weight-loss surgery 1. Diabetic State 2. Class II obesity 3. Hypercholestrelimia I am writing this letter in full support of weight reduction surgery and feels that this patient is suitable for dysplastic or in this patient is medically okay cleared for surgery up in my pre-op examination. If there are questions or concerns please have the patient contact our office.  
 
 
Sincerely, 
 
 
Daisha Iraheta MD

## 2020-11-05 ENCOUNTER — VIRTUAL VISIT (OUTPATIENT)
Dept: BEHAVIORAL/MENTAL HEALTH CLINIC | Age: 30
End: 2020-11-05
Payer: COMMERCIAL

## 2020-11-05 DIAGNOSIS — F33.0 MILD EPISODE OF RECURRENT MAJOR DEPRESSIVE DISORDER (HCC): Primary | ICD-10-CM

## 2020-11-05 DIAGNOSIS — F43.10 PTSD (POST-TRAUMATIC STRESS DISORDER): ICD-10-CM

## 2020-11-05 DIAGNOSIS — F41.1 GAD (GENERALIZED ANXIETY DISORDER): ICD-10-CM

## 2020-11-05 PROCEDURE — 99214 OFFICE O/P EST MOD 30 MIN: CPT | Performed by: NURSE PRACTITIONER

## 2020-11-05 NOTE — PROGRESS NOTES
CHIEF COMPLAINT:  Dnoya Nicolas is a 31 y.o. female and was seen today for follow-up of psychiatric condition and psychotropic medication management. HPI:    Aldair Johnson reports the following psychiatric symptoms:  depression and anxiety. The symptoms have been present for months and are of low/moderate severity. The symptoms occur secondary to stressors. Pt recently has been dealing with medical stressors. Overall she rates symptoms of depression and anxiety as moderate severity. She states she feels positive about her ability to problem solve. Met with pt via video telehealth for appt today. REVIEW OF SYSTEMS:  Psychiatric:  depression, anxiety  Appetite:decreased, working on healthy weight   Sleep: improving, dreams and then not able to get back to sleep  GI: planning gastric sleeve surgery with medical provider    Side Effects:  none currently, pt reports sedation from wellbutrin    MENTAL STATUS EXAM:   Sensorium  oriented to time, place and person   Relations cooperative   Appearance:  age appropriate and casually dressed   Motor Behavior:  gait stable and within normal limits   Speech:  normal volume   Thought Process: goal directed   Thought Content free of delusions and free of hallucinations   Suicidal ideations no intention   Homicidal ideations no intention   Mood:  anxious and sad   Affect:  anxious and sad   Memory recent  adequate   Memory remote:  adequate   Concentration:  adequate   Abstraction:  abstract   Insight:  good   Reliability good   Judgment:  good     MEDICAL DECISION MAKING:  Problems addressed today:    ICD-10-CM ICD-9-CM    1. Mild episode of recurrent major depressive disorder ([facility]  F33.0 296.31    2. TASIA (generalized anxiety disorder)  F41.1 300.02    3. PTSD (post-traumatic stress disorder)  F43.10 309.81        Assessment:   Marsha is responding to treatment overall.  Currently depression and anxiety symptoms are stabilizing as pt has been working on some lifestyle factors for depression and anxiety management. Discussed trial of wellbutrin and pt was not able to tolerate SE's. Discussed past use of meds in past and pt prefers to keep building of nutrition and stress management. Provided psychoeducation on impact of stress on body and MH. Reviewed ongoing strategies for self care and health management. Pt asking for a letter for surgery. Reinforced positive changes she has been working on. Discussed possibility that depression and anxiety symptoms can become exacerbated. Plan:   1. Current Outpatient Medications   Medication Sig Dispense Refill    BIOTIN PO Take  by mouth.  liraglutide, weight loss, (Saxenda) 3 mg/0.5 mL (18 mg/3 mL) pen 3 mg by SubCUTAneous route daily.  topiramate (TOPAMAX) 25 mg tablet Take 1 Tab by mouth daily (with breakfast). 30 Tab 2    Insulin Needles, Disposable, (BD ULTRA-FINE SHORT PEN NEEDLE) 31 gauge x 5/16\" ndle USE TO INJECT SAXENDA ONCE DAILY 100 Package 8    valACYclovir (VALTREX) 500 mg tablet       Blood-Glucose Meter (TRUERESULT BLOOD GLUCOSE SYSTM) monitoring kit Test blood sugar before breakfast, lunch dinner and before bedtime (Patient not taking: Reported on 3/18/2020) 1 Kit 11    glucose blood VI test strips (TRUETEST TEST STRIPS) strip Test blood sugar before breakfast, lunch, dinner and before bedtime. 100 Strip 11    Lancets misc Test blood sugar before breakfast, lunch, dinner and bedtime. 1 Package 11          medication changes: none currently    2. Counseling and coordination of care including instructions for treatment, risks/benefits, risk factor reduction and patient/family education. She agrees with the plan. Patient instructed to call with any side effects, questions or issues. 3.    Follow-up and Dispositions    · Return in about 3 months (around 2/5/2021).        4. Letter in support of bariatric surgery    Met with pt for 20 minutes face to face and >50% time spent was in counseling and coordination of care: stress management for dep/anx treatment    Jaime Muller, who was evaluated through a synchronous (real-time) audio-video encounter, and/or her healthcare decision maker, is aware that it is a billable service, with coverage as determined by her insurance carrier. She provided verbal consent to proceed: Yes, and patient identification was verified. It was conducted pursuant to the emergency declaration under the [address] [place] [address] and the [employer] and [employer] Act. A caregiver was present when appropriate. Ability to conduct physical exam was limited. I was at home. The patient was at home.       Tiffanie Somers NP  11/5/2020

## 2020-11-05 NOTE — LETTER
[address] [facility] [address] [place] [id number] 
[phone]  
 
 
11/5/2020 To Whom it May Concern: This letter is to serve as written support on the behalf of the [address] at [facility] for [address] to undergo bariatric surgery. Ms. Concetta Schaumann is a current patient, and is undergoing treatment with this practice for chronic depression and anxiety. There are no current medications being prescribed by this practice for these conditions, which alleviates any potential concerns for requiring tapering or stopping medications in order for surgery to commence. We have been working on alternative methods for depression and anxiety management as well. Ms. Concetta Schaumann is aware of the risks, benefits, and importance of self care and management both now, and after surgery as they relate to her overall mental health.    
 
Respectfully, 
 
 
 
 
Jay Schaeffer, PhD, PMHNP-BC

## 2020-11-05 NOTE — LETTER
11/5/2020 2:16 PM 
 
Ms. Herman Tj Freedman [id number] To Whom it May Concern: This letter is written in support of bariatric surgery for Ms. Wolfgang Bennett. Ms. Pietro Velásquez is a current patient, and is undergoing treatment for chronic depression and anxiety. There are no current medications being prescribed at this time. She actively works on strategies to manage symptoms. Ms. Pietro Velásquez is aware of the risks and benefits of surgery as they relate to her overall mental health.    
 
 
Sincerely, 
 
 
 
 
Elena Redd, PhD, PMHNP-BC

## 2020-11-12 ENCOUNTER — OFFICE VISIT (OUTPATIENT)
Dept: SURGERY | Age: 30
End: 2020-11-12
Payer: COMMERCIAL

## 2020-11-12 ENCOUNTER — TELEPHONE (OUTPATIENT)
Dept: BEHAVIORAL/MENTAL HEALTH CLINIC | Age: 30
End: 2020-11-12

## 2020-11-12 VITALS
BODY MASS INDEX: 40.05 KG/M2 | WEIGHT: 204 LBS | SYSTOLIC BLOOD PRESSURE: 116 MMHG | HEART RATE: 92 BPM | OXYGEN SATURATION: 99 % | RESPIRATION RATE: 18 BRPM | HEIGHT: 60 IN | DIASTOLIC BLOOD PRESSURE: 72 MMHG | TEMPERATURE: 98.3 F

## 2020-11-12 DIAGNOSIS — E66.01 MORBID OBESITY (HCC): Primary | ICD-10-CM

## 2020-11-12 PROCEDURE — 99203 OFFICE O/P NEW LOW 30 MIN: CPT | Performed by: NURSE PRACTITIONER

## 2020-11-12 RX ORDER — THERA TABS 400 MCG
1 TAB ORAL DAILY
COMMUNITY
End: 2021-08-10 | Stop reason: ALTCHOICE

## 2020-11-12 RX ORDER — CHOLECALCIFEROL (VITAMIN D3) 125 MCG
CAPSULE ORAL
COMMUNITY
End: 2021-06-28

## 2020-11-12 RX ORDER — GREEN TEA LEAF EXTRACT 250 MG
CAPSULE ORAL
COMMUNITY
End: 2021-06-28

## 2020-11-12 NOTE — PROGRESS NOTES
1. Have you been to the ER, urgent care clinic since your last visit? Hospitalized since your last visit? no    2. Have you seen or consulted any other health care providers outside of the [address] [place] [address] since your last visit? Include any pap smears or colon screening. Yes, Dr Artis Rose Endrocrinologist.      Jaime Cutting  Body composition    female  31 y.o. Vitals:    11/12/20 1104   BP: 116/72   Pulse: 92   Resp: 18   Temp: 98.3 °F (36.8 °C)   SpO2: 99%   Weight: 204 lb (92.5 kg)   Height: 5' (1.524 m)     Body mass index is 39.84 kg/m². Fidel Hailey Neck- 16 inches  Waist-41.5 inches  Hips-46 inches

## 2020-11-12 NOTE — TELEPHONE ENCOUNTER
Patient is requesting a letter to be addressed to the dietician that is apart of her program, Anthony Douglass, stating that due to circumstances of her condition she is unable to participate in the dietician program longer than three months. She also requests a mention of a preference accommodations for virtual appointments or one on one versus group meetings. Patient also needs this letter to go to the bariatric weight loss team of [facility]. Patient voices appreciation for the provider's last letter and all the assistance she has received from the provider.

## 2020-11-12 NOTE — PROGRESS NOTES
HISTORY OF PRESENT ILLNESS  Mio Skelton is new patient presents today for evaluation for weight loss surgery. Patient has been overweight since childhood. Her weight has ranged from 170-215 lbs. Her heaviest weight is 215 lbs. Marsha Roger  Body composition     female  31 y.o. Vitals:     11/12/20 1104   BP: 116/72   Pulse: 92   Resp: 18   Temp: 98.3 °F (36.8 °C)   SpO2: 99%   Weight: 204 lb (92.5 kg)   Height: 5' (1.524 m)      Body mass index is 39.84 kg/m². [place] Neck- 16 inches  Waist-41.5 inches  Hips-46 inches      Dietary Habits:  Breakfast- eggs, rodrigues and bread  Shredded wheat and oatmeal  Lunch- salad, chicken, 1/2 sandwich  Dinner- stuffed shells,   Snack- cookies, veggie chips, yogurt    Prior weight loss attempts: Weight watchers, psyician supervised diet, prescription diet pills, psychiatric evaluation and unsupervised diet. She felt that phentermine helped with weight loss. Her weight loss was 40 lbs. Ms. Bryn Ruiz has a reminder for a \"due or due soon\" health maintenance. I have asked that she contact her primary care provider for follow-up on this health maintenance.       Patient Active Problem List   Diagnosis Code    History of pregnancy loss in prior pregnancy, currently pregnant O09.299    Incompetent cervix N88.3    Supervision of other normal pregnancy Z34.80    Acute cholecystitis K81.0    Chronic back pain greater than 3 months duration M54.9, G89.29    Urinary frequency R35.0    Hypoglycemia E16.2    Thyromegaly E01.0    Insulinoma D13.7    TASIA (generalized anxiety disorder) F41.1    High-risk sexual behavior Z72.51    Obesity E66.9    BMI 40.0-44.9, adult ([address] Z68.41    BMI 36.0-36.9,adult Z68.36    Prediabetes R73.03    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    PTSD (post-traumatic stress disorder) F43.10    Obesity (BMI 35.0-39.9 without comorbidity) E66.9       Past Medical History:   Diagnosis Date    BMI 38.0-38.9,adult 4/3/2018    BMI 40.0-44.9, adult ([facility] 2/19/2018    Chronic back pain greater than 3 months duration 7/29/2015    TASIA (generalized anxiety disorder) 1/19/2017    High-risk sexual behavior 2/23/2017    HX OTHER MEDICAL     chlamydia age 24, treated & negative now    Hypoglycemia 3/30/2016    Ill-defined condition     Insulinoma 4/6/2016    Morbid obesity ([facility]     Obesity (BMI 35.0-39.9 without comorbidity) 9/12/2019    Syncopal seizure ([facility] 11/19/2015    Thyromegaly 3/30/2016    Urinary frequency 11/19/2015         Past Surgical History:   Procedure Laterality Date    HX CHOLECYSTECTOMY  10/6/2014    lap.  tracee    HX DILATION AND CURETTAGE  6/2011    HX OTHER SURGICAL      D&C with SAB 6/2011    HX OTHER SURGICAL      wisdom extraction 15 years    [facility] CERVIX [facility]  2013           Marvel Brittle, MD      Allergies   Allergen Reactions    Pcn [Penicillins] Rash         Family History   Problem Relation Age of Onset    Hypertension Mother     Diabetes Mother     High Cholesterol Father     Other Father         high cholesterole, herniated disc    Diabetes Paternal Aunt     Hypertension Maternal Grandmother     COPD Maternal Grandmother     Emphysema Maternal Grandmother     Diabetes Paternal Grandmother        Social History     Socioeconomic History    Marital status: SINGLE     Spouse name: Not on file    Number of children: Not on file    Years of education: Not on file    Highest education level: Not on file   Occupational History    Not on file   Social Needs    Financial resource strain: Not on file    Food insecurity     Worry: Not on file     Inability: Not on file    Transportation needs     Medical: Not on file     Non-medical: Not on file   Tobacco Use    Smoking status: Never Smoker    Smokeless tobacco: Never Used   Substance and Sexual Activity    Alcohol use: No    Drug use: No    Sexual activity: Not Currently     Partners: Male     Birth control/protection: Injection   Lifestyle    Physical activity     Days per week: Not on file     Minutes per session: Not on file    Stress: Not on file   Relationships    Social connections     Talks on phone: Not on file     Gets together: Not on file     Attends [employer] service: Not on file     Active member of club or organization: Not on file     Attends meetings of clubs or organizations: Not on file     Relationship status: Not on file    Intimate partner violence     Fear of current or ex partner: Not on file     Emotionally abused: Not on file     Physically abused: Not on file     Forced sexual activity: Not on file   Other Topics Concern    Not on file   Social History Narrative    Not on file     HPI    Review of Systems   Constitutional: Negative for chills, fever and malaise/fatigue. HENT: Negative for congestion, hearing loss, sinus pain, sore throat and tinnitus. Eyes: Negative for blurred vision, double vision, pain, discharge and redness. Respiratory: Negative for cough, sputum production, shortness of breath and wheezing. Cardiovascular: Negative for chest pain, palpitations and leg swelling. Gastrointestinal: Negative for abdominal pain, constipation, diarrhea, heartburn, nausea and vomiting. Genitourinary: Negative for dysuria, frequency and urgency. Musculoskeletal: Positive for back pain. Negative for joint pain and neck pain. Skin: Negative for itching and rash. Neurological: Positive for dizziness, seizures and headaches. Psychiatric/Behavioral: Positive for depression. The patient is nervous/anxious and has insomnia. Physical Exam  Constitutional:       Appearance: She is well-developed. HENT:      Mouth/Throat:      Mouth: Mucous membranes are moist.   Neck:      Musculoskeletal: Normal range of motion. Cardiovascular:      Rate and Rhythm: Normal rate and regular rhythm. Heart sounds: No murmur. No friction rub. No gallop.     Pulmonary: Effort: Pulmonary effort is normal.      Breath sounds: Normal breath sounds. Abdominal:      General: Bowel sounds are normal. There is no distension. Palpations: Abdomen is soft. Tenderness: There is no abdominal tenderness. Musculoskeletal:         General: No swelling. Skin:     General: Skin is warm and dry. Neurological:      Mental Status: She is alert and oriented to person, place, and time. ASSESSMENT and PLAN    Sleeve gastrectomy or vertical gastric resection involves a resection of the vast majority of the stomach usually done with a dilator pressed against the right half of the stomach of the lesser curvature. One preserves the pyloric sphincter at the lower end of the stomach and then sequentially staples and divides all the way up to the gastroesophageal junction leaving a small rim of the stomach to the left better known as the angle of His. This procedure significantly reduces the amount that the stomach can hold while removing the part of the stomach that secretes the hormone grehlin and alters the speed of food emptying from the stomach. Once food leaves the stomach, the remainder of the intestinal tract is completely intact and there is no effect on the digestion or absorption of food nutrients and calories. The procedure is intermediate between the adjustable gastric band and the gastric bypass as far as weight loss, potential complications and resolution of comorbid diseases such as Type 2 diabetes, high blood pressure, sleep apnea, arthritic pain, cholesterol disorders to mention a few. The usual complications are that of any procedure which affects the ability of the stomach to accept food at any given time. Those are usually nausea and vomiting which either spontaneously resolve or require endoscopic dilatation. The most serious complication from this surgery is a leak from the staple line usually near the  gastroesophageal junction.  The frequency of this serious complication is low and usually heals spontaneously although reoperation may be necessary. The other serious complications are those which can occur with any major surgery and include  Infection, bleeding, blood clots and/or cardiopulmonary problems. Patient meets criteria established by the NIH. Without weight reduction, co-morbidities will escalate as well as risk of early mortality. Recommendation is patient could be served with surgical weight reduction, the procedure of Sleeve gastrectomy. I explained to the patient differences between laparoscopic and open gastric bypass, laparoscopic adjustable gastric banding, and sleeve gastrectomy procedures. Patient has attended one our informational meeting and has seen our educational materials. Patient desires to have surgery with Dr. Mike Langley. I have reinforced without lifestyle change and behavior modification, they would not achieve his weight loss goals. I reviewed risks and complications associated with each procedure. She will need an UGI. Other recommendations include psychological evaluation, nutritional consultation. I discussed with patient a diet high in protein, low-fat, low- sugar, limited carbohydrates, and discontinuing use of carbonated beverages. Also discussed physical activity and exercises. I have answered all questions, they wish to proceed. 33  Minutes spent face to face with patient, >50 % of time spent counseling.    ** Copy to PCP and other providers

## 2020-11-13 ENCOUNTER — TELEPHONE (OUTPATIENT)
Dept: SURGERY | Age: 30
End: 2020-11-13

## 2020-11-13 NOTE — TELEPHONE ENCOUNTER
Returned patient's call, informed her that she has to complete the insurance requirements (6 mths, psych eval, etc) before we can submit to insurance. Patient agreed.

## 2020-11-13 NOTE — TELEPHONE ENCOUNTER
Pt is calling to see if Dr Cabrera Tyler can call [address] to do a prior auth for her bariatric. Stated that she is moving and wanted to speed up her process for the weight loss.  Pt stated that she has spoken with Ins and was told that Dr Cony Arteaga could call them to get it [place] - [phone]

## 2020-11-16 NOTE — TELEPHONE ENCOUNTER
Discussed request with patient. She states she has since learned additional information about this situation, and will be following up with [employer] and does not think a letter will be needed from us at this time, as it is an insurance requirement. She will reach back out if anything is needed from this office. Peripheral Block    Patient location during procedure: holding area  Start time: 11/21/2017 2:51 PM  End time: 11/21/2017 2:51 PM  Staffing  Anesthesiologist: Cristi Morales  Performed: anesthesiologist   Preanesthetic Checklist  Completed: patient identified, site marked, surgical consent, pre-op evaluation, timeout performed, IV checked, risks and benefits discussed, monitors and equipment checked, anesthesia consent given, oxygen available and patient being monitored  Peripheral Block  Patient position: supine  Prep: ChloraPrep  Patient monitoring: cardiac monitor, continuous pulse ox, frequent blood pressure checks and IV access  Block type: Brachial plexus  Laterality: right  Injection technique: single-shot  Procedures: ultrasound guided  Local infiltration: ropivacaine  Infiltration strength: 0.5 %  Dose: 20 mL  Interscalene  Provider prep: sterile gloves and mask  Local infiltration: ropivacaine  Needle  Needle type: short-bevel   Needle gauge: 22 G  Needle length: 5 cm  Needle localization: ultrasound guidance  Test dose: negative  Assessment  Injection assessment: negative aspiration for heme, no paresthesia on injection and local visualized surrounding nerve on ultrasound  Slow fractionated injection: yes  Hemodynamics: stable  Additional Notes  Needle was introduced aprroximately the C5-C6 region and using a inplane imaging technique the needle was directed thru the middle scalene muscle and brought to bear adjacent to the brachial plexus. Needle advancement was under direct vision at all times . Local Anesthesia was injected and all vascular structures were avoided. The local anesthetic hydrodissected in a perineural fashion. Ropivicaine 0.5% injected in divided doses, total of 20 cc injected. The plexus appeared anatomically normal and no obvious pathology was noted.    Reason for block: post-op pain management

## 2020-11-24 ENCOUNTER — CLINICAL SUPPORT (OUTPATIENT)
Dept: SURGERY | Age: 30
End: 2020-11-24

## 2020-11-24 ENCOUNTER — HOSPITAL ENCOUNTER (OUTPATIENT)
Dept: GENERAL RADIOLOGY | Age: 30
Discharge: HOME | End: 2020-11-24
Attending: NURSE PRACTITIONER
Payer: COMMERCIAL

## 2020-11-24 DIAGNOSIS — E66.01 MORBID OBESITY (HCC): ICD-10-CM

## 2020-11-24 DIAGNOSIS — E66.01 MORBID OBESITY (HCC): Primary | ICD-10-CM

## 2020-11-24 PROCEDURE — 74246 X-RAY XM UPR GI TRC 2CNTRST: CPT

## 2020-11-24 NOTE — PROGRESS NOTES
Pre-operative Bariatric Nutrition Evaluation  (1 of 6)     Date: 2020   Keven Vides M.D. Name: Jennifer Parker  :  1990  Age:  31  Gender: Female   Type of Surgery: []           Gastric Bypass   [x]           Sleeve Gastrectomy    ASSESSMENT:    Past Medical History:Type II DM, depression, anxiety, arthritis      Medications/Supplements:   Prior to Admission medications    Medication Sig Start Date End Date Taking? Authorizing Provider   cholecalciferol, vitamin D3, (Vitamin D3) 50 mcg (2,000 unit) tab Take  by mouth. Provider, Historical   AZO Tony Briggs 822-11-01 mg-mg-million tab Take  by mouth. Provider, Historical   therapeutic multivitamin (THERAGRAN) tablet Take 1 Tab by mouth daily. Provider, Historical   green tea leaf extract (Green Tea) 250 mg cap Take  by mouth. Provider, Historical   elderberry fruit (ELDERBERRY PO) Take  by mouth. Provider, Historical   BIOTIN PO Take  by mouth. Provider, Historical   liraglutide, weight loss, (Saxenda) 3 mg/0.5 mL (18 mg/3 mL) pen 3 mg by SubCUTAneous route daily. Provider, Historical   topiramate (TOPAMAX) 25 mg tablet Take 1 Tab by mouth daily (with breakfast). 20   Martina Kenny MD   Insulin Needles, Disposable, (BD ULTRA-FINE SHORT PEN NEEDLE) 31 gauge x \" ndle USE TO INJECT SAXENDA ONCE DAILY 11/15/19   Martina Kenny MD   valACYclovir (VALTREX) 500 mg tablet  19   Provider, Historical   Blood-Glucose Meter (TRUERESULT BLOOD GLUCOSE SYSTM) monitoring kit Test blood sugar before breakfast, lunch dinner and before bedtime  Patient not taking: Reported on 3/18/2020 5/26/16   Martina Kenny MD   glucose blood VI test strips (TRUETEST TEST STRIPS) strip Test blood sugar before breakfast, lunch, dinner and before bedtime. 16   Martina Kenny MD   Lancets misc Test blood sugar before breakfast, lunch, dinner and bedtime.  16   Martina Kenny MD       Food Allergies/Intolerances:lactose intolerance     Anthropometrics:    Ht:60\"   Recent Office Wt: 204#    IBW: 100#    %IBW: 204%     BMI:39    Category: obesity II     Reported wt history: Pt completing pre-op nutrition evaluation for wt loss surgery over the phone d/t social distancing guidelines d/t COVID-19. Reports lowest adult BW of 170# and highest adult BW of 215#. Attributes wt gain over the years r/t pregnancy, medical conditions, depression. Has attempted wt loss through various methods with most successful wt loss of 60#. Has been unable to maintain long term or significant wt loss and is now seeking approval for weight loss surgery. Pt will need to complete 6 months of supervised weight loss for insurance requirements. Exercise/Physical Activity:walking and bike     Reported Diet History:self-directed diets, Phentermine, Saxenda      24 Hour Diet Recall  Breakfast  Eggs, rodrigues, coffee; occasionally fruit    Lunch  Sometimes skips or yogurt w/ frozen fruit or breakfast biscuit   Dinner  Pasta or fast food or chicken and vegetables   Snacks  \"this is my problem\" \"cravings\" as a result of hypoglycemia    Beverages  Coffee w/ sweetened creamer, water, diet green tea, Gatorade Zero (1 per day)        Environment/Psychosocial/Support: Pt reports good support from mother and close friends. 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 Pt does majority of grocery shopping and cooking for the household which includes her daughter. NUTRITION DIAGNOSIS:  1. Self-monitoring deficit r/t previous lack of value for this change evidenced by pt skips meals. Pt reports this often results in hypoglycemia which then results in grabbing foods quick and convenient which are not always the healthiest.   2. Food and nutrition related knowledge deficit r/t lack of prior exposure to information evidenced by pt demonstrates need for nutrition education specific to sleeve gastrectomy.        NUTRITION INTERVENTION:  Pt educated on nutrition recommendations for weight loss surgery, specifically sleeve gastrectomy. Instructed on consuming 3 meals per day starting now. Use the balanced plate method to plan meals, include 3 oz of lean source of protein, 1/2 cup whole grains, unlimited non-starchy vegetables, 1/2 cup fruit and 1 serving of low fat dairy. Utilize handouts listing healthy snack and meal ideas to limit restaurant meals. After surgery measure all meals to 1/2 cup. Each meal will contain a 1/4 cup lean protein and 1/4 cup fruit, non-starchy vegetable or starch (limiting to once per day). Aim for 60 g protein per day. Sip on 48-64 oz of sugar free, calorie free, non-carbonated beverages each day. Do not use a straw. Do not consume beverages 30 minutes before, during or 30 minutes after meals. Read all nutrition labels. Demonstrated and emphasized identifying serving size, total fat, sugar and protein content. Defined low fat as </= 3 g per serving. Discussed lean and extra lean sources of protein. Provided list of low fat cooking methods. Avoid foods with sugar listed in the first 3 ingredients and >/15 g sugar per serving. Excess sugar/fat intake may lead to dumping syndrome. Discussed signs and symptoms of dumping syndrome. Practice mindful eating habits; take small bites, chew thoroughly, avoid distractions, utilize hunger/fullness scale. Consume meals over 20-30 minutes. Attend Bariatric Support Group and increase physical activity (approved per MD) for long term weight maintenance. NUTRITION MONITORING AND EVALUATION:    The following goals were established with patient;  1. Use 3 meals a day. Do not skip meals. Use protein shakes PRN. Recommended a carb-balanced protein shake (Boost Glucose Control, Glucerna Hunger Smart) with consideration of recent issues of hypoglycemia.    2. Use balanced plate method for help with meal planning and portion control to help support better diabetes management. 3. Pack and plan snacks when away from home to help prevent need to grab higher calorie options \"on the run\". 4. Review nutrition education materials for sleeve gastrectomy. Follow up next month for continued nutrition education and supervised weight loss. Specific tips and techniques to facilitate compliance with above recommendations were provided and discussed. Nutrition evaluation reveals important lifestyle and behaviors indicated. Will continue to evaluate as pt works to complete supervised weight loss requirements. If further details are desired please feel free to contact me at [phone]. This phone number was also provided to the patient for any further questions or concerns.            Sarah Rosa RD

## 2020-12-11 ENCOUNTER — TELEPHONE (OUTPATIENT)
Dept: BEHAVIORAL/MENTAL HEALTH CLINIC | Age: 30
End: 2020-12-11

## 2020-12-11 NOTE — TELEPHONE ENCOUNTER
Patient called to request a recommendation letter for medicinal use of marijuana and cbd oil. Patient found a vendor for it but needs a doctor's recommendation for it. Please advise.

## 2020-12-11 NOTE — TELEPHONE ENCOUNTER
Made contact with pt regarding medical marijuana. Advised pt that Dr. Lorin Romero is not licensed to prescribe medical marijuana and would not be able to provide any assistance at this time regarding this. Pt states understanding and states there are providers available for this, she just wanted to check first.  Pt has no further requests at this time.     Ralf Bailey RN

## 2020-12-14 ENCOUNTER — PATIENT MESSAGE (OUTPATIENT)
Dept: BEHAVIORAL/MENTAL HEALTH CLINIC | Age: 30
End: 2020-12-14

## 2020-12-14 NOTE — TELEPHONE ENCOUNTER
----- Message from LUIS FERNANDO Roger sent at 12/14/2020 10:23 AM EST -----  Regarding: Non-Urgent Medical Question  Contact: [phone]  Could you please call me when ready to fill out these papers.

## 2020-12-14 NOTE — TELEPHONE ENCOUNTER
Spoke with patient. Two identifiers confirmed. Patient states The Department of  is requiring her to complete 20 hours of work/education classes  to continue receiving assistance. Patient states that due to anxiety, being in school, and being a  single mom, she cannot take the  training courses required. Patient did not clarify if classes were on line classes or in person. Will clarify after receiving forms. Patient attached forms to be completed. Patient informed forms will not  print correctly. Patient asked to fax forms if possible or re-scan. Fax number given. Patient did not have further questions.

## 2020-12-17 ENCOUNTER — TELEPHONE (OUTPATIENT)
Dept: BEHAVIORAL/MENTAL HEALTH CLINIC | Age: 30
End: 2020-12-17

## 2020-12-17 NOTE — TELEPHONE ENCOUNTER
Patient faxed forms from [facility]. Patient requesting an exception from participating in a 20 hour work education training. Provider reviewed forms and denied request. Patient stated prior that  she is currently working and attending college courses. Patient informed of providers decision and had no further questions.

## 2020-12-21 ENCOUNTER — CLINICAL SUPPORT (OUTPATIENT)
Dept: SURGERY | Age: 30
End: 2020-12-21

## 2020-12-21 DIAGNOSIS — E66.01 MORBID OBESITY (HCC): Primary | ICD-10-CM

## 2020-12-29 ENCOUNTER — VIRTUAL VISIT (OUTPATIENT)
Dept: FAMILY MEDICINE CLINIC | Age: 30
End: 2020-12-29
Payer: COMMERCIAL

## 2020-12-29 DIAGNOSIS — F41.1 GAD (GENERALIZED ANXIETY DISORDER): Primary | ICD-10-CM

## 2020-12-29 PROCEDURE — 99214 OFFICE O/P EST MOD 30 MIN: CPT | Performed by: FAMILY MEDICINE

## 2020-12-29 NOTE — PROGRESS NOTES
HISTORY OF PRESENT ILLNESS  Chantel Helton is a 31 y.o. female. Patient presented with history of generalized anxiety disorder stable currently on no medication seeing a psychiatrist and a counselor doing physical activity and herbal medication for better outcome patient denies any suicidal homicidal ideation feeling anxious but not depressed able to do her job concentrate, is not irritated not feeling guilty stressed out if she has to do few things at the same time at that make her very anxious and irritated, so far has been doing very well has a form from [address] [place] [address] of [facility] to be completed today regarding her ability to participate in employment and training activity unfortunately patient not able to pursue the participate in employment and training activity in any capacity at this time for the next 6 months due to her generalized anxiety disorder and mental health limitation patient also seen psychiatrist counselors and doing well so far        Current Outpatient Medications   Medication Sig Dispense Refill    cholecalciferol, vitamin D3, (Vitamin D3) 50 mcg (2,000 unit) tab Take  by mouth.  AZO CRANBERRY 821-09-97 mg-mg-million tab Take  by mouth.  therapeutic multivitamin (THERAGRAN) tablet Take 1 Tab by mouth daily.  green tea leaf extract (Green Tea) 250 mg cap Take  by mouth.  elderberry fruit (ELDERBERRY PO) Take  by mouth.  BIOTIN PO Take  by mouth.  liraglutide, weight loss, (Saxenda) 3 mg/0.5 mL (18 mg/3 mL) pen 3 mg by SubCUTAneous route daily.  topiramate (TOPAMAX) 25 mg tablet Take 1 Tab by mouth daily (with breakfast).  30 Tab 2    Insulin Needles, Disposable, (BD ULTRA-FINE SHORT PEN NEEDLE) 31 gauge x 5/16\" ndle USE TO INJECT SAXENDA ONCE DAILY 100 Package 8    valACYclovir (VALTREX) 500 mg tablet       Blood-Glucose Meter (TRUERESULT BLOOD GLUCOSE SYSTM) monitoring kit Test blood sugar before breakfast, lunch dinner and before bedtime (Patient not taking: Reported on 3/18/2020) 1 Kit 11    glucose blood VI test strips (TRUETEST TEST STRIPS) strip Test blood sugar before breakfast, lunch, dinner and before bedtime. (Patient not taking: Reported on 12/29/2020) 100 Strip 11    Lancets misc Test blood sugar before breakfast, lunch, dinner and bedtime. 1 Package 11     Allergies   Allergen Reactions    Pcn [Penicillins] Rash     Past Medical History:   Diagnosis Date    BMI 38.0-38.9,adult 4/3/2018    BMI 40.0-44.9, adult ([address] 2/19/2018    Chronic back pain greater than 3 months duration 7/29/2015    TASIA (generalized anxiety disorder) 1/19/2017    Heat stroke     High-risk sexual behavior 2/23/2017    HX OTHER MEDICAL     chlamydia age 24, treated & negative now    Hypoglycemia 3/30/2016    Ill-defined condition     Insulinoma 4/6/2016    Morbid obesity ([address]     Obesity (BMI 35.0-39.9 without comorbidity) 9/12/2019    Syncopal seizure ([address] 11/19/2015    Thyromegaly 3/30/2016    Urinary frequency 11/19/2015     Past Surgical History:   Procedure Laterality Date    HX CHOLECYSTECTOMY  10/6/2014    lap.  tracee    HX DILATION AND CURETTAGE  6/2011    HX OTHER SURGICAL      D&C with SAB 6/2011    HX OTHER SURGICAL      wisdom extraction 15 years    REVISION CERVIX Chevis Allis [address]  2013     Family History   Problem Relation Age of Onset    Hypertension Mother     Diabetes Mother     High Cholesterol Father     Other Father         high cholesterole, herniated disc    Diabetes Paternal Aunt     Hypertension Maternal Grandmother     COPD Maternal Grandmother     Emphysema Maternal Grandmother     Diabetes Paternal Grandmother      Social History     Tobacco Use    Smoking status: Never Smoker    Smokeless tobacco: Never Used   Substance Use Topics    Alcohol use: No      Lab Results   Component Value Date/Time    WBC 5.5 09/04/2018 11:00 AM    HGB 13.5 09/04/2018 11:00 AM    Hemoglobin (POC) 12.9 06/06/2015 06:39 PM    HCT 41.3 09/04/2018 11:00 AM    Hematocrit (POC) 38 06/06/2015 06:39 PM    PLATELET 455 75/87/0808 11:00 AM    MCV 85 09/04/2018 11:00 AM    Hgb, External 12.4 10/14/2013    Hct, External 36.7 10/14/2013    Platelet cnt., External 304 10/14/2013     Lab Results   Component Value Date/Time    TSH 1.410 09/21/2018 10:57 AM    T4, Free 1.04 04/06/2016 09:31 AM         Review of Systems   Constitutional: Negative for chills and fever. HENT: Negative for congestion and nosebleeds. Eyes: Negative for blurred vision and pain. Respiratory: Negative for cough, shortness of breath and wheezing. Cardiovascular: Negative for chest pain and leg swelling. Gastrointestinal: Negative for constipation, diarrhea, nausea and vomiting. Genitourinary: Negative for dysuria and frequency. Musculoskeletal: Negative for joint pain and myalgias. Skin: Negative for itching and rash. Neurological: Negative for dizziness, loss of consciousness and headaches. Psychiatric/Behavioral: Positive for depression. The patient is nervous/anxious and has insomnia. Physical Exam  Constitutional:       Appearance: She is obese. She is not ill-appearing or toxic-appearing. HENT:      Head: Normocephalic and atraumatic. Mouth/Throat:      Mouth: Mucous membranes are moist.   Neurological:      Mental Status: She is alert and oriented to person, place, and time. Psychiatric:         Attention and Perception: She is inattentive. She does not perceive auditory or visual hallucinations. Mood and Affect: Mood normal. Mood is not anxious, depressed or elated. Affect is flat. Affect is not labile, blunt, angry, tearful or inappropriate. Speech: She is communicative. Speech is rapid and pressured. Speech is not delayed, slurred or tangential.         Behavior: Behavior is slowed. Behavior is not agitated, aggressive, withdrawn, hyperactive or combative. Thought Content:  Thought content normal. Thought content is not paranoid. Thought content does not include homicidal or suicidal ideation. Cognition and Memory: Memory is not impaired. She does not exhibit impaired recent memory or impaired remote memory. Judgment: Judgment normal. Judgment is not inappropriate. ASSESSMENT and PLAN  Diagnoses and all orders for this visit:    1. TASIA (generalized anxiety disorder)      Secondary to the patient chronic medical conditions,   form was completed, w/ with multiple questions answered to the best knowledge will keep a copy in the chart for further correspondence patient was given signed completed patient was informed about the safety and  regulations regarding this condition patient was also advised to follow-up with HR for further guidelines patient acknowledged understanding and agreed with today's recommendations    Depression with anxiety in a stable condition, not suicidal, start antianxiety and  will reevaluate in 3 w for progression   in addition Counseling , social support, spiritual belonging, inc physical activity, all offered,  compliance advised, patient was told that should not drink any alcoholic beverages or do any illicit drugs,   patient acknowledged understanding and agreed with today's recommendation in addition patient was told to call for any concern        Patient advised to have the mask on most of the time, social distance and handwashing avoid crowded area pursuant to the emergency declaration under the [address] and [facility], [address] [place] [address] [facility] authority and the [employer] General Act, this Virtual Visit was conducted, with patient's consent, to reduce the patient's risk of exposure to COVID-19 and provide continuity of care for an established patient  Services were provided through a Video synchronous discussion virtually to substitute for in-person appointment.

## 2020-12-29 NOTE — PROGRESS NOTES
Chief Complaint   Patient presents with    Documentation     1. Have you been to the ER, urgent care clinic since your last visit? Hospitalized since your last visit? No    2. Have you seen or consulted any other health care providers outside of the [address] [place] [address] since your last visit? Include any pap smears or colon screening. No    Call placed to pt. Verified patient with two type of identifiers.   requesting documentation completed for department of

## 2021-01-12 ENCOUNTER — TELEPHONE (OUTPATIENT)
Dept: SURGERY | Age: 31
End: 2021-01-12

## 2021-01-12 NOTE — TELEPHONE ENCOUNTER
Patient called and asked if it was okay to take the depo shot or the patch contraception before and after surgery.

## 2021-01-12 NOTE — TELEPHONE ENCOUNTER
Left message for patient regarding contraceptive. Usually stopped 4 weeks before surgery and 4 weeks after surgery.  Depo shot is a monthly thing so will discuss once surgery has been schedule

## 2021-01-21 ENCOUNTER — CLINICAL SUPPORT (OUTPATIENT)
Dept: SURGERY | Age: 31
End: 2021-01-21

## 2021-01-21 DIAGNOSIS — E66.01 MORBID OBESITY (HCC): Primary | ICD-10-CM

## 2021-01-22 NOTE — PROGRESS NOTES
[facility]  Supervised Weight Loss     Date:   2021    Patient's Name: Hanane Rivera  : 1990    Insurance:  Reyna Schultznina                                                      Session: 3 of  6  Surgery: Sleeve Gastrectomy                                 Surgeon:  Lolis Camacho M.D.      Height: 60\"                 Reported Weight:    210      Lbs. BMI: 41             Pounds Lost since last month: 0               Pounds Gained since last month: 3#     Starting Weight: 204#                       Previous Months Weight: 207#  Overall Pounds Lost: 0                    Overall Pounds Gained: 6#     Other Pertinent Information: Today's appointment was completed in a virtual setting d/t COVID-[employer] Bug. Today's wt was self-reported. Smoking Status:  none  Alcohol Intake: none    I have reviewed with pt the guidelines of the supervised wt loss program.  Pt understands the expectations of some wt loss during the program and that wt gain could delay the process. I have also explained that appointments need to be consecutive and missing an appointment may result in starting over. Pt has received this information in writing. Changes that patient has made since last month include: Increased exercise, no straws, taking smaller bites. Eating Habits and Behaviors  A nutrition lesson specific to vitamins was provided. We discussed the various reasons for needing vitamins and different types and doses. General healthy eating guidelines were also discussed. Pts were instructed that their plate should be made up 1/2 plate coming from non-starchy vegetables, 1/4 coming from lean meat, and 1/4 of their plate coming from carbohydrates, including fruits, starches, or milk. We discussed measuring meals to 1/2 cup total per meal after surgery. Drinking only calorie-free, sugar-free and non-carbonated beverages.  We discussed the importance of drinking 64 ounces of fluid per day to prevent dehydration post-operatively. Patient's current diet habits include: Pt is eating 2-3 meals per day. Snack choices include crackers, fruit and sweets. Pt is eating refined carbohydrate foods (bread, pasta, rice, potatoes) daily. Pt is eating sweets/desserts twice per week. Pt is using baked, grilled, broiled and fried cooking methods. Pt is eating meals prepared outside of the home 1-3 times per week. Pt is drinking water, caffeine and Crystal Light. Pt reports sometimes emotional eating. Physical Activity/Exercise  We talked about the importance of increasing daily physical activity and beginning to develop an exercise regimen/routine. We talked about exercise as being an important part of long term weight loss after surgery. Comments:  During class, I discussed with patient the importance of getting into an exercise routine. Pt is currently walking for activity. Pt has been encouraged to maintain and increase as tolerated. Behavior Modification       We talked about how to eat more mindfully and identify emotional eating triggers. Tips and recommendations for how to make these changes were provided. Pt was encouraged to keep a food journal and record what they were taking in daily. Overall Assessment: Pt demonstrates appropriate lifestyle changes evidenced by reported changes. Pt's reported wt indicates wt gain. Will continue to assess. Patient-Set Goals:   1. Nutrition - better food choices   2. Exercise - increase activity   3.  Behavior -not give up    Kaylee Ferguson, KRISTAN  1/22/2021

## 2021-02-01 ENCOUNTER — VIRTUAL VISIT (OUTPATIENT)
Dept: BEHAVIORAL/MENTAL HEALTH CLINIC | Age: 31
End: 2021-02-01
Payer: COMMERCIAL

## 2021-02-01 DIAGNOSIS — F43.10 PTSD (POST-TRAUMATIC STRESS DISORDER): ICD-10-CM

## 2021-02-01 DIAGNOSIS — F33.0 MILD EPISODE OF RECURRENT MAJOR DEPRESSIVE DISORDER (HCC): Primary | ICD-10-CM

## 2021-02-01 DIAGNOSIS — F41.1 GAD (GENERALIZED ANXIETY DISORDER): ICD-10-CM

## 2021-02-01 PROCEDURE — 99214 OFFICE O/P EST MOD 30 MIN: CPT | Performed by: NURSE PRACTITIONER

## 2021-02-01 NOTE — PROGRESS NOTES
CHIEF COMPLAINT:  Rosenda Ibarra is a 31 y.o. female and was seen today for follow-up of psychiatric condition and psychotropic medication management. HPI:    Daryn Frazier reports the following psychiatric symptoms by hx:  depression and anxiety. Overall symptoms have been present for months. Currently symptoms are intermittent and are of moderate to mod/high severity. The symptoms occur intermittently. Pt reports she still does not want to use scheduled medications. Met with pt via video telehelath for appt today. FAMILY/SOCIAL HX: psychosocial stressors    REVIEW OF SYSTEMS:  Psychiatric:  depression, anxiety  Appetite:good   Sleep: fitful at times, problems with maintaining sleep at times  Neuro: denies      Side Effects:  none    MENTAL STATUS EXAM:   Sensorium  oriented to time, place and person   Relations cooperative   Appearance:  age appropriate and casually dressed   Motor Behavior:  gait stable and within normal limits   Speech:  normal volume   Thought Process: goal directed   Thought Content free of delusions and free of hallucinations   Suicidal ideations no intention   Homicidal ideations no intention   Mood:  anxious   Affect:  anxious   Memory recent  adequate   Memory remote:  adequate   Concentration:  adequate   Abstraction:  abstract   Insight:  good   Reliability good   Judgment:  good     MEDICAL DECISION MAKING:  Problems addressed today:    ICD-10-CM ICD-9-CM    1. Mild episode of recurrent major depressive disorder ([facility]  F33.0 296.31    2. TASIA (generalized anxiety disorder)  F41.1 300.02    3. PTSD (post-traumatic stress disorder)  F43.10 309.81        Assessment:   Marsha is responding to treatment. Symptoms are improving as she continues to work on trauma related strategies. Discussed desire not to use medications. Pt states she does want to try medicinal marijuana. Discussed possible risks associated with use.  Discussed importance of understanding stress response and ACE's information. Pt will consider trauma therapy as well. Reviewed treatment goals and target symptoms to monitor for. Pt would like for this provider to complete her MH assessment form for weight loss surgery. Plan:   1. Current Outpatient Medications   Medication Sig Dispense Refill    cholecalciferol, vitamin D3, (Vitamin D3) 50 mcg (2,000 unit) tab Take  by mouth.  AZO CRANBERRY 943-60-66 mg-mg-million tab Take  by mouth.  therapeutic multivitamin (THERAGRAN) tablet Take 1 Tab by mouth daily.  green tea leaf extract (Green Tea) 250 mg cap Take  by mouth.  elderberry fruit (ELDERBERRY PO) Take  by mouth.  BIOTIN PO Take  by mouth.  liraglutide, weight loss, (Saxenda) 3 mg/0.5 mL (18 mg/3 mL) pen 3 mg by SubCUTAneous route daily.  topiramate (TOPAMAX) 25 mg tablet Take 1 Tab by mouth daily (with breakfast). 30 Tab 2    Insulin Needles, Disposable, (BD ULTRA-FINE SHORT PEN NEEDLE) 31 gauge x 5/16\" ndle USE TO INJECT SAXENDA ONCE DAILY 100 Package 8    valACYclovir (VALTREX) 500 mg tablet       Blood-Glucose Meter (TRUERESULT BLOOD GLUCOSE SYSTM) monitoring kit Test blood sugar before breakfast, lunch dinner and before bedtime (Patient not taking: Reported on 3/18/2020) 1 Kit 11    glucose blood VI test strips (TRUETEST TEST STRIPS) strip Test blood sugar before breakfast, lunch, dinner and before bedtime. (Patient not taking: Reported on 12/29/2020) 100 Strip 11    Lancets misc Test blood sugar before breakfast, lunch, dinner and bedtime. 1 Package 11          medication changes made today: none    2. Counseling and coordination of care including instructions for treatment, risks/benefits, risk factor reduction and patient/family education. She agrees with the plan. Patient instructed to call with any side effects, questions or issues. 3.    Follow-up and Dispositions    · Return in about 3 months (around 5/1/2021).          Darren Holt, who was evaluated through a synchronous (real-time) audio-video encounter, and/or her healthcare decision maker, is aware that it is a billable service, with coverage as determined by her insurance carrier. She provided verbal consent to proceed: Yes, and patient identification was verified. It was conducted pursuant to the emergency declaration under the [address] [place] [address] and the [employer] General Act. A caregiver was present when appropriate. Ability to conduct physical exam was limited. I was at home. The patient was at home.             2/1/2021  Rebeca Castrejon NP

## 2021-02-16 ENCOUNTER — TELEPHONE (OUTPATIENT)
Dept: BEHAVIORAL/MENTAL HEALTH CLINIC | Age: 31
End: 2021-02-16

## 2021-02-16 NOTE — TELEPHONE ENCOUNTER
Will complete assessment at next scheduled appointment. Will complete the following:  Pt Name/  Referral  Medical Hx/Meds/WINN  Pt's knowledge of surgery  Medical Compliance/Motivation  Psych hx/eating disorders?   Social Support  MSE  Impressions/Recommendations

## 2021-02-16 NOTE — TELEPHONE ENCOUNTER
Hansen Minor you want to incorporate this into your scheduled appointment, or still work on this prior to appt?     Thanks-    Ligia Locke RN

## 2021-02-16 NOTE — TELEPHONE ENCOUNTER
Spoke with patient and she says this can be done at her appt in April. She says her surgery is in May, and it just needs to be done before then. Advised patient to call us if this should change.

## 2021-02-16 NOTE — TELEPHONE ENCOUNTER
----- Message from Emi Villeda NP sent at 2/16/2021  1:06 PM EST -----  Regarding: FW: Psychological evaluation for bariatric surgery  Could you please about getting a follow up appt sooner so I can review some of the requested questions/topics? Thank you, Cora Reeder  ----- Message -----  From: Asad Bergeron  Sent: 2/11/2021  11:30 AM EST  To: Emi Villeda NP  Subject: Psychological evaluation for bariatric surge#    Hi Cora Reeder,  This patient has stated that you will be completing her psychological evaluation and I just wanted to send you the documentation that we need in the evaluation. Pre- Surgery Psychological Evaluation Requirements for Bariatrics:    \" Patient Name, Date of birth, Date of Evaluation  \" Referral Information  \" Medical History, Medications, Substance abuse/use  \" Psychiatric Treatment History  \" Patient's Knowledge of Surgery  \" Medical Compliance and Motivation  \" Psychological History/eating disorders, if any  \" Social Support  \" Mental Status Exam  \" Examiner's Impressions and Recommendations      Please contact me if you have any questions.     Mumtaz Motley

## 2021-02-17 ENCOUNTER — CLINICAL SUPPORT (OUTPATIENT)
Dept: SURGERY | Age: 31
End: 2021-02-17

## 2021-02-17 DIAGNOSIS — E66.01 MORBID OBESITY (HCC): Primary | ICD-10-CM

## 2021-02-19 NOTE — PROGRESS NOTES
[facility] at [facility]  Supervised Weight Loss     Date:   2021    Patient's Name: Alan Romeo  : 1990    Insurance:  [facility]                                                      Session:   Surgery: Sleeve Gastrectomy                                 Surgeon: Kassandra Diaz M.D.      Height: 60\"                 Reported HHOFKT:    177      ICW.                               BMI: 71             Pounds Lost since last month: 0               Pounds Gained since last month: 1#     Starting Weight: 204#                       Previous Months Weight: 210#  Overall Pounds Lost: 0                    Overall Pounds Gained: 7#     Other Pertinent Information: Today's appointment was completed in a virtual setting d/t COVID-[employer]. Today's wt was self-reported. Smoking Status:  none  Alcohol Intake: none    I have reviewed with pt the guidelines of the supervised wt loss program.  Pt understands the expectations of some wt loss during the program and that wt gain could delay the process. I have also explained that appointments need to be consecutive and missing an appointment may result in starting over. Pt has received this information in writing. Changes that patient has made since last month include:  Drinking more water, mindful of meal timing, mindful eating. Eating Habits and Behaviors  General healthy eating guidelines were also discussed. Pts were instructed that their plate should be made up 1/2 plate coming from non-starchy vegetables, 1/4 coming from lean meat, and 1/4 of their plate coming from carbohydrates, including fruits, starches, or milk. We discussed measuring meals to 1/2 cup total per meal after surgery. Drinking only calorie-free, sugar-free and non-carbonated beverages. We discussed the importance of drinking 64 ounces of fluid per day to prevent dehydration post-operatively.                       Physical Activity/Exercise  We talked about the importance of increasing daily physical activity and beginning to develop an exercise regimen/routine. We talked about exercise as being an important part of long term weight loss after surgery. Comments:  During class, I discussed with patient the importance of getting into an exercise routine. Behavior Modification       A behavior modification lesson was provided with an emphasis on developing mindful eating behaviors. We talked about how to eat more mindfully and identify emotional eating triggers. Tips and recommendations for how to make these changes were provided. Pt was encouraged to keep a food journal and record what they were taking in daily. Patient's reported eating behaviors: Pt reports grazing as the biggest behavior barrier at this time. We discussed various tips and strategies for implementing more mindful snacking behaviors and patterns. Overall Assessment: Pt demonstrates appropriate lifestyle changes evidenced by reported changes. Reported wt does indicate wt gain. Will continue to assess. Patient-Set Goals:   1. Nutrition - eat slower  2. Exercise - increase activity   3.  Behavior -find different coping mechanisms for stress     Radha Rubi, KRISTAN  2/19/2021

## 2021-03-09 ENCOUNTER — TELEPHONE (OUTPATIENT)
Dept: SURGERY | Age: 31
End: 2021-03-09

## 2021-03-09 NOTE — TELEPHONE ENCOUNTER
Returned call to Ms Matthew Mcintyre verified [address] [place] [address]. Patient states she would like to know how long she would be out after the surgery. I explained the typical time for recovery is 6 weeks out of work. She may go back earlier depending on healing and ability to not go against any of the restrictions.

## 2021-03-09 NOTE — TELEPHONE ENCOUNTER
Patient called stating with her health issues would she be able to switch her procedure from bypass to sleeve.

## 2021-03-16 ENCOUNTER — CLINICAL SUPPORT (OUTPATIENT)
Dept: SURGERY | Age: 31
End: 2021-03-16

## 2021-03-16 DIAGNOSIS — E66.01 MORBID OBESITY (HCC): Primary | ICD-10-CM

## 2021-03-18 ENCOUNTER — TELEPHONE (OUTPATIENT)
Dept: SURGERY | Age: 31
End: 2021-03-18

## 2021-03-18 NOTE — TELEPHONE ENCOUNTER
Patient called and would like a call back regarding how much weight she needs to be down to have surgery set up and also she had some other questions regarding the surgery itself.

## 2021-03-18 NOTE — PROGRESS NOTES
[facility] at [facility]  Supervised Weight Loss     Date:   3/16/2021    Patient's Name: Marsha Roger  : 1990    Ins[facility]:  [facility]                                                      Session: 5 of  6  Surgery: Sleeve Gastrectomy                                 Surgeon:  Edilson Coburn M.D.      Height: 60\"                 Reported Weight:    209      Lbs.                              BMI: 40             Pounds Lost since last month: 2               Pounds Gained since last month: 0#     Starting Weight: 204#                       Previous Month’s Weight: 211#  Overall Pounds Lost: 0                    Overall Pounds Gained: 5#     Other Pertinent Information: Today's appointment was completed in a virtual setting d/t COVID-19. Today's wt was self-reported.    Smoking Status:  none  Alcohol Intake: none    I have reviewed with pt the guidelines of the supervised wt loss program.  Pt understands the expectations of some wt loss during the program and that wt gain could delay the process. I have also explained that appointments need to be consecutive and missing an appointment may result in starting over. Pt has received this information in writing.          Changes that patient has made since last month include:  Continuing mindful eating, no sodas, more water.      Eating Habits and Behaviors  General healthy eating guidelines were discussed. A nutrition lesson was presented on portion control. Patients were instructed implement portion control now using the balanced plate method (1/2 plate non-starchy vegetables, 1/4 plate lean meat, and 1/4 plate whole grains and to include fruit and/or milk at meals or snack). We discussed measuring meals to 1/2 cup total per meal after surgery and appropriate portion progression long term.                       Patient's current diet habits include: Pt is eating 3 meals per day. Snack choices include yogurt, fruit, crackers. Pt is  eating refined carbohydrate foods (bread, pasta, rice, potatoes) sometimes. Pt is eating sweets/desserts rarely. Pt is using baked, grilled, broiled cooking methods. Pt is eating meals prepared outside of the home none. Pt is drinking water only. Pt reports sometimes emotional eating. Physical Activity/Exercise  We talked about the importance of increasing daily physical activity and beginning to develop an exercise regimen/routine. We talked about exercise as being an important part of long term weight loss after surgery. Comments:  During class, I discussed with patient the importance of getting into an exercise routine. Pt is currently walking for activity. Pt has been encouraged to maintain and increase as tolerated. Behavior Modification       We talked about how to eat more mindfully. Tips and recommendations for how to make these changes were provided. Pt was encouraged to keep a food journal and record what they were taking in daily. Overall Assessment: Pt demonstrates appropriate lifestyle changes evidenced by reported changes and reported wt loss from previous month. Will continue to assess. Patient-Set Goals:   1. Nutrition - healthy snack choices  2. Exercise - be more active  3.  Behavior -not feeding my behavior    Marbin Potter, RD  3/18/2021

## 2021-03-19 ENCOUNTER — TELEPHONE (OUTPATIENT)
Dept: SURGERY | Age: 31
End: 2021-03-19

## 2021-03-19 NOTE — TELEPHONE ENCOUNTER
Returned call to Ms Myrtice Skiff verified [address]. I advised patient to follow the nutritionist plan designed for her. The plan is not to gain any weight.

## 2021-03-19 NOTE — TELEPHONE ENCOUNTER
Returned call to Ms Matthew conti advised to call the office back. Follow up call made to Ms Matthew Mcintyre. V/M left X-2 advised to call the office at her earliest convenience.

## 2021-03-30 ENCOUNTER — VIRTUAL VISIT (OUTPATIENT)
Dept: BEHAVIORAL/MENTAL HEALTH CLINIC | Age: 31
End: 2021-03-30
Payer: COMMERCIAL

## 2021-03-30 DIAGNOSIS — F41.1 GAD (GENERALIZED ANXIETY DISORDER): ICD-10-CM

## 2021-03-30 DIAGNOSIS — F33.0 MILD EPISODE OF RECURRENT MAJOR DEPRESSIVE DISORDER (HCC): Primary | ICD-10-CM

## 2021-03-30 DIAGNOSIS — F43.10 PTSD (POST-TRAUMATIC STRESS DISORDER): ICD-10-CM

## 2021-03-30 PROCEDURE — 90833 PSYTX W PT W E/M 30 MIN: CPT | Performed by: NURSE PRACTITIONER

## 2021-03-30 PROCEDURE — 99213 OFFICE O/P EST LOW 20 MIN: CPT | Performed by: NURSE PRACTITIONER

## 2021-03-30 NOTE — PROGRESS NOTES
CHIEF COMPLAINT:  Simba Oliveira is a 27 y.o. female and was seen today for follow-up of psychiatric condition and psychotropic medication management. HPI:    Mahendra Vu reports the following psychiatric symptoms by hx:  depression and anxiety. Overall symptoms have been present for months. Currently symptoms are of moderate/high severity. The symptoms occur somewhat more frequently due to recent stressors. Pt reports she is not taking any medications at this time. Met with pt via video telehelath for appt today. FAMILY/SOCIAL HX: work stressors    REVIEW OF SYSTEMS:  Psychiatric:  depression, anxiety  Appetite:decreased, working on weight loss strategies   Sleep: fitful at times  Neuro: no changes reported, hx migraines and TIA      Side Effects:  none    MENTAL STATUS EXAM:   Sensorium  oriented to time, place and person   Relations cooperative   Appearance:  age appropriate and casually dressed   Motor Behavior:  gait stable and within normal limits   Speech:  normal volume   Thought Process: goal directed   Thought Content free of delusions and free of hallucinations   Suicidal ideations no intention   Homicidal ideations no intention   Mood:  anxious and depressed   Affect:  anxious   Memory recent  adequate   Memory remote:  adequate   Concentration:  adequate   Abstraction:  abstract   Insight:  fair and good   Reliability good   Judgment:  good     MEDICAL DECISION MAKING:  Problems addressed today:    ICD-10-CM ICD-9-CM    1. Mild episode of recurrent major depressive disorder ([facility] [address]  F33.0 296.31    2. TASIA (generalized anxiety disorder)  F41.1 300.02    3. PTSD (post-traumatic stress disorder)  F43.10 309.81        Assessment:   Marsha is responding to treatment overall. She does experience intermittent exacerbated symptoms. Discussed options for medications and dosages. Pt prefers to continue to deal with symptoms using lifestyle interventions.  Reviewed treatment goals and target symptoms to monitor for. Plan:   1. Current Outpatient Medications   Medication Sig Dispense Refill    cholecalciferol, vitamin D3, (Vitamin D3) 50 mcg (2,000 unit) tab Take  by mouth.  AZO CRANBERRY 424-15-38 mg-mg-million tab Take  by mouth.  therapeutic multivitamin (THERAGRAN) tablet Take 1 Tab by mouth daily.  green tea leaf extract (Green Tea) 250 mg cap Take  by mouth.  elderberry fruit (ELDERBERRY PO) Take  by mouth.  BIOTIN PO Take  by mouth.  liraglutide, weight loss, (Saxenda) 3 mg/0.5 mL (18 mg/3 mL) pen 3 mg by SubCUTAneous route daily.  topiramate (TOPAMAX) 25 mg tablet Take 1 Tab by mouth daily (with breakfast). 30 Tab 2    Insulin Needles, Disposable, (BD ULTRA-FINE SHORT PEN NEEDLE) 31 gauge x 5/16\" ndle USE TO INJECT SAXENDA ONCE DAILY 100 Package 8    valACYclovir (VALTREX) 500 mg tablet       Blood-Glucose Meter (TRUERESULT BLOOD GLUCOSE SYSTM) monitoring kit Test blood sugar before breakfast, lunch dinner and before bedtime (Patient not taking: Reported on 3/18/2020) 1 Kit 11    glucose blood VI test strips (TRUETEST TEST STRIPS) strip Test blood sugar before breakfast, lunch, dinner and before bedtime. (Patient not taking: Reported on 12/29/2020) 100 Strip 11    Lancets misc Test blood sugar before breakfast, lunch, dinner and bedtime. 1 Package 11          medication changes made today: none    2. Counseling and coordination of care including instructions for treatment, risks/benefits, risk factor reduction and patient/family education. She agrees with the plan. Patient instructed to call with any side effects, questions or issues. 3.    Follow-up and Dispositions    · Return in about 3 months (around 6/30/2021).        PSYCHOTHERAPY: 20 minutes (total time spent 35-40 minutes)  TYPE: supportive, cognitive  FOCUS: coping with stressors, upcoming medical procedure for weight loss  PROGRESS: pt is progressing, she continues to focus on lifestyle factors for stress management, weight loss and anxiety/depression management. Discussed impact of stress on mental and medical well-being. Also reviewed impact of trauma and ACE's. Pt active and engaged. Chris Singleton, who was evaluated through a synchronous (real-time) audio-video encounter, and/or her healthcare decision maker, is aware that it is a billable service, with coverage as determined by her insurance carrier. She provided verbal consent to proceed: Yes, and patient identification was verified. This visit was conducted pursuant to the emergency declaration under the [facility], [address] [place] [address] authority and the [employer] Act. A caregiver was present when appropriate. Ability to conduct physical exam was limited. The patient was located in a state where the provider was credentialed to provide care.        3/30/2021  David Ortiz NP

## 2021-03-31 ENCOUNTER — DOCUMENTATION ONLY (OUTPATIENT)
Dept: BEHAVIORAL/MENTAL HEALTH CLINIC | Age: 31
End: 2021-03-31

## 2021-03-31 ENCOUNTER — VIRTUAL VISIT (OUTPATIENT)
Dept: FAMILY MEDICINE CLINIC | Age: 31
End: 2021-03-31
Payer: COMMERCIAL

## 2021-03-31 DIAGNOSIS — R73.03 PREDIABETES: ICD-10-CM

## 2021-03-31 DIAGNOSIS — Z71.89 ADVICE GIVEN ABOUT COVID-19 VIRUS INFECTION: ICD-10-CM

## 2021-03-31 DIAGNOSIS — E66.9 CLASS 2 OBESITY WITHOUT SERIOUS COMORBIDITY WITH BODY MASS INDEX (BMI) OF 37.0 TO 37.9 IN ADULT, UNSPECIFIED OBESITY TYPE: ICD-10-CM

## 2021-03-31 DIAGNOSIS — F43.10 PTSD (POST-TRAUMATIC STRESS DISORDER): ICD-10-CM

## 2021-03-31 DIAGNOSIS — Z02.89 ENCOUNTER TO OBTAIN EXCUSE FROM WORK: ICD-10-CM

## 2021-03-31 DIAGNOSIS — F41.1 GAD (GENERALIZED ANXIETY DISORDER): Primary | ICD-10-CM

## 2021-03-31 DIAGNOSIS — Z02.89 ENCOUNTER FOR COMPLETION OF FORM WITH PATIENT: ICD-10-CM

## 2021-03-31 DIAGNOSIS — F32.1 MODERATE MAJOR DEPRESSION (HCC): ICD-10-CM

## 2021-03-31 PROCEDURE — 99214 OFFICE O/P EST MOD 30 MIN: CPT | Performed by: FAMILY MEDICINE

## 2021-03-31 RX ORDER — TOPIRAMATE 25 MG/1
25 TABLET ORAL
Qty: 30 TAB | Refills: 2 | Status: SHIPPED | OUTPATIENT
Start: 2021-03-31 | End: 2021-06-28

## 2021-03-31 NOTE — PROGRESS NOTES
Sent Surgical Coordinator Yara Lopez a staff message regarding behavioral health provider note and office note. Nitin Strong responded  via staff message that she can view the note in [address]. Informed her to contact this nurse if she need further assistance.

## 2021-03-31 NOTE — PROGRESS NOTES
HISTORY OF PRESENT ILLNESS  Ct West is a 27 y.o. female. Unemployment ran out need to be recompleted , used to b letter e a CNA work, does get stressed out sees the psych and the counselor, had trouble w her boss, last day of work was March of 2020, patient presented with a form from [place] employment commission requesting certificate of health patient did current illness of major depression generalized anxiety disorder incoming gastric sleeve procedure in May 2021 patient currently not working. Probably benefit from a leave of absence regarding the upcoming surgical procedure patient is not incapacitated and patient is able to perform all work secondary to the patient procedure coming in May patient may be limited test September 2021 for pre and post surgical care otherwise denies any other complaint any other concern    Current Outpatient Medications   Medication Sig Dispense Refill    cholecalciferol, vitamin D3, (Vitamin D3) 50 mcg (2,000 unit) tab Take  by mouth.  AZO CRANBERRY 126-79-41 mg-mg-million tab Take  by mouth.  therapeutic multivitamin (THERAGRAN) tablet Take 1 Tab by mouth daily.  green tea leaf extract (Green Tea) 250 mg cap Take  by mouth.  elderberry fruit (ELDERBERRY PO) Take  by mouth.  BIOTIN PO Take  by mouth.  liraglutide, weight loss, (Saxenda) 3 mg/0.5 mL (18 mg/3 mL) pen 3 mg by SubCUTAneous route daily.  topiramate (TOPAMAX) 25 mg tablet Take 1 Tab by mouth daily (with breakfast).  30 Tab 2    Insulin Needles, Disposable, (BD ULTRA-FINE SHORT PEN NEEDLE) 31 gauge x 5/16\" ndle USE TO INJECT SAXENDA ONCE DAILY 100 Package 8    valACYclovir (VALTREX) 500 mg tablet       Blood-Glucose Meter (TRUERESULT BLOOD GLUCOSE SYSTM) monitoring kit Test blood sugar before breakfast, lunch dinner and before bedtime (Patient not taking: Reported on 3/18/2020) 1 Kit 11    glucose blood VI test strips (TRUETEST TEST STRIPS) strip Test blood sugar before breakfast, lunch, dinner and before bedtime. (Patient not taking: Reported on 12/29/2020) 100 Strip 11    Lancets misc Test blood sugar before breakfast, lunch, dinner and bedtime. 1 Package 11     Allergies   Allergen Reactions    Pcn [Penicillins] Rash     Past Medical History:   Diagnosis Date    BMI 38.0-38.9,adult 4/3/2018    BMI 40.0-44.9, adult ([facility] [address] 2/19/2018    Chronic back pain greater than 3 months duration 7/29/2015    TASIA (generalized anxiety disorder) 1/19/2017    Heat stroke     High-risk sexual behavior 2/23/2017    HX OTHER MEDICAL     chlamydia age 24, treated & negative now    Hypoglycemia 3/30/2016    Ill-defined condition     Insulinoma 4/6/2016    Morbid obesity ([address]     Obesity (BMI 35.0-39.9 without comorbidity) 9/12/2019    Syncopal seizure ([facility] [address] 11/19/2015    Thyromegaly 3/30/2016    Urinary frequency 11/19/2015     Past Surgical History:   Procedure Laterality Date    HX CHOLECYSTECTOMY  10/6/2014    lap.  tracee    HX DILATION AND CURETTAGE  6/2011    HX OTHER SURGICAL      D&C with SAB 6/2011    HX OTHER SURGICAL      wisdom extraction 15 years    RI REVISION CERVIX W PREG, [address]  2013     Family History   Problem Relation Age of Onset    Hypertension Mother     Diabetes Mother     High Cholesterol Father     Other Father         high cholesterole, herniated disc    Diabetes Paternal Aunt     Hypertension Maternal Grandmother     COPD Maternal Grandmother     Emphysema Maternal Grandmother     Diabetes Paternal Grandmother      Social History     Tobacco Use    Smoking status: Never Smoker    Smokeless tobacco: Never Used   Substance Use Topics    Alcohol use: No      Lab Results   Component Value Date/Time    WBC 5.5 09/04/2018 11:00 AM    HGB 13.5 09/04/2018 11:00 AM    Hemoglobin (POC) 12.9 06/06/2015 06:39 PM    HCT 41.3 09/04/2018 11:00 AM    Hematocrit (POC) 38 06/06/2015 06:39 PM    PLATELET 586 47/40/7375 11:00 AM    MCV 85 09/04/2018 11:00 AM    Hgb, External 12.4 10/14/2013    Hct, External 36.7 10/14/2013    Platelet cnt., External 304 10/14/2013     Lab Results   Component Value Date/Time    TSH 1.410 09/21/2018 10:57 AM    T4, Free 1.04 04/06/2016 09:31 AM      Lab Results   Component Value Date/Time    Glucose 71 09/21/2018 10:57 AM    Glucose (POC) 96 06/06/2015 06:39 PM    Glucose  04/06/2016 09:31 AM         Review of Systems   Constitutional: Positive for malaise/fatigue. Negative for chills and fever. HENT: Negative for nosebleeds. Eyes: Negative for pain. Respiratory: Negative for cough and wheezing. Cardiovascular: Negative for chest pain and leg swelling. Gastrointestinal: Negative for constipation, diarrhea and nausea. Genitourinary: Negative for frequency. Musculoskeletal: Negative for joint pain and myalgias. Skin: Negative for rash. Neurological: Negative for loss of consciousness and headaches. Endo/Heme/Allergies: Does not bruise/bleed easily. Psychiatric/Behavioral: Negative for depression. The patient is not nervous/anxious and does not have insomnia. All other systems reviewed and are negative. Physical Exam  Constitutional:       Appearance: She is obese. She is not ill-appearing or toxic-appearing. HENT:      Head: Normocephalic and atraumatic. Mouth/Throat:      Mouth: Mucous membranes are moist.   Neurological:      Mental Status: She is alert and oriented to person, place, and time. Psychiatric:         Mood and Affect: Mood normal.         Behavior: Behavior normal.         Thought Content: Thought content normal.         Judgment: Judgment normal.         ASSESSMENT and PLAN  Diagnoses and all orders for this visit:    1. TASIA (generalized anxiety disorder)  -     topiramate (TOPAMAX) 25 mg tablet; Take 1 Tab by mouth daily (with breakfast). 2. PTSD (post-traumatic stress disorder)  -     topiramate (TOPAMAX) 25 mg tablet;  Take 1 Tab by mouth daily (with breakfast). 3. Moderate major depression (HCC)  -     topiramate (TOPAMAX) 25 mg tablet; Take 1 Tab by mouth daily (with breakfast). 4. Encounter to obtain excuse from work  -     topiramate (TOPAMAX) 25 mg tablet; Take 1 Tab by mouth daily (with breakfast). 5. Encounter for completion of form with patient  -     topiramate (TOPAMAX) 25 mg tablet; Take 1 Tab by mouth daily (with breakfast). 6. Advice given about COVID-19 virus infection  -     topiramate (TOPAMAX) 25 mg tablet; Take 1 Tab by mouth daily (with breakfast). 7. Class 2 obesity without serious comorbidity with body mass index (BMI) of 37.0 to 37.9 in adult, unspecified obesity type  -     topiramate (TOPAMAX) 25 mg tablet; Take 1 Tab by mouth daily (with breakfast). 8. Prediabetes  -     topiramate (TOPAMAX) 25 mg tablet; Take 1 Tab by mouth daily (with breakfast). Secondary to the patient chronic medical conditions,   form was completed, w/ with multiple questions answered to the best knowledge will keep a copy in the chart for further correspondence patient was given signed completed patient was informed about the safety and  regulations regarding this condition patient was also advised to follow-up with SS for further guidelines patient acknowledged understanding and agreed with today's recommendations  Patient advised to have the mask on most of the time, social distance and handwashing avoid crowded area pursuant to the emergency declaration under the [address] and [place] [address] [facility] authority and the [address] and [employer] Act, this Virtual Visit was conducted, with patient's consent, to reduce the patient's risk of exposure to COVID-19 and provide continuity of care for an established patient  Services were provided through a Video synchronous discussion virtually to substitute for in-person appointment.

## 2021-03-31 NOTE — PROGRESS NOTES
Chief Complaint   Patient presents with    Documentation     1. Have you been to the ER, urgent care clinic since your last visit? Hospitalized since your last visit? No    2. Have you seen or consulted any other health care providers outside of the [address] [place] [address] since your last visit? Include any pap smears or colon screening. No    Call placed to pt. Verified patient with two type of identifiers. requesting social security documentation completed.

## 2021-04-01 ENCOUNTER — TELEPHONE (OUTPATIENT)
Dept: SURGERY | Age: 31
End: 2021-04-01

## 2021-04-01 ENCOUNTER — TELEPHONE (OUTPATIENT)
Dept: BEHAVIORAL/MENTAL HEALTH CLINIC | Age: 31
End: 2021-04-01

## 2021-04-01 NOTE — TELEPHONE ENCOUNTER
Patient identified with two patient identifiers. Patient is calling because she is currently on Ozempic for weight loss only. Per patient she is not diabetic. Patient questioning time frame to stop this medication prior to surgery. She is not currently scheduled but she heard that some medications have to be stopped 4 weeks prior and she does not want to miss her chance to move forward if scheduled sooner. Patient states she will stop now if needed. Patient informed I will discuss with NP and return call shortly. Patient expressed understanding. Per NP she will return call to patient tomorrow patient informed.

## 2021-04-20 ENCOUNTER — CLINICAL SUPPORT (OUTPATIENT)
Dept: SURGERY | Age: 31
End: 2021-04-20

## 2021-04-20 DIAGNOSIS — E66.01 MORBID OBESITY (HCC): Primary | ICD-10-CM

## 2021-04-21 NOTE — PROGRESS NOTES
[facility] at [facility]  Supervised Weight Loss     Date:   2021    Patient's Name: Mark Hdez  : 1990    Insurance:  [employer]                                 Session: 6 of  6  Surgery: Sleeve Gastrectomy                                 Surgeon: Pauline Goldsmith M.D.      Height: 60\"                 Reported Weight:    207      Lbs.                               BMI: 22             Pounds Lost since last month: 2               Pounds Gained since last month: 0#     Starting Weight: 204#                       Previous Months Weight: 209#  Overall Pounds Lost: 0                    Overall Pounds Gained: 3#     Other Pertinent Information: Today's appointment was completed in a virtual setting d/t COVID-19. Today's wt was self-reported. Smoking Status:  none  Alcohol Intake: none    I have reviewed with pt the guidelines of the supervised wt loss program.  Pt understands the expectations of some wt loss during the program and that wt gain could delay the process. I have also explained that appointments need to be consecutive and missing an appointment may result in starting over. Pt has received this information in writing. Changes that patient has made since last month include:  More active, more water, better food choices, using hobbies in place of emotional eating. Eating Habits and Behaviors  General healthy eating guidelines were also discussed. Pts were instructed that their plate should be made up 1/2 plate coming from non-starchy vegetables, 1/4 coming from lean meat, and 1/4 of their plate coming from carbohydrates, including fruits, starches, or milk. We discussed measuring meals to 1/2 cup total per meal after surgery. Drinking only calorie-free, sugar-free and non-carbonated beverages. We discussed the importance of drinking 64 ounces of fluid per day to prevent dehydration post-operatively.                        Patient's current diet habits include: Pt is eating 2-3 meals per day. Snack choices include peanut butter crackers, fruit. Pt is eating refined carbohydrate foods (bread, pasta, rice, potatoes) sometimes. Pt is eating sweets/desserts once per week. Pt is using baked, grilled, air fried, pan sauteed cooking methods. Pt is eating meals prepared outside of the home 2 times per month. Pt is drinking water and diet tea. Pt reports rarely emotional eating. Physical Activity/Exercise  An exercise presentation was provided including information about exercise programs available both before and after surgery. We talked about the importance of increasing daily physical activity and beginning to develop an exercise regimen/routine. We talked about exercise as being an important part of long term weight loss after surgery. Comments:  During class, I discussed with patient the importance of getting into an exercise routine. Pt is currently walking and biking for activity. Pt has been encouraged to maintain and increase as tolerated. Behavior Modification       We talked about how to eat more mindfully. Tips and recommendations for how to make these changes were provided. Pt was encouraged to keep a food journal and record what they were taking in daily. Overall Assessment: Pt demonstrates appropriate lifestyle changes evidenced by reported changes and reported wt loss this past month. Demonstrates understanding of basic nutrition guidelines and appears to be an appropriate candidate for surgery at this time. Patient-Set Goals:   1. Nutrition - continue with healthier food choices  2. Exercise - continue with exercise   3.  Behavior -continue to manage stress eating    Aleyda Fleming, RD  4/21/2021

## 2021-05-12 ENCOUNTER — OFFICE VISIT (OUTPATIENT)
Dept: SURGERY | Age: 31
End: 2021-05-12
Payer: COMMERCIAL

## 2021-05-12 VITALS
HEIGHT: 60 IN | DIASTOLIC BLOOD PRESSURE: 71 MMHG | SYSTOLIC BLOOD PRESSURE: 116 MMHG | HEART RATE: 112 BPM | BODY MASS INDEX: 41.03 KG/M2 | TEMPERATURE: 97.9 F | RESPIRATION RATE: 18 BRPM | WEIGHT: 209 LBS | OXYGEN SATURATION: 99 %

## 2021-05-12 DIAGNOSIS — E66.01 MORBID OBESITY WITH BMI OF 40.0-44.9, ADULT (HCC): Primary | ICD-10-CM

## 2021-05-12 PROCEDURE — 99214 OFFICE O/P EST MOD 30 MIN: CPT | Performed by: SURGERY

## 2021-05-12 NOTE — LETTER
5/24/2021 Patient: Aura Christal YOB: 1990 Date of Visit: 5/12/2021 Daria Parada MD 
[address] [place] [address] [address] [employer] Dear Daria Parada MD, Thank you for referring Ms. Josh Treadwell [facility]. My notes for this consultation are attached. If you have questions, please do not hesitate to call me. I look forward to following your patient along with you. Sincerely, Joo Parker MD

## 2021-05-12 NOTE — PROGRESS NOTES
1. Have you been to the ER, urgent care clinic since your last visit? Hospitalized since your last visit? No    2. Have you seen or consulted any other health care providers outside of the [address] [place] [address] since your last visit? Include any pap smears or colon screening.  No

## 2021-05-17 ENCOUNTER — TELEPHONE (OUTPATIENT)
Dept: SURGERY | Age: 31
End: 2021-05-17

## 2021-05-17 NOTE — TELEPHONE ENCOUNTER
Returned call to Ms Rajesh Lopez verified all PHI. Patient is calling to discuss the Liver Shrinking diet. I informed patient the NP will discuss whenever she is scheduled for follow up.  Meanwhile she is to continue the recommendations suggested by the nutritionist.

## 2021-05-17 NOTE — TELEPHONE ENCOUNTER
Patient called stating she wanted to find out if she would be required to do the liver shrinkage diet due to her weight and expedited her surgery.

## 2021-05-20 ENCOUNTER — TELEPHONE (OUTPATIENT)
Dept: SURGERY | Age: 31
End: 2021-05-20

## 2021-05-20 NOTE — TELEPHONE ENCOUNTER
Patient would like a call back from the nurse to discuss why her insurance has not received he information yet for her surgery.

## 2021-05-20 NOTE — TELEPHONE ENCOUNTER
Returned call to patient. Two patient identifiers used. Patient is calling with concerns that her request for surgery has not been sent to her insurance company. Patient stated she was told by a nurse that Dr. Gissel Mejias notes weren't complete. Assured patient  Notes are complete in system. Patient is concerned that surgery will conflict with her court date. Explained to patient I would send her message to Ignacio Garza or Augie Fulton to reach out to her regarding next steps with her insurance submission. Patient in agreement.

## 2021-05-24 NOTE — PROGRESS NOTES
Bariatric Surgery Consult    Fausto Santos is a 27 y.o. female with a history of morbid obesity. Her Height: 5' (152.4 cm), Weight: 209 lb (94.8 kg). Body mass index is 40.82 kg/m². She reports that she has been trying to lose weight for 5 years. Her maximum weight was 220 pounds. She has attended our bariatric surgery information seminar. Madhav Lanza wants to consider laparoscopic sleeve gastrectomy. Pt is referred by:  Guerrero York MD.      Comorbidities:     Bariatric comorbidities present: non-insulin dependent diabetes and obstructive sleep apnea    Ambulatory status: independent    The patient's reported level of exercise: moderately active.       Patient Active Problem List    Diagnosis Date Noted    Obesity (BMI 35.0-39.9 without comorbidity) 09/12/2019    PTSD (post-traumatic stress disorder) 10/11/2018    Moderate major depression ([address] 09/10/2018    BMI 36.0-36.9,adult 04/03/2018    Obesity 02/19/2018    BMI 40.0-44.9, adult ([address] 02/19/2018    Prediabetes 12/14/2017    High-risk sexual behavior 02/23/2017    TASIA (generalized anxiety disorder) 01/19/2017    Insulinoma 04/06/2016    Hypoglycemia 03/30/2016    Thyromegaly 03/30/2016    Urinary frequency 11/19/2015    Chronic back pain greater than 3 months duration 07/29/2015    Acute cholecystitis 09/25/2014    Supervision of other normal pregnancy 09/17/2013    History of pregnancy loss in prior pregnancy, currently pregnant 09/13/2013    Incompetent cervix 09/13/2013     Past Medical History:   Diagnosis Date    BMI 38.0-38.9,adult 4/3/2018    BMI 40.0-44.9, adult ([address] 2/19/2018    Chronic back pain greater than 3 months duration 7/29/2015    TASIA (generalized anxiety disorder) 1/19/2017    Heat stroke     High-risk sexual behavior 2/23/2017    HX OTHER MEDICAL     chlamydia age 24, treated & negative now    Hypoglycemia 3/30/2016    Ill-defined condition     Insulinoma 4/6/2016    Morbid obesity ([address]     Obesity (BMI 35.0-39.9 without comorbidity) 9/12/2019    Syncopal seizure ([address] 11/19/2015    Thyromegaly 3/30/2016    Urinary frequency 11/19/2015      Past Surgical History:   Procedure Laterality Date    HX CHOLECYSTECTOMY  10/6/2014    lap. tracee    HX DILATION AND CURETTAGE  6/2011    HX OTHER SURGICAL      D&C with SAB 6/2011    HX OTHER SURGICAL      wisdom extraction 15 years    [place] REVISION CERVIX [address] [facility], [address]  2013      Social History     Tobacco Use    Smoking status: Never Smoker    Smokeless tobacco: Never Used   Substance Use Topics    Alcohol use: No      Family History   Problem Relation Age of Onset    Hypertension Mother     Diabetes Mother     High Cholesterol Father     Other Father         high cholesterole, herniated disc    Diabetes Paternal Aunt     Hypertension Maternal Grandmother     COPD Maternal Grandmother     Emphysema Maternal Grandmother     Diabetes Paternal Grandmother       . Current Outpatient Medications   Medication Sig    cholecalciferol, vitamin D3, (Vitamin D3) 50 mcg (2,000 unit) tab Take  by mouth.  BIOTIN PO Take  by mouth.  Insulin Needles, Disposable, (BD ULTRA-FINE SHORT PEN NEEDLE) 31 gauge x 5/16\" ndle USE TO INJECT SAXENDA ONCE DAILY    glucose blood VI test strips (TRUETEST TEST STRIPS) strip Test blood sugar before breakfast, lunch, dinner and before bedtime.  Lancets misc Test blood sugar before breakfast, lunch, dinner and bedtime.  topiramate (TOPAMAX) 25 mg tablet Take 1 Tab by mouth daily (with breakfast).  AZO CRANBERRY 556-58-86 mg-mg-million tab Take  by mouth.  therapeutic multivitamin (THERAGRAN) tablet Take 1 Tab by mouth daily.  green tea leaf extract (Green Tea) 250 mg cap Take  by mouth.  elderberry fruit (ELDERBERRY PO) Take  by mouth.  liraglutide, weight loss, (Saxenda) 3 mg/0.5 mL (18 mg/3 mL) pen 3 mg by SubCUTAneous route daily.     valACYclovir (VALTREX) 500 mg tablet     Blood-Glucose Meter (TRUERESULT BLOOD GLUCOSE SYSTM) monitoring kit Test blood sugar before breakfast, lunch dinner and before bedtime (Patient not taking: Reported on 3/18/2020)     No current facility-administered medications for this visit. Allergies   Allergen Reactions    Pcn [Penicillins] Rash         Review of Systems:    Constitutional: negative  Ears, Nose, Mouth, Throat, and Face: negative  Respiratory: negative  Cardiovascular: negative  Gastrointestinal: negative  Genitourinary:negative  Integument/Breast: negative  Hematologic/Lymphatic: negative  Musculoskeletal:negative  Neurological: negative  Behavioral/Psychiatric: negative  Endocrine: negative  Allergic/Immunologic: negative    Objective:     Visit Vitals  /71 (BP 1 Location: Right arm, BP Patient Position: Sitting, BP Cuff Size: Large adult)   Pulse (!) 112   Temp 97.9 °F (36.6 °C) (Oral)   Resp 18   Ht 5' (1.524 m)   Wt 209 lb (94.8 kg)   SpO2 99%   BMI 40.82 kg/m²        Physical Exam:    General:  alert, no distress, morbidly obese   Eyes:  conjunctivae and sclerae normal, pupils equal, round, reactive to light, extraocular movements intact without nystagmus   Throat & Neck: no erythema or exudates noted and neck supple and symmetrical; no palpable masses   Lungs:   clear to auscultation bilaterally   Heart:  Regular rate and rhythm   Abdomen:   obese, soft, nontender, nondistended, no masses or organomegaly,    Extremities: no edema,  no gait disturbances   Skin: Normal.     Upper GI-Preliminary examination KUB is unremarkable. Double contrast upper GI is performed. 15 images were obtained, fluoroscopy time  is 1.6 minutes. The a swallowing mechanism is normal.  The esophagus showed no stricture, extravasation or filling defect, swallowing  of the barium tablet did not show any stasis. Stomach and duodenum appear unremarkable.     IMPRESSION  IMPRESSION: No significant abnormalities. Assessment:     1.  Morbid obesity (Body mass index is 40.82 kg/m².) with multiple comorbidities. The patient meets criteria established by the NIH for weight loss surgery candidates. Without weight reduction, co-morbidities will escalate as well as increase risk of early mortality. Our recommendation is the patient could be served with laparoscopic sleeve gastrectomy. I explained to the patient differences between laparoscopic gastric bypass, laparoscopic adjustable gastric banding, and laparoscopic vertical sleeve gastrectomy with respect to expected weight loss, resolution of comorbidities and risks. Ms. Cuevas Never has attended one our informational meetings and has seen our educational materials. She has requested Dr. Griselda Vaughan to perform her procedure. I reviewed the role for this procedure as a tool to help her achieve her weight loss goals. I reminded her that effective weight loss comes from lifelong adherence to changes in dietary choices, eating habits and exercise. Recommendation: We will request approval for laparoscopic sleeve gastrectomy. She appears to be a good candidate for laparoscopic sleeve gastrectomy and we will move forward with approval.    Signed By: Andrea Daugherty MD     May 24, 2021       Greater than half of the time: 30 minutes was used in counciling the patient about bariatric surgery and the steps she needs to take to move forward with her surgery. Ms. Cuevas Never has a reminder for a \"due or due soon\" health maintenance. I have asked that she contact her primary care provider for follow-up on this health maintenance.

## 2021-06-09 ENCOUNTER — TELEPHONE (OUTPATIENT)
Dept: SURGERY | Age: 31
End: 2021-06-09

## 2021-06-09 NOTE — TELEPHONE ENCOUNTER
Patient would like to know if she can she take any weight loss medication. She has a specific one she would like to take and wants to discuss that with the nurse. Please return call.

## 2021-06-09 NOTE — TELEPHONE ENCOUNTER
Returned call to Ms Kya Kan verified all PHI. Patient wants to know if she can take weight loss medication phentermine prior to having LSG Surgery. I informed patient I will speak with the NP call her back.

## 2021-06-18 ENCOUNTER — TELEPHONE (OUTPATIENT)
Dept: SURGERY | Age: 31
End: 2021-06-18

## 2021-06-18 NOTE — TELEPHONE ENCOUNTER
Spoke with patient who wanted to know if she can start eating foods according to diet before surgery than start liver shrinking 2 weeks before surgery to get her mind and body ready for surgery. Yes it's okay. Start liver shrinking diet 2 weeks before surgery. Uday Valle

## 2021-06-28 ENCOUNTER — HOSPITAL ENCOUNTER (OUTPATIENT)
Dept: GENERAL RADIOLOGY | Age: 31
Discharge: HOME | End: 2021-06-28
Attending: SURGERY
Payer: COMMERCIAL

## 2021-06-28 ENCOUNTER — TELEPHONE (OUTPATIENT)
Dept: SURGERY | Age: 31
End: 2021-06-28

## 2021-06-28 ENCOUNTER — HOSPITAL ENCOUNTER (OUTPATIENT)
Dept: PREADMISSION TESTING | Age: 31
Discharge: HOME | End: 2021-06-28
Payer: COMMERCIAL

## 2021-06-28 VITALS
BODY MASS INDEX: 39.21 KG/M2 | WEIGHT: 207.67 LBS | DIASTOLIC BLOOD PRESSURE: 74 MMHG | TEMPERATURE: 98 F | HEIGHT: 61 IN | RESPIRATION RATE: 20 BRPM | SYSTOLIC BLOOD PRESSURE: 114 MMHG | HEART RATE: 76 BPM

## 2021-06-28 LAB
25(OH)D3 SERPL-MCNC: 30.3 NG/ML (ref 30–100)
ALBUMIN SERPL-MCNC: 3.5 G/DL (ref 3.5–5)
ALBUMIN/GLOB SERPL: 1.1 {RATIO} (ref 1.1–2.2)
ALP SERPL-CCNC: 69 U/L (ref 45–117)
ALT SERPL-CCNC: 30 U/L (ref 12–78)
ANION GAP SERPL CALC-SCNC: 5 MMOL/L (ref 5–15)
AST SERPL-CCNC: 26 U/L (ref 15–37)
BASOPHILS # BLD: 0 K/UL (ref 0–0.1)
BASOPHILS NFR BLD: 1 % (ref 0–1)
BILIRUB SERPL-MCNC: 0.3 MG/DL (ref 0.2–1)
BUN SERPL-MCNC: 7 MG/DL (ref 6–20)
BUN/CREAT SERPL: 12 (ref 12–20)
CALCIUM SERPL-MCNC: 9.1 MG/DL (ref 8.5–10.1)
CHLORIDE SERPL-SCNC: 108 MMOL/L (ref 97–108)
CO2 SERPL-SCNC: 26 MMOL/L (ref 21–32)
CREAT SERPL-MCNC: 0.6 MG/DL (ref 0.55–1.02)
DIFFERENTIAL METHOD BLD: ABNORMAL
EOSINOPHIL # BLD: 0.2 K/UL (ref 0–0.4)
EOSINOPHIL NFR BLD: 2 % (ref 0–7)
ERYTHROCYTE [DISTWIDTH] IN BLOOD BY AUTOMATED COUNT: 14.8 % (ref 11.5–14.5)
EST. AVERAGE GLUCOSE BLD GHB EST-MCNC: 111 MG/DL
GLOBULIN SER CALC-MCNC: 3.1 G/DL (ref 2–4)
GLUCOSE SERPL-MCNC: 82 MG/DL (ref 65–100)
HBA1C MFR BLD: 5.5 % (ref 4–5.6)
HCT VFR BLD AUTO: 38.6 % (ref 35–47)
HGB BLD-MCNC: 12.3 G/DL (ref 11.5–16)
IMM GRANULOCYTES # BLD AUTO: 0 K/UL (ref 0–0.04)
IMM GRANULOCYTES NFR BLD AUTO: 1 % (ref 0–0.5)
INR PPP: 1 (ref 0.9–1.1)
IRON SERPL-MCNC: 83 UG/DL (ref 35–150)
LYMPHOCYTES # BLD: 2.2 K/UL (ref 0.8–3.5)
LYMPHOCYTES NFR BLD: 29 % (ref 12–49)
MCH RBC QN AUTO: 27 PG (ref 26–34)
MCHC RBC AUTO-ENTMCNC: 31.9 G/DL (ref 30–36.5)
MCV RBC AUTO: 84.8 FL (ref 80–99)
MONOCYTES # BLD: 0.9 K/UL (ref 0–1)
MONOCYTES NFR BLD: 12 % (ref 5–13)
NEUTS SEG # BLD: 4.2 K/UL (ref 1.8–8)
NEUTS SEG NFR BLD: 55 % (ref 32–75)
NRBC # BLD: 0 K/UL (ref 0–0.01)
NRBC BLD-RTO: 0 PER 100 WBC
PLATELET # BLD AUTO: 303 K/UL (ref 150–400)
PMV BLD AUTO: 10.5 FL (ref 8.9–12.9)
POTASSIUM SERPL-SCNC: 4.3 MMOL/L (ref 3.5–5.1)
PROT SERPL-MCNC: 6.6 G/DL (ref 6.4–8.2)
PROTHROMBIN TIME: 10.3 SEC (ref 9–11.1)
RBC # BLD AUTO: 4.55 M/UL (ref 3.8–5.2)
SODIUM SERPL-SCNC: 139 MMOL/L (ref 136–145)
TSH SERPL DL<=0.05 MIU/L-ACNC: 2.23 UIU/ML (ref 0.36–3.74)
WBC # BLD AUTO: 7.5 K/UL (ref 3.6–11)

## 2021-06-28 PROCEDURE — 83036 HEMOGLOBIN GLYCOSYLATED A1C: CPT

## 2021-06-28 PROCEDURE — 84443 ASSAY THYROID STIM HORMONE: CPT

## 2021-06-28 PROCEDURE — 86677 HELICOBACTER PYLORI ANTIBODY: CPT

## 2021-06-28 PROCEDURE — 80053 COMPREHEN METABOLIC PANEL: CPT

## 2021-06-28 PROCEDURE — 85025 COMPLETE CBC W/AUTO DIFF WBC: CPT

## 2021-06-28 PROCEDURE — 82306 VITAMIN D 25 HYDROXY: CPT

## 2021-06-28 PROCEDURE — 85610 PROTHROMBIN TIME: CPT

## 2021-06-28 PROCEDURE — 83540 ASSAY OF IRON: CPT

## 2021-06-28 PROCEDURE — 71046 X-RAY EXAM CHEST 2 VIEWS: CPT

## 2021-06-28 PROCEDURE — 93005 ELECTROCARDIOGRAM TRACING: CPT

## 2021-06-28 PROCEDURE — 36415 COLL VENOUS BLD VENIPUNCTURE: CPT

## 2021-06-28 RX ORDER — VITAMIN E 268 MG
400 CAPSULE ORAL DAILY
COMMUNITY
End: 2022-05-05 | Stop reason: ALTCHOICE

## 2021-06-28 RX ORDER — CHOLECALCIFEROL (VITAMIN D3) 50 MCG
1 CAPSULE ORAL DAILY
COMMUNITY

## 2021-06-28 RX ORDER — CALCIUM CARBONATE/VITAMIN D3 600 MG-125
TABLET ORAL DAILY
COMMUNITY

## 2021-06-28 NOTE — PERIOP NOTES
Patient given surgical site infection FAQ handout and CHG soap. Preop instructions reviewed and patient verbalizes understanding of instructions. Patient has been given the opportunity to ask additional questions. Patient states she received first dose of Parvez Serve in June, second dose due July 17th. Patient does not have card.

## 2021-06-28 NOTE — TELEPHONE ENCOUNTER
Returned call to patient. Verified ID x 2.  States since calling the office, she has been able to view lab results on [employer]. States she would like to confirm with Milli Robles that everything is ok. Confirms ok to send message via [employer]. Will forward to [employer].

## 2021-06-29 LAB
ATRIAL RATE: 60 BPM
CALCULATED P AXIS, ECG09: -10 DEGREES
CALCULATED R AXIS, ECG10: 17 DEGREES
CALCULATED T AXIS, ECG11: -6 DEGREES
DIAGNOSIS, 93000: NORMAL
H PYLORI IGM SER-ACNC: <9 UNITS (ref 0–8.9)
P-R INTERVAL, ECG05: 140 MS
Q-T INTERVAL, ECG07: 392 MS
QRS DURATION, ECG06: 76 MS
QTC CALCULATION (BEZET), ECG08: 392 MS
VENTRICULAR RATE, ECG03: 60 BPM

## 2021-07-08 ENCOUNTER — TELEPHONE (OUTPATIENT)
Dept: SURGERY | Age: 31
End: 2021-07-08

## 2021-07-08 NOTE — TELEPHONE ENCOUNTER
Pt called and stated that in her [employer] Account her  is incorrect and needs to be changed. Pt stated that she tried to change it herself but was not able to do it. Please return call.

## 2021-07-08 NOTE — TELEPHONE ENCOUNTER
Pt called verified using to pt identifiers. She was made aware of the information below regarding contact [address] regarding the [employer]. She mentioned that she had spoken with her earlier and forgot to ask her about that issue. She will reach out to her again regarding this.

## 2021-07-08 NOTE — TELEPHONE ENCOUNTER
Spoke with Maria Esther in the Bariatric Dept regarding the pts question. She informed that she will have Shabana know about this and have her look into the matter to be corrected. She was not in the office today. Pt was called and left message to call the office back.

## 2021-07-09 ENCOUNTER — TELEPHONE (OUTPATIENT)
Dept: SURGERY | Age: 31
End: 2021-07-09

## 2021-07-09 NOTE — TELEPHONE ENCOUNTER
Returned call to pt verified using 2 pt identifiers. Pt wanted to discuss with Dr. Mariya Bennett on Monday about doing the Bypass versus the Lap sleeve. She was informed that he was in the OR today but will make notation in the chart to make him aware of this call.

## 2021-07-12 ENCOUNTER — OFFICE VISIT (OUTPATIENT)
Dept: SURGERY | Age: 31
End: 2021-07-12
Payer: COMMERCIAL

## 2021-07-12 VITALS
SYSTOLIC BLOOD PRESSURE: 103 MMHG | DIASTOLIC BLOOD PRESSURE: 67 MMHG | WEIGHT: 208 LBS | BODY MASS INDEX: 39.27 KG/M2 | RESPIRATION RATE: 18 BRPM | TEMPERATURE: 98.2 F | HEIGHT: 61 IN | HEART RATE: 91 BPM | OXYGEN SATURATION: 98 %

## 2021-07-12 DIAGNOSIS — E66.01 MORBID OBESITY (HCC): Primary | ICD-10-CM

## 2021-07-12 PROCEDURE — 99213 OFFICE O/P EST LOW 20 MIN: CPT | Performed by: SURGERY

## 2021-07-12 NOTE — PROGRESS NOTES
1. Have you been to the ER, urgent care clinic since your last visit? Hospitalized since your last visit? No      2. Have you seen or consulted any other health care providers outside of the [address] [place] [address] since your last visit? Include any pap smears or colon screening.  No

## 2021-07-12 NOTE — PROGRESS NOTES
[employer] [facility] Surgery History and Physical    History of Present Illness:      Lisa Haynes is a 27 y.o. female who has been approved for laparoscopic sleeve gastrectomy. She has been through the necessary preoperative education and is now ready for surgery. Past Medical History:   Diagnosis Date    BMI 38.0-38.9,adult 4/3/2018    BMI 40.0-44.9, adult ([address] 2/19/2018    Chronic back pain greater than 3 months duration 7/29/2015    COVID-19 vaccine series not completed     [address] JUNE 2021, SECOND DOSE DUE JULY 17, 2021. SHE DOES NOT HAVE CARD    TASIA (generalized anxiety disorder) 1/19/2017    Heat stroke     High-risk sexual behavior 2/23/2017    HX OTHER MEDICAL     chlamydia age 24, treated & negative now    Hypoglycemia 3/30/2016    Ill-defined condition     Insulinoma 4/6/2016    Morbid obesity ([address]     Obesity (BMI 35.0-39.9 without comorbidity) 9/12/2019    Psychiatric disorder     ANXIETY AND DEPRESSION    Stroke ([facility] 2020    NO RESIDUAL    Syncopal seizure ([facility] 11/19/2015    Thyromegaly 3/30/2016    Urinary frequency 11/19/2015       Past Surgical History:   Procedure Laterality Date    HX CHOLECYSTECTOMY  10/6/2014    lap. tracee    HX DILATION AND CURETTAGE  6/2011    HX HEENT      WISDOM TEETH REMOVED    HX OTHER SURGICAL      D&C with SAB 6/2011    [place] [facility]  2013         Current Outpatient Medications:     B.infantis-B.ani-B.long-B.bifi (Probiotic 4X) 10-15 mg TbEC, Take  by mouth daily. , Disp: , Rfl:     calcium-cholecalciferol, d3, (CALCIUM 600 + D) 600-125 mg-unit tab, Take  by mouth daily. , Disp: , Rfl:     vitamin E (AQUA GEMS) 400 unit capsule, Take 400 Units by mouth daily. , Disp: , Rfl:     AZO CRANBERRY 272-09-93 mg-mg-million tab, Take  by mouth daily. , Disp: , Rfl:     therapeutic multivitamin (THERAGRAN) tablet, Take 1 Tab by mouth daily. , Disp: , Rfl:    BIOTIN PO, Take  by mouth daily. , Disp: , Rfl:     Allergies   Allergen Reactions    Pcn [Penicillins] Rash       Social History     Socioeconomic History    Marital status: SINGLE     Spouse name: Not on file    Number of children: Not on file    Years of education: Not on file    Highest education level: Not on file   Occupational History    Not on file   Tobacco Use    Smoking status: Never Smoker    Smokeless tobacco: Never Used   Vaping Use    Vaping Use: Never used   Substance and Sexual Activity    Alcohol use: Yes     Comment: OCCASIONALLY    Drug use: Yes     Types: Marijuana     Comment: LAST TIME 6-27-21    Sexual activity: Not Currently     Partners: Male     Birth control/protection: Injection   Other Topics Concern    Not on file   Social History Narrative    Not on file     Social Determinants of Health     Financial Resource Strain:     Difficulty of Paying Living Expenses:    Food Insecurity:     Worried About Running Out of Food in the Last Year:     Ran Out of Food in the Last Year:    Transportation Needs:     Lack of Transportation (Medical):      Lack of Transportation (Non-Medical):    Physical Activity:     Days of Exercise per Week:     Minutes of Exercise per Session:    Stress:     Feeling of Stress :    Social Connections:     Frequency of Communication with Friends and Family:     Frequency of Social Gatherings with Friends and Family:     Attends Sikhism Services:     Active Member of Clubs or Organizations:     Attends Club or Organization Meetings:     Marital Status:    Intimate Partner Violence:     Fear of Current or Ex-Partner:     Emotionally Abused:     Physically Abused:     Sexually Abused:        Family History   Problem Relation Age of Onset    Hypertension Mother     High Cholesterol Father     Other Father         high cholesterole, herniated disc    Diabetes Paternal Aunt     Hypertension Maternal Grandmother     COPD Maternal Grandmother     Emphysema Maternal Grandmother     Diabetes Paternal Grandmother     No Known Problems Sister     No Known Problems Brother     Anesth Problems Neg Hx        ROS   Constitutional: negative  Ears, Nose, Mouth, Throat, and Face: negative  Respiratory: negative  Cardiovascular: negative  Gastrointestinal: negative  Genitourinary:negative  Integument/Breast: negative  Hematologic/Lymphatic: negative  Behavioral/Psychiatric: negative  Allergic/Immunologic: negative      Physical Exam:     Visit Vitals  /67 (BP 1 Location: Left upper arm, BP Patient Position: Sitting, BP Cuff Size: Adult)   Pulse 91   Temp 98.2 °F (36.8 °C) (Oral)   Resp 18   Ht 5' 1\" (1.549 m)   Wt 208 lb (94.3 kg)   SpO2 98%   BMI 39.30 kg/m²       General - alert and oriented, no apparent distress  HEENT - no jaundice, no hearing imparement  Pulm - CTAB, no C/W/R  CV - RRR, no M/R/G  Abd -soft, nondistended, bowel sounds present, nontender to palpation  Ext - pulses intact in UE and LE bilaterally, no edema  Skin - supple, no rashes  Psychiatric - normal affect, good mood    Labs  Lab Results   Component Value Date/Time    Sodium 139 06/28/2021 10:49 AM    Potassium 4.3 06/28/2021 10:49 AM    Chloride 108 06/28/2021 10:49 AM    CO2 26 06/28/2021 10:49 AM    Anion gap 5 06/28/2021 10:49 AM    Glucose 82 06/28/2021 10:49 AM    BUN 7 06/28/2021 10:49 AM    Creatinine 0.60 06/28/2021 10:49 AM    BUN/Creatinine ratio 12 06/28/2021 10:49 AM    GFR est AA >60 06/28/2021 10:49 AM    GFR est non-AA >60 06/28/2021 10:49 AM    Calcium 9.1 06/28/2021 10:49 AM    Bilirubin, total 0.3 06/28/2021 10:49 AM    Alk.  phosphatase 69 06/28/2021 10:49 AM    Protein, total 6.6 06/28/2021 10:49 AM    Albumin 3.5 06/28/2021 10:49 AM    Globulin 3.1 06/28/2021 10:49 AM    A-G Ratio 1.1 06/28/2021 10:49 AM    ALT (SGPT) 30 06/28/2021 10:49 AM    AST (SGOT) 26 06/28/2021 10:49 AM     Lab Results   Component Value Date/Time    WBC 7.5 06/28/2021 10:49 AM Hemoglobin (POC) 12.9 06/06/2015 06:39 PM    HGB 12.3 06/28/2021 10:49 AM    Hematocrit (POC) 38 06/06/2015 06:39 PM    HCT 38.6 06/28/2021 10:49 AM    PLATELET 517 32/61/4751 10:49 AM    MCV 84.8 06/28/2021 10:49 AM    Hgb, External 12.4 10/14/2013 12:00 AM    Hct, External 36.7 10/14/2013 12:00 AM    Platelet cnt., External 304 10/14/2013 12:00 AM         Imaging  Chest x-ray-normal  I have reviewed and agree with all of the pertinent images    Upper GI-Preliminary examination KUB is unremarkable. Double contrast upper GI is performed. 15 images were obtained, fluoroscopy time  is 1.6 minutes. The a swallowing mechanism is normal.  The esophagus showed no stricture, extravasation or filling defect, swallowing  of the barium tablet did not show any stasis. Stomach and duodenum appear unremarkable.     IMPRESSION  IMPRESSION: No significant abnormalities. Assessment:     Andria Roque is a 27 y.o. female with severe obesity with comorbidities    Recommendations:     1. She is now ready to proceed forward with laparoscopic sleeve gastrectomy. I have discussed the above procedure with the patient in detail. We reviewed the benefits and possible complications of the surgery which include bleeding, infection, damage to adjacent organs, venous thromboembolism, need for repeat surgery, death and other unforseen complications. The patient agreed to proceed with the surgery. Susy Guaman MD    Greater than half of the time: 20 minutes was used in counciling the patient about diagnosis and treatment plan    Ms. Josie Tanner has a reminder for a \"due or due soon\" health maintenance. I have asked that she contact her primary care provider for follow-up on this health maintenance.

## 2021-07-12 NOTE — H&P (VIEW-ONLY)
[facility] at [facility] Surgery History and Physical    History of Present Illness:      Nguyễn Cervantes is a 27 y.o. female who has been approved for laparoscopic sleeve gastrectomy. She has been through the necessary preoperative education and is now ready for surgery. Past Medical History:   Diagnosis Date    BMI 38.0-38.9,adult 4/3/2018    BMI 40.0-44.9, adult ([address] 2/19/2018    Chronic back pain greater than 3 months duration 7/29/2015    COVID-19 vaccine series not completed     [address] JUNE 2021, SECOND DOSE DUE JULY 17, 2021. SHE DOES NOT HAVE CARD    TASIA (generalized anxiety disorder) 1/19/2017    Heat stroke     High-risk sexual behavior 2/23/2017    HX OTHER MEDICAL     chlamydia age 24, treated & negative now    Hypoglycemia 3/30/2016    Ill-defined condition     Insulinoma 4/6/2016    Morbid obesity ([address]     Obesity (BMI 35.0-39.9 without comorbidity) 9/12/2019    Psychiatric disorder     ANXIETY AND DEPRESSION    Stroke ([address] 2020    NO RESIDUAL    Syncopal seizure ([address] 11/19/2015    Thyromegaly 3/30/2016    Urinary frequency 11/19/2015       Past Surgical History:   Procedure Laterality Date    HX CHOLECYSTECTOMY  10/6/2014    lap. tracee    HX DILATION AND CURETTAGE  6/2011    HX HEENT      WISDOM TEETH REMOVED    HX OTHER SURGICAL      D&C with SAB 6/2011    VT REVISION CERVIX Alfred Settles [address]  2013         Current Outpatient Medications:     B.infantis-B.ani-B.long-B.bifi (Probiotic 4X) 10-15 mg TbEC, Take  by mouth daily. , Disp: , Rfl:     calcium-cholecalciferol, d3, (CALCIUM 600 + D) 600-125 mg-unit tab, Take  by mouth daily. , Disp: , Rfl:     vitamin E (AQUA GEMS) 400 unit capsule, Take 400 Units by mouth daily. , Disp: , Rfl:     AZO CRANBERRY 981-20-78 mg-mg-million tab, Take  by mouth daily. , Disp: , Rfl:     therapeutic multivitamin (THERAGRAN) tablet, Take 1 Tab by mouth daily. , Disp: , Rfl:    BIOTIN PO, Take  by mouth daily. , Disp: , Rfl:     Allergies   Allergen Reactions    Pcn [Penicillins] Rash       Social History     Socioeconomic History    Marital status: SINGLE     Spouse name: Not on file    Number of children: Not on file    Years of education: Not on file    Highest education level: Not on file   Occupational History    Not on file   Tobacco Use    Smoking status: Never Smoker    Smokeless tobacco: Never Used   Vaping Use    Vaping Use: Never used   Substance and Sexual Activity    Alcohol use: Yes     Comment: OCCASIONALLY    Drug use: Yes     Types: Marijuana     Comment: LAST TIME 6-27-21    Sexual activity: Not Currently     Partners: Male     Birth control/protection: Injection   Other Topics Concern    Not on file   Social History Narrative    Not on file     Social Determinants of Health     Financial Resource Strain:     Difficulty of Paying Living Expenses:    Food Insecurity:     Worried About Running Out of Food in the Last Year:     Ran Out of Food in the Last Year:    Transportation Needs:     Lack of Transportation (Medical):      Lack of Transportation (Non-Medical):    Physical Activity:     Days of Exercise per Week:     Minutes of Exercise per Session:    Stress:     Feeling of Stress :    Social Connections:     Frequency of Communication with Friends and Family:     Frequency of Social Gatherings with Friends and Family:     Attends Caodaism Services:     Active Member of Clubs or Organizations:     Attends Club or Organization Meetings:     Marital Status:    Intimate Partner Violence:     Fear of Current or Ex-Partner:     Emotionally Abused:     Physically Abused:     Sexually Abused:        Family History   Problem Relation Age of Onset    Hypertension Mother     High Cholesterol Father     Other Father         high cholesterole, herniated disc    Diabetes Paternal Aunt     Hypertension Maternal Grandmother     COPD Maternal Grandmother     Emphysema Maternal Grandmother     Diabetes Paternal Grandmother     No Known Problems Sister     No Known Problems Brother     Anesth Problems Neg Hx        ROS   Constitutional: negative  Ears, Nose, Mouth, Throat, and Face: negative  Respiratory: negative  Cardiovascular: negative  Gastrointestinal: negative  Genitourinary:negative  Integument/Breast: negative  Hematologic/Lymphatic: negative  Behavioral/Psychiatric: negative  Allergic/Immunologic: negative      Physical Exam:     Visit Vitals  /67 (BP 1 Location: Left upper arm, BP Patient Position: Sitting, BP Cuff Size: Adult)   Pulse 91   Temp 98.2 °F (36.8 °C) (Oral)   Resp 18   Ht 5' 1\" (1.549 m)   Wt 208 lb (94.3 kg)   SpO2 98%   BMI 39.30 kg/m²       General - alert and oriented, no apparent distress  HEENT - no jaundice, no hearing imparement  Pulm - CTAB, no C/W/R  CV - RRR, no M/R/G  Abd -soft, nondistended, bowel sounds present, nontender to palpation  Ext - pulses intact in UE and LE bilaterally, no edema  Skin - supple, no rashes  Psychiatric - normal affect, good mood    Labs  Lab Results   Component Value Date/Time    Sodium 139 06/28/2021 10:49 AM    Potassium 4.3 06/28/2021 10:49 AM    Chloride 108 06/28/2021 10:49 AM    CO2 26 06/28/2021 10:49 AM    Anion gap 5 06/28/2021 10:49 AM    Glucose 82 06/28/2021 10:49 AM    BUN 7 06/28/2021 10:49 AM    Creatinine 0.60 06/28/2021 10:49 AM    BUN/Creatinine ratio 12 06/28/2021 10:49 AM    GFR est AA >60 06/28/2021 10:49 AM    GFR est non-AA >60 06/28/2021 10:49 AM    Calcium 9.1 06/28/2021 10:49 AM    Bilirubin, total 0.3 06/28/2021 10:49 AM    Alk.  phosphatase 69 06/28/2021 10:49 AM    Protein, total 6.6 06/28/2021 10:49 AM    Albumin 3.5 06/28/2021 10:49 AM    Globulin 3.1 06/28/2021 10:49 AM    A-G Ratio 1.1 06/28/2021 10:49 AM    ALT (SGPT) 30 06/28/2021 10:49 AM    AST (SGOT) 26 06/28/2021 10:49 AM     Lab Results   Component Value Date/Time    WBC 7.5 06/28/2021 10:49 AM Hemoglobin (POC) 12.9 06/06/2015 06:39 PM    HGB 12.3 06/28/2021 10:49 AM    Hematocrit (POC) 38 06/06/2015 06:39 PM    HCT 38.6 06/28/2021 10:49 AM    PLATELET 363 71/42/0687 10:49 AM    MCV 84.8 06/28/2021 10:49 AM    Hgb, External 12.4 10/14/2013 12:00 AM    Hct, External 36.7 10/14/2013 12:00 AM    Platelet cnt., External 304 10/14/2013 12:00 AM         Imaging  Chest x-ray-normal  I have reviewed and agree with all of the pertinent images    Upper GI-Preliminary examination KUB is unremarkable. Double contrast upper GI is performed. 15 images were obtained, fluoroscopy time  is 1.6 minutes. The a swallowing mechanism is normal.  The esophagus showed no stricture, extravasation or filling defect, swallowing  of the barium tablet did not show any stasis. Stomach and duodenum appear unremarkable.     IMPRESSION  IMPRESSION: No significant abnormalities. Assessment:     Fina James is a 27 y.o. female with severe obesity with comorbidities    Recommendations:     1. She is now ready to proceed forward with laparoscopic sleeve gastrectomy. I have discussed the above procedure with the patient in detail. We reviewed the benefits and possible complications of the surgery which include bleeding, infection, damage to adjacent organs, venous thromboembolism, need for repeat surgery, death and other unforseen complications. The patient agreed to proceed with the surgery. Sophie Davey MD    Greater than half of the time: 20 minutes was used in counciling the patient about diagnosis and treatment plan    Ms. Shawn Gomez has a reminder for a \"due or due soon\" health maintenance. I have asked that she contact her primary care provider for follow-up on this health maintenance.

## 2021-07-12 NOTE — LETTER
7/12/2021    Patient: Alisha Mann   YOB: 1990   Date of Visit: 7/12/2021     Severiano Cocks, MD  [address] [place] [address]    Dear Severiano Cocks, MD,      Thank you for referring Ms. Emiliano Sims to [address] [facility] for evaluation. My notes for this consultation are attached. If you have questions, please do not hesitate to call me. I look forward to following your patient along with you.       Sincerely,    Nick Wright MD

## 2021-07-13 ENCOUNTER — OFFICE VISIT (OUTPATIENT)
Dept: SURGERY | Age: 31
End: 2021-07-13

## 2021-07-13 VITALS — WEIGHT: 208 LBS | BODY MASS INDEX: 39.27 KG/M2 | HEIGHT: 61 IN

## 2021-07-13 DIAGNOSIS — G89.18 POST-OP PAIN: ICD-10-CM

## 2021-07-13 DIAGNOSIS — R73.03 PREDIABETES: ICD-10-CM

## 2021-07-13 DIAGNOSIS — Z86.73 HISTORY OF CEREBROVASCULAR ACCIDENT (CVA) IN ADULTHOOD: ICD-10-CM

## 2021-07-13 DIAGNOSIS — E66.01 SEVERE OBESITY (BMI 35.0-39.9) WITH COMORBIDITY (HCC): Primary | ICD-10-CM

## 2021-07-13 PROBLEM — E66.9 OBESITY (BMI 35.0-39.9 WITHOUT COMORBIDITY): Status: RESOLVED | Noted: 2019-09-12 | Resolved: 2021-07-13

## 2021-07-13 RX ORDER — PROCHLORPERAZINE MALEATE 10 MG
5 TABLET ORAL
Qty: 30 TABLET | Refills: 0 | Status: SHIPPED | OUTPATIENT
Start: 2021-07-13 | End: 2022-01-10

## 2021-07-13 RX ORDER — GABAPENTIN 100 MG/1
100-200 CAPSULE ORAL
Qty: 30 CAPSULE | Refills: 0 | Status: SHIPPED | OUTPATIENT
Start: 2021-07-13 | End: 2021-07-18

## 2021-07-13 RX ORDER — ONDANSETRON 4 MG/1
4 TABLET, ORALLY DISINTEGRATING ORAL
Qty: 20 TABLET | Refills: 0 | Status: CANCELLED | OUTPATIENT
Start: 2021-07-13

## 2021-07-13 RX ORDER — OMEPRAZOLE 40 MG/1
40 CAPSULE, DELAYED RELEASE ORAL DAILY
Qty: 30 CAPSULE | Refills: 1 | Status: SHIPPED | OUTPATIENT
Start: 2021-07-13 | End: 2022-01-10

## 2021-07-13 RX ORDER — POLYETHYLENE GLYCOL 3350 17 G/17G
17 POWDER, FOR SOLUTION ORAL DAILY
Qty: 90 PACKET | Refills: 3 | Status: SHIPPED | OUTPATIENT
Start: 2021-07-13 | End: 2021-08-10

## 2021-07-13 RX ORDER — HYOSCYAMINE SULFATE 0.12 MG/1
0.12 TABLET SUBLINGUAL
Qty: 30 TABLET | Refills: 0 | Status: SHIPPED | OUTPATIENT
Start: 2021-07-13 | End: 2022-01-10

## 2021-07-13 NOTE — PATIENT INSTRUCTIONS
Start your preop diet today, 7/13/2021    You are scheduled for sleeve gastrectomy with Dr. Josefa Cross on 7/27/2021    Add vitamin D 5000 international units daily to your vitamin regimen for mild vitamin D deficiency    Try some different protein supplements and Gene pro is a nice unflavored protein powder. You can add this to soups or really any liquid. It has no taste and does not get thick. It is available on genepro. com website as well as [employer] online. It is not on the shelf at [facility].     Day Before Surgery:   -  take (2) Extra Strength Tylenol (acetaminophen) at noon and 8 pm    -  you may drink sugar free clear liquids until 3 hours prior to surgery      your after surgery prescriptions BEFORE surgery

## 2021-07-13 NOTE — PROGRESS NOTES
1. Have you been to the ER, urgent care clinic since your last visit? Hospitalized since your last visit? 2. Have you seen or consulted any other health care providers outside of the [address] [place] [address] since your last visit? Include any pap smears or colon screening.

## 2021-07-13 NOTE — PROGRESS NOTES
I was in the office while conducting this encounter. Consent:  She and/or her healthcare decision maker is aware that this patient-initiated Telehealth encounter is a billable service, with coverage as determined by her insurance carrier. She is aware that she may receive a bill and has provided verbal consent to proceed: No - Not billable    This virtual visit was conducted via [employer]. Pursuant to the emergency declaration under the [facility], [facility] waiver authority and the [employer] Act, this Virtual  Visit was conducted to reduce the patient's risk of exposure to COVID-19 and provide continuity of care for an established patient. Services were provided through a video synchronous discussion virtually to substitute for in-person clinic visit. Due to this being a TeleHealth evaluation, many elements of the physical examination are unable to be assessed. Total Time: minutes: 21-30 minutes. Chief Complaint   Patient presents with    Surg H&P     scheduled for lap sleeve gastrectomy on 7/27/21 by Dr. Epifanio Brewster  # [phone]    Morbid Obesity    Weight Loss       Horacio Friday presents today for presurgical visit in preparation for weight loss surgery. Leonard Friday has completed the pre-surgical requirements, classes and demonstrated readiness for surgery. Patient has had no recent illness, fevers or cough. Leonard Friday has been in their usual state of health. Surgery Type: Sleeve gastrectomy  Date of Surgery: 7/27/2021  Surgeon:  Denise Estrada MD  Start Pre op Diet: 7/13/2021    Preadmission testing reviewed face to face with Horacio Friday. The following recommendations made based on findings:   Low normal vitamin D.   Patient advised to start vitamin D3 5000 international units daily    Co-Morbidities:  Prediabetes  History of a CVA with some intermittent left-sided residual pain  Anxiety    Functional status: Independent and works full-time as a nursing assistant    Social support: Mother to assist after surgery    Body mass index is 39.3 kg/m². ICD-10-CM ICD-9-CM    1. Severe obesity (BMI 35.0-39. 9) with comorbidity ([facility]  E66.01 278.01    2. BMI 39.0-39.9,adult  Z68.39 V85.39    3. Prediabetes  R73.03 790.29    4. History of cerebrovascular accident (CVA) in adulthood  Z80.78 V12.54    5. Post-op pain  G89.18 338.18 gabapentin (NEURONTIN) 100 mg capsule       Plan:  1. Morbid obesity:  Scheduled for laparoscopic sleeve gastrectomy on 7/27/2021 with Dr. Aries Silva protocol reviewed:  Acetaminophen 1000 mg at noon and 8 pm day before surgery and may have clear liquids (no caffeine, no ETOH, no carbonation and calorie free) until 3 hours prior to surgery   Preadmission testing results reviewed with patient   Post operative prescriptions sent to pharmacy on file for AFTER surgery:    Acid suppression  x 30 days, then reassess need omeprazole 30 mg p.o. daily  Antiemetic Compazine 5 mg every 6 hours as needed (Zofran has been ineffective for her in the past)  Antispasmodic Levsin  Laxative:  Miralax 1 packet every day #90/3R   DVT prophylaxis: Early ambulation, no personal or family history of VTE, non-smoker, no hormone replacement or oral contraceptives, BMI less than 40  Post op pain management:  Gabapentin 100-300 mg q 8 hours prn pain x 5 days; acetaminophen 1000 mg po q 8 hours prn; abdominal support / splinting; heating pad prn   Started on liver shrinking diet: 7/13/2021  Reviewed and started Bariatric vitamins   Reviewed post operative diet, restrictions, follow up and medications  Post operative 2, 4 and 6  week appointments to be made  Reviewed educational materials and book    Kaitlyn Merino is scheduled to meet with Leonel Garcia on 7/12/2021 to sign consents and address any final questions or concerns.     Kaitlyn Merino verbalized understanding and questions were answered to the best of my knowledge and ability. Pre and postop educational materials were provided.     30 minutes spent virtually with patient

## 2021-07-21 ENCOUNTER — TELEPHONE (OUTPATIENT)
Dept: SURGERY | Age: 31
End: 2021-07-21

## 2021-07-21 NOTE — TELEPHONE ENCOUNTER
Call made to Ms Hernando Thayer verified all [address] [place] [address]. Patient states the PAT told her what meds she need to stop prior to surgery. She  Wants to know if she can still take the probiotics, hair nail and skin meds. She was told which meds to stop taking these were not mentioned. She is scheduled for surgery on 7/26/21. I spoke with Mahamed Welsh NP advised not to take the hair nail and skin may take the probiotic.

## 2021-07-22 ENCOUNTER — HOSPITAL ENCOUNTER (OUTPATIENT)
Dept: PREADMISSION TESTING | Age: 31
Discharge: HOME | End: 2021-07-22
Payer: COMMERCIAL

## 2021-07-22 ENCOUNTER — TRANSCRIBE ORDER (OUTPATIENT)
Dept: REGISTRATION | Age: 31
End: 2021-07-22

## 2021-07-22 DIAGNOSIS — Z01.812 PRE-PROCEDURE LAB EXAM: Primary | ICD-10-CM

## 2021-07-22 DIAGNOSIS — Z01.812 PRE-PROCEDURE LAB EXAM: ICD-10-CM

## 2021-07-22 PROCEDURE — U0005 INFEC AGEN DETEC AMPLI PROBE: HCPCS

## 2021-07-23 ENCOUNTER — TELEPHONE (OUTPATIENT)
Dept: SURGERY | Age: 31
End: 2021-07-23

## 2021-07-23 ENCOUNTER — ANESTHESIA EVENT (OUTPATIENT)
Dept: SURGERY | Age: 31
End: 2021-07-23
Payer: COMMERCIAL

## 2021-07-23 LAB
SARS-COV-2, XPLCVT: NOT DETECTED
SOURCE, COVRS: NORMAL

## 2021-07-23 NOTE — TELEPHONE ENCOUNTER
Patient called in requesting some medicine to help her relax for her surgery on Monday.    Pharmacy: [facility], [address], [address]: [phone]

## 2021-07-26 ENCOUNTER — HOSPITAL ENCOUNTER (OUTPATIENT)
Age: 31
Setting detail: OBSERVATION
Discharge: HOME | End: 2021-07-28
Attending: SURGERY | Admitting: SURGERY
Payer: COMMERCIAL

## 2021-07-26 ENCOUNTER — ANESTHESIA (OUTPATIENT)
Dept: SURGERY | Age: 31
End: 2021-07-26
Payer: COMMERCIAL

## 2021-07-26 DIAGNOSIS — E66.01 SEVERE OBESITY (BMI 35.0-35.9 WITH COMORBIDITY) (HCC): ICD-10-CM

## 2021-07-26 DIAGNOSIS — E66.9 CLASS 2 OBESITY WITHOUT SERIOUS COMORBIDITY WITH BODY MASS INDEX (BMI) OF 37.0 TO 37.9 IN ADULT, UNSPECIFIED OBESITY TYPE: ICD-10-CM

## 2021-07-26 LAB — HCG UR QL: NEGATIVE

## 2021-07-26 PROCEDURE — 2709999900 HC NON-CHARGEABLE SUPPLY: Performed by: SURGERY

## 2021-07-26 PROCEDURE — G0378 HOSPITAL OBSERVATION PER HR: HCPCS

## 2021-07-26 PROCEDURE — 77030010507 HC ADH SKN DERMBND J&J -B: Performed by: SURGERY

## 2021-07-26 PROCEDURE — 96372 THER/PROPH/DIAG INJ SC/IM: CPT

## 2021-07-26 PROCEDURE — 77030008756 HC TU IRR SUC STRY -B: Performed by: SURGERY

## 2021-07-26 PROCEDURE — 74011000250 HC RX REV CODE- 250: Performed by: NURSE ANESTHETIST, CERTIFIED REGISTERED

## 2021-07-26 PROCEDURE — 77030037367 HC STPLR ENDO TRI-STPLR COVD -D: Performed by: SURGERY

## 2021-07-26 PROCEDURE — 77030022704 HC SUT VLOC COVD -B: Performed by: SURGERY

## 2021-07-26 PROCEDURE — 74011250636 HC RX REV CODE- 250/636: Performed by: ANESTHESIOLOGY

## 2021-07-26 PROCEDURE — 74011000250 HC RX REV CODE- 250: Performed by: SURGERY

## 2021-07-26 PROCEDURE — 77030026438 HC STYL ET INTUB CARD -A: Performed by: ANESTHESIOLOGY

## 2021-07-26 PROCEDURE — 77030034208 HC SLV GASTRCTMY 3D CAL SYS DISP BOEH -C: Performed by: SURGERY

## 2021-07-26 PROCEDURE — 77030011992 HC AIRWY NASOPHGL TELE -A: Performed by: ANESTHESIOLOGY

## 2021-07-26 PROCEDURE — 77030037032 HC INSRT SCIS CLICKLLINE DISP STOR -B: Performed by: SURGERY

## 2021-07-26 PROCEDURE — 77030002933 HC SUT MCRYL J&J -A: Performed by: SURGERY

## 2021-07-26 PROCEDURE — 77030038157 HC DEV PWR CNTR DISP SIGNIA COVD -C: Performed by: SURGERY

## 2021-07-26 PROCEDURE — 77030031139 HC SUT VCRL2 J&J -A: Performed by: SURGERY

## 2021-07-26 PROCEDURE — 77030008684 HC TU ET CUF COVD -B: Performed by: ANESTHESIOLOGY

## 2021-07-26 PROCEDURE — 77030020829: Performed by: SURGERY

## 2021-07-26 PROCEDURE — 88307 TISSUE EXAM BY PATHOLOGIST: CPT

## 2021-07-26 PROCEDURE — 74011250636 HC RX REV CODE- 250/636: Performed by: NURSE ANESTHETIST, CERTIFIED REGISTERED

## 2021-07-26 PROCEDURE — 77030040361 HC SLV COMPR DVT MDII -B: Performed by: SURGERY

## 2021-07-26 PROCEDURE — 77030014090 HC TRCR OPT AMR -B: Performed by: SURGERY

## 2021-07-26 PROCEDURE — 74011250636 HC RX REV CODE- 250/636: Performed by: SURGERY

## 2021-07-26 PROCEDURE — 76210000000 HC OR PH I REC 2 TO 2.5 HR: Performed by: SURGERY

## 2021-07-26 PROCEDURE — 76060000035 HC ANESTHESIA 2 TO 2.5 HR: Performed by: SURGERY

## 2021-07-26 PROCEDURE — 43775 LAP SLEEVE GASTRECTOMY: CPT | Performed by: SURGERY

## 2021-07-26 PROCEDURE — 77030002895 HC DEV VASC CLOSR COVD -B: Performed by: SURGERY

## 2021-07-26 PROCEDURE — 77030012770 HC TRCR OPT FX AMR -B: Performed by: SURGERY

## 2021-07-26 PROCEDURE — 81025 URINE PREGNANCY TEST: CPT

## 2021-07-26 PROCEDURE — 77030040956 HC DSCTR SONICISION MEDT -I: Performed by: SURGERY

## 2021-07-26 PROCEDURE — 76010000153 HC OR TIME 1.5 TO 2 HR: Performed by: SURGERY

## 2021-07-26 PROCEDURE — 77030016151 HC PROTCTR LNS DFOG COVD -B: Performed by: SURGERY

## 2021-07-26 PROCEDURE — 65270000032 HC RM SEMIPRIVATE

## 2021-07-26 PROCEDURE — 77030020263 HC SOL INJ SOD CL0.9% LFCR 1000ML: Performed by: SURGERY

## 2021-07-26 PROCEDURE — 77030037368 HC STPLR ENDO TRI-STPLR COVD -E: Performed by: SURGERY

## 2021-07-26 PROCEDURE — 74011250637 HC RX REV CODE- 250/637: Performed by: SURGERY

## 2021-07-26 RX ORDER — MIDAZOLAM HYDROCHLORIDE 1 MG/ML
0.5 INJECTION, SOLUTION INTRAMUSCULAR; INTRAVENOUS
Status: COMPLETED | OUTPATIENT
Start: 2021-07-26 | End: 2021-07-26

## 2021-07-26 RX ORDER — LIDOCAINE HYDROCHLORIDE 20 MG/ML
INJECTION, SOLUTION EPIDURAL; INFILTRATION; INTRACAUDAL; PERINEURAL AS NEEDED
Status: DISCONTINUED | OUTPATIENT
Start: 2021-07-26 | End: 2021-07-26 | Stop reason: HOSPADM

## 2021-07-26 RX ORDER — SUCCINYLCHOLINE CHLORIDE 20 MG/ML
INJECTION INTRAMUSCULAR; INTRAVENOUS AS NEEDED
Status: DISCONTINUED | OUTPATIENT
Start: 2021-07-26 | End: 2021-07-26 | Stop reason: HOSPADM

## 2021-07-26 RX ORDER — DEXAMETHASONE SODIUM PHOSPHATE 4 MG/ML
INJECTION, SOLUTION INTRA-ARTICULAR; INTRALESIONAL; INTRAMUSCULAR; INTRAVENOUS; SOFT TISSUE AS NEEDED
Status: DISCONTINUED | OUTPATIENT
Start: 2021-07-26 | End: 2021-07-26 | Stop reason: HOSPADM

## 2021-07-26 RX ORDER — SODIUM CHLORIDE 0.9 % (FLUSH) 0.9 %
5-40 SYRINGE (ML) INJECTION EVERY 8 HOURS
Status: DISCONTINUED | OUTPATIENT
Start: 2021-07-26 | End: 2021-07-29 | Stop reason: HOSPADM

## 2021-07-26 RX ORDER — FENTANYL CITRATE 50 UG/ML
INJECTION, SOLUTION INTRAMUSCULAR; INTRAVENOUS AS NEEDED
Status: DISCONTINUED | OUTPATIENT
Start: 2021-07-26 | End: 2021-07-26 | Stop reason: HOSPADM

## 2021-07-26 RX ORDER — MIDAZOLAM HYDROCHLORIDE 1 MG/ML
INJECTION, SOLUTION INTRAMUSCULAR; INTRAVENOUS AS NEEDED
Status: DISCONTINUED | OUTPATIENT
Start: 2021-07-26 | End: 2021-07-26 | Stop reason: HOSPADM

## 2021-07-26 RX ORDER — HYDROMORPHONE HYDROCHLORIDE 1 MG/ML
0.5 INJECTION, SOLUTION INTRAMUSCULAR; INTRAVENOUS; SUBCUTANEOUS
Status: DISCONTINUED | OUTPATIENT
Start: 2021-07-26 | End: 2021-07-29 | Stop reason: HOSPADM

## 2021-07-26 RX ORDER — MAGNESIUM SULFATE HEPTAHYDRATE 40 MG/ML
INJECTION, SOLUTION INTRAVENOUS AS NEEDED
Status: DISCONTINUED | OUTPATIENT
Start: 2021-07-26 | End: 2021-07-26 | Stop reason: HOSPADM

## 2021-07-26 RX ORDER — ONDANSETRON 2 MG/ML
4 INJECTION INTRAMUSCULAR; INTRAVENOUS
Status: DISCONTINUED | OUTPATIENT
Start: 2021-07-26 | End: 2021-07-29 | Stop reason: HOSPADM

## 2021-07-26 RX ORDER — DIPHENHYDRAMINE HYDROCHLORIDE 50 MG/ML
12.5 INJECTION, SOLUTION INTRAMUSCULAR; INTRAVENOUS
Status: ACTIVE | OUTPATIENT
Start: 2021-07-26 | End: 2021-07-27

## 2021-07-26 RX ORDER — HYDROMORPHONE HYDROCHLORIDE 1 MG/ML
1 INJECTION, SOLUTION INTRAMUSCULAR; INTRAVENOUS; SUBCUTANEOUS
Status: DISCONTINUED | OUTPATIENT
Start: 2021-07-26 | End: 2021-07-29 | Stop reason: HOSPADM

## 2021-07-26 RX ORDER — EPHEDRINE SULFATE/0.9% NACL/PF 50 MG/5 ML
SYRINGE (ML) INTRAVENOUS AS NEEDED
Status: DISCONTINUED | OUTPATIENT
Start: 2021-07-26 | End: 2021-07-26 | Stop reason: HOSPADM

## 2021-07-26 RX ORDER — SODIUM CHLORIDE, SODIUM LACTATE, POTASSIUM CHLORIDE, CALCIUM CHLORIDE 600; 310; 30; 20 MG/100ML; MG/100ML; MG/100ML; MG/100ML
125 INJECTION, SOLUTION INTRAVENOUS CONTINUOUS
Status: DISCONTINUED | OUTPATIENT
Start: 2021-07-26 | End: 2021-07-29 | Stop reason: HOSPADM

## 2021-07-26 RX ORDER — HYOSCYAMINE SULFATE 0.12 MG/1
0.12 TABLET SUBLINGUAL
Status: DISCONTINUED | OUTPATIENT
Start: 2021-07-26 | End: 2021-07-29 | Stop reason: HOSPADM

## 2021-07-26 RX ORDER — MIDAZOLAM HYDROCHLORIDE 1 MG/ML
1 INJECTION, SOLUTION INTRAMUSCULAR; INTRAVENOUS AS NEEDED
Status: DISCONTINUED | OUTPATIENT
Start: 2021-07-26 | End: 2021-07-26 | Stop reason: HOSPADM

## 2021-07-26 RX ORDER — LORAZEPAM 2 MG/ML
0.5 INJECTION INTRAMUSCULAR
Status: DISCONTINUED | OUTPATIENT
Start: 2021-07-26 | End: 2021-07-29 | Stop reason: HOSPADM

## 2021-07-26 RX ORDER — PHENYLEPHRINE HCL IN 0.9% NACL 0.4MG/10ML
SYRINGE (ML) INTRAVENOUS AS NEEDED
Status: DISCONTINUED | OUTPATIENT
Start: 2021-07-26 | End: 2021-07-26 | Stop reason: HOSPADM

## 2021-07-26 RX ORDER — SODIUM CHLORIDE 0.9 % (FLUSH) 0.9 %
5-40 SYRINGE (ML) INJECTION AS NEEDED
Status: DISCONTINUED | OUTPATIENT
Start: 2021-07-26 | End: 2021-07-26 | Stop reason: HOSPADM

## 2021-07-26 RX ORDER — GABAPENTIN 100 MG/1
200 CAPSULE ORAL 2 TIMES DAILY
Status: DISCONTINUED | OUTPATIENT
Start: 2021-07-26 | End: 2021-07-29 | Stop reason: HOSPADM

## 2021-07-26 RX ORDER — NALOXONE HYDROCHLORIDE 0.4 MG/ML
0.4 INJECTION, SOLUTION INTRAMUSCULAR; INTRAVENOUS; SUBCUTANEOUS AS NEEDED
Status: DISCONTINUED | OUTPATIENT
Start: 2021-07-26 | End: 2021-07-29 | Stop reason: HOSPADM

## 2021-07-26 RX ORDER — BUPIVACAINE HYDROCHLORIDE 5 MG/ML
INJECTION, SOLUTION EPIDURAL; INTRACAUDAL AS NEEDED
Status: DISCONTINUED | OUTPATIENT
Start: 2021-07-26 | End: 2021-07-26 | Stop reason: HOSPADM

## 2021-07-26 RX ORDER — KETOROLAC TROMETHAMINE 30 MG/ML
15 INJECTION, SOLUTION INTRAMUSCULAR; INTRAVENOUS EVERY 6 HOURS
Status: DISCONTINUED | OUTPATIENT
Start: 2021-07-26 | End: 2021-07-29 | Stop reason: HOSPADM

## 2021-07-26 RX ORDER — SODIUM CHLORIDE 0.9 % (FLUSH) 0.9 %
5-40 SYRINGE (ML) INJECTION AS NEEDED
Status: DISCONTINUED | OUTPATIENT
Start: 2021-07-26 | End: 2021-07-29 | Stop reason: HOSPADM

## 2021-07-26 RX ORDER — ENOXAPARIN SODIUM 100 MG/ML
40 INJECTION SUBCUTANEOUS EVERY 24 HOURS
Status: DISCONTINUED | OUTPATIENT
Start: 2021-07-27 | End: 2021-07-29 | Stop reason: HOSPADM

## 2021-07-26 RX ORDER — FENTANYL CITRATE 50 UG/ML
25 INJECTION, SOLUTION INTRAMUSCULAR; INTRAVENOUS
Status: COMPLETED | OUTPATIENT
Start: 2021-07-26 | End: 2021-07-26

## 2021-07-26 RX ORDER — PANTOPRAZOLE SODIUM 40 MG/1
40 TABLET, DELAYED RELEASE ORAL DAILY
Status: DISCONTINUED | OUTPATIENT
Start: 2021-07-27 | End: 2021-07-29 | Stop reason: HOSPADM

## 2021-07-26 RX ORDER — SCOLOPAMINE TRANSDERMAL SYSTEM 1 MG/1
1 PATCH, EXTENDED RELEASE TRANSDERMAL ONCE
Status: DISCONTINUED | OUTPATIENT
Start: 2021-07-26 | End: 2021-07-29 | Stop reason: HOSPADM

## 2021-07-26 RX ORDER — ONDANSETRON 2 MG/ML
4 INJECTION INTRAMUSCULAR; INTRAVENOUS AS NEEDED
Status: DISCONTINUED | OUTPATIENT
Start: 2021-07-26 | End: 2021-07-26 | Stop reason: HOSPADM

## 2021-07-26 RX ORDER — GABAPENTIN 250 MG/5ML
500 SOLUTION ORAL ONCE
Status: COMPLETED | OUTPATIENT
Start: 2021-07-26 | End: 2021-07-26

## 2021-07-26 RX ORDER — ENOXAPARIN SODIUM 100 MG/ML
40 INJECTION SUBCUTANEOUS EVERY 24 HOURS
Status: DISCONTINUED | OUTPATIENT
Start: 2021-07-26 | End: 2021-07-26

## 2021-07-26 RX ORDER — PROPOFOL 10 MG/ML
INJECTION, EMULSION INTRAVENOUS AS NEEDED
Status: DISCONTINUED | OUTPATIENT
Start: 2021-07-26 | End: 2021-07-26 | Stop reason: HOSPADM

## 2021-07-26 RX ORDER — KETAMINE HYDROCHLORIDE 10 MG/ML
INJECTION, SOLUTION INTRAMUSCULAR; INTRAVENOUS AS NEEDED
Status: DISCONTINUED | OUTPATIENT
Start: 2021-07-26 | End: 2021-07-26 | Stop reason: HOSPADM

## 2021-07-26 RX ORDER — SODIUM CHLORIDE 0.9 % (FLUSH) 0.9 %
5-40 SYRINGE (ML) INJECTION EVERY 8 HOURS
Status: DISCONTINUED | OUTPATIENT
Start: 2021-07-26 | End: 2021-07-26 | Stop reason: HOSPADM

## 2021-07-26 RX ORDER — SODIUM CHLORIDE, SODIUM LACTATE, POTASSIUM CHLORIDE, CALCIUM CHLORIDE 600; 310; 30; 20 MG/100ML; MG/100ML; MG/100ML; MG/100ML
INJECTION, SOLUTION INTRAVENOUS
Status: DISCONTINUED | OUTPATIENT
Start: 2021-07-26 | End: 2021-07-26 | Stop reason: HOSPADM

## 2021-07-26 RX ORDER — LIDOCAINE HYDROCHLORIDE ANHYDROUS AND DEXTROSE MONOHYDRATE .8; 5 G/100ML; G/100ML
INJECTION, SOLUTION INTRAVENOUS
Status: DISCONTINUED | OUTPATIENT
Start: 2021-07-26 | End: 2021-07-26 | Stop reason: HOSPADM

## 2021-07-26 RX ORDER — ONDANSETRON 2 MG/ML
INJECTION INTRAMUSCULAR; INTRAVENOUS AS NEEDED
Status: DISCONTINUED | OUTPATIENT
Start: 2021-07-26 | End: 2021-07-26 | Stop reason: HOSPADM

## 2021-07-26 RX ORDER — SODIUM CHLORIDE, SODIUM LACTATE, POTASSIUM CHLORIDE, CALCIUM CHLORIDE 600; 310; 30; 20 MG/100ML; MG/100ML; MG/100ML; MG/100ML
500 INJECTION, SOLUTION INTRAVENOUS CONTINUOUS
Status: DISCONTINUED | OUTPATIENT
Start: 2021-07-26 | End: 2021-07-26

## 2021-07-26 RX ORDER — ROCURONIUM BROMIDE 10 MG/ML
INJECTION, SOLUTION INTRAVENOUS AS NEEDED
Status: DISCONTINUED | OUTPATIENT
Start: 2021-07-26 | End: 2021-07-26 | Stop reason: HOSPADM

## 2021-07-26 RX ORDER — ACETAMINOPHEN 500 MG
1000 TABLET ORAL EVERY 6 HOURS
Status: DISCONTINUED | OUTPATIENT
Start: 2021-07-27 | End: 2021-07-29 | Stop reason: HOSPADM

## 2021-07-26 RX ADMIN — KETAMINE HYDROCHLORIDE 20 MG: 10 INJECTION, SOLUTION INTRAMUSCULAR; INTRAVENOUS at 13:23

## 2021-07-26 RX ADMIN — SUCCINYLCHOLINE CHLORIDE 40 MG: 20 INJECTION, SOLUTION INTRAMUSCULAR; INTRAVENOUS at 12:45

## 2021-07-26 RX ADMIN — PROPOFOL 180 MG: 10 INJECTION, EMULSION INTRAVENOUS at 12:43

## 2021-07-26 RX ADMIN — FENTANYL CITRATE 25 MCG: 50 INJECTION, SOLUTION INTRAMUSCULAR; INTRAVENOUS at 17:10

## 2021-07-26 RX ADMIN — ONDANSETRON 4 MG: 2 INJECTION INTRAMUSCULAR; INTRAVENOUS at 14:45

## 2021-07-26 RX ADMIN — SODIUM CHLORIDE, POTASSIUM CHLORIDE, SODIUM LACTATE AND CALCIUM CHLORIDE 125 ML/HR: 600; 310; 30; 20 INJECTION, SOLUTION INTRAVENOUS at 15:00

## 2021-07-26 RX ADMIN — FENTANYL CITRATE 25 MCG: 50 INJECTION, SOLUTION INTRAMUSCULAR; INTRAVENOUS at 15:25

## 2021-07-26 RX ADMIN — MIDAZOLAM HYDROCHLORIDE 0.5 MG: 1 INJECTION, SOLUTION INTRAMUSCULAR; INTRAVENOUS at 15:30

## 2021-07-26 RX ADMIN — WATER 2 G: 1 INJECTION INTRAMUSCULAR; INTRAVENOUS; SUBCUTANEOUS at 13:00

## 2021-07-26 RX ADMIN — ROCURONIUM BROMIDE 5 MG: 10 SOLUTION INTRAVENOUS at 12:43

## 2021-07-26 RX ADMIN — LIDOCAINE HYDROCHLORIDE 1.5 MG/KG/HR: 8 INJECTION, SOLUTION INTRAVENOUS at 12:50

## 2021-07-26 RX ADMIN — SUCCINYLCHOLINE CHLORIDE 160 MG: 20 INJECTION, SOLUTION INTRAMUSCULAR; INTRAVENOUS at 12:43

## 2021-07-26 RX ADMIN — MIDAZOLAM HYDROCHLORIDE 0.5 MG: 1 INJECTION, SOLUTION INTRAMUSCULAR; INTRAVENOUS at 16:05

## 2021-07-26 RX ADMIN — PROPOFOL 40 MG: 10 INJECTION, EMULSION INTRAVENOUS at 12:45

## 2021-07-26 RX ADMIN — Medication 20 MG: at 13:18

## 2021-07-26 RX ADMIN — DEXAMETHASONE SODIUM PHOSPHATE 8 MG: 4 INJECTION, SOLUTION INTRAMUSCULAR; INTRAVENOUS at 12:58

## 2021-07-26 RX ADMIN — MIDAZOLAM HYDROCHLORIDE 0.5 MG: 1 INJECTION, SOLUTION INTRAMUSCULAR; INTRAVENOUS at 15:35

## 2021-07-26 RX ADMIN — HYOSCYAMINE SULFATE 0.12 MG: 0.12 TABLET ORAL; SUBLINGUAL at 16:00

## 2021-07-26 RX ADMIN — LIDOCAINE HYDROCHLORIDE 100 MG: 20 INJECTION, SOLUTION EPIDURAL; INFILTRATION; INTRACAUDAL; PERINEURAL at 12:43

## 2021-07-26 RX ADMIN — MAGNESIUM SULFATE 2 G: 2 INJECTION INTRAVENOUS at 12:50

## 2021-07-26 RX ADMIN — LORAZEPAM 0.5 MG: 2 INJECTION INTRAMUSCULAR; INTRAVENOUS at 16:35

## 2021-07-26 RX ADMIN — ONDANSETRON HYDROCHLORIDE 4 MG: 2 INJECTION, SOLUTION INTRAMUSCULAR; INTRAVENOUS at 13:56

## 2021-07-26 RX ADMIN — MIDAZOLAM 2 MG: 1 INJECTION INTRAMUSCULAR; INTRAVENOUS at 12:28

## 2021-07-26 RX ADMIN — HYDROMORPHONE HYDROCHLORIDE 0.5 MG: 1 INJECTION, SOLUTION INTRAMUSCULAR; INTRAVENOUS; SUBCUTANEOUS at 21:45

## 2021-07-26 RX ADMIN — FENTANYL CITRATE 50 MCG: 50 INJECTION, SOLUTION INTRAMUSCULAR; INTRAVENOUS at 14:41

## 2021-07-26 RX ADMIN — SODIUM CHLORIDE, POTASSIUM CHLORIDE, SODIUM LACTATE AND CALCIUM CHLORIDE 500 ML/HR: 600; 310; 30; 20 INJECTION, SOLUTION INTRAVENOUS at 12:00

## 2021-07-26 RX ADMIN — KETAMINE HYDROCHLORIDE 30 MG: 10 INJECTION, SOLUTION INTRAMUSCULAR; INTRAVENOUS at 12:43

## 2021-07-26 RX ADMIN — MIDAZOLAM HYDROCHLORIDE 0.5 MG: 1 INJECTION, SOLUTION INTRAMUSCULAR; INTRAVENOUS at 16:00

## 2021-07-26 RX ADMIN — FENTANYL CITRATE 25 MCG: 50 INJECTION, SOLUTION INTRAMUSCULAR; INTRAVENOUS at 17:05

## 2021-07-26 RX ADMIN — Medication 120 MCG: at 13:13

## 2021-07-26 RX ADMIN — GABAPENTIN 200 MG: 100 CAPSULE ORAL at 21:45

## 2021-07-26 RX ADMIN — FENTANYL CITRATE 50 MCG: 50 INJECTION, SOLUTION INTRAMUSCULAR; INTRAVENOUS at 12:43

## 2021-07-26 RX ADMIN — FENTANYL CITRATE 25 MCG: 50 INJECTION, SOLUTION INTRAMUSCULAR; INTRAVENOUS at 15:20

## 2021-07-26 RX ADMIN — ROCURONIUM BROMIDE 25 MG: 10 SOLUTION INTRAVENOUS at 12:52

## 2021-07-26 RX ADMIN — MEPERIDINE HYDROCHLORIDE 12.5 MG: 25 INJECTION INTRAMUSCULAR; INTRAVENOUS; SUBCUTANEOUS at 16:10

## 2021-07-26 RX ADMIN — SODIUM CHLORIDE, POTASSIUM CHLORIDE, SODIUM LACTATE AND CALCIUM CHLORIDE: 600; 310; 30; 20 INJECTION, SOLUTION INTRAVENOUS at 13:22

## 2021-07-26 RX ADMIN — KETOROLAC TROMETHAMINE 15 MG: 30 INJECTION, SOLUTION INTRAMUSCULAR; INTRAVENOUS at 18:20

## 2021-07-26 RX ADMIN — ENOXAPARIN SODIUM 40 MG: 40 INJECTION SUBCUTANEOUS at 11:39

## 2021-07-26 RX ADMIN — ROCURONIUM BROMIDE 10 MG: 10 SOLUTION INTRAVENOUS at 13:22

## 2021-07-26 RX ADMIN — Medication 10 ML: at 21:45

## 2021-07-26 RX ADMIN — Medication 120 MCG: at 13:16

## 2021-07-26 RX ADMIN — GABAPENTIN 500 MG: 250 SOLUTION ORAL at 11:39

## 2021-07-26 RX ADMIN — ACETAMINOPHEN ORAL SOLUTION 1000 MG: 650 SOLUTION ORAL at 11:38

## 2021-07-26 RX ADMIN — SUGAMMADEX 200 MG: 100 INJECTION, SOLUTION INTRAVENOUS at 14:05

## 2021-07-26 RX ADMIN — HYOSCYAMINE SULFATE 0.12 MG: 0.12 TABLET ORAL; SUBLINGUAL at 21:45

## 2021-07-26 RX ADMIN — SODIUM CHLORIDE, POTASSIUM CHLORIDE, SODIUM LACTATE AND CALCIUM CHLORIDE 125 ML/HR: 600; 310; 30; 20 INJECTION, SOLUTION INTRAVENOUS at 20:46

## 2021-07-26 RX ADMIN — ONDANSETRON 4 MG: 2 INJECTION INTRAMUSCULAR; INTRAVENOUS at 21:51

## 2021-07-26 RX ADMIN — SODIUM CHLORIDE, POTASSIUM CHLORIDE, SODIUM LACTATE AND CALCIUM CHLORIDE: 600; 310; 30; 20 INJECTION, SOLUTION INTRAVENOUS at 11:45

## 2021-07-26 NOTE — PERIOP NOTES
TRANSFER - OUT REPORT:    Verbal report given to doc maloney(name) on Marsha Roger  being transferred to 5E(unit) for routine post - op       Report consisted of patients Situation, Background, Assessment and   Recommendations(SBAR). Time Pre op antibiotic given:1300  Anesthesia Stop time: 4287  Chung Present on Transfer to floor:n/a  Order for Chung on Chart:n/a  Discharge Prescriptions with Chart:n/a    Information from the following report(s) SBAR, Kardex, OR Summary, Procedure Summary, Intake/Output, MAR, Recent Results and Cardiac Rhythm sr was reviewed with the receiving nurse. Opportunity for questions and clarification was provided. Is the patient on 02? NO      Is the patient on a monitor? NO    Is the nurse transporting with the patient? NO    Surgical Waiting Area notified of patient's transfer from PACU? YES      The following personal items collected during your admission accompanied patient upon transfer:   Dental Appliance: Dental Appliances: None  Vision: Visual Aid: Glasses  Hearing Aid:    Jewelry:    Clothing: Clothing: Other (comment) (bag of clothes and glasses returned to pt in PACU)  Other Valuables:  Other Valuables:  (locked with security)  Valuables sent to safe:

## 2021-07-26 NOTE — ANESTHESIA PREPROCEDURE EVALUATION
Relevant Problems   No relevant active problems       Anesthetic History   No history of anesthetic complications            Review of Systems / Medical History  Patient summary reviewed, nursing notes reviewed and pertinent labs reviewed    Pulmonary  Within defined limits                 Neuro/Psych   Within defined limits           Cardiovascular  Within defined limits                Exercise tolerance: >4 METS     GI/Hepatic/Renal     GERD: well controlled           Endo/Other      Hypothyroidism: well controlled  Morbid obesity     Other Findings              Physical Exam    Airway  Mallampati: II  TM Distance: > 6 cm  Neck ROM: normal range of motion   Mouth opening: Normal     Cardiovascular  Regular rate and rhythm,  S1 and S2 normal,  no murmur, click, rub, or gallop             Dental  No notable dental hx       Pulmonary  Breath sounds clear to auscultation               Abdominal  GI exam deferred       Other Findings            Anesthetic Plan    ASA: 2  Anesthesia type: general          Induction: Intravenous  Anesthetic plan and risks discussed with: Patient

## 2021-07-26 NOTE — OP NOTES
[address]   OPERATIVE REPORT    Name:  Case Limon  MR#:  [id number]  :  1990  ACCOUNT #:  [de-identified]  DATE OF SERVICE:  2021    PREOPERATIVE DIAGNOSIS:  Morbid obesity. POSTOPERATIVE DIAGNOSIS:  Morbid obesity. PROCEDURE PERFORMED:  Laparoscopic sleeve gastrectomy with esophagogastroduodenoscopy. SURGEON:  Guerda Chavez MD    ASSISTANT:  Rick Mccollum SA    ANESTHESIA:  General.    COMPLICATIONS:  None. SPECIMENS REMOVED:  Partial gastrectomy. IMPLANTS:  None. ESTIMATED BLOOD LOSS:  Less than 50 mL. DRAINS:  None. FINDINGS:  Normal sleeve gastrectomy, normal postop EGD, negative leak test.    INDICATIONS FOR THE OPERATION:  The patient is a 25-year-old female with a history of severe obesity with comorbidities with a BMI of 39.3, with bariatric comorbidities including back and joint pain, stroke, who has been preoperatively evaluated for bariatric surgery. She has been through the necessary preoperative education and is now ready for surgery. DESCRIPTION OF THE OPERATION:  The patient was met in the preoperative holding area, the H and P was updated, consent was signed. All risks and benefits were explained to the patient prior to the start of the operation. She was taken back to the operating room. She was lying in a supine position. The abdomen was prepped and draped in standard sterile fashion. Time-out was called. Antibiotics were given. SCDs were on the lower extremities. We started the operation by making a 5 mm incision into the left upper quadrant, inserting a VisiPort trocar into the intra-abdominal cavity, insufflating to 15 mmHg. We then placed another 5 mm trocar superior to the umbilicus on the left and then a 15 mm port on the right side of the umbilicus and then a 5 mm right upper quadrant port. We placed the patient in steep reverse Trendelenburg.   We placed a subxiphoid liver retractor, lifting the liver up and out of the way. We could see the stomach and the hiatus. Everything with the stomach looked normal.  There was no hiatal hernia. We then marked an area about 6 cm proximal to pylorus, which would be the starting point of our sleeve staple line. We then started taking down the gastrocolic ligament from the mid body of the stomach, traveling up superiorly along the greater curve of the stomach, taking down the short gastric vessels and taking down all the posterior attachments. We took down the area of the angle of His and exposed the left magi. The stomach was fully mobilized superiorly. We then continued taking down the gastrocolic ligament from the distal portion of the stomach along the greater curve to our margaret about 6 cm proximal to the pylorus with the [employer] device. Once we had taken that down, the stomach was fully mobilized. We then had the 40-Irish ViSiGi bougie placed down the mouth into the esophagus and down into the sleeve stomach. It was placed down into the area of the pylorus and antrum. It was hooked to suction. It was along the lesser curve. It was in good position. We then started creating a vertical sleeve gastrectomy with a 60 mm black load Signia stapler with TRS reinforcement for the first firing of the stapler. We then used 3 more purple loads with TRS reinforcement to create the rest of the sleeve gastrectomy using a total of 4 loads, making a nice straight sleeve gastrectomy. All the staple lines appeared to be intact. There was no bleeding. We then placed our partial gastrectomy specimen into the right upper quadrant away from the operative field. We had the ViSiGi bougie removed from the stomach, and then we oversewed the lateral portion of the staple line starting at the top of the stomach, buttressing with omentum laterally with a 2-0 V-Loc absorbable suture. We ran that down along the greater curve, making sure the stomach had a natural lie.   There were no kinks or bending of the stomach. The staple line was hemostatic. We then removed our suture and then performed our endoscopy. We placed the endoscope down the mouth into the esophagus and down into the sleeve stomach. The sleeve stomach looked nice and straight. Everything looked good with it. We got the scope all the way down along the area of the staple line down to the area of the antrum and pylorus. Everything looked good with that. We could see the staple line appeared to be nice and straight. There was no bleeding from the staple line, and there was no leak during our leak test.  We then desufflated the stomach, removing the endoscope, passing it off the field. We then removed our partial gastrectomy specimen from the 15 mm port site after dilating with a fascial dilator. We then closed our 15 mm port site fascial defect with an Endo Close suture passing device in interrupted figure-of-eight fashion. We then removed our subxiphoid liver retractor, inspected the operative field one more time, everything was hemostatic. We then desufflated the abdominal cavity, removed the trocars and closed the  skin with 4-0 Monocryl and Dermabond to complete the operation. Dr. Oriana Riddle was present and scrubbed during the entire operation. The counts were correct.       Dejan Gamez MD      [id number]  D:  07/26/2021 14:47  T:  07/26/2021 17:29  JOB #:  [id number]

## 2021-07-26 NOTE — BRIEF OP NOTE
Brief Postoperative Note    Patient: Angela Mckeon  YOB: 1990  MRN: [id number]    Date of Procedure: 7/26/2021     Pre-Op Diagnosis: Morbid obesity ([facility] [E66.01]    Post-Op Diagnosis: Same as preoperative diagnosis.       Procedure(s):  LAPAROSCOPIC SLEEVE GASTRECTOMY, EGD (E R A S)  ESOPHAGOGASTRODUODENOSCOPY (EGD)    Surgeon(s):  Ozzy Feldman MD    Surgical Assistant: Surg Asst-1: Sg Shirley    Anesthesia: General     Estimated Blood Loss (mL): less than 50     Complications: None    Specimens:   ID Type Source Tests Collected by Time Destination   1 : Partial Gastrectomy Fresh Abdomen  Ozzy Feldman MD 7/26/2021 1357 Pathology        Implants: * No implants in log *    Drains:   [REMOVED] Orogastric Tube 07/26/21 (Removed)       Findings: normal sleeve gastrectomy, normal post op EGD, negative leak test    Electronically Signed by Fabiano Rich MD on 7/26/2021 at 2:37 PM

## 2021-07-26 NOTE — TELEPHONE ENCOUNTER
Returned call to Ms Victor Hugo Thomas verified all PHI. I informed patient I received her message I was out of the office on Friday. She stated she should be alright.

## 2021-07-26 NOTE — PERIOP NOTES
1325 - Pt complaining of chest pain stating her \"heart hurts\". Pt's head of bed elevated. Informed Dr. Heide Haque, verbal orders received for 2mg versed and to keep [facility] elevated. Educated patient on signs and symptoms of gas pain. No changes to heart rhythm on monitor. [phone] - Dr. Luther Crowder to see patient, informed him of midsternal/chest pain. No orders received.

## 2021-07-26 NOTE — ANESTHESIA POSTPROCEDURE EVALUATION
Post-Anesthesia Evaluation and Assessment    Patient: Abelina Castleman MRN: [id number]  SSN: [id number]    YOB: 1990  Age: 27 y.o. Sex: female      I have evaluated the patient and they are stable and ready for discharge from the PACU. Cardiovascular Function/Vital Signs  Visit Vitals  /81   Pulse 98   Temp 36.9 °C (98.5 °F)   Resp 23   Wt 93.6 kg (206 lb 4.8 oz)   SpO2 97%   BMI 38.98 kg/m²       Patient is status post General anesthesia for Procedure(s):  LAPAROSCOPIC SLEEVE GASTRECTOMY, EGD (E R A S)  ESOPHAGOGASTRODUODENOSCOPY (EGD). Nausea/Vomiting: None    Postoperative hydration reviewed and adequate. Pain:  Pain Scale 1: Adult Nonverbal Pain Scale (07/26/21 1615)  Pain Intensity 1: 0 (07/26/21 1615)   Managed    Neurological Status:   Neuro (WDL): Exceptions to WDL (07/26/21 1530)  Neuro  Neurologic State: Drowsy (easily arousable) (07/26/21 1530)  Orientation Level: Oriented X4 (07/26/21 1530)  LUE Motor Response: Purposeful (07/26/21 1530)  LLE Motor Response: Purposeful (07/26/21 1530)  RUE Motor Response: Purposeful (07/26/21 1530)  RLE Motor Response: Purposeful (07/26/21 1530)   At baseline    Mental Status, Level of Consciousness: Alert and  oriented to person, place, and time    Pulmonary Status:   O2 Device: Nasal cannula (07/26/21 1530)   Adequate oxygenation and airway patent    Complications related to anesthesia: None    Post-anesthesia assessment completed. No concerns    Signed By: Susanne Campos MD     July 26, 2021              Procedure(s):  LAPAROSCOPIC SLEEVE GASTRECTOMY, EGD (E R A S)  ESOPHAGOGASTRODUODENOSCOPY (EGD). general    <BSHSIANPOST>    INITIAL Post-op Vital signs:   Vitals Value Taken Time   /82 07/26/21 1645   Temp 36.9 °C (98.5 °F) 07/26/21 1550   Pulse 104 07/26/21 1648   Resp 27 07/26/21 1648   SpO2 98 % 07/26/21 1648   Vitals shown include unvalidated device data.

## 2021-07-26 NOTE — INTERVAL H&P NOTE
Update History & Physical      The surgery was reviewed with the patient and I examined the patient. There was no change. The surgical site was confirmed by the patient and me. Plan:  The risk, benefits, expected outcome, and alternative to the recommended procedure have been discussed with the patient. Patient understands and wants to proceed with the procedure.     Electronically signed by Belinda Charles MD on 7/26/2021 at 11:39 AM

## 2021-07-26 NOTE — ROUTINE PROCESS
Attempted to notify family no answer at 1:18 PM by Taylor Joseph RN.    1300 During intra-op pt interview (pt stated no metal). In OR plastic nipple  piercing noted.

## 2021-07-27 LAB
ANION GAP SERPL CALC-SCNC: 8 MMOL/L (ref 5–15)
BUN SERPL-MCNC: 6 MG/DL (ref 6–20)
BUN/CREAT SERPL: 9 (ref 12–20)
CALCIUM SERPL-MCNC: 8.8 MG/DL (ref 8.5–10.1)
CHLORIDE SERPL-SCNC: 102 MMOL/L (ref 97–108)
CO2 SERPL-SCNC: 24 MMOL/L (ref 21–32)
CREAT SERPL-MCNC: 0.68 MG/DL (ref 0.55–1.02)
ERYTHROCYTE [DISTWIDTH] IN BLOOD BY AUTOMATED COUNT: 13.7 % (ref 11.5–14.5)
GLUCOSE SERPL-MCNC: 119 MG/DL (ref 65–100)
HCT VFR BLD AUTO: 36.3 % (ref 35–47)
HGB BLD-MCNC: 12 G/DL (ref 11.5–16)
MCH RBC QN AUTO: 27.2 PG (ref 26–34)
MCHC RBC AUTO-ENTMCNC: 33.1 G/DL (ref 30–36.5)
MCV RBC AUTO: 82.3 FL (ref 80–99)
NRBC # BLD: 0 K/UL (ref 0–0.01)
NRBC BLD-RTO: 0 PER 100 WBC
PLATELET # BLD AUTO: 361 K/UL (ref 150–400)
PMV BLD AUTO: 10 FL (ref 8.9–12.9)
POTASSIUM SERPL-SCNC: 4 MMOL/L (ref 3.5–5.1)
RBC # BLD AUTO: 4.41 M/UL (ref 3.8–5.2)
SODIUM SERPL-SCNC: 134 MMOL/L (ref 136–145)
WBC # BLD AUTO: 9.1 K/UL (ref 3.6–11)

## 2021-07-27 PROCEDURE — 96376 TX/PRO/DX INJ SAME DRUG ADON: CPT

## 2021-07-27 PROCEDURE — 65660000000 HC RM CCU STEPDOWN

## 2021-07-27 PROCEDURE — 96374 THER/PROPH/DIAG INJ IV PUSH: CPT

## 2021-07-27 PROCEDURE — 80048 BASIC METABOLIC PNL TOTAL CA: CPT

## 2021-07-27 PROCEDURE — 96375 TX/PRO/DX INJ NEW DRUG ADDON: CPT

## 2021-07-27 PROCEDURE — 74011250636 HC RX REV CODE- 250/636: Performed by: SURGERY

## 2021-07-27 PROCEDURE — G0378 HOSPITAL OBSERVATION PER HR: HCPCS

## 2021-07-27 PROCEDURE — 36415 COLL VENOUS BLD VENIPUNCTURE: CPT

## 2021-07-27 PROCEDURE — 96372 THER/PROPH/DIAG INJ SC/IM: CPT

## 2021-07-27 PROCEDURE — 85027 COMPLETE CBC AUTOMATED: CPT

## 2021-07-27 PROCEDURE — 74011250637 HC RX REV CODE- 250/637: Performed by: SURGERY

## 2021-07-27 RX ORDER — HYDROMORPHONE HYDROCHLORIDE 2 MG/1
2 TABLET ORAL
Qty: 30 TABLET | Refills: 0 | Status: SHIPPED | OUTPATIENT
Start: 2021-07-27 | End: 2021-08-03

## 2021-07-27 RX ORDER — HYDROMORPHONE HYDROCHLORIDE 2 MG/1
4 TABLET ORAL
Status: DISCONTINUED | OUTPATIENT
Start: 2021-07-27 | End: 2021-07-29 | Stop reason: HOSPADM

## 2021-07-27 RX ORDER — METOCLOPRAMIDE HYDROCHLORIDE 5 MG/ML
10 INJECTION INTRAMUSCULAR; INTRAVENOUS EVERY 6 HOURS
Status: DISCONTINUED | OUTPATIENT
Start: 2021-07-27 | End: 2021-07-29 | Stop reason: HOSPADM

## 2021-07-27 RX ADMIN — ACETAMINOPHEN ORAL SOLUTION 1000 MG: 650 SOLUTION ORAL at 00:23

## 2021-07-27 RX ADMIN — ONDANSETRON 4 MG: 2 INJECTION INTRAMUSCULAR; INTRAVENOUS at 05:08

## 2021-07-27 RX ADMIN — Medication 10 ML: at 22:41

## 2021-07-27 RX ADMIN — KETOROLAC TROMETHAMINE 15 MG: 30 INJECTION, SOLUTION INTRAMUSCULAR; INTRAVENOUS at 12:37

## 2021-07-27 RX ADMIN — METOCLOPRAMIDE 10 MG: 5 INJECTION, SOLUTION INTRAMUSCULAR; INTRAVENOUS at 23:00

## 2021-07-27 RX ADMIN — Medication 10 ML: at 05:08

## 2021-07-27 RX ADMIN — KETOROLAC TROMETHAMINE 15 MG: 30 INJECTION, SOLUTION INTRAMUSCULAR; INTRAVENOUS at 23:00

## 2021-07-27 RX ADMIN — GABAPENTIN 200 MG: 100 CAPSULE ORAL at 18:21

## 2021-07-27 RX ADMIN — SODIUM CHLORIDE, POTASSIUM CHLORIDE, SODIUM LACTATE AND CALCIUM CHLORIDE 125 ML/HR: 600; 310; 30; 20 INJECTION, SOLUTION INTRAVENOUS at 19:52

## 2021-07-27 RX ADMIN — GABAPENTIN 200 MG: 100 CAPSULE ORAL at 08:49

## 2021-07-27 RX ADMIN — KETOROLAC TROMETHAMINE 15 MG: 30 INJECTION, SOLUTION INTRAMUSCULAR; INTRAVENOUS at 00:23

## 2021-07-27 RX ADMIN — ENOXAPARIN SODIUM 40 MG: 40 INJECTION SUBCUTANEOUS at 08:49

## 2021-07-27 RX ADMIN — PANTOPRAZOLE SODIUM 40 MG: 40 TABLET, DELAYED RELEASE ORAL at 08:49

## 2021-07-27 RX ADMIN — Medication 10 ML: at 14:15

## 2021-07-27 RX ADMIN — ONDANSETRON 4 MG: 2 INJECTION INTRAMUSCULAR; INTRAVENOUS at 09:32

## 2021-07-27 RX ADMIN — KETOROLAC TROMETHAMINE 15 MG: 30 INJECTION, SOLUTION INTRAMUSCULAR; INTRAVENOUS at 18:21

## 2021-07-27 RX ADMIN — METOCLOPRAMIDE 10 MG: 5 INJECTION, SOLUTION INTRAMUSCULAR; INTRAVENOUS at 18:21

## 2021-07-27 RX ADMIN — KETOROLAC TROMETHAMINE 15 MG: 30 INJECTION, SOLUTION INTRAMUSCULAR; INTRAVENOUS at 05:08

## 2021-07-27 RX ADMIN — SODIUM CHLORIDE, POTASSIUM CHLORIDE, SODIUM LACTATE AND CALCIUM CHLORIDE 125 ML/HR: 600; 310; 30; 20 INJECTION, SOLUTION INTRAVENOUS at 04:23

## 2021-07-27 NOTE — PROGRESS NOTES
09:30 - Patient complaining of nausea. RN administered PRN Zofran. 11:15 - Patient had 1 emesis occurrence. Patient also complaining of pain at abd lap sites. Patient has completed 1 round of bariatric fluids so far this morning. Patient has flat affect and has delayed and or no response to RN questions. RN will continue to monitor patient's symptoms and progress. 12:37 - Patient refused her Tylenol due to complaints of nausea. Patient ambulated 1 lap with mobility services. Patient still has only completed one round of her bariatric fluids. 16:10 - Patient ambulated 1 lap. Patient has only completed one round of bariatric fluid cups today. RN has encouraged fluids and ambulation. Patient complains Ensure is too sweet and is making her nauseous. Patient declined nutrition education twice today. RN will continue to encourage fluids and ambulation with patient. 18:35 - Patient refused to ambulate with mobility services.

## 2021-07-27 NOTE — PROGRESS NOTES
NUTRITION education brief     Pt visited for post-op bariatric education. Pt visited x 2 with very flat affect during both visits. Pt has been dealing with nausea with 1 episode of vomiting but barely even answering questions and with poor eye contact. Hx of depression and anxiety along with PTSD noted. Has only been able tolerate 1 round of liquids and reports Ensure HP too sweet and contributing to nausea. Pt drinking Glucerna Hunger Smart so will change shakes to Glucerna shakes which are still low sugar and may be tolerated better. RN aware. Pt declining education x 2 attempts and deferring until tomorrow since will stay overnight with likely d/c tomorrow pending PO tolerance. Will follow for education prior to d/c tomorrow.    Jhoan De Oliveira RD

## 2021-07-27 NOTE — DISCHARGE INSTRUCTIONS
Laparoscopic Sleeve Gastrectomy    Patient Discharge Instructions    Kristi Councilman / [id number] : 1990    Admitted 2021 Discharged:        · It is important that you take the medication exactly as they are prescribed. · Keep your medication in the bottles provided by the pharmacist and keep a list of the medication names, dosages, and times to be taken in your wallet. · Do not take other medications without consulting your doctor. · Wound care: You may shower starting tomorrow. Do not remove the dermabond on the incision, it will fall of on its own in a few weeks. · No heavy lifting or strenuous activity for 2wks after the operation    Please add a B12 500mcg supplement daily. Because part of your stomach has been removed, you are more at risk for a B12 deficiency. Diet:  Full liquid Bariatric diet as outlined in your education book and on the handout provided by the dietician. Gastric Sleeve  Patient Discharge Instructions  [address] [facility] at [facility]   [phone]    1. Activities: You may be active walking, stair climbing, and doing light weight activities with one to two-pound weights, sitting in a chair using different arm motions. Major restrictions include driving until your first office visit and lifting anything heavier than ten pounds. It is very important that you walk frequently. In addition to walking, you should continue to use your incentive spirometer (breathing exercises) throughout the day. 2. Caring for vour incision (s}: Your surgical incision(s) will be covered with Derma bond (super glue). You may shower as desired and simply pat dry the area over the incision(s). There may occasionally be seepage of yellowish to yellowish-maroon dissolved fat from your wound, which is of no major concern as long as the wound is not red, hardened in the area of the drainage, or if the drainage has a foul smell.  If there are any questions, call our office for further instructions. If there is this type of dissolved fat drainage,  the shower and gently express the fluid from your wound. Then keep gauze pads over the area to protect both the wound and your clothes. 3. When to call the office immediately:      *Chest pain (not associated with eating/drinking)  *Shortness of breath (more than normal)  *Sudden pain and/or redness in calf  *Fever greater than 101F  *Persistent nausea and/or vomiting (unable to keep down any liquids)  *Bleeding from incision(s)  *Severe abdominal pain  *Any other concerning symptoms    4. Diet: There are three main priorities as far as your diet is concerned---    a. Clear Liquids-drink from 1 oz. cups or standard shot glass. These liquids are non-carbonated, no added sugar, and not irritating to your stomach. They may include water, tea, coffee\" 100% no added sugar juices (avoid citrus) , clear broth, blenderized clear soups such as [employer] Healthy Request Chicken Noodle and Chicken & Rice, Sugar-free products including popsicles, Shubham-Aid, and Crystal Lite. b. Vitamins: Multi-vitamin with iron, Vitamin B-12 and D capsule may be taken as recommended. c. Protein Intake: During your initial two-three weeks when your body is switching from burning glucose to directly burning fat, there is an attempt by your body to use protein as a fuel. To protect that protein, we like you to exercise as listed above, and to try to take in 50-60 grams of protein per day. This can be done with four 8 oz. serving of skim milk and Low Carb [place] Instant Breakfast. Each 8 oz. should be considered a meal-breakfast, lunch, or supper, and during this mealtime it should be the sole ingested substance. Stop your clear liquids for 20 minutes before your start on the instant breakfast, which is sipped 1 oz. at a time.  You may also try the following substitute for [place] Instant Breakfast: skim milk-fat free powdered milk + Egg Beaters + flavor extract. If desired, ice may be added and blenderized in the . If the milk upsets your stomach, you may purchase Lactaid tablets from any drug store. Lactaid skim milk may be purchased at most major supermarkets. Another alternative is Boost Diabetic, which is Lactose-free and ready to drink. There are many protein supplements on the market. Whey and Soy based protein powders/drinks are acceptable. If you have any questions about specific products please call the office. d. Other foods may be taken if you have met the requirements of a, b, and c. These foods should be low fat, low sugar, and low spice, and blenderized to the point that, if needed, could be sucked through a straw. One of the favorite treats is dietetic canned fruit blenderized with a combination of its juices and crushed ice, and then eaten in small amounts with a spoon. A smooth low-calorie, low-fat yogurt (should be 100 calories or less) is acceptable as is low-fat or fat-free cottage cheese. If you are having difficulty with your diet, please call the office. 5. Medications: Take no more than 2 pills at a time. Wait 20 minutes between pills. a. Vitamins as listed above---multi-vitamin with iron twice a day, B-12 once a day, vitamin D.    b. Acid reducing medicine--Will be prescribed by your surgeon at discharge. c. Mylanta Plus--one to two teaspoons as needed for belching or gas (other gas relieving medicines are also acceptable Beano, GasX, etc). d. Bowel Regulation--mild laxatives are permissible such as Milk of Magnesia, Dulcolax (either by mouth or suppository). If you are accustomed to using a [id number] laxative not mentioned, you may continue to use it. Fibercon tablets or Benefiber may be taken as a fiber supplement to regulate bowel movements. Fibercon tablets should be broken in half and taken in half and taken one half in the morning and one half in the evening.   Benefiber (1 tsp.) can be added to your protein drink(s). It has no taste or added thickness. Imodium AD may be taken as needed for diarrhea.    e. Pain medication-one to two every four hours as needed for pain control. If pain is mild, try extra-strength Tylenol first.    f. Do not take any pain or arthritis medication such as Aspirin, Advil, Aleve, Naprosyn, or any other nonsteroidal anti-inflammatory medication unless approved by your surgeon. A Tylenol product is OK.    g. Preadmission medications may be resumed, but this should be discussed with your doctor before your discharge from the hospital.    6.  Follow up Office Visits:  call our office at [phone] if you have any questions or concerns. Your appointment should be 2 weeks from your surgery date. Do not plan on returning to work prior to this office visit unless discussed with your doctor. Information obtained by :  I understand that if any problems occur once I am at home I am to contact my physician. I understand and acknowledge receipt of the instructions indicated above.                                                                                                                                            Physician's or R.N.'s Signature                                                                  Date/Time                                                                                                                                              Patient or Representative Signature                                                          Date/Time         MD Pam Arnold, MERNA Gamez, [address] [facility]

## 2021-07-27 NOTE — PROGRESS NOTES
Bedside shift change report given to Karen Carroll RN (oncoming nurse) by Arely Pandey RN (offgoing nurse). Report included the following information SBAR, Kardex, Intake/Output, MAR and Recent Results.

## 2021-07-27 NOTE — PROGRESS NOTES
[facility] at [facility]    POD #1    Subjective     Feeling tired, no N/V, mild pain    Objective     Patient Vitals for the past 24 hrs:   Temp Pulse Resp BP SpO2   07/27/21 0343 98.8 °F (37.1 °C) 82 18 126/76 93 %   07/27/21 0024 99 °F (37.2 °C) (!) 103 18 123/77 96 %   07/26/21 2043 98.7 °F (37.1 °C) 98 18 (!) 145/88 97 %   07/26/21 2025  98 20 128/89 97 %   07/26/21 1905  99 21 138/89 96 %   07/26/21 1900  99 20 135/88 96 %   07/26/21 1855    126/84    07/26/21 1850  98 20  97 %   07/26/21 1815  95 19 127/82 97 %   07/26/21 1800  96 19 129/83 98 %   07/26/21 1755  95 20  98 %   07/26/21 1745  97 19 126/84 98 %   07/26/21 1730  94 21 126/84 98 %   07/26/21 1715  97 16 123/78 96 %   07/26/21 1710  92 20  97 %   07/26/21 1700  96  122/79 98 %   07/26/21 1645  97 20 129/82 97 %   07/26/21 1630  98 23 128/81 97 %   07/26/21 1615   17 127/80 96 %   07/26/21 1600  98  125/79 97 %   07/26/21 1550 98.5 °F (36.9 °C) 95 20  96 %   07/26/21 1545  92 20 131/84 97 %   07/26/21 1530  90 22 (!) 136/92 97 %   07/26/21 1515  93 21 137/83 98 %   07/26/21 1510  90 25  98 %   07/26/21 1505  90 23  99 %   07/26/21 1500  92 23 (!) 140/85 98 %   07/26/21 1455  94 20 126/80 97 %   07/26/21 1450  92 21 130/81 98 %   07/26/21 1445  92 14 130/88 95 %   07/26/21 1442 (!) 96.4 °F (35.8 °C) 92 25 114/70 99 %   07/26/21 1440  91 20 114/70 99 %   07/26/21 1438 (!) 96.4 °F (35.8 °C) 91 (!) 91 114/70 99 %   07/26/21 1115 98 °F (36.7 °C) 68 18 110/78 98 %       Date 07/26/21 0700 - 07/27/21 0659 07/27/21 0700 - 07/28/21 0659   Shift 5211-3493 7387-8695 24 Hour Total 6454-1904 2270-8795 24 Hour Total   INTAKE   I.V.(mL/kg/hr) 1520(1.4)  1520(0.7)        Volume (lactated Ringers infusion) 1500  1500        Volume (ceFAZolin (ANCEF) 2 g in sterile water (preservative free) 20 mL IV syringe) 20  20      Shift Total(mL/kg) 1520(16.2) 5168(20.1)      OUTPUT   Urine(mL/kg/hr)  1350(1.2) 1350(0.6)        Urine Voided  1350 1350        Urine Occurrence(s) 1 x 1 x 2 x      Emesis/NG output           Emesis Occurrence(s)  1 x 1 x      Blood 25  25        Estimated Blood Loss 25  25      Shift Total(mL/kg) 25(0.3) 1350(14.4) 1375(14.7)      NET 1495 -1350 145      Weight (kg) 93.6 93.6 93.6 93.6 93.6 93.6       PE  Pulm - CTAB  CV - RRR  Abd - soft, ND, BS present, incisions c/d/i    Labs  Recent Results (from the past 12 hour(s))   METABOLIC PANEL, BASIC    Collection Time: 07/27/21  3:16 AM   Result Value Ref Range    Sodium 134 (L) 136 - 145 mmol/L    Potassium 4.0 3.5 - 5.1 mmol/L    Chloride 102 97 - 108 mmol/L    CO2 24 21 - 32 mmol/L    Anion gap 8 5 - 15 mmol/L    Glucose 119 (H) 65 - 100 mg/dL    BUN 6 6 - 20 MG/DL    Creatinine 0.68 0.55 - 1.02 MG/DL    BUN/Creatinine ratio 9 (L) 12 - 20      GFR est AA >60 >60 ml/min/1.73m2    GFR est non-AA >60 >60 ml/min/1.73m2    Calcium 8.8 8.5 - 10.1 MG/DL   CBC W/O DIFF    Collection Time: 07/27/21  3:16 AM   Result Value Ref Range    WBC 9.1 3.6 - 11.0 K/uL    RBC 4.41 3.80 - 5.20 M/uL    HGB 12.0 11.5 - 16.0 g/dL    HCT 36.3 35.0 - 47.0 %    MCV 82.3 80.0 - 99.0 FL    MCH 27.2 26.0 - 34.0 PG    MCHC 33.1 30.0 - 36.5 g/dL    RDW 13.7 11.5 - 14.5 %    PLATELET 278 825 - 746 K/uL    MPV 10.0 8.9 - 12.9 FL    NRBC 0.0 0  WBC    ABSOLUTE NRBC 0.00 0.00 - 0.01 K/uL         Assessment     Celitta D Acacia is a 27 y. o.yr old female s/p laparoscopic sleeve gastrectomy    Plan     Doing well today but seems very tired  She will need some extra motivation today to get her moving  Start liquid diet today  OOB more today  Continue IVF  Start PO dilaudid for pain  Continue IV toradol  Possible DC home today if drinking well    Reggie Siu MD

## 2021-07-28 VITALS
DIASTOLIC BLOOD PRESSURE: 66 MMHG | WEIGHT: 206.3 LBS | OXYGEN SATURATION: 100 % | HEART RATE: 62 BPM | RESPIRATION RATE: 16 BRPM | SYSTOLIC BLOOD PRESSURE: 118 MMHG | BODY MASS INDEX: 38.98 KG/M2 | TEMPERATURE: 98.1 F

## 2021-07-28 PROCEDURE — 96372 THER/PROPH/DIAG INJ SC/IM: CPT

## 2021-07-28 PROCEDURE — G0378 HOSPITAL OBSERVATION PER HR: HCPCS

## 2021-07-28 PROCEDURE — 74011250636 HC RX REV CODE- 250/636: Performed by: SURGERY

## 2021-07-28 PROCEDURE — 96376 TX/PRO/DX INJ SAME DRUG ADON: CPT

## 2021-07-28 PROCEDURE — 74011250637 HC RX REV CODE- 250/637: Performed by: SURGERY

## 2021-07-28 RX ORDER — METOCLOPRAMIDE 5 MG/1
5 TABLET ORAL
Qty: 30 TABLET | Refills: 0 | Status: SHIPPED | OUTPATIENT
Start: 2021-07-28 | End: 2021-08-07

## 2021-07-28 RX ADMIN — ENOXAPARIN SODIUM 40 MG: 40 INJECTION SUBCUTANEOUS at 13:45

## 2021-07-28 RX ADMIN — SODIUM CHLORIDE, POTASSIUM CHLORIDE, SODIUM LACTATE AND CALCIUM CHLORIDE 125 ML/HR: 600; 310; 30; 20 INJECTION, SOLUTION INTRAVENOUS at 04:08

## 2021-07-28 RX ADMIN — METOCLOPRAMIDE 10 MG: 5 INJECTION, SOLUTION INTRAMUSCULAR; INTRAVENOUS at 05:00

## 2021-07-28 RX ADMIN — PANTOPRAZOLE SODIUM 40 MG: 40 TABLET, DELAYED RELEASE ORAL at 10:34

## 2021-07-28 RX ADMIN — Medication 10 ML: at 04:06

## 2021-07-28 RX ADMIN — KETOROLAC TROMETHAMINE 15 MG: 30 INJECTION, SOLUTION INTRAMUSCULAR; INTRAVENOUS at 13:42

## 2021-07-28 RX ADMIN — GABAPENTIN 200 MG: 100 CAPSULE ORAL at 10:34

## 2021-07-28 RX ADMIN — Medication 10 ML: at 19:03

## 2021-07-28 RX ADMIN — KETOROLAC TROMETHAMINE 15 MG: 30 INJECTION, SOLUTION INTRAMUSCULAR; INTRAVENOUS at 05:00

## 2021-07-28 RX ADMIN — METOCLOPRAMIDE 10 MG: 5 INJECTION, SOLUTION INTRAMUSCULAR; INTRAVENOUS at 14:04

## 2021-07-28 RX ADMIN — HYDROMORPHONE HYDROCHLORIDE 4 MG: 2 TABLET ORAL at 10:42

## 2021-07-28 RX ADMIN — KETOROLAC TROMETHAMINE 15 MG: 30 INJECTION, SOLUTION INTRAMUSCULAR; INTRAVENOUS at 19:02

## 2021-07-28 RX ADMIN — GABAPENTIN 200 MG: 100 CAPSULE ORAL at 19:02

## 2021-07-28 NOTE — PROGRESS NOTES
[employer] [facility] at [facility]    POD #2    Subjective     Doing much better, no N/V, pain minimal    Objective     Patient Vitals for the past 24 hrs:   Temp Pulse Resp BP SpO2   07/27/21 2329 98.2 °F (36.8 °C) (!) 51 16 123/84 98 %   07/27/21 1952 98.5 °F (36.9 °C) 73 16 139/76 94 %   07/27/21 1512 97.9 °F (36.6 °C) 72 18 (!) 140/83 95 %   07/27/21 1143 98 °F (36.7 °C) 66 18 134/85 96 %   07/27/21 0750 98.8 °F (37.1 °C) (!) 102 18 (!) 116/90 96 %       Date 07/27/21 0700 - 07/28/21 0659 07/28/21 0700 - 07/29/21 0659   Shift 5019-9035 3486-8632 24 Hour Total 5607-1497 9171-2566 24 Hour Total   INTAKE   P.O. 120  120        P. O. 120  120      Shift Total(mL/kg) 120(1.3)  120(1.3)      OUTPUT   Urine(mL/kg/hr) 1200(1.1) 900(0.8) 2100(0.9)        Urine Voided 5014 361 9116      Emesis/NG output           Emesis Occurrence(s) 1 x  1 x      Shift Total(mL/kg) 1200(12.8) 900(9.6) 2100(22.4)      NET -1080 -900 -1980      Weight (kg) 93.6 93.6 93.6 93.6 93.6 93.6       PE  Pulm - CTAB  CV - RRR  Abd - soft, ND, BS present, incisions c/d/i    Labs  No results found for this or any previous visit (from the past 12 hour(s)). Mateusz Lee is a 27 y. o.yr old female s/p sleeve gastrectomy    Plan      Doing much better today  Continue to increase PO intake  Continue reglan  Continue IVF  lovenox SQ  Possible DC home later today    Rosa Hawley MD

## 2021-07-28 NOTE — DISCHARGE SUMMARY
Physician Discharge Summary     Patient ID:  Arias Maddox  [id number]  70 y.o.  1990    Admit Date: 7/26/2021    Discharge Date:     Admission Diagnoses: Morbid obesity ([facility] [E66.01]; Severe obesity (BMI 35.0-35.9 with comorbidity) ([place] [address].) [E66.01, Z68.35]    Discharge Diagnoses: Active Problems:    Severe obesity (BMI 35.0-35.9 with comorbidity) ([place] [address].) (7/26/2021)         Admission Condition: Good    Discharge Condition: Good    Procedure(s):    7/26/2021 - Procedure(s):  LAPAROSCOPIC SLEEVE GASTRECTOMY, EGD (E R A S)  ESOPHAGOGASTRODUODENOSCOPY (EGD)      Hospital Course:   Normal hospital course for this procedure. She was not able to drink much on POD 1 and was very tired. She did well on POD 2 having no N/V, tolerating liquids then was DC home    Consults: None    Significant Diagnostic Studies: none    Disposition: home    Patient Instructions:   Current Discharge Medication List      START taking these medications    Details   metoclopramide HCl (REGLAN) 5 mg tablet Take 1 Tablet by mouth Before breakfast, lunch, and dinner for 10 days. Qty: 30 Tablet, Refills: 0      HYDROmorphone (DILAUDID) 2 mg tablet Take 1 Tablet by mouth every four (4) hours as needed for Pain for up to 7 days. Max Daily Amount: 12 mg.  Qty: 30 Tablet, Refills: 0    Associated Diagnoses: BMI 40.0-44.9, adult ([place] [address].); Class 2 obesity without serious comorbidity with body mass index (BMI) of 37.0 to 37.9 in adult, unspecified obesity type         CONTINUE these medications which have NOT CHANGED    Details   B.infantis-B.ani-B.long-B.bifi (Probiotic 4X) 10-15 mg TbEC Take  by mouth daily. polyethylene glycol (MIRALAX) 17 gram packet Take 1 Packet by mouth daily. Indications: constipation  Qty: 90 Packet, Refills: 3      hyoscyamine SL (Levsin/SL) 0.125 mg SL tablet 1 Tablet by SubLINGual route every four (4) hours as needed for Cramping (CHEST PRESSURE/ SPASM AFTER SURGERY).   Qty: 30 Tablet, Refills: 0      omeprazole (PRILOSEC) 40 mg capsule Take 1 Capsule by mouth daily. AFTER SURGERY  Qty: 30 Capsule, Refills: 1      prochlorperazine (COMPAZINE) 10 mg tablet Take 0.5 Tablets by mouth every six (6) hours as needed for Nausea or Vomiting (AFTER SURGERY). Qty: 30 Tablet, Refills: 0      calcium-cholecalciferol, d3, (CALCIUM 600 + D) 600-125 mg-unit tab Take  by mouth daily. vitamin E (AQUA GEMS) 400 unit capsule Take 400 Units by mouth daily. Marko Kocher 878-91-13 mg-mg-million tab Take  by mouth daily. therapeutic multivitamin (THERAGRAN) tablet Take 1 Tab by mouth daily. BIOTIN PO Take  by mouth daily.              Activity: No heavy lifting for 2 weeks  Diet: bariatric liquid diet  Wound Care: Keep wound clean and dry    Follow-up with Lana Zhang MD in 2 week(s)  Follow-up tests/labs none    Signed:  Lana Zhang MD  7/28/2021  5:14 PM .

## 2021-07-28 NOTE — PROGRESS NOTES
Bedside shift change report given to Rafiq (oncoming nurse) by Arnie Roque (offgoing nurse). Report included the following information SBAR, Kardex, Intake/Output, MAR and Recent Results.

## 2021-07-28 NOTE — CONSULTS
NUTRITION       Pt doing much better today. Smiling and much more interactive during visit this morning. Already has done 4 rounds of liquids and hoping to go home today. Post-op bariatric diet instruction completed. Will gladly follow up for additional questions as needed. Thank you.      Nisa Stewart RD

## 2021-07-28 NOTE — PROGRESS NOTES
Pt was able to do 2 rounds of liquids using water & chicken broth & Dorita protein shake. She also walked 2 laps before going to bed.

## 2021-07-29 ENCOUNTER — TELEPHONE (OUTPATIENT)
Dept: SURGERY | Age: 31
End: 2021-07-29

## 2021-07-29 NOTE — TELEPHONE ENCOUNTER
Bariatric Post-Operative Phone Calls: 48 hour phone call    Diet:Question of any nausea and/or vomiting. Protein intake (goal is 60 grams of protein daily)   Poor____Fair_X___Good____Great____     Comment:_No c/o N/V consumed 25 gms protein this morning, 30 gms yesterday_____________________________________________________________      ______________________________________________________________________    Hydration:Less than 32 ounces of water daily is fair to poor (Goal is 64 ounces per day)   Poor____ Fair_X___ Good____Great____    Comment: Consumed 16 oz water today______________________________________________________________    ______________________________________________________________________      Ambulation:( walking at least 3 x week, for 15- 20 minutes)     Poor______ Fair__X____ Good______     Great______ Comment:_Walking 3 times 10 mins_________________________________________________    ______________________________________________________________________      Urine Color: Question of any odor and color(should be la nena, pale, and clear) Dark______ Amber______ Pale______      Clear__X____ Comment:_No issues voiding__________________________________________________                           ________________________________________________________________    Bowel movements: Question of any constipation- haven't had any bowel movements for more than 3 days. This could be related to protein intake and/or narcotic pain medication usage. Comment:                                                      Had BM this morning no constipation                                                                        Pain: Left sided abdominal pain is normal (should be less than 3)  Question if pain medication is helpful. 10___ 9___ 8___ 7 X___ 6___ 5___ 4___ 3___     2___1___0___Comment:_C/O right sided pain where incision is. She took gabapentin and tylenol. She don't want to take the opiod.  I suggested apply ice pack wear support girdle.  ________________________________________________    ______________________________________________________________________      Incision: (No redness, pain, swelling or fever) Healing Well_X_____     Healed______Redness_________ Pain_________     Swelling_________ Fever__________(greater than 101 needs evaluation)    Comment:_There is small amt of redness where the larger incision is no warmth, sm amt swelling no drainage. ___________________________________________________________    ______________________________________________________________________  Use of incentive spirometer: Yes_X___       No           Next Appointment:_8/10/21 at 11 AM _____________                 Support Group: Yes_X_____No______    Additional Comments:_None___________________________________________________________    ____________________________________________________________________      If more than one parameter is not met or considered poor, nurse needs to discuss with provider recommend for patient to be seen in the office as soon as possible or refer to the provider for follow-up. Reinforce to patient to use bariatric educational booklet as guide. It is appropriate to refer patient to the nutritionist to discuss more in detail of diet and nutrition.

## 2021-08-01 ENCOUNTER — HOSPITAL ENCOUNTER (EMERGENCY)
Age: 31
Discharge: HOME | End: 2021-08-01
Attending: STUDENT IN AN ORGANIZED HEALTH CARE EDUCATION/TRAINING PROGRAM
Payer: COMMERCIAL

## 2021-08-01 ENCOUNTER — APPOINTMENT (OUTPATIENT)
Dept: GENERAL RADIOLOGY | Age: 31
End: 2021-08-01
Attending: EMERGENCY MEDICINE
Payer: COMMERCIAL

## 2021-08-01 ENCOUNTER — APPOINTMENT (OUTPATIENT)
Dept: CT IMAGING | Age: 31
End: 2021-08-01
Attending: EMERGENCY MEDICINE
Payer: COMMERCIAL

## 2021-08-01 VITALS
OXYGEN SATURATION: 99 % | WEIGHT: 198.63 LBS | BODY MASS INDEX: 37.5 KG/M2 | RESPIRATION RATE: 22 BRPM | HEIGHT: 61 IN | SYSTOLIC BLOOD PRESSURE: 113 MMHG | DIASTOLIC BLOOD PRESSURE: 82 MMHG | HEART RATE: 72 BPM | TEMPERATURE: 97.6 F

## 2021-08-01 DIAGNOSIS — R07.89 ATYPICAL CHEST PAIN: Primary | ICD-10-CM

## 2021-08-01 LAB
ALBUMIN SERPL-MCNC: 3.6 G/DL (ref 3.5–5)
ALBUMIN/GLOB SERPL: 0.8 {RATIO} (ref 1.1–2.2)
ALP SERPL-CCNC: 60 U/L (ref 45–117)
ALT SERPL-CCNC: 48 U/L (ref 12–78)
ANION GAP SERPL CALC-SCNC: 5 MMOL/L (ref 5–15)
AST SERPL-CCNC: 28 U/L (ref 15–37)
ATRIAL RATE: 77 BPM
BASOPHILS # BLD: 0.1 K/UL (ref 0–0.1)
BASOPHILS NFR BLD: 1 % (ref 0–1)
BILIRUB SERPL-MCNC: 0.4 MG/DL (ref 0.2–1)
BUN SERPL-MCNC: 13 MG/DL (ref 6–20)
BUN/CREAT SERPL: 18 (ref 12–20)
CALCIUM SERPL-MCNC: 9.3 MG/DL (ref 8.5–10.1)
CALCULATED P AXIS, ECG09: 41 DEGREES
CALCULATED R AXIS, ECG10: 72 DEGREES
CALCULATED T AXIS, ECG11: -18 DEGREES
CHLORIDE SERPL-SCNC: 103 MMOL/L (ref 97–108)
CO2 SERPL-SCNC: 27 MMOL/L (ref 21–32)
COMMENT, HOLDF: NORMAL
CREAT SERPL-MCNC: 0.74 MG/DL (ref 0.55–1.02)
D DIMER PPP FEU-MCNC: 3.08 MG/L FEU (ref 0–0.65)
DIAGNOSIS, 93000: NORMAL
DIFFERENTIAL METHOD BLD: ABNORMAL
EOSINOPHIL # BLD: 0.5 K/UL (ref 0–0.4)
EOSINOPHIL NFR BLD: 7 % (ref 0–7)
ERYTHROCYTE [DISTWIDTH] IN BLOOD BY AUTOMATED COUNT: 12.9 % (ref 11.5–14.5)
GLOBULIN SER CALC-MCNC: 4.4 G/DL (ref 2–4)
GLUCOSE SERPL-MCNC: 74 MG/DL (ref 65–100)
HCT VFR BLD AUTO: 37.1 % (ref 35–47)
HGB BLD-MCNC: 12.2 G/DL (ref 11.5–16)
IMM GRANULOCYTES # BLD AUTO: 0 K/UL (ref 0–0.04)
IMM GRANULOCYTES NFR BLD AUTO: 0 % (ref 0–0.5)
LYMPHOCYTES # BLD: 1.6 K/UL (ref 0.8–3.5)
LYMPHOCYTES NFR BLD: 24 % (ref 12–49)
MCH RBC QN AUTO: 27.1 PG (ref 26–34)
MCHC RBC AUTO-ENTMCNC: 32.9 G/DL (ref 30–36.5)
MCV RBC AUTO: 82.4 FL (ref 80–99)
MONOCYTES # BLD: 0.7 K/UL (ref 0–1)
MONOCYTES NFR BLD: 10 % (ref 5–13)
NEUTS SEG # BLD: 4 K/UL (ref 1.8–8)
NEUTS SEG NFR BLD: 58 % (ref 32–75)
NRBC # BLD: 0 K/UL (ref 0–0.01)
NRBC BLD-RTO: 0 PER 100 WBC
P-R INTERVAL, ECG05: 138 MS
PLATELET # BLD AUTO: 366 K/UL (ref 150–400)
PMV BLD AUTO: 9.3 FL (ref 8.9–12.9)
POTASSIUM SERPL-SCNC: 4 MMOL/L (ref 3.5–5.1)
PROT SERPL-MCNC: 8 G/DL (ref 6.4–8.2)
Q-T INTERVAL, ECG07: 378 MS
QRS DURATION, ECG06: 72 MS
QTC CALCULATION (BEZET), ECG08: 427 MS
RBC # BLD AUTO: 4.5 M/UL (ref 3.8–5.2)
SAMPLES BEING HELD,HOLD: NORMAL
SODIUM SERPL-SCNC: 135 MMOL/L (ref 136–145)
TROPONIN I SERPL-MCNC: <0.05 NG/ML
VENTRICULAR RATE, ECG03: 77 BPM
WBC # BLD AUTO: 6.9 K/UL (ref 3.6–11)

## 2021-08-01 PROCEDURE — 74011000636 HC RX REV CODE- 636: Performed by: RADIOLOGY

## 2021-08-01 PROCEDURE — 85379 FIBRIN DEGRADATION QUANT: CPT

## 2021-08-01 PROCEDURE — 93005 ELECTROCARDIOGRAM TRACING: CPT

## 2021-08-01 PROCEDURE — 71275 CT ANGIOGRAPHY CHEST: CPT

## 2021-08-01 PROCEDURE — 85025 COMPLETE CBC W/AUTO DIFF WBC: CPT

## 2021-08-01 PROCEDURE — 84484 ASSAY OF TROPONIN QUANT: CPT

## 2021-08-01 PROCEDURE — 71046 X-RAY EXAM CHEST 2 VIEWS: CPT

## 2021-08-01 PROCEDURE — 74011250637 HC RX REV CODE- 250/637: Performed by: EMERGENCY MEDICINE

## 2021-08-01 PROCEDURE — 74011000250 HC RX REV CODE- 250: Performed by: EMERGENCY MEDICINE

## 2021-08-01 PROCEDURE — 36415 COLL VENOUS BLD VENIPUNCTURE: CPT

## 2021-08-01 PROCEDURE — 99283 EMERGENCY DEPT VISIT LOW MDM: CPT

## 2021-08-01 PROCEDURE — 80053 COMPREHEN METABOLIC PANEL: CPT

## 2021-08-01 RX ADMIN — ALUMINUM HYDROXIDE, MAGNESIUM HYDROXIDE, AND SIMETHICONE 40 ML: 200; 200; 20 SUSPENSION ORAL at 09:13

## 2021-08-01 RX ADMIN — IOPAMIDOL 80 ML: 755 INJECTION, SOLUTION INTRAVENOUS at 10:12

## 2021-08-01 NOTE — ED PROVIDER NOTES
Please note that this dictation was completed with [employer], the computer voice recognition software.  Quite often unanticipated grammatical, syntax, homophones, and other interpretive errors are inadvertently transcribed by the computer software.  Please disregard these errors.  Please excuse any errors that have escaped final proofreading. Patient is a 25-year-old female with history of obesity status post gastric sleeve 7/26 presenting to emergency department for evaluation of mid chest pain. States the pain has been present for the past 3 days but is worse when she lies down. She feels shortness of breath when the pain intensifies. Pain is radiating to bilateral upper back. She denies fever, abdominal pain, nausea, vomiting, diarrhea, constipation, leg swelling, or any other medical complaints at this time. She spoke with on-call surgeon who recommended she come to the emergency department for evaluation. She states that she has been fairly active since her surgery. Past Medical History:   Diagnosis Date    BMI 38.0-38.9,adult 4/3/2018    BMI 40.0-44.9, adult ([address] 2/19/2018    Chronic back pain greater than 3 months duration 7/29/2015    COVID-19 vaccine series not completed     [address] JUNE 2021, SECOND DOSE DUE JULY 17, 2021. SHE DOES NOT HAVE CARD    TASIA (generalized anxiety disorder) 1/19/2017    Heat stroke     High-risk sexual behavior 2/23/2017    HX OTHER MEDICAL     chlamydia age 24, treated & negative now    Hypoglycemia 3/30/2016    Ill-defined condition     Insulinoma 4/6/2016    Morbid obesity ([address]     Obesity (BMI 35.0-39.9 without comorbidity) 9/12/2019    Psychiatric disorder     ANXIETY AND DEPRESSION    Stroke ([address] 2020    NO RESIDUAL    Syncopal seizure ([address] 11/19/2015    Thyromegaly 3/30/2016    Urinary frequency 11/19/2015       Past Surgical History:   Procedure Laterality Date    HX CHOLECYSTECTOMY  10/6/2014    lap.  tracee  HX DILATION AND CURETTAGE  6/2011    HX HEENT      WISDOM TEETH REMOVED    HX OTHER SURGICAL      D&C with SAB 6/2011    MT REVISION CERVIX W PREG, [address]  2013         Family History:   Problem Relation Age of Onset    Hypertension Mother     High Cholesterol Father     Other Father         high cholesterole, herniated disc    Diabetes Paternal Aunt     Hypertension Maternal Grandmother     COPD Maternal Grandmother     Emphysema Maternal Grandmother     Diabetes Paternal Grandmother     No Known Problems Sister     No Known Problems Brother     Anesth Problems Neg Hx        Social History     Socioeconomic History    Marital status: SINGLE     Spouse name: Not on file    Number of children: Not on file    Years of education: Not on file    Highest education level: Not on file   Occupational History    Not on file   Tobacco Use    Smoking status: Never Smoker    Smokeless tobacco: Never Used   Vaping Use    Vaping Use: Never used   Substance and Sexual Activity    Alcohol use: Yes     Comment: OCCASIONALLY    Drug use: Yes     Types: Marijuana     Comment: medicinal marijuana, tincture    Sexual activity: Not Currently     Partners: Male     Birth control/protection: Injection   Other Topics Concern    Not on file   Social History Narrative    In the home with 9year-old daughter whom is well. Mother will be assisting after surgery. 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 Social Determinants of Health     Financial Resource Strain:     Difficulty of Paying Living Expenses:    Food Insecurity:     Worried About Running Out of Food in the Last Year:     [address] in the Last Year:    Transportation Needs:     Lack of Transportation (Medical):      Lack of Transportation (Non-Medical):    Physical Activity:     Days of Exercise per Week:     Minutes of Exercise per Session:    Stress:     Feeling of Stress :    Social Connections:     Frequency of Communication with Friends and Family:     Frequency of Social Gatherings with Friends and Family:     Attends Sikh Services:     Active Member of Clubs or Organizations:     Attends Club or Organization Meetings:     Marital Status:    Intimate Partner Violence:     Fear of Current or Ex-Partner:     Emotionally Abused:     Physically Abused:     Sexually Abused: ALLERGIES: Pcn [penicillins]    Review of Systems   Constitutional: Negative for chills and fever. HENT: Negative for ear pain and sore throat. Eyes: Negative for visual disturbance. Respiratory: Positive for shortness of breath. Negative for cough. Cardiovascular: Positive for chest pain. Gastrointestinal: Negative for abdominal pain, diarrhea, nausea and vomiting. Genitourinary: Negative for flank pain. Musculoskeletal: Negative for back pain. Skin: Negative for color change. Neurological: Negative for dizziness and headaches. Psychiatric/Behavioral: Negative for confusion. Vitals:    08/01/21 0757   BP: 113/82   Pulse: 72   Resp: 22   Temp: 97.6 °F (36.4 °C)   SpO2: 99%   Weight: 90.1 kg (198 lb 10.2 oz)   Height: 5' 1\" (1.549 m)            Physical Exam  Vitals and nursing note reviewed. Constitutional:       General: She is not in acute distress. Appearance: Normal appearance. She is not ill-appearing. HENT:      Head: Normocephalic and atraumatic. Mouth/Throat:      Pharynx: Oropharynx is clear. Eyes:      Extraocular Movements: Extraocular movements intact. Conjunctiva/sclera: Conjunctivae normal.   Cardiovascular:      Rate and Rhythm: Normal rate and regular rhythm. Pulmonary:      Effort: Pulmonary effort is normal.      Breath sounds: Normal breath sounds. No decreased breath sounds, wheezing, rhonchi or rales. Abdominal:      Palpations: Abdomen is soft. Tenderness: There is no abdominal tenderness.       Comments: Well-healing surgical sites without evidence of infection    Musculoskeletal:         General: Normal range of motion. Cervical back: No rigidity. Skin:     General: Skin is warm and dry. Neurological:      General: No focal deficit present. Mental Status: She is alert and oriented to person, place, and time. Psychiatric:         Mood and Affect: Mood normal.          MDM  Number of Diagnoses or Management Options  Atypical chest pain  Diagnosis management comments: Patient is alert, well-appearing, afebrile, vitals stable. She is postop day 6 from a gastric sleeve done by Dr. Richey President, has had 2 days of chest burning, worse when supine, with some shortness of breath. Abdomen seems soft, nondistended, with no tenderness, well-healing surgical sites. Differential diagnosis includes musculoskeletal chest pain, postop reflux, post-op thromboembolus. Doubt anastomotic leak as she does not have any abdominal pain is otherwise very well-appearing. Will obtain EKG, trop, dimer, labs, CXR, give GI cocktail, monitor, reassess. 10:49 AM  EKG without acute ischemic change or ectopy. Troponin x1 within normal limits. Heart score low risk category. D-dimer was elevated, CTA of chest was obtained which is without abnormality. Labs unremarkable. Patient reassessed, symptoms continue to be mild with slight improvement with GI cocktail. Patient is discharged with instructions to continue her postop pain medications as prescribed and she will call her surgeon on Monday morning to discuss symptoms and further management as needed. Return precautions outlined. All questions answered at this time. Amount and/or Complexity of Data Reviewed  Discuss the patient with other providers: yes (Discussed patient with ED attending Heather Marinelli MD who agrees with current management plan.    )      ED Course as of Aug 01 1049   Sun Aug 01, 2021   0945 Troponin-I, Qt.: <0.05 [EP]   4035 IMPRESSION  No evidence of acute pulmonary thromboembolism or other acute  finding in the chest. No significant abnormalities. CTA CHEST W OR W WO CONT [EP]   8426 ED EKG interpretation:10:33 AM  Rhythm: normal sinus rhythm; and regular. Rate (approx.): 77; Axis: normal; P wave: normal; ST/T wave: no concerning ST elevations or depressions; Other findings: unremarkable. EKG has also been evaluated by attending ED physician. BROCK Forrest        [EP]      ED Course User Index  [EP] Onelia Malone PA     10:50 AM  Pt has been reevaluated. There are no new complaints, changes, or physical findings at this time. All results have been reviewed with patient and/or family. Medications have been reviewed w/ pt and/or family. Pt and/or family's questions have been answered. Pt and/or family expressed good understanding of the dx/tx/rx and is in agreement with plan of care. Pt instructed and agreed to f/u w/ surgery and to return to ED upon further deterioration. Pt is ready for discharge. IMPRESSION:  1. Atypical chest pain        PLAN:  1. Current Discharge Medication List        2.    Follow-up Information     Follow up With Specialties Details Why Contact Info    Yola Nino MD Family Medicine Schedule an appointment as soon as possible for a visit   [address]  [phone]      Cleopatra José MD General Surgery Call on 8/2/2021  [address] [facility]  [address]  [facility] [phone]      [address] [facility] [address] [facility] Emergency Medicine Go to  If symptoms worsen [address] [facility]  [phone]            Return to ED if worse     Procedures

## 2021-08-01 NOTE — ED TRIAGE NOTES
Triage Note: Patient is coming in with chest pressure and back pain for the past 2 days. Patient had gastric sleeve on Monday. Patient states is worse when laying down.

## 2021-08-02 ENCOUNTER — TELEPHONE (OUTPATIENT)
Dept: SURGERY | Age: 31
End: 2021-08-02

## 2021-08-02 NOTE — TELEPHONE ENCOUNTER
Pt called about pressure and sharp pain in her upper chest into back when trying to sleep. She states she cannot sleep right now because of it. Requested a call back please.

## 2021-08-02 NOTE — TELEPHONE ENCOUNTER
Returned call to Ms Olivarez Priscilla verified all PHI. Patient had Laparoscopic sleeve gastrectomy with esophagogastroduodenoscopy on 7/26/21. Patient states whenever she lay down sometimes she feels pressure and sharp pain to the chest both sides. She went to the [facility] ER yesterday could not find anything wrong. She states it currently feels a little better. It's just whenever she lay down. It don't matter whether she is sitting upright either. She don't have any c/o n/v. There is no issues with fluids don't really have much of a appetite. She denies any indigestion. She is having good BM. Follow up is scheduled with Rebekah Howard on 8/10/21. I informed patient I will speak with Ayala Melgoza and call her back later. Message forwarded to Rebekah Howard NP for review.

## 2021-08-02 NOTE — TELEPHONE ENCOUNTER
Pt called bariatric RD with questions regarding post-op full liquid diet. Pt is 1 week s/p gastric bypass. Pt inquiring about certain \"food items\" that she is allowed to have at this time. Reiterated full liquid diet for 2 full weeks. Educated pt thtat she is not allowed to have food at this time. Reviewed post-op grocery lists. Discussed various ideas for incorporating variety and tips/ideas for freezing protein shakes and yogurt to provide some \"texture\" to provide chewing sensation. Reiterated importance of drinking 64 oz calorie free fluids daily to prevent dehydration and to drink 3 protein shakes per day. Can replace one protein shake per day with approved/strained soup option with added protein. RD available for additional support PRN.    Veronica Lopes, [address] [place] [address]   [phone]

## 2021-08-02 NOTE — TELEPHONE ENCOUNTER
Patient is about 1 week status post laparoscopic sleeve gastrectomy complaining of chest pressure. She said she is fine when she is standing up and walking but when she lies down it is very uncomfortable. She says she does not know when she is full. And she is not hungry. She did go to the emergency room 8/1/2021 and her work-up was negative for PE. She said the Levsin does not help much. She never got the Reglan filled. She says she has no nausea or vomiting. After reviewing her diet with her sounds like she is taking in too much too fast and then having some back pressure up into the esophagus causing the pain. I advised her to slow down with drinking and space her protein shake out over 30 to 40 minutes. She can try warm liquids and sometimes those go down a little bit easier. I have encouraged her to  the Reglan and take it as prescribed as a motility agent. She can also use Gas-X or any kind of gas reducing tab as needed. Advised to avoid drinking within 2 hours of lying down and she may be more comfortable sleeping more upright in a recliner or an oversized chair. She denies fever or chills. No nausea or vomiting. No shortness of breath. Pain is mid chest esophagus and only associated with lying supine after drinking. She is moving her bowels regularly. Voiding without difficulty. She has follow-up with me next week and advised to stay on full bariatric liquids. She appreciated the call and is feeling better. She was actually out at the store when I had her on the phone.

## 2021-08-04 ENCOUNTER — TELEPHONE (OUTPATIENT)
Dept: SURGERY | Age: 31
End: 2021-08-04

## 2021-08-04 NOTE — TELEPHONE ENCOUNTER
I called the patient back and she said she is itching at night on her arms chest and upper incisions, she said her incisions look okay, she has not tried any new medications, she said she restarted the Reglan since she came home but said she was on that in the Hospital. I told her to try Benadryl and if it makes her sleepy she needs to have some one wake her up so she can stay on top of her liquids, she said she has the non drowsy at home and has used it in the past and it did not make her sleepy. I also told her to try Pepcid OTC and take twice a day and she can use Benadryl cream or any anti itch cream. I told her if this does not help to please call us back and she said she will. Pt in agreement.

## 2021-08-04 NOTE — TELEPHONE ENCOUNTER
Patient called and stated that she has been itching on the upper part of her body and her abdomen and wanted to know what she could do.

## 2021-08-10 ENCOUNTER — OFFICE VISIT (OUTPATIENT)
Dept: SURGERY | Age: 31
End: 2021-08-10
Payer: COMMERCIAL

## 2021-08-10 VITALS
HEIGHT: 61 IN | BODY MASS INDEX: 36.63 KG/M2 | DIASTOLIC BLOOD PRESSURE: 82 MMHG | TEMPERATURE: 98.3 F | SYSTOLIC BLOOD PRESSURE: 134 MMHG | WEIGHT: 194 LBS | RESPIRATION RATE: 16 BRPM | OXYGEN SATURATION: 98 % | HEART RATE: 72 BPM

## 2021-08-10 DIAGNOSIS — E66.9 OBESITY (BMI 30-39.9): ICD-10-CM

## 2021-08-10 DIAGNOSIS — Z09 FOLLOW-UP EXAMINATION AFTER ABDOMINAL SURGERY: Primary | ICD-10-CM

## 2021-08-10 PROCEDURE — 99024 POSTOP FOLLOW-UP VISIT: CPT | Performed by: NURSE PRACTITIONER

## 2021-08-10 RX ORDER — IRON,CARBONYL/FOLIC ACID/MV-MN 30-0.8MG
TABLET,CHEWABLE ORAL
COMMUNITY
End: 2022-05-05 | Stop reason: ALTCHOICE

## 2021-08-10 RX ORDER — IRON,CARBONYL/FOLIC ACID/MV-MN 30-0.8MG
2 TABLET,CHEWABLE ORAL DAILY
COMMUNITY

## 2021-08-10 NOTE — PROGRESS NOTES
Chief Complaint   Patient presents with    Post OP Follow Up     2 weeks post lap sleeve gastrectomy down 14 lbs       Marsha WHITTAKER Acacia is 2 weeks status post sleeve gastrectomy. Presents today for obesity management. Patient has lost 14 lbs. Since surgery. Patient is satisfied with progress. Feels much better than last week   Pressure /pain in chest has resolved     Patient is consuming 60+ grams of protein daily. 2 Premiere shakes + Genepro  Patient is drinking 64 oz of fluids per day. Bowels moving daily. Constipated  no  Using laxatives no  Nausea  no  Regurgitation  no  No fever or chills, chest pain or shortness of breath. Vitamin compliance Yes (Opurity)  Activity  Walking every day   Sleep   Poor and can't fall asleep   Has had issues with sleep in the past, but more anxiety related   Denies feelings of worry / stress   Took 1 Benedryl with relief   Feels less anxious and \"mental health is good\"   Has nature sounds at night to help and using puzzles / coloring to help manage anxiety   No abdominal pain     Physical Exam  Visit Vitals  /82 (BP 1 Location: Left arm, BP Patient Position: Sitting, BP Cuff Size: Large adult)   Pulse 72   Temp 98.3 °F (36.8 °C) (Oral)   Resp 16   Ht 5' 1\" (1.549 m)   Wt 194 lb (88 kg)   LMP 07/26/2021 (Exact Date)   SpO2 98%   BMI 36.66 kg/m²     A + O x 3  Chest  CTA, unlabored   COR  RRR  ABD Soft, obese, lap sites C/D/I, no erythema or induration; NT/ND, no masses or hernias   EXT No edema; ambulating independently       ICD-10-CM ICD-9-CM    1. Follow-up examination after abdominal surgery  Z09 V67.09    2. Obesity (BMI 30-39. 9)  E66.9 278.00        Marsha WHITTAKER Victor Hugo Thomas is 2 weeks  s/p sleeve gastrectomy doing well   Diet soft stage 1   Continue vitamins and protein supplements   Activity daily walking / biking   Sleep hygiene reviewed   Follow-up in 2 weeks   Ok to use Unisom sleep tab 1/2 -1 tab qhs prn sleep   Sleepy time tea, sleep hygiene reviewed Return to work this week and did not need a note   Support group  Marsha Roger verbalized understanding and questions were answered to the best of my knowledge and ability. Diet, activity and mindfulness educational materials were provided. 22 minutes spent in face to face with Marsha Roger > 50% counseling.

## 2021-08-10 NOTE — PROGRESS NOTES
1. Have you been to the ER, urgent care clinic since your last visit? Hospitalized since your last visit? No    2. Have you seen or consulted any other health care providers outside of the [address] [place] [address] since your last visit? Include any pap smears or colon screening.  No

## 2021-08-10 NOTE — PATIENT INSTRUCTIONS
For sleep -     Try Unisom sleep tab (doxylamine 25 mg) at bedtime. You can take 1/2 -1 whole tab as needed for sleep     Add sleepy time tea in the evening      Continue to use your protein powder and or shakes every day to meet the 60 grams / day protein goal (minimum)    Liquids are still a priority and aim for at least 64 oz water or other approved clear liquid every day     In addition to everything on the Bariatric Liquid diet, you may   add these foods to your diet. Protein - include with every meal   Egg or egg substitute     Low fat or fat-free cottage cheese     Low fat or fat-free yogurt    Low fat Greek yogurt    Fat-free, 1% milk, or Lactaid milk    Low-fat or vegetarian refried beans    Well-cooked beans and lentils   Fat-free or 2% reduced-fat cheese    Hummus    Low fat soup     Snacks/Other Options:   Sugar free fudgsicles    Sugar free cocoa    No sugar added pudding     Fruits and Vegetables   Applesauce (no sugar added)   Canned fruit (no sugar added)   Fresh soft peeled fruits (melons, banana, avocado, berries)   Any soft cooked vegetables    Mashed potatoes, Sweet potatoes, baked potatoes (no skin)    Condiments   Fat free non-stick spray   Herbs and spices   Lite butter, margarine, canola oil, olive oil   Reduced-fat or fat-free smith   Reduced-fat or fat-free salad dressing   Reduced-fat or fat-free cream cheese   Reduced-fat or fat-free sour cream   Lemon juice   Salt, pepper, mustard, ketchup, salsa    Prepare food to the appropriate texture. Sample Meal Plan:  Soft and Mushy    Breakfast ½ cup plain oatmeal with protein powder. Add cinnamon, nutmeg, Splenda brown sugar as desired for flavor 20-25 grams protein   Snack (optional) High protein gelatin (recipe on www.unjury. com) 10 grams protein   Lunch ½ cup low fat cottage cheese or [place] yogurt with soft fruit 10 - 15 grams protein    Snack (optional) High protein pudding or high protein popsicle (recipe on www.Kidos. com) 10 grams protein   Dinner ¼ cup low-fat well cooked beans with low-fat cheese sprinkled on top  ¼ cup no sugar added applesauce (can sprinkle protein powder) 5-8 grams protein  510  grams protein     Key Points   Continue to drink 48-64 ounces of low calorie, non-carbonated, sugar free beverages between meals.  Eat 3 meals per day   Measure each meal to HALF cup per meal   Aim for 60 grams of protein every day. Try food sources of protein first.    Continue to supplement with protein shakes/powder to meet protein goals.  Take small bites. Try eating with smaller utensils (baby spoon, cocktail fork).  Chew food thoroughly   Allow about 30 minutes to eat a meal.  Eating too fast may cause nausea or vomiting.  Stop eating as soon as you feel full. Overeating may stretch your stomach's capacity and prevent desired weight loss.  Do not drink liquids during meals and 30 minutes after meals. Drinking with meals may cause nausea or vomiting.  Add one new food at a time   Take vitamins daily   No lifting greater than 20 lbs.  You can do light jogging and walking.  You may go into a pool. What to do if you are constipated: You may  take Milk of Magnesia. Take 2 Tablespoons followed by 16 oz of water then 2 hours later take another 2 tablespoons. If  milk of magnesia does not work then take Yarsanism-[place] or Miralax over the counter. Keep in mind that the Benefiber or Miralax may take a day or two to work. If all of the above do not work try a Fleets enema and follow the directions on the box. If you are not able to tolerate liquids or soft foods. Please call our office  [phone]  If you have vomiting and persistent epigastric pain or chest pain. You should call our office, the doctor on-call or go to the emergency room.

## 2021-08-16 ENCOUNTER — TELEPHONE (OUTPATIENT)
Dept: SURGERY | Age: 31
End: 2021-08-16

## 2021-08-16 NOTE — TELEPHONE ENCOUNTER
Bariatric Post-Operative Phone Calls: Week 3    Diet:Question of any nausea and/or vomiting. Question of tolerance to diet advancement from liquids to solids. Protein intake (goal is 60 grams of protein daily)   Poor____Fair____Good____Great_X___     Comment:_Has nausea some days no vomiting. Consumes 60 gms of the protein shake. eating eggs, oatmeal, soups,humus, avocado, tuna, chicken salad_____________________________________________________________      ______________________________________________________________________    Hydration:Less than 32 ounces of water daily is fair to poor (Goal is 64 ounces per day)   Poor____ Fair____ Good_X___Great____    Comment: Consumes 54-64 oz water a day______________________________________________________________    ______________________________________________________________________      Ambulation:( walking at least 3 x week, for at least 30 minutes)   Poor______ Fair______ Good______     Great__X____ Comment:_Walking 4-5 days a week 3 miles_________________________________________________    ______________________________________________________________________      Urine Color: Question of any odor and color(should be la nena, pale, and clear) Dark______ Amber______ Pale______      Clear_X_____ Comment:_No issues voiding__________________________________________________                           ________________________________________________________________    Bowel movements: Question of any constipation- haven't had any bowel movements for more than 3 days. This could be related to protein intake and/or narcotic pain medication usage. Comment:                                                      Having good regular BM                                                                        Pain: Left sided abdominal pain is normal (should be less than 3)         Question if pain medication is helpful.  10___ 9___ 8___ 7___ 6___ 5___ 4___ 3___ 2___1___0X___Comment:_________________________________________________    ______________________________________________________________________      Incision: (No redness, pain, swelling or fever) Healing Well_X_____     Healed______Redness_________ Pain_________     Swelling_________ Fever__________(greater than 101 needs evaluation)    Comment:_No redness swelling or drainage___________________________________________________________    ______________________________________________________________________  Use of incentive spirometer: Yes____       No X          Next Appointment:_8/24/21 at 1PM_____________                 Support Group: Yes_X_____No______    Additional Comments:_None___________________________________________________________    ____________________________________________________________________      If more than one parameter is not met or considered poor, nurse needs to discuss with provider recommend for patient to be seen in the office as soon as possible or refer to the provider for follow-up. Reinforce to patient to use bariatric educational booklet as guide. It is appropriate to refer patient to the nutritionist to discuss more in detail of diet and nutrition.

## 2021-08-18 ENCOUNTER — TELEPHONE (OUTPATIENT)
Dept: SURGERY | Age: 31
End: 2021-08-18

## 2021-08-18 NOTE — TELEPHONE ENCOUNTER
Pt called about nausea. Even without eating pt feels nauseated. Asking if this is normal. Asked for a call back.

## 2021-08-18 NOTE — TELEPHONE ENCOUNTER
Returned call to Ms Kezia Alva verified all PHI. Patient had [address] on 7/26/21. She is having nausea even when she don't eat. She states it's once in awhile. There is no vomiting. She took the promethazine with relief. She is moving her  Bowels good and voiding. She has a follow up scheduled on 8/24/21 at 1 PM.     I advised to call the office back if worsen. She consumes 60 gms of protein shake a day. She is eating eggs with cheese,humus. She states she had chicken salad with [employer] yesterday. She states the smith may possibly upset her stomach. I advised patient to slowly introduce foods back into her diet to determine what she can tolerate.

## 2021-08-24 ENCOUNTER — VIRTUAL VISIT (OUTPATIENT)
Dept: SURGERY | Age: 31
End: 2021-08-24
Payer: COMMERCIAL

## 2021-08-24 VITALS — BODY MASS INDEX: 35.12 KG/M2 | HEIGHT: 61 IN | WEIGHT: 186 LBS

## 2021-08-24 DIAGNOSIS — E66.9 OBESITY (BMI 30-39.9): Primary | ICD-10-CM

## 2021-08-24 DIAGNOSIS — Z09 SURGICAL FOLLOWUP: ICD-10-CM

## 2021-08-24 PROCEDURE — 99024 POSTOP FOLLOW-UP VISIT: CPT | Performed by: NURSE PRACTITIONER

## 2021-08-24 NOTE — PROGRESS NOTES
I was in the office while conducting this encounter. Consent:  She and/or her healthcare decision maker is aware that this patient-initiated Telehealth encounter is a billable service, with coverage as determined by her insurance carrier. She is aware that she may receive a bill and has provided verbal consent to proceed: Yes    This virtual visit was conducted via Strap. Pursuant to the emergency declaration under the [facility] [place] [address] waiver authority and the [employer] Act, this Virtual  Visit was conducted to reduce the patient's risk of exposure to COVID-19 and provide continuity of care for an established patient. Services were provided through a video synchronous discussion virtually to substitute for in-person clinic visit. Due to this being a TeleHealth evaluation, many elements of the physical examination are unable to be assessed. Total Time: minutes: 11-20 minutes. 4 weeks status post Sleeve gastrectomy   Pt reports doing well on liquids and soft foods. .    Patient no complaints of pain. Pt reports occassional nausea. She got sick when she was in the car once. Sheis drinking approximately 50-64 oz of water daily. She is drinking and eating 50  grams of protein daily. She is taking all bariatric vitamins without issue. Total weight loss since surgery 22lbs  Weight loss since last visit 8lbs    Visit Vitals  Ht 5' 1\" (1.549 m)   Wt 186 lb (84.4 kg)   LMP 07/26/2021 (Exact Date)   BMI 35.14 kg/m²              Ms. Keven Arrington has a reminder for a \"due or due soon\" health maintenance. I have asked that she contact her primary care provider for follow-up on this health maintenance. Physical Examination: General appearance - alert, well appearing, and in no distress,  Chest -  No respiratory distress.      Abdomen -     A/P    Doing well 4 weeks  Status post laparoscopic Sleeve gastrectomy  Diet advanced to soft meats. Focus on 50-60 grams of protein daily. Supplement with unflavored protein powder daily. Encouraged water intake to at least 64 oz of non-carbonated/no calorie beverages. May stop PPI  No lifting greater than 40  lbs  Follow up 2 weeks. Pt verbalized understanding and questions were answered to the best of my knowledge and ability.     Gina Johnson, NP

## 2021-08-24 NOTE — PROGRESS NOTES
1. Have you been to the ER, urgent care clinic since your last visit? Hospitalized since your last visit? no    2. Have you seen or consulted any other health care providers outside of the [address] [place] [address] since your last visit? Include any pap smears or colon screening.  no

## 2021-08-24 NOTE — PATIENT INSTRUCTIONS
Constipation: Care Instructions  Your Care Instructions     Constipation means that you have a hard time passing stools (bowel movements). People pass stools from 3 times a day to once every 3 days. What is normal for you may be different. Constipation may occur with pain in the rectum and cramping. The pain may get worse when you try to pass stools. Sometimes there are small amounts of bright red blood on toilet paper or the surface of stools. This is because of enlarged veins near the rectum (hemorrhoids). A few changes in your diet and lifestyle may help you avoid ongoing constipation. Your doctor may also prescribe medicine to help loosen your stool. Some medicines can cause constipation. These include pain medicines and antidepressants. Tell your doctor about all the medicines you take. Your doctor may want to make a medicine change to ease your symptoms. Follow-up care is a key part of your treatment and safety. Be sure to make and go to all appointments, and call your doctor if you are having problems. It's also a good idea to know your test results and keep a list of the medicines you take. How can you care for yourself at home? · Drink plenty of fluids. If you have kidney, heart, or liver disease and have to limit fluids, talk with your doctor before you increase the amount of fluids you drink. · Include high-fiber foods in your diet each day. These include fruits, vegetables, beans, and whole grains. · Get at least 30 minutes of exercise on most days of the week. Walking is a good choice. You also may want to do other activities, such as running, swimming, cycling, or playing tennis or team sports. · Take a fiber supplement, such as Citrucel or Metamucil, every day. Read and follow all instructions on the label. · Schedule time each day for a bowel movement. A daily routine may help. Take your time having your bowel movement.   · Support your feet with a small step stool when you sit on the toilet. This helps flex your hips and places your pelvis in a squatting position. · Your doctor may recommend an over-the-counter laxative to relieve your constipation. Examples are Milk of Magnesia and MiraLax. Read and follow all instructions on the label. Do not use laxatives on a long-term basis. When should you call for help? Call your doctor now or seek immediate medical care if:    · You have new or worse belly pain.     · You have new or worse nausea or vomiting.     · You have blood in your stools. Watch closely for changes in your health, and be sure to contact your doctor if:    · Your constipation is getting worse.     · You do not get better as expected. Where can you learn more? Go to http://www.jones.com/  Enter P343 in the search box to learn more about \"Constipation: Care Instructions. \"  Current as of: February 26, 2020               Content Version: 12.8  © 9024-3657 "[employer]. Care instructions adapted under license by [employer] (which disclaims liability or warranty for this information). If you have questions about a medical condition or this instruction, always ask your healthcare professional. [place] [address] any warranty or liability for your use of this information. What you need to know:  1 . Advance your diet to moist meats. Follow the handout that you were given today in the office. 2. Take the recommended vitamins daily  3 No lifting greater than 40 lbs. 4. You can do light jogging, moderate walking and a recumbent bike. 5 Follow up in 2 weeks. 6. You may go into a pool. 7. If you are not able to tolerate liquids, soft foods or moist meats. Please call our office. [phone]  8. If you have vomiting and persistent epigastric pain or chest pain. You should call our office, the doctor on-call or go to the emergency room.           Constipation  Benefiber, Miralax & similar (once or twice daily)  Milk of Magnesia (daily as needed)  Dulcolax suppository  Fleets Enema    Moist Meats   What is this diet?  This phase adds moist meats in addition to foods in Soft & Mushy   Lean protein sources  When do I begin?  When directed by your NP or surgeon, usually 4 weeks after surgery          What new foods can I eat?    Moist meat and poultry   Moist fish and seafood          Shopping Lists      Protein - include with every meal   Tuna packed in water   [employer] (canned, frozen, fresh)    White flaky fish (vaibhav, cod, flounder, tilapia)    Canned chicken packed in water     96-99% fat free thinly sliced deli meat (ham, turkey, chicken)    Silken Tofu    Skinless turkey or chicken (prepare to a soft texture)    Lean ground meat   Lean pork (cooked until very tender, cut into small pieces)     Sample Meal Plan:  Moist Meats    Breakfast ½ cup soft cooked eggs (2) 16 grams protein   Snack (optional) Low-fat string cheese 5 grams protein   Lunch 2 slices of lean deli turkey (2 oz.), ¼ cup soft fruit or  ½ cup tuna salad made with low-fat mayonnaise 14 grams protein   14 grams protein   Snack (optional) Greek yogurt or low-fat cottage cheese or low-fat string cheese 8-15 grams protein   Dinner Soft/flaky fish (2 oz.)  ¼ cup soft cooked vegetables 14 grams protein

## 2021-09-07 ENCOUNTER — VIRTUAL VISIT (OUTPATIENT)
Dept: SURGERY | Age: 31
End: 2021-09-07
Payer: COMMERCIAL

## 2021-09-07 VITALS — WEIGHT: 182 LBS | HEIGHT: 61 IN | BODY MASS INDEX: 34.36 KG/M2

## 2021-09-07 DIAGNOSIS — Z09 POSTOPERATIVE EXAMINATION: Primary | ICD-10-CM

## 2021-09-07 DIAGNOSIS — E66.01 MORBID OBESITY (HCC): ICD-10-CM

## 2021-09-07 PROCEDURE — 99024 POSTOP FOLLOW-UP VISIT: CPT | Performed by: NURSE PRACTITIONER

## 2021-09-07 RX ORDER — MELATONIN
5000 DAILY
COMMUNITY

## 2021-09-07 NOTE — PROGRESS NOTES
I was in the office while conducting this encounter. Consent:  She and/or her healthcare decision maker is aware that this patient-initiated Telehealth encounter is a billable service, with coverage as determined by her insurance carrier. She is aware that she may receive a bill and has provided verbal consent to proceed: Yes    This virtual visit was conducted via Doxy. me. Pursuant to the emergency declaration under the [facility] [place] [address] waiver authority and the [employer] Act, this Virtual  Visit was conducted to reduce the patient's risk of exposure to COVID-19 and provide continuity of care for an established patient. Services were provided through a video synchronous discussion virtually to substitute for in-person clinic visit. Due to this being a TeleHealth evaluation, many elements of the physical examination are unable to be assessed. Total Time: minutes: 11-20 minutes. 6 weeks status post Sleeve gastrectomy   Pt reports doing well on liquids, soft foods and soft meats. She states that she has had some bad days \"cheating on the diet. \" She felt bad after eating chicken tenders. Complaints of hungry. .    Patient no complaints of pain. Pt reports no nausea and no vomiting  Sheis drinking approximately 64 oz of water daily. She is drinking and eating 60 grams of protein daily. She is taking all bariatric vitamins without issue. Total weight loss since surgery 26lbs  Weight loss since last visit 4lbs    Visit Vitals  Ht 5' 1\" (1.549 m)   Wt 182 lb (82.6 kg)   BMI 34.39 kg/m²              Ms. Nura Carvajal has a reminder for a \"due or due soon\" health maintenance. I have asked that she contact her primary care provider for follow-up on this health maintenance.       Physical Examination: General appearance - alert, well appearing, and in no distress,  Chest - no respiratory distress    Abdomen - incisions healing well per patient. A/P    Doing well 6 weeks  Status post laparoscopic Sleeve gastrectomy  Diet advanced to solid foods. Advised patient regard to diet that is high-protein, low-fat, low-sugar, limited carbohydrates. Strive for 50-60 grams of protein daily. If having a snack, foods that are protein or fiber rich. . No eating/drinking together, chew foods well, and portion control. Measure meals. Discussed snacking behavior and to [facility] pay attention to behavioral factor and habits. Drink at least 40-64 ounces of water or non-calorie/non-carbonated beverages daily. Continue vitamin regiment daily. Exercise at least 3 days a week with cardiovascular and strength training. Patient to follow up in 10 weeks. . Advised to call office if any questions/concerns. 15 Minutes spent face to face with patient, >50 % of time spent counseling.        Harshad Soto NP

## 2021-09-07 NOTE — PROGRESS NOTES
1. Have you been to the ER, urgent care clinic since your last visit? Hospitalized since your last visit?no    2. Have you seen or consulted any other health care providers outside of the [address] [place] [address] since your last visit? Include any pap smears or colon screening.  No

## 2021-09-07 NOTE — PATIENT INSTRUCTIONS
Walking for Exercise: Care Instructions  Your Care Instructions     Walking is one of the easiest ways to get the exercise you need for good health. A brisk, 30-minute walk each day can help you feel better and have more energy. It can help you lower your risk of disease. Walking can help you keep your bones strong and your heart healthy. Check with your doctor before you start a walking plan if you have heart problems, other health issues, or you have not been active in a long time. Follow your doctor's instructions for safe levels of exercise. Follow-up care is a key part of your treatment and safety. Be sure to make and go to all appointments, and call your doctor if you are having problems. It's also a good idea to know your test results and keep a list of the medicines you take. How can you care for yourself at home? Getting started  · Start slowly and set a short-term goal. For example, walk for 5 or 10 minutes every day. · Bit by bit, increase the amount you walk every day. Try for at least 30 minutes on most days of the week. You also may want to swim, bike, or do other activities. · If finding enough time is a problem, it's fine to be active in shorter periods of time throughout your day. · To get the heart-healthy benefits of walking, you need to walk briskly enough to increase your heart rate and breathing, but not so fast that you can't talk comfortably. · Wear comfortable shoes that fit well and provide good support for your feet and ankles. Staying with your plan  · After you've made walking a habit, set a longer-term goal. You may want to set a goal of walking briskly for longer or walking farther. Experts say to do 2½ hours (150 minutes) of moderate activity a week. A faster heartbeat is what defines moderate-level activity. · To stay motivated, walk with friends, coworkers, or pets. · Use a phone gavin or pedometer to track your steps each day. Set a goal to increase your steps.  When you reach that goal, set a higher goal.  · If the weather keeps you from walking outside, go for walks at the mall with a friend. Local schools and churches may have indoor gyms where you can walk. Fitting a walk into your workday  · Park several blocks away from work, or get off the bus a few stops early. · Use the stairs instead of the elevator, at least for a few floors. · Suggest holding meetings with colleagues during a walk inside or outside the building. · Use the restroom that is the farthest from your desk or workstation. · Use your morning and afternoon breaks to take quick 15 minutes walks. Staying safe  · Know your surroundings. Walk in a well-lighted, safe place. If it's dark, walk with a partner. Wear light-colored clothing. If you can, buy a vest or jacket that reflects light. · Carry a cell phone for emergencies. · Drink plenty of water. Take a water bottle with you when you walk. This is very important if it is hot out. · Be careful not to slip on wet or icy ground. You can buy \"grippers\" for your shoes to help keep you from slipping. · Pay attention to your walking surface. Use sidewalks and paths. · If you have health issues such as asthma, COPD, or heart problems, or if you haven't been active for a long time, check with your doctor before you start a new activity. Where can you learn more? Go to http://www.gray.com/  Enter R159 in the search box to learn more about \"Walking for Exercise: Care Instructions. \"  Current as of: September 10, 2020               Content Version: 12.8  © 7799-8428 [employer]. Care instructions adapted under license by [employer] (which disclaims liability or warranty for this information). If you have questions about a medical condition or this instruction, always ask your healthcare professional. [address] any warranty or liability for your use of this information.

## 2021-11-30 ENCOUNTER — TELEPHONE (OUTPATIENT)
Dept: SURGERY | Age: 31
End: 2021-11-30

## 2022-01-10 ENCOUNTER — OFFICE VISIT (OUTPATIENT)
Dept: SURGERY | Age: 32
End: 2022-01-10
Payer: COMMERCIAL

## 2022-01-10 VITALS
HEART RATE: 91 BPM | DIASTOLIC BLOOD PRESSURE: 73 MMHG | RESPIRATION RATE: 20 BRPM | BODY MASS INDEX: 32.94 KG/M2 | SYSTOLIC BLOOD PRESSURE: 112 MMHG | WEIGHT: 174.5 LBS | HEIGHT: 61 IN | TEMPERATURE: 99.5 F | OXYGEN SATURATION: 99 %

## 2022-01-10 DIAGNOSIS — E66.9 OBESITY (BMI 30.0-34.9): Primary | ICD-10-CM

## 2022-01-10 PROCEDURE — 99213 OFFICE O/P EST LOW 20 MIN: CPT | Performed by: NURSE PRACTITIONER

## 2022-01-10 NOTE — PROGRESS NOTES
1. Have you been to the ER, urgent care clinic since your last visit? Hospitalized since your last visit? No    2. Have you seen or consulted any other health care providers outside of the [address] [place] [address] since your last visit? Include any pap smears or colon screening.  No

## 2022-01-10 NOTE — PATIENT INSTRUCTIONS
If you would like to make an appointment with one of the bariatric dietitians, please call the bariatric line at [phone]. Appointments are offered in person and virtual at no charge. Keys to long term weight loss / management     -  Sleep is critical and aim for 7 hours / night     -  Drink mostly water and lots of it     -  Eat \"clean\" and avoid processed foods (ie eating out of a box or bag)     -  Move every day = walk, swim, bike, yard work, etc     -  Journal what you eat (this is a proven tool to keep us on track)       [address]   2nd Thursday of each Month from 6-7 pm   The next one is 1/13/22 6-7 pm   You can access the link at [email or web address] Networks  Go to the Calendar tab and click on the date and it should go right to the link     Additional Resources:  Yashira Cronin:  [email or web address]    Offering online support groups and pre-recorded videos  o Every Wednesday evening at 7:00 pm   [employer] Facebook page  Fam 39, Advice, and Motivation from fellow patients  Bariatric Pal: [email or web address]  BariatricPal has launched a podcast!  Hosted by Sunshine Melchor, our podcast will cover topics about obesity, pre-weight loss surgery, post-weight loss surgery, food addiction, emotional eating, myths about weight loss surgery, and more. Each episode will feature an expert in the field of weight loss and bariatric surgery who can provide a deeper insight into these topics.   ACAC:  [email or web address] Networks   Offering discounted exercise programs (8 Week programs, On-Demand/Virtual)   See flyer   [address] at [email or web address] or [phone]) [phone]

## 2022-01-10 NOTE — PROGRESS NOTES
Chief Complaint   Patient presents with    Surgical Follow-up     6 months s/p lap sleeve gastrectomy down 34 pounds, lost 8 pounds       Marsha Roger is 6 months status post sleeve gastrectomy. Presents today for obesity management. Patient has lost 34 lbs. Since surgery. Asking about if Optimum nutrition diet is ok. It is a very low calorie plan and she \"wants to do it for 21 days\"  Increased snacking and sweet cravings  High stress r/t work and she is quitting today due to stress and poor sleep   No GERD symptoms   No exercise   Taking vitamins every day   64 oz water every day   BM most days   Sleep   Poor     Co-Morbid(s)     Was anti coagulation initiated for presumed / confirmed DVT/PE? NO    Was an incisional hernia noted on exam?       NO      COMORBIDITY     SLEEP APNEA                 NO        GERD  (req.meds)           NO  HYPERLIPIDEMIA           NO  HYPERTENSION             NO       IF YES, # OF HTN MEDICATIONS 0  DIABETES                        NO      IF YES, 0 NON-INSULIN   0 INSULIN     Physical Exam  Visit Vitals  /73   Pulse 91   Temp 99.5 °F (37.5 °C)   Resp 20   Ht 5' 1\" (1.549 m)   Wt 174 lb 8 oz (79.2 kg)   SpO2 99%   BMI 32.97 kg/m²     A + O x 3  Chest  CTA, unlabored   COR  RRR  ABD Soft, obese, NT/ND, no masses or hernias   EXT No edema; ambulating independently       ICD-10-CM ICD-9-CM    1. Obesity (BMI 30.0-34. 9)  E66.9 278.00        Marsha Alvarezwright is 6 months s/p sleeve gastrectomy   Diet referred to RD for support   Advised (since she has spent the money) can do the Optimum nutrition, but would use as get back on track tool   Get back to basics   Job change today and will allow for time to exercise and sleep / self care   Continue vitamins and protein supplements   Activity daily walking 10 minutes qid   Sleep hygiene reviewed   Follow-up in 3 months    Support group  Marsha Roger verbalized understanding and questions were answered to the best of my knowledge and ability. Diet, activity and mindfulness educational materials were provided. 22 minutes spent in face to face with Marsha Roger > 50% counseling.

## 2022-01-11 ENCOUNTER — DOCUMENTATION ONLY (OUTPATIENT)
Dept: BEHAVIORAL/MENTAL HEALTH CLINIC | Age: 32
End: 2022-01-11

## 2022-01-11 NOTE — PROGRESS NOTES
Pt calls to say she can only print page one of the myc5min Mediat attachment  I will mail the note to her home that she is requesting

## 2022-01-26 ENCOUNTER — OFFICE VISIT (OUTPATIENT)
Dept: BEHAVIORAL/MENTAL HEALTH CLINIC | Age: 32
End: 2022-01-26
Payer: COMMERCIAL

## 2022-01-26 VITALS
WEIGHT: 170.4 LBS | HEIGHT: 61 IN | BODY MASS INDEX: 32.17 KG/M2 | OXYGEN SATURATION: 98 % | DIASTOLIC BLOOD PRESSURE: 69 MMHG | TEMPERATURE: 97.5 F | RESPIRATION RATE: 17 BRPM | SYSTOLIC BLOOD PRESSURE: 106 MMHG | HEART RATE: 74 BPM

## 2022-01-26 DIAGNOSIS — F41.1 GAD (GENERALIZED ANXIETY DISORDER): ICD-10-CM

## 2022-01-26 DIAGNOSIS — F33.0 MILD EPISODE OF RECURRENT MAJOR DEPRESSIVE DISORDER (HCC): Primary | ICD-10-CM

## 2022-01-26 DIAGNOSIS — F43.10 PTSD (POST-TRAUMATIC STRESS DISORDER): ICD-10-CM

## 2022-01-26 PROCEDURE — 99214 OFFICE O/P EST MOD 30 MIN: CPT | Performed by: NURSE PRACTITIONER

## 2022-01-26 RX ORDER — BUSPIRONE HYDROCHLORIDE 5 MG/1
5 TABLET ORAL 2 TIMES DAILY
Qty: 60 TABLET | Refills: 2 | Status: SHIPPED | OUTPATIENT
Start: 2022-01-26 | End: 2022-05-31 | Stop reason: SDUPTHER

## 2022-01-26 NOTE — PROGRESS NOTES
CHIEF COMPLAINT:  Jaguar Holley is a 27 y.o. female and was seen today for follow-up of psychiatric condition and psychotropic medication management. HPI:    Dior Tejeda reports the following psychiatric symptoms by hx:  depression and anxiety. Overall symptoms have been present for months. Currently symptoms are somewhat exacerbated and are of moderate severity. The symptoms occur more days than not. Pt reports she still is not on medication. Met with pt for appt today to review current treatment plan. FAMILY/SOCIAL HX: psychosocial stressors    REVIEW OF SYSTEMS:  Psychiatric symptoms being monitored for:  depression, anxiety  Appetite:good   Sleep: good overall  Neuro: hx of seizure/stroke    Visit Vitals  /69 (BP 1 Location: Left arm, BP Patient Position: Sitting, BP Cuff Size: Large adult)   Pulse 74   Temp 97.5 °F (36.4 °C) (Temporal)   Resp 17   Ht 5' 1\" (1.549 m)   Wt 77.3 kg (170 lb 6.4 oz)   SpO2 98%   BMI 32.20 kg/m²       Side Effects:  none    MENTAL STATUS EXAM:   Sensorium  oriented to time, place and person   Relations cooperative   Appearance:  age appropriate and casually dressed   Motor Behavior:  gait stable and within normal limits   Speech:  normal volume   Thought Process: goal directed   Thought Content free of delusions and free of hallucinations   Suicidal ideations no intention   Homicidal ideations no intention   Mood:  anxious   Affect:  anxious   Memory recent  adequate   Memory remote:  adequate   Concentration:  adequate   Abstraction:  abstract   Insight:  good   Reliability good   Judgment:  good     MEDICAL DECISION MAKING:  Problems addressed today:    ICD-10-CM ICD-9-CM    1. Mild episode of recurrent major depressive disorder (HCC)  F33.0 296.31    2. TASIA (generalized anxiety disorder)  F41.1 300.02    3. PTSD (post-traumatic stress disorder)  F43.10 309.81        Assessment:   Marsha is responding to treatment. Symptoms are exacerbated.  Discussed current medications and dosages. Pt has not been on medication. Reviewed options and will re-start buspar. Discussed impact of stressors. Pt has noted increased problems with concentration and short term memory issues. Discussed impact of increased stimuli and need to find ways to reduce it. Discussed possibility of accommodations for work. Reviewed treatment goals and target symptoms to monitor for. Plan:   1. Current Outpatient Medications   Medication Sig Dispense Refill    OTHER Keronique (hair product suppliment)      busPIRone (BUSPAR) 5 mg tablet Take 1 Tablet by mouth two (2) times a day. 60 Tablet 2    calcium citrate/vitamin D3 (CALCIUM CITRATE + D PO) Take  by mouth.  cholecalciferol (Vitamin D3) (1000 Units /25 mcg) tablet Take 5,000 Units by mouth daily.  iron,carbonyl-FA-multivit-min (Opurity Multivitamin) 30 mg iron- 800 mcg chew Take  by mouth.  B.infantis-B.ani-B.long-B.bifi (Probiotic 4X) 10-15 mg TbEC Take  by mouth daily.  BIOTIN PO Take  by mouth daily.  mecobalamin-folic acid (Opurity) 3,072-023 mcg subl by SubLINGual route. (Patient not taking: Reported on 1/26/2022)      calcium-cholecalciferol, d3, (CALCIUM 600 + D) 600-125 mg-unit tab Take  by mouth daily. (Patient not taking: Reported on 1/26/2022)      vitamin E (AQUA GEMS) 400 unit capsule Take 400 Units by mouth daily. (Patient not taking: Reported on 1/26/2022)            medication changes made today: start buspar    2. Counseling and coordination of care including instructions for treatment, risks/benefits, risk factor reduction and patient/family education. She agrees with the plan. Patient instructed to call with any side effects, questions or issues. 3.    Follow-up and Dispositions    · Return in about 3 months (around 4/26/2022).          1/26/2022  Greg Carvajal NP

## 2022-01-26 NOTE — PROGRESS NOTES
Chief Complaint   Patient presents with    Medication Management     Visit Vitals  /69 (BP 1 Location: Left arm, BP Patient Position: Sitting, BP Cuff Size: Large adult)   Pulse 74   Temp 97.5 °F (36.4 °C) (Temporal)   Resp 17   Ht 5' 1\" (1.549 m)   Wt 77.3 kg (170 lb 6.4 oz)   SpO2 98%   BMI 32.20 kg/m²     Prior to Admission medications    Medication Sig Start Date End Date Taking? Authorizing Provider   OTHER Sara (hair product suppliment)   Yes Provider, Historical   calcium citrate/vitamin D3 (CALCIUM CITRATE + D PO) Take  by mouth. Yes Provider, Historical   cholecalciferol (Vitamin D3) (1000 Units /25 mcg) tablet Take 5,000 Units by mouth daily. Yes Provider, Historical   iron,carbonyl-FA-multivit-min (Opurity Multivitamin) 30 mg iron- 800 mcg chew Take  by mouth. Yes Provider, Historical   B.infantis-B.ani-B.long-B.bifi (Probiotic 4X) 10-15 mg TbEC Take  by mouth daily. Yes Provider, Historical   BIOTIN PO Take  by mouth daily. Yes Provider, Historical   mecobalamin-folic acid (Opurity) 5,356-470 mcg subl by SubLINGual route. Patient not taking: Reported on 1/26/2022    Provider, Historical   calcium-cholecalciferol, d3, (CALCIUM 600 + D) 600-125 mg-unit tab Take  by mouth daily. Patient not taking: Reported on 1/26/2022    Provider, Historical   vitamin E (AQUA GEMS) 400 unit capsule Take 400 Units by mouth daily.   Patient not taking: Reported on 1/26/2022    Provider, Historical

## 2022-01-31 ENCOUNTER — TELEPHONE (OUTPATIENT)
Dept: BEHAVIORAL/MENTAL HEALTH CLINIC | Age: 32
End: 2022-01-31

## 2022-03-01 ENCOUNTER — OFFICE VISIT (OUTPATIENT)
Dept: BEHAVIORAL/MENTAL HEALTH CLINIC | Age: 32
End: 2022-03-01
Payer: COMMERCIAL

## 2022-03-01 VITALS
HEIGHT: 61 IN | TEMPERATURE: 97.5 F | HEART RATE: 105 BPM | SYSTOLIC BLOOD PRESSURE: 98 MMHG | RESPIRATION RATE: 17 BRPM | BODY MASS INDEX: 33.34 KG/M2 | DIASTOLIC BLOOD PRESSURE: 68 MMHG | OXYGEN SATURATION: 98 % | WEIGHT: 176.6 LBS

## 2022-03-01 DIAGNOSIS — F41.1 GAD (GENERALIZED ANXIETY DISORDER): ICD-10-CM

## 2022-03-01 DIAGNOSIS — F43.10 PTSD (POST-TRAUMATIC STRESS DISORDER): ICD-10-CM

## 2022-03-01 DIAGNOSIS — F33.0 MILD EPISODE OF RECURRENT MAJOR DEPRESSIVE DISORDER (HCC): Primary | ICD-10-CM

## 2022-03-01 PROCEDURE — 99214 OFFICE O/P EST MOD 30 MIN: CPT | Performed by: NURSE PRACTITIONER

## 2022-03-01 NOTE — PROGRESS NOTES
CHIEF COMPLAINT:  Lilian Eaton is a 34 y.o. female and was seen today for follow-up of psychiatric condition and psychotropic medication management. HPI:    Alondra Alexnader reports the following psychiatric symptoms by hx:  depression and anxiety. Overall depression and anxiety symptoms have been present for months. Currently symptoms are of moderate/low severity. The symptoms still occur most days days. Anxiety symptoms are slightly less severe with use of buspar. Pt reports medications are beneficial. Met with pt for appt today to review current treatment plan. FAMILY/SOCIAL HX: psychosocial stressors    REVIEW OF SYSTEMS:  Psychiatric symptoms being monitored for:  depression, anxiety  Appetite:good   Sleep: poor with DIMS (difficulty initiating & maintaining sleep)   Neuro: seizure/heat stroke    Visit Vitals  BP 98/68 (BP 1 Location: Left arm, BP Patient Position: Sitting, BP Cuff Size: Large adult)   Pulse (!) 105   Temp 97.5 °F (36.4 °C) (Temporal)   Resp 17   Ht 5' 1\" (1.549 m)   Wt 80.1 kg (176 lb 9.6 oz)   SpO2 98%   BMI 33.37 kg/m²       Side Effects:  none    MENTAL STATUS EXAM:   Sensorium  oriented to time, place and person   Relations cooperative   Appearance:  age appropriate and casually dressed   Motor Behavior:  gait stable and within normal limits   Speech:  normal volume   Thought Process: goal directed   Thought Content free of delusions and free of hallucinations   Suicidal ideations no intention   Homicidal ideations no intention   Mood:  anxious and depressed   Affect:  anxious and depressed   Memory recent  adequate   Memory remote:  adequate   Concentration:  adequate   Abstraction:  abstract   Insight:  good   Reliability good   Judgment:  good     MEDICAL DECISION MAKING:  Problems addressed today:    ICD-10-CM ICD-9-CM    1. Mild episode of recurrent major depressive disorder (HCC)  F33.0 296.31     mild to moderate   2. TASIA (generalized anxiety disorder)  F41.1 300.02    3. PTSD (post-traumatic stress disorder)  F43.10 309.81        Assessment:   Marsha is responding to treatment. Anxiety symptoms are somewhat improved. She has noticed some exacerbated symptoms of depression. Discussed current medications and dosages. Pt will only take buspar x1 per day due to some possible activation. Discussed possible need for treatment with an antidepressant. Also discussed importance of stress management and self care. Reinforced importance of ind therapy. Reviewed treatment goals and target symptoms to monitor for. Plan:   1. Current Outpatient Medications   Medication Sig Dispense Refill    OTHER Keronique (hair product suppliment)      busPIRone (BUSPAR) 5 mg tablet Take 1 Tablet by mouth two (2) times a day. 60 Tablet 2    calcium citrate/vitamin D3 (CALCIUM CITRATE + D PO) Take  by mouth.  cholecalciferol (Vitamin D3) (1000 Units /25 mcg) tablet Take 5,000 Units by mouth daily.  iron,carbonyl-FA-multivit-min (Opurity Multivitamin) 30 mg iron- 800 mcg chew Take  by mouth.  B.infantis-B.ani-B.long-B.bifi (Probiotic 4X) 10-15 mg TbEC Take  by mouth daily.  BIOTIN PO Take  by mouth daily.  mecobalamin-folic acid (Opurity) 4,604-000 mcg subl by SubLINGual route. (Patient not taking: Reported on 1/26/2022)      calcium-cholecalciferol, d3, (CALCIUM 600 + D) 600-125 mg-unit tab Take  by mouth daily. (Patient not taking: Reported on 1/26/2022)      vitamin E (AQUA GEMS) 400 unit capsule Take 400 Units by mouth daily. (Patient not taking: Reported on 1/26/2022)            medication changes made today: take full dose of buspar 10 mg daily    2. Counseling and coordination of care including instructions for treatment, risks/benefits, risk factor reduction and patient/family education. She agrees with the plan. Patient instructed to call with any side effects, questions or issues.      3.    Follow-up and Dispositions    · Return in about 3 months (around 6/1/2022).          3/1/2022  Jairo Gonzalez NP

## 2022-03-01 NOTE — PROGRESS NOTES
Chief Complaint   Patient presents with    Medication Management     Visit Vitals  BP 98/68 (BP 1 Location: Left arm, BP Patient Position: Sitting, BP Cuff Size: Large adult)   Pulse (!) 105   Temp 97.5 °F (36.4 °C) (Temporal)   Resp 17   Ht 5' 1\" (1.549 m)   Wt 80.1 kg (176 lb 9.6 oz)   SpO2 98%   BMI 33.37 kg/m²     Prior to Admission medications    Medication Sig Start Date End Date Taking? Authorizing Provider   OTHER Sara (hair product suppliment)   Yes Provider, Historical   busPIRone (BUSPAR) 5 mg tablet Take 1 Tablet by mouth two (2) times a day. 1/26/22  Yes Garry Mcdonnell, NP   calcium citrate/vitamin D3 (CALCIUM CITRATE + D PO) Take  by mouth. Yes Provider, Historical   cholecalciferol (Vitamin D3) (1000 Units /25 mcg) tablet Take 5,000 Units by mouth daily. Yes Provider, Historical   iron,carbonyl-FA-multivit-min (Opurity Multivitamin) 30 mg iron- 800 mcg chew Take  by mouth. Yes Provider, Historical   B.infantis-B.ani-B.long-B.bifi (Probiotic 4X) 10-15 mg TbEC Take  by mouth daily. Yes Provider, Historical   BIOTIN PO Take  by mouth daily. Yes Provider, Historical   mecobalamin-folic acid (Opurity) 6,942-011 mcg subl by SubLINGual route. Patient not taking: Reported on 1/26/2022    Provider, Historical   calcium-cholecalciferol, d3, (CALCIUM 600 + D) 600-125 mg-unit tab Take  by mouth daily. Patient not taking: Reported on 1/26/2022    Provider, Historical   vitamin E (AQUA GEMS) 400 unit capsule Take 400 Units by mouth daily.   Patient not taking: Reported on 1/26/2022    Provider, Historical

## 2022-03-03 ENCOUNTER — TELEPHONE (OUTPATIENT)
Dept: SURGERY | Age: 32
End: 2022-03-03

## 2022-03-03 NOTE — TELEPHONE ENCOUNTER
Spoke with patient who wants to discuss weight loss medication. She has take phentermine that does not work for her. She would like to [employer]. medication for weight loss. Patient transferred to  to make an appointment.

## 2022-03-08 ENCOUNTER — OFFICE VISIT (OUTPATIENT)
Dept: SURGERY | Age: 32
End: 2022-03-08
Payer: COMMERCIAL

## 2022-03-08 VITALS
TEMPERATURE: 98.1 F | DIASTOLIC BLOOD PRESSURE: 85 MMHG | OXYGEN SATURATION: 98 % | WEIGHT: 177.8 LBS | HEART RATE: 58 BPM | RESPIRATION RATE: 18 BRPM | SYSTOLIC BLOOD PRESSURE: 128 MMHG | BODY MASS INDEX: 33.57 KG/M2 | HEIGHT: 61 IN

## 2022-03-08 DIAGNOSIS — D64.9 ANEMIA, UNSPECIFIED TYPE: ICD-10-CM

## 2022-03-08 DIAGNOSIS — Z90.3 S/P GASTRIC SLEEVE PROCEDURE: ICD-10-CM

## 2022-03-08 DIAGNOSIS — R63.5 WEIGHT GAIN: ICD-10-CM

## 2022-03-08 DIAGNOSIS — E55.9 VITAMIN D DEFICIENCY: ICD-10-CM

## 2022-03-08 DIAGNOSIS — E66.9 OBESITY (BMI 30.0-34.9): Primary | ICD-10-CM

## 2022-03-08 DIAGNOSIS — E53.8 VITAMIN B12 DEFICIENCY: ICD-10-CM

## 2022-03-08 PROCEDURE — 99212 OFFICE O/P EST SF 10 MIN: CPT | Performed by: NURSE PRACTITIONER

## 2022-03-08 NOTE — PROGRESS NOTES
1. Have you been to the ER, urgent care clinic since your last visit? Hospitalized since your last visit? no    2. Have you seen or consulted any other health care providers outside of the [address] [place] [address] since your last visit? Include any pap smears or colon screening.  no

## 2022-03-08 NOTE — PROGRESS NOTES
HISTORY OF PRESENT ILLNESS  Constantine Hightower is a 34 y.o. 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 . She has lost a total of 34.5 pounds since surgery. She  has gained 3 lbs  since the last ov. Body mass index is 33.6 kg/m². Loal Stands no nausea and no vomiting . Denies Acid reflux/heartburn. . Drinking 64  ounces of water daily. 60 grams  protein intake daily. +  BM's. Pt isf exercising 4-5 times a week 5-6 miles. . She has tried medication in the past. She has tried ozempic and phentermine. She wants to discuss mediations. She is frustrated that in the past medication have gotten her to this to this point. She wants to discuss medical weight loss. Dietary recall -    Breakfast- shake, fruit  Lunch- 1/2 sandwich, vegetables, oatmeal  Dinner- spaghetti, chicken  Snacking-she states that her craving are coming back. She stress eats- apple, fruit, yogurt, candy        Vitamins:  MVI : yes  Calcium : yes  B-Vit 12: yes  Vit D: Yes        Ms. Yomaira Aly has a reminder for a \"due or due soon\" health maintenance. I have asked that she contact her primary care provider for follow-up on this health maintenance. COMORBIDITY     SLEEP APNEA                 NO        GERD  (req.meds)            NO  HYPERLIPIDEMIA            NO  HYPERTENSION              NO         DIABETES                         NO           Current Outpatient Medications:     OTHER, Sara (hair product suppliment), Disp: , Rfl:     busPIRone (BUSPAR) 5 mg tablet, Take 1 Tablet by mouth two (2) times a day. (Patient taking differently: Take 5 mg by mouth two (2) times a day. Takes 10 mg at bedtime), Disp: 60 Tablet, Rfl: 2    calcium citrate/vitamin D3 (CALCIUM CITRATE + D PO), Take  by mouth., Disp: , Rfl:     cholecalciferol (Vitamin D3) (1000 Units /25 mcg) tablet, Take 5,000 Units by mouth daily. , Disp: , Rfl:     mecobalamin-folic acid (Opurity) 7,459-846 mcg subl, by SubLINGual route., Disp: , Rfl:    iron,carbonyl-FA-multivit-min (Opurity Multivitamin) 30 mg iron- 800 mcg chew, Take  by mouth., Disp: , Rfl:     B.infantis-B.ani-B.long-B.bifi (Probiotic 4X) 10-15 mg TbEC, Take  by mouth daily. , Disp: , Rfl:     calcium-cholecalciferol, d3, (CALCIUM 600 + D) 600-125 mg-unit tab, Take  by mouth daily. , Disp: , Rfl:     vitamin E (AQUA GEMS) 400 unit capsule, Take 400 Units by mouth daily. (Patient not taking: Reported on 3/8/2022), Disp: , Rfl:     BIOTIN PO, Take  by mouth daily. (Patient not taking: Reported on 3/8/2022), Disp: , Rfl:       Visit Vitals  /85   Pulse (!) 58   Temp 98.1 °F (36.7 °C) (Oral)   Resp 18   Ht 5' 1\" (1.549 m)   Wt 177 lb 12.8 oz (80.6 kg)   SpO2 98%   BMI 33.60 kg/m²     HPI    Review of Systems   Respiratory: Negative for shortness of breath. Gastrointestinal: Negative for abdominal pain, heartburn, nausea and vomiting. Physical Exam  Constitutional:       Appearance: She is well-developed. HENT:      Mouth/Throat:      Mouth: Mucous membranes are moist.   Cardiovascular:      Rate and Rhythm: Normal rate and regular rhythm. Heart sounds: No murmur heard. No friction rub. No gallop. Pulmonary:      Effort: Pulmonary effort is normal.      Breath sounds: Normal breath sounds. Abdominal:      General: Bowel sounds are normal. There is no distension. Palpations: Abdomen is soft. Tenderness: There is no abdominal tenderness. Comments: No masses or hernias noted   Musculoskeletal:      Cervical back: Normal range of motion. Skin:     General: Skin is warm and dry. Neurological:      Mental Status: She is alert and oriented to person, place, and time. ASSESSMENT and PLAN  Eugenie Sánchez is a 34 y.o. 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 . She has lost a total of 34.5 pounds since surgery. She  has gained 3 lbs  since the last ov. Body mass index is 33.6 kg/m². Trula Blew no nausea and no vomiting .  Denies Acid reflux/heartburn. . Drinking 64  ounces of water daily. 60 grams  protein intake daily. +  BM's. Pt isf exercising 4-5 times a week 5-6 miles. . She has tried medication in the past. She has tried ozempic and phentermine. She wants to discuss mediations. She is frustrated that in the past medication have gotten her to this to this point. She wants to discuss medical weight loss. Will refer for medical weight loss. Advised patient needs to meet with RD  Nutrition labs checked. Advised patient regard to diet that is high-protein, low-fat, low-sugar, limited carbohydrates. Strive for 50-60 grams of protein daily. If having a snack, foods that are protein or fiber rich. . No eating/drinking together, chew foods well, and portion control. Measure meals. Discussed snacking behavior and to [facility] pay attention to behavioral factor and habits. Drink at least 40-64 ounces of water or non-calorie/non-carbonated beverages daily. Continue vitamin regiment daily. Exercise at least 3 days a week with cardiovascular and strength training. Patient to follow up in 4 months. Advised to call office if any questions/concerns. 15 Minutes spent face to face with patient, >50 % of time spent counseling.

## 2022-03-08 NOTE — PATIENT INSTRUCTIONS
Eating Healthy Foods: Care Instructions  Your Care Instructions     Eating healthy foods can help lower your risk for disease. Healthy food gives you energy and keeps your heart strong, your brain active, your muscles working, and your bones strong. A healthy diet includes a variety of foods from the basic food groups: grains, vegetables, fruits, milk and milk products, and meat and beans. Some people may eat more of their favorite foods from only one food group and, as a result, miss getting the nutrients they need. So, it is important to pay attention not only to what you eat but also to what you are missing from your diet. You can eat a healthy, balanced diet by making a few small changes. Follow-up care is a key part of your treatment and safety. Be sure to make and go to all appointments, and call your doctor if you are having problems. It's also a good idea to know your test results and keep a list of the medicines you take. How can you care for yourself at home? Look at what you eat  · Keep a food diary for a week or two and record everything you eat or drink. Track the number of servings you eat from each food group. · For a balanced diet every day, eat a variety of:  ? 6 or more ounce-equivalents of grains, such as cereals, breads, crackers, rice, or pasta, every day. An ounce-equivalent is 1 slice of bread, 1 cup of ready-to-eat cereal, or ½ cup of cooked rice, cooked pasta, or cooked cereal.  ? 2½ cups of vegetables, especially:  § Dark-green vegetables such as broccoli and spinach. § Orange vegetables such as carrots and sweet potatoes. § Dry beans (such as lehman and kidney beans) and peas (such as lentils). ? 2 cups of fresh, frozen, or canned fruit. A small apple or 1 banana or orange equals 1 cup. ? 3 cups of nonfat or low-fat milk, yogurt, or other milk products. ? 5½ ounces of meat and beans, such as chicken, fish, lean meat, beans, nuts, and seeds.  One egg, 1 tablespoon of peanut butter, ½ ounce nuts or seeds, or ¼ cup of cooked beans equals 1 ounce of meat. · Learn how to read food labels for serving sizes and ingredients. Fast-food and convenience-food meals often contain few or no fruits or vegetables. Make sure you eat some fruits and vegetables to make the meal more nutritious. · Look at your food diary. For each food group, add up what you have eaten and then divide the total by the number of days. This will give you an idea of how much you are eating from each food group. See if you can find some ways to change your diet to make it more healthy. Start small  · Do not try to make dramatic changes to your diet all at once. You might feel that you are missing out on your favorite foods and then be more likely to fail. · Start slowly, and gradually change your habits. Try some of the following:  ? Use whole wheat bread instead of white bread. ? Use nonfat or low-fat milk instead of whole milk. ? Eat brown rice instead of white rice, and eat whole wheat pasta instead of white-flour pasta. ? Try low-fat cheeses and low-fat yogurt. ? Add more fruits and vegetables to meals and have them for snacks. ? Add lettuce, tomato, cucumber, and onion to sandwiches. ? Add fruit to yogurt and cereal.  Enjoy food  · You can still eat your favorite foods. You just may need to eat less of them. If your favorite foods are high in fat, salt, and sugar, limit how often you eat them, but do not cut them out entirely. · Eat a wide variety of foods. Make healthy choices when eating out  · The type of restaurant you choose can help you make healthy choices. Even fast-food chains are now offering more low-fat or healthier choices on the menu. · Choose smaller portions, or take half of your meal home. · When eating out, try:  ? A veggie pizza with a whole wheat crust or grilled chicken (instead of sausage or pepperoni).   ? Pasta with roasted vegetables, grilled chicken, or marinara sauce instead of cream sauce. ? A vegetable wrap or grilled chicken wrap. ? Broiled or poached food instead of fried or breaded items. Make healthy choices easy  · Buy packaged, prewashed, ready-to-eat fresh vegetables and fruits, such as baby carrots, salad mixes, and chopped or shredded broccoli and cauliflower. · Buy packaged, presliced fruits, such as melon or pineapple. · Choose 100% fruit or vegetable juice instead of soda. Limit juice intake to 4 to 6 oz (½ to ¾ cup) a day. · Blend low-fat yogurt, fruit juice, and canned or frozen fruit to make a smoothie for breakfast or a snack. Where can you learn more? Go to http://www.jones.com/  Enter T756 in the search box to learn more about \"Eating Healthy Foods: Care Instructions. \"  Current as of: December 17, 2020               Content Version: 13.0  © 2006-2021 [employer]. Care instructions adapted under license by [employer] (which disclaims liability or warranty for this information). If you have questions about a medical condition or this instruction, always ask your healthcare professional. [place] [address] any warranty or liability for your use of this information.

## 2022-03-09 DIAGNOSIS — Z76.89 ENCOUNTER FOR WEIGHT MANAGEMENT: Primary | ICD-10-CM

## 2022-03-09 DIAGNOSIS — E66.01 MORBID OBESITY (HCC): ICD-10-CM

## 2022-03-09 LAB
25(OH)D3+25(OH)D2 SERPL-MCNC: 68.7 NG/ML (ref 30–100)
ALBUMIN SERPL-MCNC: 4.2 G/DL (ref 3.8–4.8)
ALBUMIN/GLOB SERPL: 1.6 {RATIO} (ref 1.2–2.2)
ALP SERPL-CCNC: 79 IU/L (ref 44–121)
ALT SERPL-CCNC: 22 IU/L (ref 0–32)
AST SERPL-CCNC: 25 IU/L (ref 0–40)
BILIRUB SERPL-MCNC: 0.2 MG/DL (ref 0–1.2)
BUN SERPL-MCNC: 11 MG/DL (ref 6–20)
BUN/CREAT SERPL: 16 (ref 9–23)
CALCIUM SERPL-MCNC: 9.3 MG/DL (ref 8.7–10.2)
CHLORIDE SERPL-SCNC: 102 MMOL/L (ref 96–106)
CO2 SERPL-SCNC: 20 MMOL/L (ref 20–29)
CREAT SERPL-MCNC: 0.7 MG/DL (ref 0.57–1)
EGFR: 117 ML/MIN/1.73
ERYTHROCYTE [DISTWIDTH] IN BLOOD BY AUTOMATED COUNT: 13.8 % (ref 11.7–15.4)
GLOBULIN SER CALC-MCNC: 2.6 G/DL (ref 1.5–4.5)
GLUCOSE SERPL-MCNC: 80 MG/DL (ref 65–99)
HCT VFR BLD AUTO: 39.1 % (ref 34–46.6)
HGB BLD-MCNC: 12.7 G/DL (ref 11.1–15.9)
IRON SERPL-MCNC: 104 UG/DL (ref 27–159)
MCH RBC QN AUTO: 27.7 PG (ref 26.6–33)
MCHC RBC AUTO-ENTMCNC: 32.5 G/DL (ref 31.5–35.7)
MCV RBC AUTO: 85 FL (ref 79–97)
PLATELET # BLD AUTO: 326 X10E3/UL (ref 150–450)
POTASSIUM SERPL-SCNC: 4.3 MMOL/L (ref 3.5–5.2)
PROT SERPL-MCNC: 6.8 G/DL (ref 6–8.5)
RBC # BLD AUTO: 4.59 X10E6/UL (ref 3.77–5.28)
SODIUM SERPL-SCNC: 135 MMOL/L (ref 134–144)
VIT B12 SERPL-MCNC: 1644 PG/ML (ref 232–1245)
WBC # BLD AUTO: 5.9 X10E3/UL (ref 3.4–10.8)

## 2022-03-16 ENCOUNTER — OFFICE VISIT (OUTPATIENT)
Dept: FAMILY MEDICINE CLINIC | Age: 32
End: 2022-03-16
Payer: COMMERCIAL

## 2022-03-16 VITALS
OXYGEN SATURATION: 98 % | HEIGHT: 61 IN | HEART RATE: 75 BPM | RESPIRATION RATE: 16 BRPM | BODY MASS INDEX: 33 KG/M2 | WEIGHT: 174.8 LBS | DIASTOLIC BLOOD PRESSURE: 83 MMHG | SYSTOLIC BLOOD PRESSURE: 115 MMHG

## 2022-03-16 DIAGNOSIS — Z23 ENCOUNTER FOR IMMUNIZATION: ICD-10-CM

## 2022-03-16 DIAGNOSIS — Z00.00 LABORATORY EXAM ORDERED AS PART OF ROUTINE GENERAL MEDICAL EXAMINATION: ICD-10-CM

## 2022-03-16 DIAGNOSIS — E11.9 DIET-CONTROLLED DIABETES MELLITUS (HCC): ICD-10-CM

## 2022-03-16 DIAGNOSIS — Z00.00 WELL WOMAN EXAM (NO GYNECOLOGICAL EXAM): Primary | ICD-10-CM

## 2022-03-16 PROCEDURE — 99395 PREV VISIT EST AGE 18-39: CPT | Performed by: FAMILY MEDICINE

## 2022-03-16 PROCEDURE — 3044F HG A1C LEVEL LT 7.0%: CPT | Performed by: FAMILY MEDICINE

## 2022-03-16 RX ORDER — SEMAGLUTIDE 0.25 MG/.5ML
0.25 INJECTION, SOLUTION SUBCUTANEOUS
Qty: 4 EACH | Refills: 0 | Status: SHIPPED | OUTPATIENT
Start: 2022-03-16 | End: 2022-03-30 | Stop reason: SDUPTHER

## 2022-03-16 NOTE — PATIENT INSTRUCTIONS
Starting a Weight Loss Plan: Care Instructions  Overview     If you're thinking about losing weight, it can be hard to know where to start. Your doctor can help you set up a weight loss plan that best meets your needs. You may want to take a class on nutrition or exercise, or you could join a weight loss support group. If you have questions about how to make changes to your eating or exercise habits, ask your doctor about seeing a registered dietitian or an exercise specialist.  It can be a big challenge to lose weight. But you don't have to make huge changes at once. Make small changes, and stick with them. When those changes become habit, add a few more changes. If you don't think you're ready to make changes right now, try to pick a date in the future. Make an appointment to see your doctor to discuss whether the time is right for you to start a plan. Follow-up care is a key part of your treatment and safety. Be sure to make and go to all appointments, and call your doctor if you are having problems. It's also a good idea to know your test results and keep a list of the medicines you take. How can you care for yourself at home? · Set realistic goals. Many people expect to lose much more weight than is likely. A weight loss of 5% to 10% of your body weight may be enough to improve your health. · Get family and friends involved to provide support. Talk to them about why you are trying to lose weight, and ask them to help. They can help by participating in exercise and having meals with you, even if they may be eating something different. · Find what works best for you. If you do not have time or do not like to cook, a program that offers meal replacement bars or shakes may be better for you. Or if you like to prepare meals, finding a plan that includes daily menus and recipes may be best.  · Ask your doctor about other health professionals who can help you achieve your weight loss goals.   ? A dietitian can help you make healthy changes in your diet. ? An exercise specialist or  can help you develop a safe and effective exercise program.  ? A counselor or psychiatrist can help you cope with issues such as depression, anxiety, or family problems that can make it hard to focus on weight loss. · Consider joining a support group for people who are trying to lose weight. Your doctor can suggest groups in your area. Where can you learn more? Go to http://www.gray.com/  Enter U357 in the search box to learn more about \"Starting a Weight Loss Plan: Care Instructions. \"  Current as of: December 27, 2021               Content Version: 13.2  © 2019-2828 Healthwise, [employer]. Care instructions adapted under license by [employer] (which disclaims liability or warranty for this information). If you have questions about a medical condition or this instruction, always ask your healthcare professional. [address] any warranty or liability for your use of this information.

## 2022-03-16 NOTE — PROGRESS NOTES
Room 2     Identified pt with two pt identifiers(name and ). Reviewed record in preparation for visit and have obtained necessary documentation. All patient medications has been reviewed. Chief Complaint   Patient presents with    Weight Management     pt has concerens about weight loss medication     Complete Physical       3 most recent PHQ Screens 3/16/2022   Little interest or pleasure in doing things Not at all   Feeling down, depressed, irritable, or hopeless Not at all   Total Score PHQ 2 0   Trouble falling or staying asleep, or sleeping too much -   Feeling tired or having little energy -   Poor appetite, weight loss, or overeating -   Feeling bad about yourself - or that you are a failure or have let yourself or your family down -   Trouble concentrating on things such as school, work, reading, or watching TV -   Moving or speaking so slowly that other people could have noticed; or the opposite being so fidgety that others notice -   Thoughts of being better off dead, or hurting yourself in some way -   PHQ 9 Score -     Abuse Screening Questionnaire 3/31/2021   Do you ever feel afraid of your partner? N   Are you in a relationship with someone who physically or mentally threatens you? N   Is it safe for you to go home? Y       Health Maintenance Due   Topic    Cervical cancer screen     Flu Vaccine (1)    COVID-19 Vaccine (3 - Booster for Moderna series)         Health Maintenance Review: Patient reminded of \"due or due soon\" health maintenance. I have asked the patient to contact his/her primary care provider (PCP) for follow-up on his/her health maintenance.     Vitals:    22 0804   BP: 115/83   Pulse: 75   Resp: 16   SpO2: 98%   Weight: 174 lb 12.8 oz (79.3 kg)   Height: 5' 1\" (1.549 m)       Wt Readings from Last 3 Encounters:   22 174 lb 12.8 oz (79.3 kg)   22 177 lb 12.8 oz (80.6 kg)   22 176 lb 9.6 oz (80.1 kg)     Temp Readings from Last 3 Encounters:   22 98.1 °F (36.7 °C) (Oral)   03/01/22 97.5 °F (36.4 °C) (Temporal)   01/26/22 97.5 °F (36.4 °C) (Temporal)     BP Readings from Last 3 Encounters:   03/16/22 115/83   03/08/22 128/85   03/01/22 98/68     Pulse Readings from Last 3 Encounters:   03/16/22 75   03/08/22 (!) 58   03/01/22 (!) 105       Coordination of Care Questionnaire:   1) Have you been to an emergency room, urgent care, or hospitalized since your last visit?   no       2. Have seen or consulted any other health care provider since your last visit? NO    Patient is accompanied by self I have received verbal consent from [address] [place] [address] to discuss any/all medical information while they are present in the room.

## 2022-03-16 NOTE — PROGRESS NOTES
macroda    Subjective:   34 y.o. female for Well Woman Check. Her gyne and breast care is done elsewhere by her Ob-Gyne physician, w/ hx of gastric sleeve, requesting to be on a wt management, no fhx of thyroid cancer no fhx of MEN,       Obesity  Specific concerns today for my pt is the abnormal wt , patient does not do binge drinking no self induced vomiting patient also denies any use of laxative,      Urinary Frequency   current history is provided by the patient, stating that this is a new but recurrent problem. The current episode started more than 1 week ago. Hemoglobin A1c 4.0 - 5.6 % 5.4  5.5 CM  6.2 High  R, CM  6.4 High          Current Outpatient Medications   Medication Sig Dispense Refill    semaglutide, weight loss, (Wegovy) 0.25 mg/0.5 mL pnij 0.25 mg by SubCUTAneous route every seven (7) days. 4 Each 0    OTHER Keronique (hair product suppliment)      busPIRone (BUSPAR) 5 mg tablet Take 1 Tablet by mouth two (2) times a day. (Patient taking differently: Take 5 mg by mouth two (2) times a day. Takes 10 mg at bedtime) 60 Tablet 2    cholecalciferol (Vitamin D3) (1000 Units /25 mcg) tablet Take 5,000 Units by mouth daily.  iron,carbonyl-FA-multivit-min (Opurity Multivitamin) 30 mg iron- 800 mcg chew Take  by mouth.  B.infantis-B.ani-B.long-B.bifi (Probiotic 4X) 10-15 mg TbEC Take  by mouth daily.  calcium-cholecalciferol, d3, (CALCIUM 600 + D) 600-125 mg-unit tab Take  by mouth daily.  calcium citrate/vitamin D3 (CALCIUM CITRATE + D PO) Take  by mouth. (Patient not taking: Reported on 3/16/2022)      mecobalamin-folic acid (Opurity) 5,728-780 mcg subl by SubLINGual route. (Patient not taking: Reported on 3/16/2022)      vitamin E (AQUA GEMS) 400 unit capsule Take 400 Units by mouth daily. (Patient not taking: Reported on 3/8/2022)      BIOTIN PO Take  by mouth daily.  (Patient not taking: Reported on 3/8/2022)       Allergies   Allergen Reactions    Pcn [Penicillins] Rash     Past Medical History:   Diagnosis Date    BMI 38.0-38.9,adult 4/3/2018    BMI 40.0-44.9, adult ([address] 2/19/2018    Chronic back pain greater than 3 months duration 7/29/2015    COVID-19 vaccine series not completed     [address] JUNE 2021, SECOND DOSE DUE JULY 17, 2021. SHE DOES NOT HAVE CARD    TASIA (generalized anxiety disorder) 1/19/2017    Heat stroke     High-risk sexual behavior 2/23/2017    HX OTHER MEDICAL     chlamydia age 24, treated & negative now    Hypoglycemia 3/30/2016    Ill-defined condition     Insulinoma 4/6/2016    Morbid obesity ([address]     Obesity (BMI 35.0-39.9 without comorbidity) 9/12/2019    Psychiatric disorder     ANXIETY AND DEPRESSION    Stroke ([address] 2020    NO RESIDUAL    Syncopal seizure ([facility] 11/19/2015    Thyromegaly 3/30/2016    Urinary frequency 11/19/2015     Past Surgical History:   Procedure Laterality Date    HX CHOLECYSTECTOMY  10/6/2014    lap.  tracee    HX DILATION AND CURETTAGE  6/2011    HX GASTRECTOMY  07/27/2021    laparoscopic sleeve gastrectomy    HX HEENT      WISDOM TEETH REMOVED    HX OTHER SURGICAL      D&C with SAB 6/2011    [place] REVISION CERVIX [facility]  2013     Family History   Problem Relation Age of Onset    Hypertension Mother     High Cholesterol Father     Other Father         high cholesterole, herniated disc    Diabetes Paternal Aunt     Hypertension Maternal Grandmother     COPD Maternal Grandmother     Emphysema Maternal Grandmother     Diabetes Paternal Grandmother     No Known Problems Sister     No Known Problems Brother     Anesth Problems Neg Hx      Social History     Tobacco Use    Smoking status: Never Smoker    Smokeless tobacco: Never Used   Substance Use Topics    Alcohol use: Yes     Comment: OCCASIONALLY        Lab Results   Component Value Date/Time    Hemoglobin A1c 5.5 06/28/2021 10:57 AM    Hemoglobin A1c 6.2 (H) 03/30/2016 05:01 PM    Hemoglobin A1c 6.4 (H) 02/07/2014 11:37 AM    Glucose 80 03/08/2022 11:20 AM    Glucose (POC) 96 06/06/2015 06:39 PM    Glucose  04/06/2016 09:31 AM    LDL, calculated 88 02/23/2017 12:37 PM    Creatinine (POC) 0.7 06/06/2015 06:39 PM    Creatinine 0.70 03/08/2022 11:20 AM      Lab Results   Component Value Date/Time    Cholesterol, total 145 02/23/2017 12:37 PM    HDL Cholesterol 45 02/23/2017 12:37 PM    LDL, calculated 88 02/23/2017 12:37 PM    Triglyceride 62 02/23/2017 12:37 PM        ROS: Feeling generally well. No TIA's or unusual headaches, no dysphagia. No prolonged cough. No dyspnea or chest pain on exertion. No abdominal pain, change in bowel habits, black or bloody stools. No urinary tract symptoms. No new or unusual musculoskeletal symptoms. Specific concerns today: BMI 33      Objective: The patient appears well, alert, oriented x 3, in no distress. Visit Vitals  /83 (BP 1 Location: Left upper arm, BP Patient Position: Sitting, BP Cuff Size: Adult)   Pulse 75   Resp 16   Ht 5' 1\" (1.549 m)   Wt 174 lb 12.8 oz (79.3 kg)   SpO2 98%   BMI 33.03 kg/m²     ENT normal.  Neck supple. No adenopathy or thyromegaly. ROZ. Lungs are clear, good air entry, no wheezes, rhonchi or rales. S1 and S2 normal, no murmurs, regular rate and rhythm. Abdomen soft without tenderness, guarding, mass or organomegaly. Extremities show no edema, normal peripheral pulses. Neurological is normal, no focal findings. Breast and Pelvic exams are deferred. Assessment/Plan:   Well Woman  lose weight, increase physical activity, limit alcohol consumption, follow low fat diet, follow low salt diet, continue present plan, routine labs ordered  Diagnoses and all orders for this visit:    1. Well woman exam (no gynecological exam)  Comments:  [V70.0]    2. Encounter for immunization    3. Diet-controlled diabetes mellitus ([facility] [address]  -     HEMOGLOBIN A1C WITH EAG; Future    4.  Laboratory exam ordered as part of routine general medical examination  -     CBC WITH AUTOMATED DIFF; Future  -     HEMOGLOBIN A1C WITH EAG; Future  -     LIPID PANEL; Future  -     METABOLIC PANEL, COMPREHENSIVE; Future  -     TSH 3RD GENERATION; Future  -     URINALYSIS W/ RFLX MICROSCOPIC; Future    5. BMI 33.0-33.9,adult  -     CBC WITH AUTOMATED DIFF; Future  -     HEMOGLOBIN A1C WITH EAG; Future  -     LIPID PANEL; Future  -     METABOLIC PANEL, COMPREHENSIVE; Future  -     URINALYSIS W/ RFLX MICROSCOPIC; Future    Other orders  -     semaglutide, weight loss, (Wegovy) 0.25 mg/0.5 mL pnij; 0.25 mg by SubCUTAneous route every seven (7) days. -     nitrofurantoin (MACRODANTIN) 100 mg capsule; Take 1 Capsule by mouth two (2) times a day for 5 days.          protection with vaccination, and to Wear a facemask , having social distance, and to get tested if possible,

## 2022-03-17 LAB
ALBUMIN SERPL-MCNC: 3.8 G/DL (ref 3.5–5)
ALBUMIN/GLOB SERPL: 1.2 {RATIO} (ref 1.1–2.2)
ALP SERPL-CCNC: 84 U/L (ref 45–117)
ALT SERPL-CCNC: 33 U/L (ref 12–78)
ANION GAP SERPL CALC-SCNC: 3 MMOL/L (ref 5–15)
APPEARANCE UR: CLEAR
AST SERPL-CCNC: 27 U/L (ref 15–37)
BACTERIA URNS QL MICRO: ABNORMAL /HPF
BASOPHILS # BLD: 0.1 K/UL (ref 0–0.1)
BASOPHILS NFR BLD: 1 % (ref 0–1)
BILIRUB SERPL-MCNC: 0.4 MG/DL (ref 0.2–1)
BILIRUB UR QL: NEGATIVE
BUN SERPL-MCNC: 10 MG/DL (ref 6–20)
BUN/CREAT SERPL: 13 (ref 12–20)
CALCIUM SERPL-MCNC: 9.4 MG/DL (ref 8.5–10.1)
CHLORIDE SERPL-SCNC: 110 MMOL/L (ref 97–108)
CHOLEST SERPL-MCNC: 153 MG/DL
CO2 SERPL-SCNC: 25 MMOL/L (ref 21–32)
COLOR UR: ABNORMAL
CREAT SERPL-MCNC: 0.8 MG/DL (ref 0.55–1.02)
DIFFERENTIAL METHOD BLD: ABNORMAL
EOSINOPHIL # BLD: 0.1 K/UL (ref 0–0.4)
EOSINOPHIL NFR BLD: 1 % (ref 0–7)
EPITH CASTS URNS QL MICRO: ABNORMAL /LPF
ERYTHROCYTE [DISTWIDTH] IN BLOOD BY AUTOMATED COUNT: 14.7 % (ref 11.5–14.5)
EST. AVERAGE GLUCOSE BLD GHB EST-MCNC: 108 MG/DL
GLOBULIN SER CALC-MCNC: 3.2 G/DL (ref 2–4)
GLUCOSE SERPL-MCNC: 80 MG/DL (ref 65–100)
GLUCOSE UR STRIP.AUTO-MCNC: NEGATIVE MG/DL
HBA1C MFR BLD: 5.4 % (ref 4–5.6)
HCT VFR BLD AUTO: 40.4 % (ref 35–47)
HDLC SERPL-MCNC: 61 MG/DL
HDLC SERPL: 2.5 {RATIO} (ref 0–5)
HGB BLD-MCNC: 12.5 G/DL (ref 11.5–16)
HGB UR QL STRIP: NEGATIVE
IMM GRANULOCYTES # BLD AUTO: 0 K/UL (ref 0–0.04)
IMM GRANULOCYTES NFR BLD AUTO: 0 % (ref 0–0.5)
KETONES UR QL STRIP.AUTO: NEGATIVE MG/DL
LDLC SERPL CALC-MCNC: 78 MG/DL (ref 0–100)
LEUKOCYTE ESTERASE UR QL STRIP.AUTO: ABNORMAL
LYMPHOCYTES # BLD: 2.3 K/UL (ref 0.8–3.5)
LYMPHOCYTES NFR BLD: 37 % (ref 12–49)
MCH RBC QN AUTO: 27.2 PG (ref 26–34)
MCHC RBC AUTO-ENTMCNC: 30.9 G/DL (ref 30–36.5)
MCV RBC AUTO: 88 FL (ref 80–99)
MONOCYTES # BLD: 0.7 K/UL (ref 0–1)
MONOCYTES NFR BLD: 11 % (ref 5–13)
NEUTS SEG # BLD: 3.1 K/UL (ref 1.8–8)
NEUTS SEG NFR BLD: 50 % (ref 32–75)
NITRITE UR QL STRIP.AUTO: NEGATIVE
NRBC # BLD: 0 K/UL (ref 0–0.01)
NRBC BLD-RTO: 0 PER 100 WBC
PH UR STRIP: 7 [PH] (ref 5–8)
PLATELET # BLD AUTO: 287 K/UL (ref 150–400)
PMV BLD AUTO: 10.6 FL (ref 8.9–12.9)
POTASSIUM SERPL-SCNC: 4.3 MMOL/L (ref 3.5–5.1)
PROT SERPL-MCNC: 7 G/DL (ref 6.4–8.2)
PROT UR STRIP-MCNC: NEGATIVE MG/DL
RBC # BLD AUTO: 4.59 M/UL (ref 3.8–5.2)
RBC #/AREA URNS HPF: ABNORMAL /HPF (ref 0–5)
SODIUM SERPL-SCNC: 138 MMOL/L (ref 136–145)
SP GR UR REFRACTOMETRY: 1.02 (ref 1–1.03)
TRIGL SERPL-MCNC: 70 MG/DL (ref ?–150)
TSH SERPL DL<=0.05 MIU/L-ACNC: 2.19 UIU/ML (ref 0.36–3.74)
UROBILINOGEN UR QL STRIP.AUTO: 1 EU/DL (ref 0.2–1)
VLDLC SERPL CALC-MCNC: 14 MG/DL
WBC # BLD AUTO: 6.2 K/UL (ref 3.6–11)
WBC URNS QL MICRO: ABNORMAL /HPF (ref 0–4)

## 2022-03-18 PROBLEM — Z72.51 HIGH-RISK SEXUAL BEHAVIOR: Status: ACTIVE | Noted: 2017-02-23

## 2022-03-18 PROBLEM — F41.1 GAD (GENERALIZED ANXIETY DISORDER): Status: ACTIVE | Noted: 2017-01-19

## 2022-03-18 RX ORDER — NITROFURANTOIN (MACROCRYSTALS) 100 MG/1
100 CAPSULE ORAL 2 TIMES DAILY
Qty: 10 CAPSULE | Refills: 0 | Status: SHIPPED | OUTPATIENT
Start: 2022-03-18 | End: 2022-03-23

## 2022-03-19 PROBLEM — R73.03 PREDIABETES: Status: ACTIVE | Noted: 2017-12-14

## 2022-03-19 PROBLEM — F32.1 MODERATE MAJOR DEPRESSION (HCC): Status: ACTIVE | Noted: 2018-09-10

## 2022-03-19 PROBLEM — E66.01 SEVERE OBESITY (BMI 35.0-35.9 WITH COMORBIDITY) (HCC): Status: ACTIVE | Noted: 2021-07-26

## 2022-03-19 PROBLEM — F43.10 PTSD (POST-TRAUMATIC STRESS DISORDER): Status: ACTIVE | Noted: 2018-10-11

## 2022-03-20 PROBLEM — E66.9 OBESITY: Status: ACTIVE | Noted: 2018-02-19

## 2022-03-28 ENCOUNTER — OFFICE VISIT (OUTPATIENT)
Dept: FAMILY MEDICINE CLINIC | Age: 32
End: 2022-03-28
Payer: COMMERCIAL

## 2022-03-28 VITALS
DIASTOLIC BLOOD PRESSURE: 80 MMHG | OXYGEN SATURATION: 100 % | TEMPERATURE: 98.5 F | WEIGHT: 177 LBS | HEIGHT: 61 IN | BODY MASS INDEX: 33.42 KG/M2 | HEART RATE: 70 BPM | SYSTOLIC BLOOD PRESSURE: 122 MMHG | RESPIRATION RATE: 17 BRPM

## 2022-03-28 DIAGNOSIS — Z71.89 ADVICE GIVEN ABOUT COVID-19 VIRUS INFECTION: ICD-10-CM

## 2022-03-28 PROCEDURE — 99213 OFFICE O/P EST LOW 20 MIN: CPT | Performed by: FAMILY MEDICINE

## 2022-03-28 NOTE — PROGRESS NOTES
HISTORY OF PRESENT ILLNESS  Ligia Stevens is a 34 y.o. female, A Covid vaccinated x3, and masked Denies flu like sxs, with current medical history of TASIA, morbid obesity and the BMI of 33.4,   present with the following complaint and concern of [facility] did not do her paper work for her prior Auth regarding the wt management meds, for which it was done but denied, pt states that it must be redone, she has Medicaid and is nicely quliafies for it because she Was on saxenda, welbutrin, topamax, ozempic, pt has also had a gastric sleeve, nutritionist and also was referred to wt management      Current Outpatient Medications   Medication Sig Dispense Refill    OTHER Keronique (hair product suppliment)      busPIRone (BUSPAR) 5 mg tablet Take 1 Tablet by mouth two (2) times a day. (Patient taking differently: Take 5 mg by mouth two (2) times a day. Takes 10 mg at bedtime) 60 Tablet 2    calcium citrate/vitamin D3 (CALCIUM CITRATE + D PO) Take  by mouth.  cholecalciferol (Vitamin D3) (1000 Units /25 mcg) tablet Take 5,000 Units by mouth daily.  iron,carbonyl-FA-multivit-min (Opurity Multivitamin) 30 mg iron- 800 mcg chew Take  by mouth.  B.infantis-B.ani-B.long-B.bifi (Probiotic 4X) 10-15 mg TbEC Take  by mouth daily.  semaglutide, weight loss, (Wegovy) 0.25 mg/0.5 mL pnij 0.25 mg by SubCUTAneous route every seven (7) days. (Patient not taking: Reported on 3/28/2022) 4 Each 0    mecobalamin-folic acid (Opurity) 6,153-125 mcg subl by SubLINGual route. (Patient not taking: Reported on 3/16/2022)      calcium-cholecalciferol, d3, (CALCIUM 600 + D) 600-125 mg-unit tab Take  by mouth daily. (Patient not taking: Reported on 3/28/2022)      vitamin E (AQUA GEMS) 400 unit capsule Take 400 Units by mouth daily. (Patient not taking: Reported on 3/8/2022)      BIOTIN PO Take  by mouth daily.  (Patient not taking: Reported on 3/8/2022)       Allergies   Allergen Reactions    Pcn [Penicillins] Rash     Past Medical History:   Diagnosis Date    BMI 38.0-38.9,adult 4/3/2018    BMI 40.0-44.9, adult ([address] 2/19/2018    Chronic back pain greater than 3 months duration 7/29/2015    COVID-19 vaccine series not completed     [address] JUNE 2021, SECOND DOSE DUE JULY 17, 2021. SHE DOES NOT HAVE CARD    TASIA (generalized anxiety disorder) 1/19/2017    Heat stroke     High-risk sexual behavior 2/23/2017    HX OTHER MEDICAL     chlamydia age 24, treated & negative now    Hypoglycemia 3/30/2016    Ill-defined condition     Insulinoma 4/6/2016    Morbid obesity ([address]     Obesity (BMI 35.0-39.9 without comorbidity) 9/12/2019    Psychiatric disorder     ANXIETY AND DEPRESSION    Stroke ([address] 2020    NO RESIDUAL    Syncopal seizure ([facility] [address] 11/19/2015    Thyromegaly 3/30/2016    Urinary frequency 11/19/2015     Past Surgical History:   Procedure Laterality Date    HX CHOLECYSTECTOMY  10/6/2014    lap.  tracee    HX DILATION AND CURETTAGE  6/2011    HX GASTRECTOMY  07/27/2021    laparoscopic sleeve gastrectomy    HX HEENT      WISDOM TEETH REMOVED    HX OTHER SURGICAL      D&C with SAB 6/2011    [place] REVISION CERVIX [facility]ilee T[facility]  2013     Family History   Problem Relation Age of Onset    Hypertension Mother     High Cholesterol Father     Other Father         high cholesterole, herniated disc    Diabetes Paternal Aunt     Hypertension Maternal Grandmother     COPD Maternal Grandmother     Emphysema Maternal Grandmother     Diabetes Paternal Grandmother     No Known Problems Sister     No Known Problems Brother     Anesth Problems Neg Hx      Social History     Tobacco Use    Smoking status: Never Smoker    Smokeless tobacco: Never Used   Substance Use Topics    Alcohol use: Yes     Comment: OCCASIONALLY      Lab Results   Component Value Date/Time    Hemoglobin A1c 5.4 03/16/2022 08:55 AM    Hemoglobin A1c 5.5 06/28/2021 10:57 AM    Hemoglobin A1c 6.2 (H) 03/30/2016 05:01 PM Glucose 80 03/16/2022 08:55 AM    Glucose (POC) 96 06/06/2015 06:39 PM    Glucose  04/06/2016 09:31 AM    LDL, calculated 78 03/16/2022 08:55 AM    Creatinine (POC) 0.7 06/06/2015 06:39 PM    Creatinine 0.80 03/16/2022 08:55 AM      Lab Results   Component Value Date/Time    Cholesterol, total 153 03/16/2022 08:55 AM    HDL Cholesterol 61 03/16/2022 08:55 AM    LDL, calculated 78 03/16/2022 08:55 AM    Triglyceride 70 03/16/2022 08:55 AM    CHOL/HDL Ratio 2.5 03/16/2022 08:55 AM        Review of Systems   Constitutional: Negative for chills and fever. HENT: Negative for congestion and nosebleeds. Eyes: Negative for blurred vision and pain. Respiratory: Negative for cough, shortness of breath and wheezing. Cardiovascular: Negative for chest pain and leg swelling. Gastrointestinal: Negative for constipation, diarrhea, nausea and vomiting. Genitourinary: Negative for dysuria and frequency. Musculoskeletal: Negative for joint pain and myalgias. Skin: Negative for itching and rash. Neurological: Negative for dizziness, loss of consciousness and headaches. Psychiatric/Behavioral: Negative for depression. The patient is not nervous/anxious and does not have insomnia. Physical Exam  Vitals and nursing note reviewed. Constitutional:       Appearance: She is well-developed. HENT:      Head: Normocephalic and atraumatic. Eyes:      Conjunctiva/sclera: Conjunctivae normal.      Pupils: Pupils are equal, round, and reactive to light. Neck:      Thyroid: No thyromegaly. Vascular: No JVD. Cardiovascular:      Rate and Rhythm: Normal rate and regular rhythm. Heart sounds: Normal heart sounds. No murmur heard. No friction rub. No gallop. Pulmonary:      Effort: Pulmonary effort is normal. No respiratory distress. Breath sounds: Normal breath sounds. No stridor. No wheezing or rales. Abdominal:      General: Bowel sounds are normal. There is no distension. Palpations: Abdomen is soft. There is no mass. Tenderness: There is no abdominal tenderness. Musculoskeletal:         General: No tenderness. Normal range of motion. Lymphadenopathy:      Cervical: No cervical adenopathy. Skin:     Findings: No erythema or rash. Neurological:      Mental Status: She is alert and oriented to person, place, and time. Cranial Nerves: No cranial nerve deficit. Deep Tendon Reflexes: Reflexes are normal and symmetric. Psychiatric:         Behavior: Behavior normal.         ASSESSMENT and PLAN  Diagnoses and all orders for this visit:    1. BMI 33.0-33.9,adult    2. Advice given about COVID-19 virus infection    Other orders  -     semaglutide, weight loss, (Wegovy) 0.25 mg/0.5 mL pnij; 0.25 mg by SubCUTAneous route every seven (7) days.       Form letter other support document were provided for the pt,   pt was told that she need to be monitored q3 months and if any thing of unusual to stop this med, the pt was told if any needs to call us and let us know of any  concern regarding the current meds,     Low salt diet increase PA , pt agreed

## 2022-03-28 NOTE — LETTER
3/28/2022 11:26 AM    Ms. Nichelle Hope is currently under the care of [address] [facility]. She has current medical history of obesity state with BMI of 33.4, has no contraindication for the prescribed medication, who do not have any eating disorder, and has failed a weight loss treatment plan,  will benefit from [facility]. Patient has had treatment with Saxenda, Adipex, Wellbutrin, Topamax, Ozempic, she also has Had a Gastric Sleeve, and she was also referred to wt management and the Nutritionist.    Please reconsider her approval for [facility] weight management. If there are questions or concerns please have the patient contact our office.           Sincerely,      Claire Weldon MD

## 2022-03-28 NOTE — PROGRESS NOTES
Identified pt with two pt identifiers(name and ). Reviewed record in preparation for visit and have obtained necessary documentation. All patient medications has been reviewed. Chief Complaint   Patient presents with    Medication Evaluation       3 most recent PHQ Screens 3/28/2022   Little interest or pleasure in doing things Not at all   Feeling down, depressed, irritable, or hopeless Several days   Total Score PHQ 2 1   Trouble falling or staying asleep, or sleeping too much -   Feeling tired or having little energy -   Poor appetite, weight loss, or overeating -   Feeling bad about yourself - or that you are a failure or have let yourself or your family down -   Trouble concentrating on things such as school, work, reading, or watching TV -   Moving or speaking so slowly that other people could have noticed; or the opposite being so fidgety that others notice -   Thoughts of being better off dead, or hurting yourself in some way -   PHQ 9 Score -     Abuse Screening Questionnaire 3/28/2022   Do you ever feel afraid of your partner? N   Are you in a relationship with someone who physically or mentally threatens you? N   Is it safe for you to go home? Y       Health Maintenance Due   Topic    Pneumococcal 0-64 years (1 of 2 - PPSV23)    Foot Exam Q1     MICROALBUMIN Q1     Eye Exam Retinal or Dilated     Cervical cancer screen     Flu Vaccine (1)     Health Maintenance Review: Patient reminded of \"due or due soon\" health maintenance. I have asked the patient to contact his/her primary care provider (PCP) for follow-up on his/her health maintenance.     Vitals:    22 1005   BP: 122/80   Pulse: 70   Resp: 17   Temp: 98.5 °F (36.9 °C)   TempSrc: Oral   SpO2: 100%   Weight: 177 lb (80.3 kg)   Height: 5' 1\" (1.549 m)   PainSc:   0 - No pain   LMP: 2022       Wt Readings from Last 3 Encounters:   22 177 lb (80.3 kg)   22 174 lb 12.8 oz (79.3 kg)   22 177 lb 12.8 oz (80.6 kg) Temp Readings from Last 3 Encounters:   03/28/22 98.5 °F (36.9 °C) (Oral)   03/08/22 98.1 °F (36.7 °C) (Oral)   03/01/22 97.5 °F (36.4 °C) (Temporal)     BP Readings from Last 3 Encounters:   03/28/22 122/80   03/16/22 115/83   03/08/22 128/85     Pulse Readings from Last 3 Encounters:   03/28/22 70   03/16/22 75   03/08/22 (!) 58       1. \"Have you been to the ER, urgent care clinic since your last visit? Hospitalized since your last visit? \" No    2. \"Have you seen or consulted any other health care providers outside of the [address] [place] [address] since your last visit? \" No     3. For patients aged 39-70: Has the patient had a colonoscopy / FIT/ Cologuard? No      If the patient is female:    4. For patients aged 41-77: Has the patient had a mammogram within the past 2 years? NA - based on age or sex      11. For patients aged 21-65: Has the patient had a pap smear?  Yes - no Care Gap present

## 2022-03-28 NOTE — LETTER
NOTIFICATION RETURN TO WORK / SCHOOL        3/28/2022 10:26 AM    Ms. Kim BLACK [facility] [id number]      To Whom It May Concern:    Nadia Corado is currently under the care of [address] [facility]. She will benefit from [facility], patient has had treatment with Saxenda, Adipex, Wellbutrin, Topamax,     Ozempic, she also has Had a Gastric Sleeve, and she was also referred to wt management and the     Nutritionist.    Please reconsider her approval for [facility] weight management. If there are questions or concerns please have the patient contact our office.         Sincerely,      Alisia Gonzalez MD

## 2022-03-30 RX ORDER — SEMAGLUTIDE 0.25 MG/.5ML
0.25 INJECTION, SOLUTION SUBCUTANEOUS
Qty: 4 EACH | Refills: 0
Start: 2022-03-30 | End: 2022-03-31 | Stop reason: ALTCHOICE

## 2022-03-31 ENCOUNTER — OFFICE VISIT (OUTPATIENT)
Dept: SURGERY | Age: 32
End: 2022-03-31
Payer: COMMERCIAL

## 2022-03-31 VITALS
HEART RATE: 79 BPM | RESPIRATION RATE: 16 BRPM | DIASTOLIC BLOOD PRESSURE: 73 MMHG | SYSTOLIC BLOOD PRESSURE: 106 MMHG | BODY MASS INDEX: 32.94 KG/M2 | OXYGEN SATURATION: 98 % | WEIGHT: 174.5 LBS | HEIGHT: 61 IN | TEMPERATURE: 98.4 F

## 2022-03-31 DIAGNOSIS — F41.1 GENERALIZED ANXIETY DISORDER: ICD-10-CM

## 2022-03-31 DIAGNOSIS — R73.03 PREDIABETES: ICD-10-CM

## 2022-03-31 DIAGNOSIS — F43.10 PTSD (POST-TRAUMATIC STRESS DISORDER): ICD-10-CM

## 2022-03-31 DIAGNOSIS — Z90.3 S/P GASTRIC SLEEVE PROCEDURE: ICD-10-CM

## 2022-03-31 DIAGNOSIS — E66.9 OBESITY (BMI 30.0-34.9): Primary | ICD-10-CM

## 2022-03-31 DIAGNOSIS — M48.061 DEGENERATIVE LUMBAR SPINAL STENOSIS: ICD-10-CM

## 2022-03-31 PROBLEM — E66.01 SEVERE OBESITY (BMI 35.0-35.9 WITH COMORBIDITY) (HCC): Status: RESOLVED | Noted: 2021-07-26 | Resolved: 2022-03-31

## 2022-03-31 PROCEDURE — 99204 OFFICE O/P NEW MOD 45 MIN: CPT | Performed by: INTERNAL MEDICINE

## 2022-03-31 NOTE — PROGRESS NOTES
1. Have you been to the ER, urgent care clinic since your last visit? Hospitalized since your last visit? No    2. Have you seen or consulted any other health care providers outside of the [address] [place] [address] since your last visit? Include any pap smears or colon screening.  No

## 2022-03-31 NOTE — PROGRESS NOTES
[facility] Medically Supervised   [facility] Loss Program     INITIAL PHYSICIAN VISIT    HISTORY OF PRESENT ILLNESS    Amy Millan is a 34 y.o. female with Obesity Body mass index is 32.97 kg/m². and associated health concerns presents for evaluation and treatment of weight management. She was referred here by Bariatric Surgery since she is able to be 1 year out from gastric sleeve surgery and not at her goal weight yet. She had gastric sleeve surgery on 07/26/21 and was 208 lb prior to surgery. She has also been working on weight loss with her PCP, Dr. More Moore and Endocrinology. She gets cravings for sugar and carbs especially when she is under a lot of stress. She was rx'd [facility]FRANCISCO MCGOWAN by her PCP, but this was in the approval process so she has not started this yet. She is on Buspar 5 mg BID and followed by Clinton Douglas NP (psychiatry) for anxiety, depression, and PTSD. She is in therapy with [facility] for PTSD. She has back pain from 2 herniated discs in her back. She is followed by [address] [place] [address]. She is followed by Endocrinology due to hx of diabetes. Her BG has improved with weight loss and she no longer has a dx of diabetes. She has george having episodes of hypoglycemia since losing weight. She has 1 child and is not planning on having any more children. WEIGHT HISTORY     Current weight 174 lb. Lowest weight 174 lb. Maximum weight 208 lb. Short term goal weight 155 lb. Long term goal weight 140 lb. Neck circumference 14.5 in. Waist circumference 35.5 in. Body fat 36.4%. BMI 32.97.      Past Weight Loss Programs:  · Success: none  · Failures: Weight Watchers, Her[employer]     Previous Weight Loss Medications (prescription or herbal): phentermine, Topamax - lost 40 lb on phentermine and Topamax together but gained the weight back, Saxenda, Ozempic, Wellbturin     Weight Loss Surgeries or Abdominal Surgeries: gastric sleeve surgery, cholecystectomy     Family Hx of Obesity or Childhood Obesity: There is a FMHx of obesity in her mother and father's side. Her brother and sister are also on the heavier side. She gained weight after pregnancy. Hx of Eating Disorders: hx of night time eating before surgery        CURRENT HABITS     Eating/Drinking Habits:  · Meals per day/snacking: She has been intermittent fasting. She has 2 meals per day and will snack on yogurt, fruit, biscuits. For breakfast she will have a Premier protein shake with coffee. Lunch varies, but will be sandwiches, cereal, soup. · Drinking Habits: 54 oz water daily     Sleep Habits: 6-8 hrs per night     Physical Activity: 5-6 miles of running/walking 4-5x per week, 10,000-12,000 steps     Current medications attributing to weight gain: none      Depression Screening    3 most recent PHQ Screens 3/31/2022   PHQ Not Done Patient refuses   Little interest or pleasure in doing things Not at all   Feeling down, depressed, irritable, or hopeless Not at all   Total Score PHQ 2 0   Trouble falling or staying asleep, or sleeping too much -   Feeling tired or having little energy -   Poor appetite, weight loss, or overeating -   Feeling bad about yourself - or that you are a failure or have let yourself or your family down -   Trouble concentrating on things such as school, work, reading, or watching TV -   Moving or speaking so slowly that other people could have noticed; or the opposite being so fidgety that others notice -   Thoughts of being better off dead, or hurting yourself in some way -   PHQ 9 Score -        ROS:    Review of Systems negative except as noted above in HPI.        ALLERGIES:    Allergies   Allergen Reactions    Pcn [Penicillins] Rash       CURRENT MEDICATIONS:    Current Outpatient Medications   Medication Instructions    B.infantis-B.ani-B.long-B.bifi (Probiotic 4X) 10-15 mg TbEC Oral, DAILY    BIOTIN PO DAILY    busPIRone (BUSPAR) 5 mg, Oral, 2 TIMES DAILY    calcium citrate/vitamin D3 (CALCIUM CITRATE + D PO) Oral    calcium-cholecalciferol, d3, (CALCIUM 600 + D) 600-125 mg-unit tab DAILY    cholecalciferol (VITAMIN D3) 5,000 Units, Oral, DAILY    iron,carbonyl-FA-multivit-min (Opurity Multivitamin) 30 mg iron- 800 mcg chew Oral    mecobalamin-folic acid (Opurity) 7,838-492 mcg subl by SubLINGual route.  OTHER Keronique (hair product suppliment)     vitamin E (AQUA GEMS) 400 Units, DAILY       PAST MEDICAL HISTORY:    Past Medical History:   Diagnosis Date    BMI 38.0-38.9,adult 4/3/2018    BMI 40.0-44.9, adult ([address] 2/19/2018    Chronic back pain greater than 3 months duration 7/29/2015    COVID-19 vaccine series not completed     [address] JUNE 2021, SECOND DOSE DUE JULY 17, 2021. SHE DOES NOT HAVE CARD    TASIA (generalized anxiety disorder) 1/19/2017    Heat stroke     High-risk sexual behavior 2/23/2017    HX OTHER MEDICAL     chlamydia age 24, treated & negative now    Hypoglycemia 3/30/2016    Ill-defined condition     Insulinoma 4/6/2016    Morbid obesity ([address]     Obesity (BMI 35.0-39.9 without comorbidity) 9/12/2019    Psychiatric disorder     ANXIETY AND DEPRESSION    Stroke ([address] 2020    NO RESIDUAL    Syncopal seizure ([address] 11/19/2015    Thyromegaly 3/30/2016    Urinary frequency 11/19/2015       PAST SURGICAL HISTORY:    Past Surgical History:   Procedure Laterality Date    HX CHOLECYSTECTOMY  10/6/2014    lap.  tracee    HX DILATION AND CURETTAGE  6/2011    HX GASTRECTOMY  07/27/2021    laparoscopic sleeve gastrectomy    HX HEENT      WISDOM TEETH REMOVED    HX OTHER SURGICAL      D&C with SAB 6/2011    [facility]       FAMILY HISTORY:    Family History   Problem Relation Age of Onset    Hypertension Mother     High Cholesterol Father     Other Father         high cholesterole, herniated disc    Diabetes Paternal Aunt     Hypertension Maternal Grandmother     COPD Maternal Grandmother     Emphysema Maternal Grandmother     Diabetes Paternal Grandmother     No Known Problems Sister     No Known Problems Brother     Anesth Problems Neg Hx        SOCIAL HISTORY:    Social History     Socioeconomic History    Marital status: SINGLE   Tobacco Use    Smoking status: Never Smoker    Smokeless tobacco: Never Used   Vaping Use    Vaping Use: Never used   Substance and Sexual Activity    Alcohol use: Yes     Comment: OCCASIONALLY    Drug use: Yes     Types: Marijuana     Comment: medicinal marijuana, tincture    Sexual activity: Not Currently     Partners: Male     Birth control/protection: Injection   Social History Narrative    In the home with 9year-old daughter whom is well. Mother will be assisting after surgery. 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 IMMUNIZATIONS:    Immunization History   Administered Date(s) Administered    COVID-19, Moderna, Primary or Immunocompromised Series, MRNA, PF, 100mcg/0.5mL 06/19/2021, 07/17/2021, 01/31/2022    Influenza Vaccine 01/27/2014    Influenza Vaccine (Quad) PF (>6 Mo Flulaval, Fluarix, and >3 Yrs Afluria, Fluzone 52601) 01/19/2017, 11/14/2019    TB Skin Test (PPD) Intradermal 10/24/2018    Tdap 02/10/2014         PHYSICAL EXAMINATION    Vital Signs    Visit Vitals  /73 (BP 1 Location: Left arm, BP Patient Position: Sitting, BP Cuff Size: Large adult)   Pulse 79   Temp 98.4 °F (36.9 °C)   Resp 16   Ht 5' 1\" (1.549 m)   Wt 174 lb 8 oz (79.2 kg)   LMP 03/20/2022   SpO2 98%   BMI 32.97 kg/m²       Weight Metrics 3/31/2022 3/31/2022 3/28/2022 3/16/2022 3/8/2022 3/1/2022 1/26/2022   Weight - 174 lb 8 oz 177 lb 174 lb 12.8 oz 177 lb 12.8 oz 176 lb 9.6 oz 170 lb 6.4 oz   Neck Circ (inches) 14.5 - - - - - -   Waist Measure Inches 35.5 - - - - - -   Body Fat % 36.4 - - - - - -   BMI - 32.97 kg/m2 33.44 kg/m2 33.03 kg/m2 33.6 kg/m2 33.37 kg/m2 32.2 kg/m2         General appearance - No acute distress. Obese.    Eyes - pupils equal and reactive. Extraocular eye movements intact. Sclera anicteric. Nose - normal and patent. No polyps noted. No erythema. No discharge. Neck - supple. ROM cervical spine normal.   Chest - no wheezing . Unlabored respirations. Heart - normal rate. Regular rhythm. Normal S1, S2.   Abdomen - soft and distended. No masses or organomegaly. .  Neurological - awake, alert and oriented to person, place, and time and event. Clear speech. Muscle strength is +5/5 x 4 extremities. Steady gait. Heme/Lymph - peripheral pulses normal x 4 extremities. No peripheral edema is noted. Musculoskeletal - Intact x 4 extremities. Full ROM x 4 extremities. No pain with movement. Back exam - normal range of motion. .  No buffalo hump noted. Psychological - normal behavior, dress and thought processes. Good insight. Good eye contact. Normal affect. Appropriate mood. Normal speech. ASSESSMENT and PLAN    ICD-10-CM ICD-9-CM    1. Obesity (BMI 30.0-34. 9)  E66.9 278.00 She will continue with intermittent fasting from 7:30p-9a. For breakfast she will have eggs with no more than 2 pieces of rodrigues or turkey sausage. She can also have yogurt (< 7 g sugar). If she does not have time to make breakfast she can have her Premier protein shake with coffee. Around 12-1p she will have a lunch of salad with baked or grilled chicken. She can also have a sandwich, but should use low calorie bread like 647 bread or Lucian's bread. At 5p she will have dinner of grilled veggies and protein. Told her not to eat seafood or red meat more than 1-2x per week. Recommend she try the program bars for snack and can also snack on fruit. She should continue to get more than 10,000 steps daily and try and drink more than 64 oz water daily. Also recommend looking into chromium supplements and try a glass of Metamucil before lunch and dinner. 2. BMI 32.0-32.9,adult  Z68.32 V85.32 BMI will improve with weight loss.      3. Degenerative lumbar spinal stenosis  M48.061 724.02 Followed by Ortho. Back pain should improve with weight loss. 4. Generalized anxiety disorder  F41.1 300.02 Followed by Gabriel Dumont NP (psychiatry). Continue on Buspar 5 mg BID. 5. PTSD (post-traumatic stress disorder)  F43.10 309.81 Continue with therapy at [facility]. 6. Prediabetes  R73.03 790.29 Followed by Endocrinology. 7. S/P gastric sleeve procedure  Z90.3 V45.75 Recommend she start vitamin b12 500 mcg daily and continue on vitamin d3 supplement daily. Patient was offered a choice/choices in the treatment plan today. Patient expresses understanding of the plan and agrees with recommendations. More than 45 mins spent face to face with patient going over weight gain history, challenges for weight loss, new diet plan, water intake plan, and discussing physical activity routines. Also counseling and coordinating care and/or discussing treatment plans in reference to primary diagnosis of Obesity Body mass index is 32.97 kg/m².     Follow up: 3 weeks       Written by Solis Solitario, as dictated by Dr. Polo Caicedo MD.

## 2022-05-05 ENCOUNTER — DOCUMENTATION ONLY (OUTPATIENT)
Dept: SURGERY | Age: 32
End: 2022-05-05

## 2022-05-05 ENCOUNTER — OFFICE VISIT (OUTPATIENT)
Dept: SURGERY | Age: 32
End: 2022-05-05
Payer: COMMERCIAL

## 2022-05-05 VITALS
BODY MASS INDEX: 32.62 KG/M2 | HEART RATE: 70 BPM | OXYGEN SATURATION: 99 % | SYSTOLIC BLOOD PRESSURE: 107 MMHG | WEIGHT: 172.8 LBS | RESPIRATION RATE: 18 BRPM | HEIGHT: 61 IN | TEMPERATURE: 98.6 F | DIASTOLIC BLOOD PRESSURE: 71 MMHG

## 2022-05-05 DIAGNOSIS — M48.061 DEGENERATIVE LUMBAR SPINAL STENOSIS: ICD-10-CM

## 2022-05-05 DIAGNOSIS — E66.9 OBESITY (BMI 30.0-34.9): Primary | ICD-10-CM

## 2022-05-05 DIAGNOSIS — Z90.3 S/P GASTRIC SLEEVE PROCEDURE: ICD-10-CM

## 2022-05-05 DIAGNOSIS — R73.03 PRE-DIABETES: ICD-10-CM

## 2022-05-05 PROCEDURE — 99214 OFFICE O/P EST MOD 30 MIN: CPT | Performed by: INTERNAL MEDICINE

## 2022-05-05 RX ORDER — SEMAGLUTIDE 0.5 MG/.5ML
0.5 INJECTION, SOLUTION SUBCUTANEOUS
Qty: 1 EACH | Refills: 1 | Status: SHIPPED | OUTPATIENT
Start: 2022-05-05 | End: 2022-06-04

## 2022-05-05 NOTE — PROGRESS NOTES
Weight Management. 1 month follow up. 1. Have you been to the ER, urgent care clinic since your last visit? Hospitalized since your last visit? No    2. Have you seen or consulted any other health care providers outside of the [address] [place] [address] since your last visit? Include any pap smears or colon screening.  No     BMI - 32.6

## 2022-05-05 NOTE — PROGRESS NOTES
[employer] Medically Supervised   [facility] Loss Program     FOLLOW-UP PHYSICIAN VISIT    HISTORY OF PRESENT ILLNESS    Sky Nava is a 34 y.o. female with Obesity Body mass index is 32.65 kg/m². and associated health concerns presents for follow-up of weight management. She has lost 2 lb since her initial visit 3 weeks ago. She joined the weight loss program since she is 1 year out form gastric sleeve surgery and not at her goal weight yet. She has been sticking to the low calorie diet plan and intermittent fasting. She does get cravings and has been working on getting approval by insurance for [facility]. She has tried otc chromium supplement which has helped some with cravings. She had tried Ozempic , [place] and Phentermine in the past with little to no benefit. She has back pain from 2 herniated discs in her back. She is followed by Lajuanda Opitz. She has noticed her back pain improve with weight loss. She is followed by Endocrinology due to hx of diabetes. Her BG has improved with weight loss and she no longer has a dx of diabetes. She has been taking a vitamin b12 supplement daily and vitamin d3 supplement daily. WEIGHT HISTORY    Current weight 172 lb. Weight loss since previous visit -2 lb. Total weight loss -2 lb. Short term goal weight 155 lb. Long term goal weight 140 lb. Neck circumference 14.5 in. Total neck circumference change 0 in. Waist circumference 35.75 in. Total waist circumference change +0.75 in. Body fat 36.1%. Total body fat change -0.3%. Current BMI 32.65. CURRENT HABITS    Eating/Drinking Habits:   Meals per day/snacking: She has been intermittent fasting from 7:30p-9a. She will have a protein shake with decaf coffee for breakfast. Lunch varies, but will be food like soup or sandwiches. She has a home cooked meal for dinner.     Drinking Habits: 64 oz water daily    Sleep Habits: 6-8 hrs per night    Physical Activity: 10k steps daily    Current medications attributing to weight gain: none      Depression Screening    3 most recent PHQ Screens 3/31/2022   PHQ Not Done Patient refuses   Little interest or pleasure in doing things Not at all   Feeling down, depressed, irritable, or hopeless Not at all   Total Score PHQ 2 0   Trouble falling or staying asleep, or sleeping too much -   Feeling tired or having little energy -   Poor appetite, weight loss, or overeating -   Feeling bad about yourself - or that you are a failure or have let yourself or your family down -   Trouble concentrating on things such as school, work, reading, or watching TV -   Moving or speaking so slowly that other people could have noticed; or the opposite being so fidgety that others notice -   Thoughts of being better off dead, or hurting yourself in some way -   PHQ 9 Score -          ROS:    Review of Systems negative except as noted above in HPI. ALLERGIES:    Allergies   Allergen Reactions    Pcn [Penicillins] Rash       CURRENT MEDICATIONS:    Current Outpatient Medications   Medication Instructions    B.infantis-B.ani-B.long-B.bifi (Probiotic 4X) 10-15 mg TbEC 1 Capsule, Oral, DAILY    busPIRone (BUSPAR) 5 mg, Oral, 2 TIMES DAILY    calcium citrate/vitamin D3 (CALCIUM CITRATE + D PO) 2 Tablets, Oral, DAILY    calcium-cholecalciferol, d3, (CALCIUM 600 + D) 600-125 mg-unit tab Oral, DAILY    cholecalciferol (VITAMIN D3) 5,000 Units, Oral, DAILY    iron,carbonyl-FA-multivit-min (Opurity Multivitamin) 30 mg iron- 800 mcg chew 2 Tablets, Oral, DAILY    semaglutide, weight loss, (Wegovy) 0.5 mg/0.5 mL pnij 0.5 Doses, SubCUTAneous, EVERY 7 DAYS       PAST MEDICAL HISTORY:    Past Medical History:   Diagnosis Date    BMI 38.0-38.9,adult 4/3/2018    BMI 40.0-44.9, adult ([facility]) 2/19/2018    Chronic back pain greater than 3 months duration 7/29/2015    COVID-19 vaccine series not completed     PATIENT STATES SHE RECEIVED MODERNA JUNE 2021, SECOND DOSE DUE JULY 17, 2021.  SHE DOES NOT HAVE CARD    TASIA (generalized anxiety disorder) 1/19/2017    Heat stroke     High-risk sexual behavior 2/23/2017    HX OTHER MEDICAL     chlamydia age 24, treated & negative now    Hypoglycemia 3/30/2016    Insulinoma 4/6/2016    Morbid obesity ([address]     Obesity (BMI 35.0-39.9 without comorbidity) 9/12/2019    Psychiatric disorder     ANXIETY AND DEPRESSION    Stroke ([address] 2020    NO RESIDUAL    Syncopal seizure ([address] 11/19/2015    Thyromegaly 3/30/2016    Urinary frequency 11/19/2015       PAST SURGICAL HISTORY:    Past Surgical History:   Procedure Laterality Date    HX CHOLECYSTECTOMY  10/6/2014    lap.  tracee    HX DILATION AND CURETTAGE  6/2011    HX GASTRECTOMY  07/27/2021    laparoscopic sleeve gastrectomy    HX HEENT      WISDOM TEETH REMOVED    HX OTHER SURGICAL      D&C with SAB 6/2011    [facility]       FAMILY HISTORY:    Family History   Problem Relation Age of Onset    Hypertension Mother     Other Mother         gastric bypass    High Cholesterol Father     Heart Disease Father     Hypertension Father     Diabetes Paternal Aunt     Hypertension Maternal Grandmother     COPD Maternal Grandmother     Emphysema Maternal Grandmother     Stroke Maternal Grandmother     Diabetes Paternal Grandmother     Stroke Paternal Grandmother     No Known Problems Sister     No Known Problems Brother     Anesth Problems Neg Hx        SOCIAL HISTORY:    Social History     Socioeconomic History    Marital status: SINGLE   Tobacco Use    Smoking status: Never Smoker    Smokeless tobacco: Never Used   Vaping Use    Vaping Use: Never used   Substance and Sexual Activity    Alcohol use: Yes     Comment: OCCASIONALLY    Drug use: Yes     Types: Marijuana     Comment: medicinal marijuana, tincture    Sexual activity: Not Currently     Partners: Male     Birth control/protection: Injection   Social History Narrative    In the home with 9year-old daughter whom is well. Mother will be assisting after surgery. 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 IMMUNIZATIONS:    Immunization History   Administered Date(s) Administered    COVID-19, Moderna, Primary or Immunocompromised Series, MRNA, PF, 100mcg/0.5mL 06/19/2021, 07/17/2021, 01/31/2022    Influenza Vaccine 01/27/2014    Influenza Vaccine (Quad) PF (>6 Mo Flulaval, Fluarix, and >3 Yrs Afluria, Fluzone 50735) 01/19/2017, 11/14/2019    TB Skin Test (PPD) Intradermal 10/24/2018    Tdap 02/10/2014         PHYSICAL EXAMINATION    Vital Signs    Visit Vitals  /71 (BP 1 Location: Right arm, BP Patient Position: Sitting, BP Cuff Size: Adult)   Pulse 70   Temp 98.6 °F (37 °C) (Oral)   Resp 18   Ht 5' 1\" (1.549 m)   Wt 172 lb 12.8 oz (78.4 kg)   LMP 04/15/2022 (Approximate)   SpO2 99%   BMI 32.65 kg/m²       Weight Metrics 5/5/2022 5/5/2022 3/31/2022 3/31/2022 3/28/2022 3/16/2022 3/8/2022   Weight - 172 lb 12.8 oz - 174 lb 8 oz 177 lb 174 lb 12.8 oz 177 lb 12.8 oz   Neck Circ (inches) 14.5 - 14.5 - - - -   Waist Measure Inches 35.75 - 35.5 - - - -   Body Fat % 36.1 - 36.4 - - - -   BMI - 32.65 kg/m2 - 32.97 kg/m2 33.44 kg/m2 33.03 kg/m2 33.6 kg/m2         General appearance - No acute distress. Obese. Eyes - pupils equal and reactive. Extraocular eye movements intact. Sclera anicteric. Nose - normal and patent. No polyps noted. No erythema. No discharge. Neck - supple. ROM cervical spine normal.   Chest - no wheezing. Unlabored respirations. Heart - normal rate. Regular rhythm. Normal S1, S2.   Abdomen - soft and distended. No masses or organomegaly. Neurological - awake, alert and oriented to person, place, and time and event. Clear speech. Muscle strength is +5/5 x 4 extremities. Steady gait. Heme/Lymph - peripheral pulses normal x 4 extremities. No peripheral edema is noted. Musculoskeletal - Intact x 4 extremities. Full ROM x 4 extremities.  No pain with movement. Back exam - normal range of motion. No buffalo hump noted. Psychological - normal behavior, dress and thought processes. Good insight. Good eye contact. Normal affect. Appropriate mood. Normal speech. ASSESSMENT and PLAN    ICD-10-CM ICD-9-CM    1. Obesity (BMI 30.0-34. 9)  E66.9 278.00 semaglutide, weight loss, (Wegovy) 0.5 mg/0.5 mL pnij sent to pharmacy. She has been struggling with cravings so we will start her on Wegovy 0.5 mg subQ once a week. Potential side effects were discussed. She has tried multiple other medications for weight loss in the past including phentermine, Topamax, Saxenda, Ozempic, and Wellbutrin which have not helped. She will continue with her current low calorie diet plan and intermittent fasting from 7:30p-9a. Continue to get at least 5k steps daily and drinking at least 64 oz water daily. 2. BMI 32.0-32.9,adult  Z68.32 V85.32 semaglutide, weight loss, (Wegovy) 0.5 mg/0.5 mL pnij sent to pharmacy. BMI will improve with weight loss. She has been struggling with cravings so we will start her on Wegovy 0.5 mg subQ once a week. Potential side effects were discussed. 3. S/P gastric sleeve procedure  Z90.3 V45.75 She has not hit her goal weight since having surgery about 1 year ago. Will start her on Wegovy to help with cravings. Continue on vitamin b12 supplement and vitamin d3 supplement. 4. Pre-diabetes  R73.03 790.29 Followed by Endocrinology. 5. Degenerative lumbar spinal stenosis  M48.061 724.02 Followed by Ortho. Back pain should improve with weight loss. Patient was offered a choice/choices in the treatment plan today. Patient expresses understanding of the plan and agrees with recommendations.     Follow up: 6 weeks      Written by Kj High, as dictated by Dr. Daniel Chisholm MD.

## 2022-05-09 ENCOUNTER — DOCUMENTATION ONLY (OUTPATIENT)
Dept: SURGERY | Age: 32
End: 2022-05-09

## 2022-05-09 NOTE — PROGRESS NOTES
Submitted prior authorization request for wegovy 0.50mg per cover my meds:     Adeola Wright (Angel: [id number])  Zuleima Pier 0.5MG/0.5ML auto-injectors     Form  [facility] Electronic PA Form (2017 NCP[employer])    Wait for Determination.

## 2022-05-10 NOTE — PROGRESS NOTES
Dr. Radha Soto sent a note to the clinic that the [facility] is denied. Note states Dr must address:  - Current accurate height measurement  - No history of an eating disorder  - No malabsorption syndromes or cholestasis  -No contraindications to use  -tried two preferred drugs such as Phentermine (trail noted), Contrave, Benzphetamine, and Phendimetrazine (all require Prior authorization)     Do you want to consider prescribing one of the alternatives mentioned in the note?     The number to call if you have questions for the pharmacist who made the decision at the insurance is [phone]

## 2022-05-12 NOTE — PROGRESS NOTES
I resubmitted prior authorization yesterday for [facility] fax. Included last note dated 05/05/2022. As was done on original prior authorization via cover my meds.      I will notify the pt and the pt's pharmacy that the [facility]FRANCISCO MCGOWAN;

## 2022-05-31 ENCOUNTER — TELEPHONE (OUTPATIENT)
Dept: BEHAVIORAL/MENTAL HEALTH CLINIC | Age: 32
End: 2022-05-31

## 2022-05-31 ENCOUNTER — VIRTUAL VISIT (OUTPATIENT)
Dept: BEHAVIORAL/MENTAL HEALTH CLINIC | Age: 32
End: 2022-05-31
Payer: COMMERCIAL

## 2022-05-31 DIAGNOSIS — F43.10 PTSD (POST-TRAUMATIC STRESS DISORDER): ICD-10-CM

## 2022-05-31 DIAGNOSIS — F41.1 GAD (GENERALIZED ANXIETY DISORDER): ICD-10-CM

## 2022-05-31 DIAGNOSIS — F33.0 MILD EPISODE OF RECURRENT MAJOR DEPRESSIVE DISORDER (HCC): Primary | ICD-10-CM

## 2022-05-31 PROCEDURE — 99213 OFFICE O/P EST LOW 20 MIN: CPT | Performed by: NURSE PRACTITIONER

## 2022-05-31 RX ORDER — BUSPIRONE HYDROCHLORIDE 5 MG/1
5 TABLET ORAL 2 TIMES DAILY
Qty: 60 TABLET | Refills: 5 | Status: SHIPPED | OUTPATIENT
Start: 2022-05-31

## 2022-05-31 NOTE — PROGRESS NOTES
CHIEF COMPLAINT:  Santa Ng is a 34 y.o. female and was seen today for follow-up of psychiatric condition and psychotropic medication management. HPI:    Aruna Will reports the following psychiatric symptoms by hx:  depression and anxiety. Overall symptoms have been present for months. Currently symptoms are of moderate severity. The symptoms occur intermittently. Pt reports medications are beneficial. Met with pt via video telehealth for appt today to review current treatment plan. FAMILY/SOCIAL HX: psychosocial stressors    REVIEW OF SYSTEMS:  Psychiatric symptoms being monitored for:  depression, anxiety  Appetite:good   Sleep: fitful at times      There were no vitals taken for this visit.: virtual    Side Effects:  none    MENTAL STATUS EXAM:   Sensorium  oriented to time, place and person   Relations cooperative   Appearance:  age appropriate and casually dressed   Motor Behavior:  gait stable and within normal limits   Speech:  normal volume   Thought Process: goal directed   Thought Content free of delusions and free of hallucinations   Suicidal ideations no intention   Homicidal ideations no intention   Mood:  anxious   Affect:  anxious   Memory recent  adequate   Memory remote:  adequate   Concentration:  adequate   Abstraction:  abstract   Insight:  good   Reliability good   Judgment:  good     MEDICAL DECISION MAKING:  Problems addressed today:    ICD-10-CM ICD-9-CM    1. Mild episode of recurrent major depressive disorder ([facility] [address]  F33.0 296.31    2. TASIA (generalized anxiety disorder)  F41.1 300.02    3. PTSD (post-traumatic stress disorder)  F43.10 309.81        Assessment:   Marsha is responding to treatment. Symptoms are stabilizing overall. Discussed current medications and dosages. No changes made at this time. Discussed strategies she is using for self care and management of MH. Reinforced positive work she is doing to continue to develop resiliency.  Reviewed treatment goals and target symptoms to monitor for. Discussed transition of care. Plan:   1. Current Outpatient Medications   Medication Sig Dispense Refill    semaglutide, weight loss, (Wegovy) 0.5 mg/0.5 mL pnij 0.5 Doses by SubCUTAneous route every seven (7) days for 30 days. 1 Each 1    busPIRone (BUSPAR) 5 mg tablet Take 1 Tablet by mouth two (2) times a day. (Patient taking differently: Take 10 mg by mouth nightly.) 60 Tablet 2    calcium citrate/vitamin D3 (CALCIUM CITRATE + D PO) Take 2 Tablets by mouth daily.  cholecalciferol (Vitamin D3) (1000 Units /25 mcg) tablet Take 5,000 Units by mouth daily.  iron,carbonyl-FA-multivit-min (Opurity Multivitamin) 30 mg iron- 800 mcg chew Take 2 Tablets by mouth daily.  B.infantis-B.ani-B.long-B.bifi (Probiotic 4X) 10-15 mg TbEC Take 1 Capsule by mouth daily.  calcium-cholecalciferol, d3, (CALCIUM 600 + D) 600-125 mg-unit tab Take  by mouth daily. medication changes made today: buspar    2. Counseling and coordination of care including instructions for treatment, risks/benefits, risk factor reduction and patient/family education. She agrees with the plan. Patient instructed to call with any side effects, questions or issues. 3.    Follow-up and Dispositions    · Return transition. Tigist Peraza, who was evaluated through a synchronous (real-time) audio-video encounter, and/or her healthcare decision maker, is aware that it is a billable service, which includes applicable co-pays, with coverage as determined by her insurance carrier. She provided verbal consent to proceed and patient identification was verified. This visit was conducted pursuant to the emergency declaration under the [facility], [address] [place] [address] authority and the [employer] General Act. A caregiver was present when appropriate. Ability to conduct physical exam was limited.  The patient was located at: Home: [place] [id number]  The provider was located at:  Facility (Appt Department): [address] [facility]  [address] [place] [id number]        5/31/2022  Fabrizio Weaver NP

## 2022-05-31 NOTE — LETTER
5/31/2022 2:37 PM    Ms. [address]varez [address] [facility] [id number]           To Whom It May Concern: This letter is being written at the request of Ms. Shweta King. Ms. Zoila Dahl is an active patient of [address] at [facility]. She is treated for depression and anxiety. It is recommended she be allowed to have a cat as an emotional support pet as cats provide needed support for depression and help calm a patient during panic attacks and periods of increased anxiety. This letter is not written to vouch for the pet's behavior. Should there be any additional questions, please give our office a call.              Sincerely,         Anastacio Victoria, PhD, PMHNP-BC

## 2022-05-31 NOTE — TELEPHONE ENCOUNTER
Patient is requesting a letter stating it's fine for her to have THC in her system if she is ever drug tested.

## 2022-06-02 ENCOUNTER — TELEPHONE (OUTPATIENT)
Dept: SURGERY | Age: 32
End: 2022-06-02

## 2022-06-16 ENCOUNTER — OFFICE VISIT (OUTPATIENT)
Dept: SURGERY | Age: 32
End: 2022-06-16
Payer: COMMERCIAL

## 2022-06-16 VITALS
DIASTOLIC BLOOD PRESSURE: 72 MMHG | HEIGHT: 61 IN | WEIGHT: 157.1 LBS | TEMPERATURE: 98.3 F | BODY MASS INDEX: 29.66 KG/M2 | HEART RATE: 91 BPM | SYSTOLIC BLOOD PRESSURE: 114 MMHG | OXYGEN SATURATION: 99 % | RESPIRATION RATE: 18 BRPM

## 2022-06-16 DIAGNOSIS — F32.A ANXIETY AND DEPRESSION: ICD-10-CM

## 2022-06-16 DIAGNOSIS — R73.03 PRE-DIABETES: ICD-10-CM

## 2022-06-16 DIAGNOSIS — M48.061 DEGENERATIVE LUMBAR SPINAL STENOSIS: ICD-10-CM

## 2022-06-16 DIAGNOSIS — E66.3 OVERWEIGHT (BMI 25.0-29.9): Primary | ICD-10-CM

## 2022-06-16 DIAGNOSIS — Z90.3 S/P GASTRIC SLEEVE PROCEDURE: ICD-10-CM

## 2022-06-16 DIAGNOSIS — F41.9 ANXIETY AND DEPRESSION: ICD-10-CM

## 2022-06-16 PROBLEM — F32.1 MODERATE MAJOR DEPRESSION (HCC): Status: RESOLVED | Noted: 2018-09-10 | Resolved: 2022-06-16

## 2022-06-16 PROBLEM — F43.10 PTSD (POST-TRAUMATIC STRESS DISORDER): Status: RESOLVED | Noted: 2018-10-11 | Resolved: 2022-06-16

## 2022-06-16 PROCEDURE — 99214 OFFICE O/P EST MOD 30 MIN: CPT | Performed by: INTERNAL MEDICINE

## 2022-06-16 RX ORDER — SEMAGLUTIDE 1.7 MG/.75ML
1.7 INJECTION, SOLUTION SUBCUTANEOUS
Qty: 1 EACH | Refills: 1 | Status: SHIPPED | OUTPATIENT
Start: 2022-06-16 | End: 2022-08-17 | Stop reason: SDUPTHER

## 2022-06-16 NOTE — PROGRESS NOTES
1. Have you been to the ER, urgent care clinic since your last visit? Hospitalized since your last visit? No    2. Have you seen or consulted any other health care providers outside of the [address] [place] [address] since your last visit? Include any pap smears or colon screening.  No

## 2022-06-16 NOTE — PROGRESS NOTES
[employer] Medically Supervised   [facility] Loss Program     FOLLOW-UP PHYSICIAN VISIT    HISTORY OF PRESENT ILLNESS    Deonna Galdamez is a 34 y.o. female with Obesity Body mass index is 29.68 kg/m². and associated health concerns presents for follow-up of weight management. She has lost 15 lb since her last follow-up and 17 lb overall. She joined the weight loss program since she is 1 year out from gastric sleeve surgery and not at her goal weight yet. Her Lonzell Perch was recently approved and she is currently taking this. The Lonzell Perch has helped with cravings. She denies any side effects on Wegovy. She has back pain from 2 herniated discs in her back. Her pain has been improving with weight loss. She is followed by Endocrinology due to hx of diabetes. Her BG has improved with weight loss and she no longer has a dx of diabetes. She was followed by Vita Roldan NP (psychiatry) for depression and anxiety, but she will be leaving [employer]. She is on Buspar 5 mg BID. WEIGHT HISTORY    Current weight 157 lb. Weight loss since previous visit -15 lb. Total weight loss -17 lb. Short term goal weight 155 lb. Long term goal weight 140 lb. Neck circumference 13.75 in. Total neck circumference change -0.75 in. Waist circumference 33.5 in. Total waist circumference change -2 in. Body fat 32.8%. Total body fat change -3.6%. Current BMI 29.68. CURRENT HABITS    Eating/Drinking Habits:   Meals per day/snacking: She has been intermittent fasting from 7p-8a. She will have a Premier protein shake in the AM. Lunch varies and she will have a home cooked meal for dinner. She is on Wegovy which has helped with her cravings.     Drinking Habits: 64 oz water daily    Sleep Habits: 6-8 hrs per night    Physical Activity: she recently moved and staying active while moving, about 5k steps daily    Current medications attributing to weight gain: none      Depression Screening    3 most recent PHQ Screens 6/16/2022   PHQ Not Done -   Little interest or pleasure in doing things Not at all   Feeling down, depressed, irritable, or hopeless Not at all   Total Score PHQ 2 0   Trouble falling or staying asleep, or sleeping too much -   Feeling tired or having little energy -   Poor appetite, weight loss, or overeating -   Feeling bad about yourself - or that you are a failure or have let yourself or your family down -   Trouble concentrating on things such as school, work, reading, or watching TV -   Moving or speaking so slowly that other people could have noticed; or the opposite being so fidgety that others notice -   Thoughts of being better off dead, or hurting yourself in some way -   PHQ 9 Score -          ROS:    Review of Systems negative except as noted above in HPI. ALLERGIES:    Allergies   Allergen Reactions    Pcn [Penicillins] Rash       CURRENT MEDICATIONS:    Current Outpatient Medications   Medication Instructions    B.infantis-B.ani-B.long-B.bifi (Probiotic 4X) 10-15 mg TbEC 1 Capsule, Oral, DAILY    busPIRone (BUSPAR) 5 mg, Oral, 2 TIMES DAILY    calcium citrate/vitamin D3 (CALCIUM CITRATE + D PO) 2 Tablets, Oral, DAILY    calcium-cholecalciferol, d3, (CALCIUM 600 + D) 600-125 mg-unit tab Oral, DAILY    cholecalciferol (VITAMIN D3) 5,000 Units, Oral, DAILY    iron,carbonyl-FA-multivit-min (Opurity Multivitamin) 30 mg iron- 800 mcg chew 2 Tablets, Oral, DAILY    semaglutide, weight loss, (Wegovy) 1.7 mg/0.75 mL pnij 1.7 Doses, SubCUTAneous, EVERY 7 DAYS       PAST MEDICAL HISTORY:    Past Medical History:   Diagnosis Date    BMI 38.0-38.9,adult 4/3/2018    BMI 40.0-44.9, adult (HCC) 2/19/2018    Chronic back pain greater than 3 months duration 7/29/2015    COVID-19 vaccine series not completed     PATIENT STATES SHE RECEIVED MODERNA JUNE 2021, SECOND DOSE DUE JULY 17, 2021.  SHE DOES NOT HAVE CARD    TASIA (generalized anxiety disorder) 1/19/2017    Heat stroke     High-risk sexual behavior 2/23/2017    HX OTHER MEDICAL     chlamydia age 24, treated & negative now    Hypoglycemia 3/30/2016    Insulinoma 4/6/2016    Morbid obesity ([facility] [address]     Obesity (BMI 35.0-39.9 without comorbidity) 9/12/2019    Psychiatric disorder     ANXIETY AND DEPRESSION    Stroke ([facility] [address] 2020    NO RESIDUAL    Syncopal seizure ([facility] [address] 11/19/2015    Thyromegaly 3/30/2016    Urinary frequency 11/19/2015       PAST SURGICAL HISTORY:    Past Surgical History:   Procedure Laterality Date    HX CHOLECYSTECTOMY  10/6/2014    lap. tracee    HX DILATION AND CURETTAGE  6/2011    HX GASTRECTOMY  07/27/2021    laparoscopic sleeve gastrectomy    HX HEENT      WISDOM TEETH REMOVED    HX OTHER SURGICAL      D&C with SAB 6/2011    DC REVISION CERVIX [facility]  2013       FAMILY HISTORY:    Family History   Problem Relation Age of Onset    Hypertension Mother     Other Mother         gastric bypass    High Cholesterol Father     Heart Disease Father     Hypertension Father     Diabetes Paternal Aunt     Hypertension Maternal Grandmother     COPD Maternal Grandmother     Emphysema Maternal Grandmother     Stroke Maternal Grandmother     Diabetes Paternal Grandmother     Stroke Paternal Grandmother     No Known Problems Sister     No Known Problems Brother     Anesth Problems Neg Hx        SOCIAL HISTORY:    Social History     Socioeconomic History    Marital status: SINGLE   Tobacco Use    Smoking status: Never Smoker    Smokeless tobacco: Never Used   Vaping Use    Vaping Use: Never used   Substance and Sexual Activity    Alcohol use: Yes     Comment: OCCASIONALLY    Drug use: Yes     Types: Marijuana     Comment: medicinal marijuana, tincture    Sexual activity: Not Currently     Partners: Male     Birth control/protection: Injection   Social History Narrative    In the home with 9year-old daughter whom is well. Mother will be assisting after surgery.   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 IMMUNIZATIONS:    Immunization History   Administered Date(s) Administered    COVID-19, Moderna, Primary or Immunocompromised Series, MRNA, PF, 100mcg/0.5mL 06/19/2021, 07/17/2021, 01/31/2022    Influenza Vaccine 01/27/2014    Influenza Vaccine (Quad) PF (>6 Mo Flulaval, Fluarix, and >3 Yrs Afluria, Fluzone 15228) 01/19/2017, 11/14/2019    TB Skin Test (PPD) Intradermal 10/24/2018    Tdap 02/10/2014         PHYSICAL EXAMINATION    Vital Signs    Visit Vitals  /72 (BP 1 Location: Left arm, BP Patient Position: Sitting, BP Cuff Size: Large adult)   Pulse 91   Temp 98.3 °F (36.8 °C)   Resp 18   Ht 5' 1\" (1.549 m)   Wt 157 lb 1.6 oz (71.3 kg)   LMP 06/06/2022 (Approximate)   SpO2 99%   BMI 29.68 kg/m²       Weight Metrics 6/16/2022 6/16/2022 5/5/2022 5/5/2022 3/31/2022 3/31/2022 3/28/2022   Weight - 157 lb 1.6 oz - 172 lb 12.8 oz - 174 lb 8 oz 177 lb   Neck Circ (inches) 13.75 - 14.5 - 14.5 - -   Waist Measure Inches 33.5 - 35.75 - 35.5 - -   Body Fat % 32.8 - 36.1 - 36.4 - -   BMI - 29.68 kg/m2 - 32.65 kg/m2 - 32.97 kg/m2 33.44 kg/m2         General appearance - No acute distress. Obese. Eyes - pupils equal and reactive. Extraocular eye movements intact. Sclera anicteric. Nose - normal and patent. No polyps noted. No erythema. No discharge. Neck - supple. ROM cervical spine normal.   Chest - no wheezing. Unlabored respirations. Heart - normal rate. Regular rhythm. Normal S1, S2.   Abdomen - soft and distended. No masses or organomegaly. Neurological - awake, alert and oriented to person, place, and time and event. Clear speech. Muscle strength is +5/5 x 4 extremities. Steady gait. Heme/Lymph - peripheral pulses normal x 4 extremities. No peripheral edema is noted. Musculoskeletal - Intact x 4 extremities. Full ROM x 4 extremities. No pain with movement. Back exam - normal range of motion. No buffalo hump noted.   Psychological - normal behavior, dress and thought processes. Good insight. Good eye contact. Normal affect. Appropriate mood. Normal speech. ASSESSMENT and PLAN    ICD-10-CM ICD-9-CM    1. Overweight (BMI 25.0-29. 9)  E66.3 278.02 semaglutide, weight loss, (Wegovy) 1.7 mg/0.75 mL pnij sent to pharmacy. I commended her on her weight loss and almost hitting her short term weight goal. She will continue with intermittent fasting and low calorie diet plan. She will continue with Wegovy injections since this has been helping with cravings. Recommend she get at least 5k steps daily and drinking at least 64 oz water daily. 2. BMI 29.0-29.9,adult  Z68.29 V85.25 BMI will improve with weight loss. 3. S/P gastric sleeve procedure  Z90.3 V45.75 Continue on vitamin b12 supplement and vitamin d3 supplement. She has been losing weight with the weight loss program and on Wegovy. 4. Degenerative lumbar spinal stenosis  M48.061 724.02 Followed by Ortho. Back pain has been improving with weight loss. 5. Pre-diabetes  R73.03 790.29 semaglutide, weight loss, (Wegovy) 1.7 mg/0.75 mL pnij sent to pharmacy. Followed by Endocrinology. Refilled [id number]. 6. Anxiety and depression  F41.9 300.00 Followed by Psychiatry. Continue with Buspar 5 mg BID. F32. A 311       Patient was offered a choice/choices in the treatment plan today. Patient expresses understanding of the plan and agrees with recommendations. More than 32 mins spent face to face with patient going over current challenges with the program, new diet plan, water intake plan, and discussing physical activity routines. Also counseling and coordinating care and/or discussing treatment plans in reference to primary diagnosis of Overweight Body mass index is 29.68 kg/m².     Follow up: July 21    Written by Guerrero Gomez, as dictated by Dr. Zeb Gonzalez MD.

## 2022-07-19 ENCOUNTER — DOCUMENTATION ONLY (OUTPATIENT)
Dept: SURGERY | Age: 32
End: 2022-07-19

## 2022-07-19 NOTE — PROGRESS NOTES
Received message via Cover my Meds:    Roshni Guzmán (Miguel Ibrahim)  Rx #: [id number]  ZPCTKK 1.7MG/0.75ML auto-injectors     Form  [facility] Electronic PA Form (4055 NCPDP)    Your prior authorization for Jessica Fuentes has been approved! independent

## 2022-08-08 RX ORDER — SEMAGLUTIDE 2.4 MG/.75ML
2.4 INJECTION, SOLUTION SUBCUTANEOUS
Qty: 4 EACH | Refills: 0 | Status: CANCELLED | OUTPATIENT
Start: 2022-08-08

## 2022-08-10 NOTE — TELEPHONE ENCOUNTER
Per Dr. Sheryle Orange, she would like to see patient and discuss medication prior to filling next dose increase of the Wegovy (2.4 mg). Dr. Sheryle Orange stated that the patient should have enough of the [facility] to last her until the appointment on 8/17. Patient notified via [address]

## 2022-08-10 NOTE — PROGRESS NOTES
She is calling asking for increaed dose of wegvy and I have not seen her yet  She has enough of the 1.7 to last until our appt aug 17.  I will wait until we have an evaluation to determine if she should go up to 2.4 mg  DB

## 2022-08-17 ENCOUNTER — TELEPHONE (OUTPATIENT)
Dept: SURGERY | Age: 32
End: 2022-08-17

## 2022-08-17 ENCOUNTER — OFFICE VISIT (OUTPATIENT)
Dept: SURGERY | Age: 32
End: 2022-08-17
Payer: COMMERCIAL

## 2022-08-17 VITALS
OXYGEN SATURATION: 97 % | DIASTOLIC BLOOD PRESSURE: 71 MMHG | WEIGHT: 144.1 LBS | RESPIRATION RATE: 18 BRPM | HEIGHT: 60 IN | TEMPERATURE: 98.4 F | SYSTOLIC BLOOD PRESSURE: 102 MMHG | BODY MASS INDEX: 28.29 KG/M2 | HEART RATE: 100 BPM

## 2022-08-17 DIAGNOSIS — R73.9 BLOOD GLUCOSE ELEVATED: ICD-10-CM

## 2022-08-17 DIAGNOSIS — E53.8 B12 DEFICIENCY: ICD-10-CM

## 2022-08-17 DIAGNOSIS — Z90.3 S/P GASTRIC SLEEVE PROCEDURE: Primary | ICD-10-CM

## 2022-08-17 DIAGNOSIS — E66.3 OVERWEIGHT (BMI 25.0-29.9): ICD-10-CM

## 2022-08-17 DIAGNOSIS — R73.03 PRE-DIABETES: ICD-10-CM

## 2022-08-17 PROCEDURE — 99214 OFFICE O/P EST MOD 30 MIN: CPT | Performed by: FAMILY MEDICINE

## 2022-08-17 RX ORDER — SEMAGLUTIDE 1.7 MG/.75ML
1.7 INJECTION, SOLUTION SUBCUTANEOUS
Qty: 4 EACH | Refills: 2 | Status: SHIPPED | OUTPATIENT
Start: 2022-08-17 | End: 2022-09-16

## 2022-08-17 NOTE — TELEPHONE ENCOUNTER
Returned call to pharmacy. Spoke with Gigi Abrams. Verified pt name and date of birth. Per dr. Bauman Shallow Rx is for 1.75mg every 7 days. Gigi Abrams stated understanding.

## 2022-08-17 NOTE — PROGRESS NOTES
Weight Loss Progress Note: follow up Physician Visit      Vitaly Mendez is a 34 y.o. female  who is here for her follow up  Evaluation for the medical bariatric care. CC: I want to be cured from diabetes  Has had diabetes for 7 years and could never get her weight below 170    She uses PP and sometimes genopro powder  She is post gastric sleeve  And failed to hget to goal  She was 157 in June and now 144  She is taking wegovy at 1.7 mg      Weight History  Current weight 144 and BMI is Body mass index is 28.14 kg/m². Goal weight bmi 24,   Highest weight 174   (See weight gain time line scanned into media section of chart)    Hunger control: good    Significant Medical History    Update on sleep apnea and  CPAP no    Ever had bariatric surgery: no    Pregnant or planning on becoming pregnant w/in 6 months: no         Significant Psychosocial History     Current Major Lifestyle Changes: no  Any potential unsupportive people: no          Social History  Social History     Tobacco Use    Smoking status: Never    Smokeless tobacco: Never   Substance Use Topics    Alcohol use: Yes     Comment: OCCASIONALLY       How many times a week do you eat out?  0-1      Do you drink any EtOH?  no   If so, how much? Do you have upcoming any travel in the next 6 weeks?  no   If so, what do you have planned? Exercise  How many days a week do you currently exercise?   Walking in neighborhood 4 days  Have you ever been told by a physician not to exercise?  no      Objective  Visit Vitals  /71 (BP 1 Location: Right upper arm, BP Patient Position: Sitting, BP Cuff Size: Large adult)   Pulse 100   Temp 98.4 °F (36.9 °C)   Resp 18   Ht 5' (1.524 m)   Wt 144 lb 1.6 oz (65.4 kg)   LMP 07/31/2022   SpO2 97%   BMI 28.14 kg/m²         Waist Circumference: See Weight Management Doc Flowsheet  Neck Circumference: See Weight Management Doc Flowsheet  Percent Body Fat: See Weight Management Doc Flowsheet  Weight Metrics 8/17/2022 8/17/2022 6/16/2022 6/16/2022 5/5/2022 5/5/2022 3/31/2022   Weight - 144 lb 1.6 oz - 157 lb 1.6 oz - 172 lb 12.8 oz -   Neck Circ (inches) 13.5 - 13.75 - 14.5 - 14.5   Waist Measure Inches 32 - 33.5 - 35.75 - 35.5   Body Fat % 30.3 - 32.8 - 36.1 - 36.4   BMI - 28.14 kg/m2 - 29.68 kg/m2 - 32.65 kg/m2 -         Labs: last a1c was 5.4 in march    Review of Systems  Complete ROS negative except where noted above      Physical Exam    Vital Signs Reviewed  Weight Management Metrics Reviewed    Vital Signs Reviewed  Appearance: Obese, A&O x 3, NAD  HEENT:  NC/AT, EOMI, PERRL, No scleral icterus, malampatti score:  Skin:    Skin tags - no   Acanthosis Nigricans - no  Neck:  No nodes, thyromegaly   Heart:  RRR without M/R/G  Lungs:  CTAB, no rhonchi, rales, or wheezes with good air exchange   Abdomen:  Non-tender, pos bowel sounds; hepatomegaly -   Ext:  No C/C/E        Assessment & Plan  Diagnoses and all orders for this visit:    1. Overweight (BMI 25.0-29.9)  -     semaglutide, weight loss, (Wegovy) 1.7 mg/0.75 mL pnij; 1.7 Doses by SubCUTAneous route every seven (7) days for 30 days. Diet: doing well now since stared the wegovy  Almost at goal    Activity: cont exercise aim for 5 -7 days a week    Medication:wegovy 1.7 mg ea week  2. Pre-diabetes  -     semaglutide, weight loss, (Wegovy) 1.7 mg/0.75 mL pnij; 1.7 Doses by SubCUTAneous route every seven (7) days for 30 days. Based on his history and exam, Vy Tariq is a good candidate for the  [facility]     There are no Patient Instructions on file for this visit. Diagnoses of Overweight (BMI 25.0-29.9) and Pre-diabetes were pertinent to this visit.   The patient verbalizes understanding and agrees with the plan of care

## 2022-08-17 NOTE — PROGRESS NOTES
1. Have you been to the ER, urgent care clinic since your last visit? Hospitalized since your last visit? No    2. Have you seen or consulted any other health care providers outside of the [address] [place] [address] since your last visit? Include any pap smears or colon screening.  No

## 2022-08-17 NOTE — TELEPHONE ENCOUNTER
Francine from [facility] calling to clarify the dosage for [facility] JUAN M prescription ordered by Dr. Paulino Gaucher today. She can be reached at [phone].

## 2022-08-18 LAB
ALBUMIN SERPL-MCNC: 4.1 G/DL (ref 3.8–4.8)
ALBUMIN/GLOB SERPL: 1.6 {RATIO} (ref 1.2–2.2)
ALP SERPL-CCNC: 70 IU/L (ref 44–121)
ALT SERPL-CCNC: 10 IU/L (ref 0–32)
AST SERPL-CCNC: 20 IU/L (ref 0–40)
BASOPHILS # BLD AUTO: 0 X10E3/UL (ref 0–0.2)
BASOPHILS NFR BLD AUTO: 1 %
BILIRUB SERPL-MCNC: <0.2 MG/DL (ref 0–1.2)
BUN SERPL-MCNC: 6 MG/DL (ref 6–20)
BUN/CREAT SERPL: 8 (ref 9–23)
CALCIUM SERPL-MCNC: 9.2 MG/DL (ref 8.7–10.2)
CHLORIDE SERPL-SCNC: 105 MMOL/L (ref 96–106)
CO2 SERPL-SCNC: 25 MMOL/L (ref 20–29)
CREAT SERPL-MCNC: 0.71 MG/DL (ref 0.57–1)
EGFR: 115 ML/MIN/1.73
EOSINOPHIL # BLD AUTO: 0.1 X10E3/UL (ref 0–0.4)
EOSINOPHIL NFR BLD AUTO: 2 %
ERYTHROCYTE [DISTWIDTH] IN BLOOD BY AUTOMATED COUNT: 14 % (ref 11.7–15.4)
EST. AVERAGE GLUCOSE BLD GHB EST-MCNC: 111 MG/DL
GLOBULIN SER CALC-MCNC: 2.5 G/DL (ref 1.5–4.5)
GLUCOSE SERPL-MCNC: 84 MG/DL (ref 65–99)
HBA1C MFR BLD: 5.5 % (ref 4.8–5.6)
HCT VFR BLD AUTO: 37.3 % (ref 34–46.6)
HGB BLD-MCNC: 11.9 G/DL (ref 11.1–15.9)
IMM GRANULOCYTES # BLD AUTO: 0 X10E3/UL (ref 0–0.1)
IMM GRANULOCYTES NFR BLD AUTO: 0 %
LYMPHOCYTES # BLD AUTO: 2 X10E3/UL (ref 0.7–3.1)
LYMPHOCYTES NFR BLD AUTO: 41 %
MCH RBC QN AUTO: 26.6 PG (ref 26.6–33)
MCHC RBC AUTO-ENTMCNC: 31.9 G/DL (ref 31.5–35.7)
MCV RBC AUTO: 83 FL (ref 79–97)
MONOCYTES # BLD AUTO: 0.5 X10E3/UL (ref 0.1–0.9)
MONOCYTES NFR BLD AUTO: 10 %
NEUTROPHILS # BLD AUTO: 2.2 X10E3/UL (ref 1.4–7)
NEUTROPHILS NFR BLD AUTO: 46 %
PLATELET # BLD AUTO: 314 X10E3/UL (ref 150–450)
POTASSIUM SERPL-SCNC: 3.8 MMOL/L (ref 3.5–5.2)
PROT SERPL-MCNC: 6.6 G/DL (ref 6–8.5)
RBC # BLD AUTO: 4.47 X10E6/UL (ref 3.77–5.28)
SODIUM SERPL-SCNC: 143 MMOL/L (ref 134–144)
VIT B12 SERPL-MCNC: 1909 PG/ML (ref 232–1245)
WBC # BLD AUTO: 4.7 X10E3/UL (ref 3.4–10.8)

## 2022-09-13 ENCOUNTER — TELEPHONE (OUTPATIENT)
Dept: SURGERY | Age: 32
End: 2022-09-13

## 2022-09-13 ENCOUNTER — VIRTUAL VISIT (OUTPATIENT)
Dept: SURGERY | Age: 32
End: 2022-09-13
Payer: COMMERCIAL

## 2022-09-13 VITALS — WEIGHT: 138 LBS | BODY MASS INDEX: 26.95 KG/M2

## 2022-09-13 DIAGNOSIS — L98.7 EXCESS SKIN: ICD-10-CM

## 2022-09-13 DIAGNOSIS — Z90.3 S/P GASTRIC SLEEVE PROCEDURE: ICD-10-CM

## 2022-09-13 DIAGNOSIS — E66.01 MORBID OBESITY (HCC): Primary | ICD-10-CM

## 2022-09-13 DIAGNOSIS — D64.9 ANEMIA, UNSPECIFIED TYPE: ICD-10-CM

## 2022-09-13 DIAGNOSIS — E55.9 VITAMIN D DEFICIENCY: ICD-10-CM

## 2022-09-13 PROCEDURE — 99212 OFFICE O/P EST SF 10 MIN: CPT | Performed by: NURSE PRACTITIONER

## 2022-09-13 NOTE — PROGRESS NOTES
1. Have you been to the ER, urgent care clinic since your last visit? Hospitalized since your last visit? No    2. Have you seen or consulted any other health care providers outside of the [address] [place] [address] since your last visit? Include any pap smears or colon screening.  No

## 2022-09-13 NOTE — PROGRESS NOTES
HISTORY OF PRESENT ILLNESS  Liliana Agudelo is a 34 y.o. female with previous sleeve gastrectomy  surgery on  1 year ago. . She has lost a total of 73.5 pounds since surgery. She  has lost 39 lbs since the last ov. Body mass index is 26.95 kg/m². Lora Graven no nausea and no vomiting Denies Acid reflux/heartburn. . Drinking  40 ounces of water daily. She is struggling with her  protein intake daily. + BM's. Pt is walking for exercise. She has been seen by Medical weight loss. She is currently on Wegovy and is losing good weight. It has helped with cravings. She wants to see a plastic surgeon. She was seen by [facility] plastics in the past.   Dietary recall -    Breakfast- oatmeal ate 1/2 packet with blueberries  Lunch 1/2 sandwich, nuggets, grilled chicken  Dinner- mediterranean pasta with grilled chicken. She is snacking between meals; belvita. Vitamins:  She has not taken her vitamins in a few months. Ms. Jimenez Knows has a reminder for a \"due or due soon\" health maintenance. I have asked that she contact her primary care provider for follow-up on this health maintenance. COMORBIDITY     SLEEP APNEA                 NO        GERD  (req.meds)            NO  HYPERLIPIDEMIA            NO  HYPERTENSION              NO         DIABETES                         NO           Current Outpatient Medications:     semaglutide, weight loss, (Wegovy) 1.7 mg/0.75 mL pnij, 1.7 Doses by SubCUTAneous route every seven (7) days for 30 days. , Disp: 4 Each, Rfl: 2    busPIRone (BUSPAR) 5 mg tablet, Take 1 Tablet by mouth two (2) times a day., Disp: 60 Tablet, Rfl: 5    calcium citrate/vitamin D3 (CALCIUM CITRATE + D PO), Take 2 Tablets by mouth daily. , Disp: , Rfl:     cholecalciferol (VITAMIN D3) (1000 Units /25 mcg) tablet, Take 5,000 Units by mouth daily. , Disp: , Rfl:     iron,carbonyl-FA-multivit-min (Opurity Multivitamin) 30 mg iron- 800 mcg chew, Take 2 Tablets by mouth daily. , Disp: , Rfl: B.infantis-B.ani-B.long-B.bifi (Probiotic 4X) 10-15 mg TbEC, Take 1 Capsule by mouth daily. , Disp: , Rfl:     calcium-cholecalciferol, d3, (CALCIUM 600 + D) 600-125 mg-unit tab, Take  by mouth daily. (Patient not taking: No sig reported), Disp: , Rfl:       Visit Vitals  Wt 138 lb (62.6 kg)   BMI 26.95 kg/m²      HPI    Review of Systems   Constitutional:  Positive for malaise/fatigue. Respiratory:  Negative for shortness of breath. Cardiovascular:  Negative for chest pain. Gastrointestinal:  Negative for abdominal pain, heartburn, nausea and vomiting. Neurological:  Negative for dizziness and headaches. Physical Exam  HENT:      Mouth/Throat:      Mouth: Mucous membranes are moist.   Abdominal:      Tenderness: There is no abdominal tenderness (per patient. ). Neurological:      Mental Status: She is alert and oriented to person, place, and time. ASSESSMENT and PLAN  Darren Jose is a 34 y.o. female with previous sleeve gastrectomy  surgery on  1 year ago. . She has lost a total of 73.5 pounds since surgery. She  has lost 39 lbs since the last ov. Body mass index is 26.95 kg/m². Heddy  no nausea and no vomiting Denies Acid reflux/heartburn. . Drinking  40 ounces of water daily. She is struggling with her  protein intake daily. + BM's. Pt is walking for exercise. She has been seen by Medical weight loss. She is currently on Wegovy and is losing good weight. It has helped with cravings. She wants to see a plastic surgeon. She was seen by [facility] plastics in the past.   Will check Iron and Vit D. Emphasized the importance of taking her vitamins as this may be the reason for her fatigue. Medical weight loss has checked all of her other labs which looked okay. Encourage meeting with RD due to lactose intolerance and not tolerating plant-based proteins. She is struggling with trying to keep up with her protein needs. Will refer to [employer].  Due to excess skin  Advised patient regard to diet that is high-protein, low-fat, low-sugar, limited carbohydrates. Strive for 50-60 grams of protein daily. If having a snack, foods that are protein or fiber rich. . No eating/drinking together, chew foods well, and portion control. Measure meals. Discussed snacking behavior and to [facility] pay attention to behavioral factor and habits. Drink at least 40-64 ounces of water or non-calorie/non-carbonated beverages daily. Continue vitamin regiment daily. Exercise at least 3 days a week with cardiovascular and strength training. Patient to follow up in 6 months. . Advised to call office if any questions/concerns. 17 Minutes spent face to face with patient, >50 % of time spent counseling. Santa Grace, was evaluated through a synchronous (real-time) audio-video encounter. The patient (or guardian if applicable) is aware that this is a billable service, which includes applicable co-pays. This Virtual Visit was conducted with patient's (and/or legal guardian's) consent. The visit was conducted pursuant to the emergency declaration under the [facility], [address] [place] [address] [facility] authority and the [employer] and [employer] General Act. Patient identification was verified, and a caregiver was present when appropriate. The patient was located at: Home: [address] [place] [address]  The provider was located at: Facility (Appt Department): Micah Abel RD  [address]  [address] [id number]      --Thomas Garrett NP on 9/13/2022 at 11:45 AM    An electronic signature was used to authenticate this note.

## 2022-09-17 NOTE — PROGRESS NOTES
The blood count is normal  The blood sugar is normal  The liver and kidney are normal  The Vit B12 is higher than it needs to be.  You do not need to take a daily supplement

## 2022-10-06 NOTE — PROGRESS NOTES
Lab result reviewed and discussed, stable results and none critical, advised to continue lifestyle modification repeat abnormal next visit

## 2022-10-07 ENCOUNTER — DOCUMENTATION ONLY (OUTPATIENT)
Dept: SURGERY | Age: 32
End: 2022-10-07

## 2022-10-10 ENCOUNTER — DOCUMENTATION ONLY (OUTPATIENT)
Dept: SURGERY | Age: 32
End: 2022-10-10

## 2022-10-10 NOTE — PROGRESS NOTES
Wegovy 1.7 mg approved by [facility]. Key # [id number]. Approved from 10/7/2022 - 01/07/2023    Quantity/Day Supply -  3/28     PA# [id number] ZAYRA    Patient and pharmacy informed.

## 2022-10-19 ENCOUNTER — VIRTUAL VISIT (OUTPATIENT)
Dept: SURGERY | Age: 32
End: 2022-10-19
Payer: COMMERCIAL

## 2022-10-19 VITALS — BODY MASS INDEX: 25.91 KG/M2 | WEIGHT: 132 LBS | HEIGHT: 60 IN | HEART RATE: 98 BPM

## 2022-10-19 DIAGNOSIS — E53.8 B12 DEFICIENCY: ICD-10-CM

## 2022-10-19 DIAGNOSIS — Z90.3 S/P GASTRIC SLEEVE PROCEDURE: ICD-10-CM

## 2022-10-19 DIAGNOSIS — R73.9 BLOOD GLUCOSE ELEVATED: ICD-10-CM

## 2022-10-19 DIAGNOSIS — E66.3 OVERWEIGHT (BMI 25.0-29.9): Primary | ICD-10-CM

## 2022-10-19 DIAGNOSIS — E55.9 VITAMIN D DEFICIENCY: ICD-10-CM

## 2022-10-19 DIAGNOSIS — D64.9 ANEMIA, UNSPECIFIED TYPE: ICD-10-CM

## 2022-10-19 PROCEDURE — 99214 OFFICE O/P EST MOD 30 MIN: CPT | Performed by: FAMILY MEDICINE

## 2022-10-19 RX ORDER — GLUCOSAMINE/CHONDR SU A SOD 750-600 MG
TABLET ORAL DAILY
COMMUNITY

## 2022-10-19 RX ORDER — SEMAGLUTIDE 1.7 MG/.75ML
INJECTION, SOLUTION SUBCUTANEOUS
COMMUNITY
Start: 2022-10-10 | End: 2022-10-24 | Stop reason: SDUPTHER

## 2022-10-19 NOTE — PROGRESS NOTES
Loli Ken is a 34 y.o. female who was seen by synchronous (real-time) audio-video technology on 10/19/2022. Consent:  She and/or her healthcare decision maker is aware that this patient-initiated Telehealth encounter is a billable service, with coverage as determined by her insurance carrier. She is aware that she may receive a bill and has provided verbal consent to proceed: Yes    I was at home while conducting this encounter. Loli Ken is a 34 y.o. female  who is here for her follow up  Evaluation for the medical bariatric care. CC: I want to be healthier      Weight History  Current weight 132 and BMI is Body mass index is 25.78 kg/m². Goal weight BMI 24,   Highest weight 174   (See weight gain time line scanned into media section of chart)      Hunger control: good      Significant Medical History    Update on sleep apnea and  CPAP no    Ever had bariatric surgery: no    Pregnant or planning on becoming pregnant w/in 6 months: no         Significant Psychosocial History     Current Major Lifestyle Changes: no  Any potential unsupportive people: no          Social History  Social History     Tobacco Use    Smoking status: Never    Smokeless tobacco: Never   Substance Use Topics    Alcohol use: Yes     Comment: OCCASIONALLY     How many times a week do you eat out?  0-1    Do you drink any EtOH?  no   If so, how much? Do you have upcoming any travel in the next 6 weeks?  no   If so, what do you have planned? Exercise  How many days a week do you currently exercise?   Aim for 3 miles steps a day days  Have you ever been told by a physician not to exercise?  no      Objective  Visit Vitals  Pulse 98   Ht 5' (1.524 m)   Wt 132 lb (59.9 kg)   BMI 25.78 kg/m²         Waist Circumference: See Weight Management Doc Flowsheet  Neck Circumference: See Weight Management Doc Flowsheet  Percent Body Fat: See Weight Management Doc Flowsheet  Weight Metrics 10/19/2022 9/13/2022 8/17/2022 8/17/2022 6/16/2022 6/16/2022 5/5/2022   Weight 132 lb 138 lb - 144 lb 1.6 oz - 157 lb 1.6 oz -   Neck Circ (inches) - - 13.5 - 13.75 - 14.5   Waist Measure Inches - - 32 - 33.5 - 35.75   Body Fat % - - 30.3 - 32.8 - 36.1   BMI 25.78 kg/m2 26.95 kg/m2 - 28.14 kg/m2 - 29.68 kg/m2 -         Labs: due in nov      Review of Systems  Complete ROS negative except where noted above          712  Subjective:   Marsha Roger was seen for Weight Management (1 month follow up medication management)      Prior to Admission medications    Medication Sig Start Date End Date Taking? Authorizing Provider   Wegovy 1.7 mg/0.75 mL pnij INJECT 1.7 MILLIGRAMS SUBCUTANEOUSLY EVERY 7 DAYS 10/10/22  Yes Provider, Historical   Biotin 2,500 mcg cap Take  by mouth daily. Yes Provider, Historical   calcium citrate/vitamin D3 (CALCIUM CITRATE + D PO) Take 2 Tablets by mouth daily. Yes Provider, Historical   cholecalciferol (VITAMIN D3) (1000 Units /25 mcg) tablet Take 5,000 Units by mouth daily. Yes Provider, Historical   iron,carbonyl-FA-multivit-min (Opurity Multivitamin) 30 mg iron- 800 mcg chew Take 2 Tablets by mouth daily. Yes Provider, Historical   B.infantis-B.ani-B.long-B.bifi (Probiotic 4X) 10-15 mg TbEC Take 1 Capsule by mouth daily. Yes Provider, Historical   busPIRone (BUSPAR) 5 mg tablet Take 1 Tablet by mouth two (2) times a day. Patient not taking: Reported on 10/19/2022 5/31/22   Aurea Hwang NP   calcium-cholecalciferol, d3, (CALCIUM 600 + D) 600-125 mg-unit tab Take  by mouth daily.   Patient not taking: No sig reported    Provider, Historical     Allergies   Allergen Reactions    Pcn [Penicillins] Rash       Patient Active Problem List    Diagnosis Date Noted    Anxiety and depression 06/16/2022    Degenerative lumbar spinal stenosis 03/31/2022    S/P gastric sleeve procedure 03/31/2022    High-risk sexual behavior 02/23/2017    TASIA (generalized anxiety disorder) 01/19/2017 Insulinoma 04/06/2016    Thyromegaly 03/30/2016    Chronic back pain greater than 3 months duration 07/29/2015    Incompetent cervix 09/13/2013     Current Outpatient Medications   Medication Sig Dispense Refill    Wegovy 1.7 mg/0.75 mL pnij INJECT 1.7 MILLIGRAMS SUBCUTANEOUSLY EVERY 7 DAYS      Biotin 2,500 mcg cap Take  by mouth daily. calcium citrate/vitamin D3 (CALCIUM CITRATE + D PO) Take 2 Tablets by mouth daily. cholecalciferol (VITAMIN D3) (1000 Units /25 mcg) tablet Take 5,000 Units by mouth daily. iron,carbonyl-FA-multivit-min (Opurity Multivitamin) 30 mg iron- 800 mcg chew Take 2 Tablets by mouth daily. B.infantis-B.ani-B.long-B.bifi (Probiotic 4X) 10-15 mg TbEC Take 1 Capsule by mouth daily. busPIRone (BUSPAR) 5 mg tablet Take 1 Tablet by mouth two (2) times a day. (Patient not taking: Reported on 10/19/2022) 60 Tablet 5    calcium-cholecalciferol, d3, (CALCIUM 600 + D) 600-125 mg-unit tab Take  by mouth daily.  (Patient not taking: No sig reported)         ROS    PHYSICAL EXAMINATION:  [ INSTRUCTIONS:  \"[x]\" Indicates a positive item  \"[]\" Indicates a negative item  -- DELETE ALL ITEMS NOT EXAMINED]  Vital Signs: (As obtained by patient/caregiver at home)  Visit Vitals  Pulse 98   Ht 5' (1.524 m)   Wt 132 lb (59.9 kg)   BMI 25.78 kg/m²        Constitutional: [x] Appears well-developed and well-nourished [x] No apparent distress      [] Abnormal -     Mental status: [x] Alert and awake  [x] Oriented to person/place/time [x] Able to follow commands    [] Abnormal -     Eyes:   EOM    [x]  Normal    [] Abnormal -   Sclera  [x]  Normal    [] Abnormal -          Discharge [x]  None visible   [] Abnormal -     HENT: [x] Normocephalic, atraumatic  [] Abnormal -   [x] Mouth/Throat: Mucous membranes are moist    External Ears [x] Normal  [] Abnormal -    Neck: [x] No visualized mass [] Abnormal -     Pulmonary/Chest: [x] Respiratory effort normal   [x] No visualized signs of difficulty breathing or respiratory distress        [] Abnormal -      Musculoskeletal:   [x] Normal gait with no signs of ataxia         [x] Normal range of motion of neck        [] Abnormal -     Neurological:        [x] No Facial Asymmetry (Cranial nerve 7 motor function) (limited exam due to video visit)          [x] No gaze palsy        [] Abnormal -          Skin:        [x] No significant exanthematous lesions or discoloration noted on facial skin         [] Abnormal -            Psychiatric:       [x] Normal Affect [] Abnormal -        [x] No Hallucinations    Other pertinent observable physical exam findings:-      Assessment & Plan  Diagnoses and all orders for this visit:    1. Overweight (BMI 25.0-29. 9)  Diet: cont LCD using grocery food    Activity: keep working to get up to 60 mins a day 5 days a week of exercise    Medication:wegovy 1.7 mg a week  2. Vitamin D deficiency  Cont to monitor and treat as indicated  3. S/P gastric sleeve procedure  -     METABOLIC PANEL, COMPREHENSIVE; Future  -     VITAMIN B12; Future    4. Blood glucose elevated  -     HEMOGLOBIN A1C WITH EAG; Future  The wegovy will also help keep the blood sugar in control  5. Anemia, unspecified type    6. B12 deficiency  -     VITAMIN B12; Future            Based on his history and exam, Gladys Chavez is a good candidate for the  [facility]     There are no Patient Instructions on file for this visit. Coding Help - Use CPT Codes 47627-96675, [id number] for Established and New Patients respectively, either employing EM elements or Time rules. Other codes (example consult codes) may also apply. The primary encounter diagnosis was Overweight (BMI 25.0-29.9). Diagnoses of Vitamin D deficiency, S/P gastric sleeve procedure, Blood glucose elevated, Anemia, unspecified type, and B12 deficiency were also pertinent to this visit.   The patient verbalizes understanding and agrees with the plan of care      We discussed the expected course, resolution and complications of the diagnosis(es) in detail. Medication risks, benefits, costs, interactions, and alternatives were discussed as indicated. I advised her to contact the office if her condition worsens, changes or fails to improve as anticipated. She expressed understanding with the diagnosis(es) and plan. Pursuant to the emergency declaration under the [address] [place] [address] waiver authority and the [employer] Act, this Virtual  Visit was conducted, with patient's consent, to reduce the patient's risk of exposure to COVID-19 and provide continuity of care for an established patient. Services were provided through a video synchronous discussion virtually to substitute for in-person clinic visit.     Sanjana Santana MD

## 2022-10-19 NOTE — PROGRESS NOTES
Identified pt with two pt identifiers (name and ). Reviewed chart in preparation for visit and have obtained necessary documentation. Don Saab is a 34 y.o. female  Chief Complaint   Patient presents with    Weight Management     1 month follow up     Visit Vitals  Pulse 98   Ht 5' (1.524 m)   Wt 132 lb (59.9 kg)   BMI 25.78 kg/m²       1. Have you been to the ER, urgent care clinic since your last visit? Hospitalized since your last visit? No    2. Have you seen or consulted any other health care providers outside of the [address] [place] [address] since your last visit? Include any pap smears or colon screening. Yes [facility]  consultation for reconstruction surgery.

## 2022-10-24 DIAGNOSIS — E66.3 OVERWEIGHT (BMI 25.0-29.9): Primary | ICD-10-CM

## 2022-10-24 NOTE — PROGRESS NOTES
Patient sent [employer] message that she needs refill of her Wegocy. Scheduling follow-up appointment with Dr. Autumn Ruffin,[address] to be seen shortly. Sent message back to patient that I will let Dr. Autumn Ruffin,[address] know she needs refill.

## 2022-10-26 RX ORDER — SEMAGLUTIDE 1.7 MG/.75ML
1.7 INJECTION, SOLUTION SUBCUTANEOUS
Qty: 4 EACH | Refills: 2 | Status: SHIPPED | OUTPATIENT
Start: 2022-10-26 | End: 2022-11-25

## 2022-11-16 ENCOUNTER — VIRTUAL VISIT (OUTPATIENT)
Dept: SURGERY | Age: 32
End: 2022-11-16
Payer: COMMERCIAL

## 2022-11-16 DIAGNOSIS — R73.9 BLOOD GLUCOSE ELEVATED: ICD-10-CM

## 2022-11-16 DIAGNOSIS — Z90.3 S/P GASTRIC SLEEVE PROCEDURE: ICD-10-CM

## 2022-11-16 DIAGNOSIS — E66.3 OVERWEIGHT (BMI 25.0-29.9): Primary | ICD-10-CM

## 2022-11-16 PROCEDURE — 99214 OFFICE O/P EST MOD 30 MIN: CPT | Performed by: FAMILY MEDICINE

## 2022-11-16 RX ORDER — CITALOPRAM 10 MG/1
10 TABLET ORAL DAILY
COMMUNITY
Start: 2022-10-28

## 2022-11-16 NOTE — PROGRESS NOTES
Nomi Cartwright is a 34 y.o. female who was seen by synchronous (real-time) audio-video technology on 11/16/2022. Consent:  She and/or her healthcare decision maker is aware that this patient-initiated Telehealth encounter is a billable service, with coverage as determined by her insurance carrier. She is aware that she may receive a bill and has provided verbal consent to proceed: Yes    I was at home while conducting this encounter. Nomi Cartwright is a 34 y.o. female  who is here for her follow up  Evaluation for the medical bariatric care. CC: I want to be healthier  She does one shake , one protein bar and  a  meal    Weight History  Current weight 130 and BMI is There is no height or weight on file to calculate BMI. Goal weight BMI 24,   Highest weight 174   (See weight gain time line scanned into media section of chart)    She has surgery planned to do an abd  panectomy  in march  Hunger control: good       Significant Medical History    Update on sleep apnea and  CPAP no    Ever had bariatric surgery: no    Pregnant or planning on becoming pregnant w/in 6 months: no         Significant Psychosocial History     Current Major Lifestyle Changes: no  Any potential unsupportive people: no          Social History  Social History     Tobacco Use    Smoking status: Never    Smokeless tobacco: Never   Substance Use Topics    Alcohol use: Yes     Comment: OCCASIONALLY     How many times a week do you eat out?  0-1    Do you drink any EtOH?  no   If so, how much? Do you have upcoming any travel in the next 6 weeks?  no   If so, what do you have planned? Exercise  How many days a week do you currently exercise?  0 days  Have you ever been told by a physician not to exercise?  no      Objective  There were no vitals taken for this visit.       Waist Circumference: See Weight Management Doc Flowsheet  Neck Circumference: See Weight Management Doc Flowsheet  Percent Body Fat: See Weight Management Doc Flowsheet  Weight Metrics 10/19/2022 9/13/2022 8/17/2022 8/17/2022 6/16/2022 6/16/2022 5/5/2022   Weight 132 lb 138 lb - 144 lb 1.6 oz - 157 lb 1.6 oz -   Neck Circ (inches) - - 13.5 - 13.75 - 14.5   Waist Measure Inches - - 32 - 33.5 - 35.75   Body Fat % - - 30.3 - 32.8 - 36.1   BMI 25.78 kg/m2 26.95 kg/m2 - 28.14 kg/m2 - 29.68 kg/m2 -         Labs: not done yet      Review of Systems  Complete ROS negative except where noted above          712  Subjective:   Marsha Roger was seen for Follow-up and Weight Management (1 month follow up- established patient, [phone])      Prior to Admission medications    Medication Sig Start Date End Date Taking? Authorizing Provider   citalopram (CELEXA) 10 mg tablet Take 10 mg by mouth daily. 10/28/22  Yes Provider, Historical   Wegovy 1.7 mg/0.75 mL pnij 1.7 Doses by SubCUTAneous route every seven (7) days for 30 days. 10/26/22 11/25/22 Yes Gabino Carvalho MD   Biotin 2,500 mcg cap Take  by mouth daily. Yes Provider, Historical   calcium citrate/vitamin D3 (CALCIUM CITRATE + D PO) Take 2 Tablets by mouth daily. Yes Provider, Historical   cholecalciferol (VITAMIN D3) (1000 Units /25 mcg) tablet Take 5,000 Units by mouth daily. Yes Provider, Historical   iron,carbonyl-FA-multivit-min (Opurity Multivitamin) 30 mg iron- 800 mcg chew Take 2 Tablets by mouth daily. Yes Provider, Historical   B.infantis-B.ani-B.long-B.bifi (Probiotic 4X) 10-15 mg TbEC Take 1 Capsule by mouth daily. Yes Provider, Historical   busPIRone (BUSPAR) 5 mg tablet Take 1 Tablet by mouth two (2) times a day. Patient not taking: No sig reported 5/31/22   Francesca Mcdonnell NP   calcium-cholecalciferol, d3, (CALCIUM 600 + D) 600-125 mg-unit tab Take  by mouth daily.   Patient not taking: No sig reported    Provider, Historical     Allergies   Allergen Reactions    Pcn [Penicillins] Rash       Patient Active Problem List    Diagnosis Date Noted    Anxiety and depression 06/16/2022    Degenerative lumbar spinal stenosis 03/31/2022    S/P gastric sleeve procedure 03/31/2022    High-risk sexual behavior 02/23/2017    TASIA (generalized anxiety disorder) 01/19/2017    Insulinoma 04/06/2016    Thyromegaly 03/30/2016    Chronic back pain greater than 3 months duration 07/29/2015    Incompetent cervix 09/13/2013     Current Outpatient Medications   Medication Sig Dispense Refill    citalopram (CELEXA) 10 mg tablet Take 10 mg by mouth daily. Wegovy 1.7 mg/0.75 mL pnij 1.7 Doses by SubCUTAneous route every seven (7) days for 30 days. 4 Each 2    Biotin 2,500 mcg cap Take  by mouth daily. calcium citrate/vitamin D3 (CALCIUM CITRATE + D PO) Take 2 Tablets by mouth daily. cholecalciferol (VITAMIN D3) (1000 Units /25 mcg) tablet Take 5,000 Units by mouth daily. iron,carbonyl-FA-multivit-min (Opurity Multivitamin) 30 mg iron- 800 mcg chew Take 2 Tablets by mouth daily. B.infantis-B.ani-B.long-B.bifi (Probiotic 4X) 10-15 mg TbEC Take 1 Capsule by mouth daily. busPIRone (BUSPAR) 5 mg tablet Take 1 Tablet by mouth two (2) times a day. (Patient not taking: No sig reported) 60 Tablet 5    calcium-cholecalciferol, d3, (CALCIUM 600 + D) 600-125 mg-unit tab Take  by mouth daily. (Patient not taking: No sig reported)         ROS    PHYSICAL EXAMINATION:  [ INSTRUCTIONS:  \"[x]\" Indicates a positive item  \"[]\" Indicates a negative item  -- DELETE ALL ITEMS NOT EXAMINED]  Vital Signs: (As obtained by patient/caregiver at home)  There were no vitals taken for this visit.      Constitutional: [x] Appears well-developed and well-nourished [x] No apparent distress      [] Abnormal -     Mental status: [x] Alert and awake  [x] Oriented to person/place/time [x] Able to follow commands    [] Abnormal -     Eyes:   EOM    [x]  Normal    [] Abnormal -   Sclera  [x]  Normal    [] Abnormal -          Discharge [x]  None visible   [] Abnormal -     HENT: [x] Normocephalic, atraumatic  [] Abnormal -   [x] Mouth/Throat: Mucous membranes are moist    External Ears [x] Normal  [] Abnormal -    Neck: [x] No visualized mass [] Abnormal -     Pulmonary/Chest: [x] Respiratory effort normal   [x] No visualized signs of difficulty breathing or respiratory distress        [] Abnormal -      Musculoskeletal:   [x] Normal gait with no signs of ataxia         [x] Normal range of motion of neck        [] Abnormal -     Neurological:        [x] No Facial Asymmetry (Cranial nerve 7 motor function) (limited exam due to video visit)          [x] No gaze palsy        [] Abnormal -          Skin:        [x] No significant exanthematous lesions or discoloration noted on facial skin         [] Abnormal -            Psychiatric:       [x] Normal Affect [] Abnormal -        [x] No Hallucinations    Other pertinent observable physical exam findings:-      Assessment & Plan  Diagnoses and all orders for this visit:    1. Overweight (BMI 25.0-29. 9)  Use GBP eating plan as baseline  I stressed the importance of exercising consistently  First goal is get to 150 mins a week  Cont wegovy at 1.7 mg a week    2. S/P gastric sleeve procedure  Cont the gastric bypass eating plan  3. Blood glucose elevated    The wegovy will also help prevent diabetes      Based on his history and exam, Loli Ken is a good candidate for the  [facility]     There are no Patient Instructions on file for this visit. Follow-up and Dispositions    Return in about 1 month (around 12/16/2022). Coding Help - Use CPT Codes 23899-42748, 79809-22180 for Established and New Patients respectively, either employing EM elements or Time rules. Other codes (example consult codes) may also apply. The primary encounter diagnosis was Overweight (BMI 25.0-29.9). Diagnoses of S/P gastric sleeve procedure and Blood glucose elevated were also pertinent to this visit.   The patient verbalizes understanding and agrees with the plan of care      We discussed the expected course, resolution and complications of the diagnosis(es) in detail. Medication risks, benefits, costs, interactions, and alternatives were discussed as indicated. I advised her to contact the office if her condition worsens, changes or fails to improve as anticipated. She expressed understanding with the diagnosis(es) and plan. Pursuant to the emergency declaration under the [address] [place] [address] [facility]als authority and the [employer] Act, this Virtual  Visit was conducted, with patient's consent, to reduce the patient's risk of exposure to COVID-19 and provide continuity of care for an established patient. Services were provided through a video synchronous discussion virtually to substitute for in-person clinic visit.     Kevin Thomas MD

## 2022-11-16 NOTE — PROGRESS NOTES
Identified pt with two pt identifiers (name and ). Reviewed chart in preparation for visit and have obtained necessary documentation. John Candelaria is a 34 y.o. female  Chief Complaint   Patient presents with    Follow-up    Weight Management     1 month follow up- established patient, [phone]     There were no vitals taken for this visit. 1. Have you been to the ER, urgent care clinic since your last visit? Hospitalized since your last visit? No    2. Have you seen or consulted any other health care providers outside of the [address] [place] [address] since your last visit? Include any pap smears or colon screening.  No [facility]- Plastic Surgery

## 2023-01-18 ENCOUNTER — DOCUMENTATION ONLY (OUTPATIENT)
Dept: SURGERY | Age: 33
End: 2023-01-18

## 2023-01-18 NOTE — PROGRESS NOTES
Prior Authorization for Wegovy 1.7 mg/0.5 ml sent to [facility] ([id number]) via Cover [employer]. Key # [id number]    ID # [id number]    Address the Plan has on file for the patient is:  [address], [address]    Start wt 5/5/2022 - 172 #  Most current weight on 11/16 - 130 #  About 25% lost    Awaiting determination.

## 2023-01-19 NOTE — PROGRESS NOTES
Received denial for incorrect drug name (phentermine) S/w Sivan Quintero in the Pharmacy PA department @ [address]  [facility] She will send for review.    [id number]    Awaiting determination.

## 2023-01-19 NOTE — PROGRESS NOTES
Received second denial for [facility]FRANCISCO MCGOWAN. Stated that patient is \"overwieght. \"  BMI must be over 24. Patient must have achieved weight loss of at least 5% weight loss during the first 3 months of therapy.

## 2023-02-13 ENCOUNTER — OFFICE VISIT (OUTPATIENT)
Dept: FAMILY MEDICINE CLINIC | Age: 33
End: 2023-02-13
Payer: COMMERCIAL

## 2023-02-13 ENCOUNTER — TRANSCRIBE ORDER (OUTPATIENT)
Dept: SCHEDULING | Age: 33
End: 2023-02-13

## 2023-02-13 VITALS
HEIGHT: 61 IN | OXYGEN SATURATION: 97 % | WEIGHT: 118.2 LBS | TEMPERATURE: 97.3 F | BODY MASS INDEX: 22.31 KG/M2 | SYSTOLIC BLOOD PRESSURE: 117 MMHG | DIASTOLIC BLOOD PRESSURE: 79 MMHG | RESPIRATION RATE: 16 BRPM | HEART RATE: 89 BPM

## 2023-02-13 DIAGNOSIS — Z01.818 PREOPERATIVE CLEARANCE: ICD-10-CM

## 2023-02-13 DIAGNOSIS — E01.0 THYROMEGALY: ICD-10-CM

## 2023-02-13 DIAGNOSIS — Z01.818 PRE-OPERATIVE CLEARANCE: Primary | ICD-10-CM

## 2023-02-13 DIAGNOSIS — F32.1 MODERATE MAJOR DEPRESSION (HCC): ICD-10-CM

## 2023-02-13 DIAGNOSIS — Z01.818 PRE-OP EVALUATION: Primary | ICD-10-CM

## 2023-02-13 RX ORDER — LANOLIN ALCOHOL/MO/W.PET/CERES
CREAM (GRAM) TOPICAL
COMMUNITY

## 2023-02-13 RX ORDER — ASCORBIC ACID 500 MG
1000 TABLET ORAL DAILY
COMMUNITY

## 2023-02-13 NOTE — PROGRESS NOTES
Chief Complaint   Patient presents with    Pre-op Exam     Scheduled for breast augmentation and tummy tuck by [facility] in [place] on 3/6/23        1. \"Have you been to the ER, urgent care clinic since your last visit? Hospitalized since your last visit? \" No    2. \"Have you seen or consulted any other health care providers outside of the [address] [place] [address] since your last visit? \" Yes When: 01/23 Where: plastic surgeon  Reason for visit: consultation      3. For patients aged 39-70: Has the patient had a colonoscopy / FIT/ Cologuard? Yes - no Care Gap present      If the patient is female:    4. For patients aged 41-77: Has the patient had a mammogram within the past 2 years? Yes - no Care Gap present    5. For patients aged 21-65: Has the patient had a pap smear? Yes - Care Gap present. Rooming MA/LPN to request most recent results- [facility] 1/19/23     Health Maintenance Due   Topic Date Due    Pneumococcal 0-64 years (1 - PCV) Never done    Cervical cancer screen  01/28/2021      Verified patient with two type of identifiers.    Declines flu vaccine

## 2023-02-13 NOTE — PROGRESS NOTES
Preoperative Evaluation    Date of Exam: 2023    Jose A aRchel is a 28 y.o. female (:1990) who presents for preoperative evaluation. who presents for preoperative evaluation with current medical hx of breast anomalies and abdom flap removal after the GBP surgical correction with the recurrent depression,and stating that the current meds,   seeking alternative surgical correction for her current condition at this time,   patient also been vaccinated       functioning is capable of doing 10 blocks of walking w/out getting shortness of breath, at this time there is no assistive devices used physically  Patient with a good social support which include living at home, she is self cared, and her other primary care giver is her mother at home, no recent major trauma, does not have any history of blood clots,  on no blood thinner no hx of abnormal bleeding or easy bruisabiltity. Denies any recent surgical procedure   and currently not taking any herbal supplements, nor any illicit drugs,current status of the pt is stable. Latex Allergy: no    Problem List:     Patient Active Problem List    Diagnosis Date Noted    Anxiety and depression 2022    Degenerative lumbar spinal stenosis 2022    S/P gastric sleeve procedure 2022    High-risk sexual behavior 2017    TASIA (generalized anxiety disorder) 2017    Insulinoma 2016    Thyromegaly 2016    Chronic back pain greater than 3 months duration 2015    Incompetent cervix 2013     Medical History:     Past Medical History:   Diagnosis Date    BMI 38.0-38.9,adult 4/3/2018    BMI 40.0-44.9, adult ([facility] [address] 2018    Chronic back pain greater than 3 months duration 2015    COVID-19 vaccine series not completed     [facility] , SECOND DOSE DUE 2021.  SHE DOES NOT HAVE CARD    TASIA (generalized anxiety disorder) 2017    Heat stroke     High-risk sexual behavior 2/23/2017    HX OTHER MEDICAL     chlamydia age 24, treated & negative now    Hypoglycemia 3/30/2016    Insulinoma 4/6/2016    Morbid obesity ([facility] [address]     Obesity (BMI 35.0-39.9 without comorbidity) 9/12/2019    Psychiatric disorder     ANXIETY AND DEPRESSION    Stroke ([facility] [address] 2020    NO RESIDUAL    Syncopal seizure ([facility] [address] 11/19/2015    Thyromegaly 3/30/2016    Urinary frequency 11/19/2015     Allergies: Allergies   Allergen Reactions    Pcn [Penicillins] Rash      Medications:     Current Outpatient Medications   Medication Sig    ferrous sulfate 325 mg (65 mg iron) tablet Take  by mouth Daily (before breakfast). ascorbic acid, vitamin C, (Vitamin C) 500 mg tablet Take 1,000 mg by mouth daily. citalopram (CELEXA) 10 mg tablet Take 15 mg by mouth daily. iron,carbonyl-FA-multivit-min (Opurity Multivitamin) 30 mg iron- 800 mcg chew Take 2 Tablets by mouth daily. Biotin 2,500 mcg cap Take  by mouth daily. (Patient not taking: Reported on 2/13/2023)    busPIRone (BUSPAR) 5 mg tablet Take 1 Tablet by mouth two (2) times a day. (Patient not taking: No sig reported)    calcium citrate/vitamin D3 (CALCIUM CITRATE + D PO) Take 2 Tablets by mouth daily. (Patient not taking: Reported on 2/13/2023)    cholecalciferol (VITAMIN D3) (1000 Units /25 mcg) tablet Take 5,000 Units by mouth daily. (Patient not taking: Reported on 2/13/2023)    B.infantis-B.ani-B.long-B.bifi (Probiotic 4X) 10-15 mg TbEC Take 1 Capsule by mouth daily. (Patient not taking: Reported on 2/13/2023)    calcium-cholecalciferol, d3, (CALCIUM 600 + D) 600-125 mg-unit tab Take  by mouth daily. (Patient not taking: No sig reported)     No current facility-administered medications for this visit. Surgical History:     Past Surgical History:   Procedure Laterality Date    HX CHOLECYSTECTOMY  10/6/2014    lap.  tracee    HX DILATION AND CURETTAGE  6/2011    HX GASTRECTOMY  07/27/2021    laparoscopic sleeve gastrectomy    HX HEENT      WISDOM TEETH REMOVED    HX OTHER SURGICAL      D&C with SAB 6/2011    [place] CERCLAGE CERVIX PREGNANCY VAGINAL  2013     Social History:     Social History     Socioeconomic History    Marital status: SINGLE   Tobacco Use    Smoking status: Never    Smokeless tobacco: Never   Vaping Use    Vaping Use: Never used   Substance and Sexual Activity    Alcohol use: Yes     Comment: OCCASIONALLY    Drug use: Yes     Types: Marijuana     Comment: medicinal marijuana, tincture    Sexual activity: Not Currently     Partners: Male     Birth control/protection: Injection   Social History Narrative    In the home with 9year-old daughter whom is well. Mother will be assisting after surgery. 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 Anesthesia Complications: None  History of abnormal bleeding : None  History of Blood Transfusions: no  Health Care Directive or Living Will: no    Objective:           Constitutional: no chills and fever,  , nad     HENT: no ear pain or nosebleeds. No blurred vision  Respiratory: no shortness of breath, wheezing cough   Cardiovascular: Has no chest pain, ,and racing heart . Gastrointestinal: No constipation, diarrhea, nausea and vomiting. Genitourinary: No frequency. Musculoskeletal: Negative for joint pain. Skin: no itching, no rash. Neurological: Negative for dizziness, no tremors  Psychiatric/Behavioral: Negative for depression   is not nervous/anxious. and not depressed the patient is not nervous/anxious.         General:  alert cooperative appears stated for the age  [de-identified]; normocephalic and atraumatic PERRLA extraocular motor intact normal tympanic membrane normal external ear bilaterally, mucosal normal no drainage  Neck: supple no adenopathy no JVD no bruit  Lungs: Clear to auscultation bilaterally no rales rhonchi or wheezes  Heart: Normal regular S1-S2 ,  no murmur  Breast exam deferred  Abdomen: Soft nontender normal bowel sounds   Extremities: Normal range of motion no point tenderness normal pulses no edema  Pelvic/: No lesion, no lymphadenopathy  Skin: Normal no lesion no rash      DIAGNOSTICS:   1. EKG: EKG FINDINGS - normal EKG, normal sinus rhythm, unchanged from previous tracings, some PVC, otherwise nad  2. CXR: not indicated  3.  Labs:   Lab Results   Component Value Date/Time    Hemoglobin A1c 5.5 08/17/2022 02:32 PM    Hemoglobin A1c 5.4 03/16/2022 08:55 AM    Hemoglobin A1c 5.5 06/28/2021 10:57 AM    Glucose 84 08/17/2022 02:32 PM    Glucose (POC) 96 06/06/2015 06:39 PM    Glucose  04/06/2016 09:31 AM    LDL, calculated 78 03/16/2022 08:55 AM    Creatinine (POC) 0.7 06/06/2015 06:39 PM    Creatinine 0.71 08/17/2022 02:32 PM      Lab Results   Component Value Date/Time    Cholesterol, total 153 03/16/2022 08:55 AM    HDL Cholesterol 61 03/16/2022 08:55 AM    LDL, calculated 78 03/16/2022 08:55 AM    Triglyceride 70 03/16/2022 08:55 AM    CHOL/HDL Ratio 2.5 03/16/2022 08:55 AM       IMPRESSION:   Low risk for planned surgery  No contraindications to planned surgery    Marcel Olivo MD   2/13/2023

## 2023-02-13 NOTE — PATIENT INSTRUCTIONS
Learning About What to Expect at Home After Surgery  What do you need to know when you leave the hospital after surgery? Each person recovers from surgery at a different pace. Your discharge plan will help you leave the hospital safely. It will outline the care you need. And it will give you information about the things you'll need to do at home. Make sure you get your plan in writing. Look for information on:  What your medicines are and how to take them. When you need to see the doctor again or get any follow-up tests. How and when to change bandages. What to do about any pain or infection. How active you can be. This may include physical therapy. How to prevent falls. Things you can eat or drink and things to avoid. What do you need to know about taking medicines? Your doctor will talk with you about restarting your medicines. The doctor will also tell you about taking any new medicines. If you take aspirin or some other blood thinner, be sure to talk to your doctor. You will be told if and when to start taking those medicines again. If your doctor gave you a prescription medicine for pain, take it as prescribed. If you aren't taking a prescription pain medicine, ask your doctor if you can take an over-the-counter medicine. How can you take care of your incision? If you have a cut (incision), follow your doctor's instructions. If you didn't get instructions, follow this general advice:  You'll have a bandage over the cut. This helps the incision heal and protects it. Change the bandage daily or more often if needed. If you have strips of tape on the cut, leave them on until they fall off. If you have stitches or staples, your doctor will tell you when to have them removed. If you have skin glue (liquid stitches), leave it on until it falls off. Gently wash the area daily with warm water, and pat it dry. Don't use hydrogen peroxide or alcohol.   You may shower 24 to 48 hours after surgery. Before showering, cover the bandage with a plastic bag or use another method of keeping it dry. Pat the incision dry if it gets damp. Don't swim or take a bath until your doctor tells you it's okay. When can you be active again? One of the most important things you can do for yourself after surgery is to get up and move around several times a day. But be careful not to do too much. Here are some tips:  Don't move quickly or lift anything heavy until your doctor says it is okay. Taking short walks is a good way to help your body heal.  Rest when you feel tired. Your doctor may give you instructions on when you can do your normal activities again, such as driving, having sex, and going back to work. What do you need to know about eating? If your doctor told you when you can start eating and what foods you can eat, follow your doctor's instructions. If you did not get instructions, follow this general advice:  You can eat your normal diet when you feel well. Start with small amounts of food. If your bowel movements aren't regular right after surgery, try to avoid constipation and straining. Drink plenty of water. Your doctor may suggest fiber, a stool softener, or a mild laxative. What do you do if you have infection or pain? If you have signs of infection, call your doctor. These signs include: Increased pain, swelling, warmth, or redness. Red streaks leading from the incision. Pus draining from the incision. A fever. Also call your doctor if you have pain that does not get better after you take pain medicine. Follow-up care is a key part of your treatment and safety. Be sure to make and go to all appointments, and call your doctor if you are having problems. It's also a good idea to know your test results and keep a list of the medicines you take. Where can you learn more?   Go to http://www.gray.com/  Enter M458 in the search box to learn more about \"Learning About What to Expect at Home After Surgery. \"  Current as of: June 6, 2022               Content Version: 13.4  © 2006-2022 [employer]. Care instructions adapted under license by [employer] (which disclaims liability or warranty for this information). If you have questions about a medical condition or this instruction, always ask your healthcare professional. [place] [address] any warranty or liability for your use of this information.

## 2023-02-14 ENCOUNTER — HOSPITAL ENCOUNTER (OUTPATIENT)
Dept: MAMMOGRAPHY | Age: 33
Discharge: HOME | End: 2023-02-14
Payer: COMMERCIAL

## 2023-02-14 ENCOUNTER — HOSPITAL ENCOUNTER (OUTPATIENT)
Dept: ULTRASOUND IMAGING | Age: 33
Discharge: HOME | End: 2023-02-14
Payer: COMMERCIAL

## 2023-02-14 DIAGNOSIS — Z01.818 PRE-OP EVALUATION: ICD-10-CM

## 2023-02-14 DIAGNOSIS — Z12.39 SCREENING BREAST EXAMINATION: ICD-10-CM

## 2023-02-14 DIAGNOSIS — Z01.818 PRE-OPERATIVE CLEARANCE: ICD-10-CM

## 2023-02-14 LAB
ANION GAP SERPL CALC-SCNC: 6 MMOL/L (ref 5–15)
APPEARANCE UR: ABNORMAL
APTT PPP: 29 SEC (ref 22.1–31)
BACTERIA URNS QL MICRO: ABNORMAL /HPF
BILIRUB UR QL: NEGATIVE
BUN SERPL-MCNC: 8 MG/DL (ref 6–20)
BUN/CREAT SERPL: 11 (ref 12–20)
CALCIUM SERPL-MCNC: 9.2 MG/DL (ref 8.5–10.1)
CHLORIDE SERPL-SCNC: 106 MMOL/L (ref 97–108)
CO2 SERPL-SCNC: 25 MMOL/L (ref 21–32)
COLOR UR: ABNORMAL
COMMENT, HOLDF: NORMAL
CREAT SERPL-MCNC: 0.73 MG/DL (ref 0.55–1.02)
EPITH CASTS URNS QL MICRO: ABNORMAL /LPF
ERYTHROCYTE [DISTWIDTH] IN BLOOD BY AUTOMATED COUNT: 14.6 % (ref 11.5–14.5)
GLUCOSE SERPL-MCNC: 66 MG/DL (ref 65–100)
GLUCOSE UR STRIP.AUTO-MCNC: NEGATIVE MG/DL
HCG SERPL-ACNC: <1 MIU/ML (ref 0–6)
HCT VFR BLD AUTO: 44.5 % (ref 35–47)
HGB BLD-MCNC: 14.7 G/DL (ref 11.5–16)
HGB UR QL STRIP: NEGATIVE
HIV 1+2 AB+HIV1 P24 AG SERPL QL IA: NONREACTIVE
HIV12 RESULT COMMENT, HHIVC: NORMAL
HYALINE CASTS URNS QL MICRO: ABNORMAL /LPF (ref 0–5)
INR PPP: 1 (ref 0.9–1.1)
KETONES UR QL STRIP.AUTO: NEGATIVE MG/DL
LEUKOCYTE ESTERASE UR QL STRIP.AUTO: ABNORMAL
MCH RBC QN AUTO: 28.9 PG (ref 26–34)
MCHC RBC AUTO-ENTMCNC: 33 G/DL (ref 30–36.5)
MCV RBC AUTO: 87.4 FL (ref 80–99)
NITRITE UR QL STRIP.AUTO: NEGATIVE
NRBC # BLD: 0 K/UL (ref 0–0.01)
NRBC BLD-RTO: 0 PER 100 WBC
PH UR STRIP: 8.5 (ref 5–8)
PLATELET # BLD AUTO: 280 K/UL (ref 150–400)
PMV BLD AUTO: 10.2 FL (ref 8.9–12.9)
POTASSIUM SERPL-SCNC: 4.1 MMOL/L (ref 3.5–5.1)
PROT UR STRIP-MCNC: NEGATIVE MG/DL
PROTHROMBIN TIME: 10.8 SEC (ref 9–11.1)
RBC # BLD AUTO: 5.09 M/UL (ref 3.8–5.2)
RBC #/AREA URNS HPF: ABNORMAL /HPF (ref 0–5)
SAMPLES BEING HELD,HOLD: NORMAL
SODIUM SERPL-SCNC: 137 MMOL/L (ref 136–145)
SP GR UR REFRACTOMETRY: 1.01 (ref 1–1.03)
THERAPEUTIC RANGE,PTTT: NORMAL SECS (ref 58–77)
UROBILINOGEN UR QL STRIP.AUTO: 0.2 EU/DL (ref 0.2–1)
WBC # BLD AUTO: 5.7 K/UL (ref 3.6–11)
WBC URNS QL MICRO: ABNORMAL /HPF (ref 0–4)

## 2023-02-14 PROCEDURE — 77063 BREAST TOMOSYNTHESIS BI: CPT

## 2023-02-14 NOTE — PROGRESS NOTES
Order placed for  mammogram screening , per Verbal Order from Dr. Mamie Pablo on 2/14/2023 due to pre op testing requirement.

## 2023-02-23 ENCOUNTER — HOSPITAL ENCOUNTER (OUTPATIENT)
Dept: LAB | Age: 33
Discharge: HOME | End: 2023-02-23
Payer: COMMERCIAL

## 2023-02-23 DIAGNOSIS — Z01.818 PRE-OP EVALUATION: ICD-10-CM

## 2023-02-23 LAB
EST. AVERAGE GLUCOSE BLD GHB EST-MCNC: 97 MG/DL
HBA1C MFR BLD: 5 % (ref 4–5.6)

## 2023-02-23 PROCEDURE — 36415 COLL VENOUS BLD VENIPUNCTURE: CPT

## 2023-02-23 PROCEDURE — 83036 HEMOGLOBIN GLYCOSYLATED A1C: CPT

## 2023-03-01 ENCOUNTER — HOSPITAL ENCOUNTER (OUTPATIENT)
Dept: LAB | Age: 33
Discharge: HOME | End: 2023-03-01
Payer: COMMERCIAL

## 2023-03-01 LAB
FLUAV RNA SPEC QL NAA+PROBE: NOT DETECTED
FLUBV RNA SPEC QL NAA+PROBE: NOT DETECTED
SARS-COV-2 RNA RESP QL NAA+PROBE: NOT DETECTED

## 2023-03-01 PROCEDURE — 87636 SARSCOV2 & INF A&B AMP PRB: CPT

## 2023-03-13 ENCOUNTER — TELEPHONE (OUTPATIENT)
Dept: SURGERY | Age: 33
End: 2023-03-13

## 2023-03-13 ENCOUNTER — HOSPITAL ENCOUNTER (EMERGENCY)
Age: 33
Discharge: HOME | End: 2023-03-13
Attending: EMERGENCY MEDICINE | Admitting: EMERGENCY MEDICINE
Payer: COMMERCIAL

## 2023-03-13 VITALS
RESPIRATION RATE: 20 BRPM | HEART RATE: 97 BPM | BODY MASS INDEX: 25.49 KG/M2 | WEIGHT: 135 LBS | SYSTOLIC BLOOD PRESSURE: 114 MMHG | HEIGHT: 61 IN | TEMPERATURE: 98 F | DIASTOLIC BLOOD PRESSURE: 66 MMHG | OXYGEN SATURATION: 100 %

## 2023-03-13 DIAGNOSIS — Z09 POSTOPERATIVE EXAMINATION: Primary | ICD-10-CM

## 2023-03-13 PROCEDURE — 99281 EMR DPT VST MAYX REQ PHY/QHP: CPT

## 2023-03-13 NOTE — TELEPHONE ENCOUNTER
Patient called in re: she wants to know if it is safe to start [facility] CECIL MCGOWAN. Patient had a tummy tuck & breast augmentation surgery last week and would like to know if she can start the [facility]FRANCISCO MCGOWAN now. Please contact patient.

## 2023-03-13 NOTE — TELEPHONE ENCOUNTER
Patient called because she needs to have her AMADO tubes removed and everywhere she has called will not do it. Urgent Care, [facility], etc. Patient had a tummy tuck & breast augmentation sx last week in [place]. Patient would like to know if our office can remove the tube. Please call patient.

## 2023-03-13 NOTE — TELEPHONE ENCOUNTER
Patient returned my call. She stated that she went to [facility] for her tummy tuck and breast augmentation. She weighed herself while we were talking - 137 #. She was 119 # prior to her surgery. She has gained 18 # in fluid since her surgery. I advised the patient to call her surgeon's office to request a Virtual Visit to discuss her fluid retention. I advised the patient, as per Dr. John Sheriff, that she may restart her [facility] today. The patient verbalized understanding of all and thanked me for my help and the call.

## 2023-03-13 NOTE — ED TRIAGE NOTES
Pt reports she had a mommy makeover in [place] on Tuesday of last week. Pt reports she is here today to have her natasha drains removed.

## 2023-03-13 NOTE — ED PROVIDER NOTES
[facility] EMERGENCY DEPT  EMERGENCY DEPARTMENT HISTORY AND PHYSICAL EXAM      Date: 3/13/2023  Patient Name: Nicole Noonan  MRN: [id number]  [place]: 1990  Date of evaluation: 3/13/2023  Provider: Nils Izquierdo MD   Note Started: 2:38 PM 3/13/23    HISTORY OF PRESENT ILLNESS     Chief Complaint   Patient presents with    Surgical Follow-up       History Provided By: Patient    HPI: Nicole Noonan is a 28 y.o. female postop abdominal surgery    PAST MEDICAL HISTORY   Past Medical History:  Past Medical History:   Diagnosis Date    BMI 38.0-38.9,adult 4/3/2018    BMI 40.0-44.9, adult ([address] 2/19/2018    Chronic back pain greater than 3 months duration 7/29/2015    COVID-19 vaccine series not completed     [address] JUNE 2021, SECOND DOSE DUE JULY 17, 2021. SHE DOES NOT HAVE CARD    TASIA (generalized anxiety disorder) 1/19/2017    Heat stroke     High-risk sexual behavior 2/23/2017    HX OTHER MEDICAL     chlamydia age 24, treated & negative now    Hypoglycemia 3/30/2016    Insulinoma 4/6/2016    Morbid obesity ([address]     Obesity (BMI 35.0-39.9 without comorbidity) 9/12/2019    Psychiatric disorder     ANXIETY AND DEPRESSION    Stroke ([address] 2020    NO RESIDUAL    Syncopal seizure ([address] 11/19/2015    Thyromegaly 3/30/2016    Urinary frequency 11/19/2015       Past Surgical History:  Past Surgical History:   Procedure Laterality Date    HX CHOLECYSTECTOMY  10/6/2014    lap.  tracee    HX DILATION AND CURETTAGE  6/2011    HX GASTRECTOMY  07/27/2021    laparoscopic sleeve gastrectomy    HX HEENT      WISDOM TEETH REMOVED    HX OTHER SURGICAL      D&C with SAB 6/2011    DC CERCLAGE CERVIX PREGNANCY VAGINAL  2013       Family History:  Family History   Problem Relation Age of Onset    Hypertension Mother     Other Mother         gastric bypass    High Cholesterol Father     Heart Disease Father     Hypertension Father     Diabetes Paternal Aunt     Hypertension Maternal Grandmother COPD Maternal Grandmother     Emphysema Maternal Grandmother     Stroke Maternal Grandmother     Diabetes Paternal Grandmother     Stroke Paternal Grandmother     No Known Problems Sister     No Known Problems Brother     Anesth Problems Neg Hx        Social History:  Social History     Tobacco Use    Smoking status: Never    Smokeless tobacco: Never   Vaping Use    Vaping Use: Never used   Substance Use Topics    Alcohol use: Yes     Comment: OCCASIONALLY    Drug use: Yes     Types: Marijuana     Comment: medicinal marijuana, tincture       Allergies: Allergies   Allergen Reactions    Pcn [Penicillins] Rash       PCP: Katie López MD    Current Meds:   Previous Medications    ASCORBIC ACID, VITAMIN C, (VITAMIN C) 500 MG TABLET    Take 1,000 mg by mouth daily. B.INFANTIS-B. ANI-B. LONG-B.BIFI (PROBIOTIC 4X) 10-15 MG TBEC    Take 1 Capsule by mouth daily. BIOTIN 2,500 MCG CAP    Take  by mouth daily. BUSPIRONE (BUSPAR) 5 MG TABLET    Take 1 Tablet by mouth two (2) times a day. CALCIUM CITRATE/VITAMIN D3 (CALCIUM CITRATE + D PO)    Take 2 Tablets by mouth daily. CALCIUM-CHOLECALCIFEROL, D3, (CALCIUM 600 + D) 600-125 MG-UNIT TAB    Take  by mouth daily. CHOLECALCIFEROL (VITAMIN D3) (1000 UNITS /25 MCG) TABLET    Take 5,000 Units by mouth daily. CITALOPRAM (CELEXA) 10 MG TABLET    Take 15 mg by mouth daily. FERROUS SULFATE 325 MG (65 MG IRON) TABLET    Take  by mouth Daily (before breakfast). IRON,CARBONYL-FA-MULTIVIT-MIN (OPURITY MULTIVITAMIN) 30 MG IRON- 800 MCG CHEW    Take 2 Tablets by mouth daily. PHYSICAL EXAM     ED Triage Vitals [03/13/23 1359]   ED Encounter Vitals Group      /66      Pulse (Heart Rate) 97      Resp Rate 20      Temp 98 °F (36.7 °C)      Temp src       O2 Sat (%) 100 %      Weight 135 lb      Height 5' 1\"      Physical Exam  Vitals and nursing note reviewed. Constitutional:       Appearance: Normal appearance. She is normal weight.    HENT:      Head: Normocephalic and atraumatic. Eyes:      Extraocular Movements: Extraocular movements intact. Pupils: Pupils are equal, round, and reactive to light. Abdominal:      General: Bowel sounds are decreased. Palpations: Abdomen is soft. Tenderness: There is no abdominal tenderness. Comments: Abdomen with abdominal binder with surgical drains placed bilaterally   Neurological:      Mental Status: She is alert. Psychiatric:         Mood and Affect: Mood normal.         Behavior: Behavior normal.         SCREENINGS        No data recorded      LAB, EKG AND DIAGNOSTIC RESULTS   Labs:  No results found for this or any previous visit (from the past 12 hour(s)). EKG: Initial EKG interpreted by me. Not Applicable    Radiologic Studies:  Non-plain film images such as CT, Ultrasound and MRI are read by the radiologist. Plain radiographic images are visualized and preliminarily interpreted by the ED Physician with the following findings: Not applicable    Interpretation per the Radiologist below, if available at the time of this note:  No results found. PROCEDURES   Unless otherwise noted below, none.   Procedures      CRITICAL CARE TIME   None    ED COURSE and DIFFERENTIAL DIAGNOSIS/MDM   CC/HPI Summary, DDx, ED Course, and Reassessment: 50-year-old female 1 week status post cosmetic abdominal surgery, presents to the ED for removal of 2 bilateral surgical drains, patient denies any fever or chills or any abdominal pain no nausea no vomiting    DDx wound dehiscence, abscess formation, bowel obstruction    Disposition Considerations (Tests not done, Shared Decision Making, Pt Expectation of Test or Treatment.):  Patient had cosmetic abdominal surgery done in [place] last week and she has been followed by her bariatric specialist Dr. Hugo Burris therefore patient referred to her bariatric specialist    Records Reviewed (source and summary of external notes): Prior medical records and Nursing notes    Vitals:    Vitals:    03/13/23 1359   BP: 114/66   Pulse: 97   Resp: 20   Temp: 98 °F (36.7 °C)   SpO2: 100%   Weight: 61.2 kg (135 lb)   Height: 5' 1\" (1.549 m)             Patient was given the following medications:  Medications - No data to display    CONSULTS: (Who and What was discussed)  None     Social Determinants affecting Dx or Tx: None    FINAL IMPRESSION   No diagnosis found. DISPOSITION/PLAN       Discharge Note: The patient is stable for discharge home. The signs, symptoms, diagnosis, and discharge instructions have been discussed, understanding conveyed, and agreed upon. The patient is to follow up as recommended or return to ER should their symptoms worsen. PATIENT REFERRED TO:  Follow-up Information    None           DISCHARGE MEDICATIONS:  Current Discharge Medication List            DISCONTINUED MEDICATIONS:  Current Discharge Medication List          I am the Primary Clinician of Record: Paige Grissom MD (electronically signed)    (Please note that parts of this dictation were completed with voice recognition software. Quite often unanticipated grammatical, syntax, homophones, and other interpretive errors are inadvertently transcribed by the computer software. Please disregards these errors.  Please excuse any errors that have escaped final proofreading.)

## 2023-03-14 NOTE — TELEPHONE ENCOUNTER
I spoke with the NP and the patient is being directed to plastics. I called and informed the patient of this and she also informed me that she has been retaining fluid since her surgery. I told her she should call her PCP about the fluid retention. She informed me she did and is awaiting a response. I recommended if she feels really bad she should go to the ED. She acknowledged understanding and thanked me for the call.

## 2023-03-15 ENCOUNTER — VIRTUAL VISIT (OUTPATIENT)
Dept: SURGERY | Age: 33
End: 2023-03-15
Payer: COMMERCIAL

## 2023-03-15 DIAGNOSIS — E53.8 B12 DEFICIENCY: ICD-10-CM

## 2023-03-15 DIAGNOSIS — Z90.3 S/P GASTRIC SLEEVE PROCEDURE: ICD-10-CM

## 2023-03-15 DIAGNOSIS — R73.9 BLOOD GLUCOSE ELEVATED: ICD-10-CM

## 2023-03-15 DIAGNOSIS — E66.3 OVERWEIGHT (BMI 25.0-29.9): Primary | ICD-10-CM

## 2023-03-15 DIAGNOSIS — E55.9 VITAMIN D DEFICIENCY: ICD-10-CM

## 2023-03-15 PROCEDURE — 99214 OFFICE O/P EST MOD 30 MIN: CPT | Performed by: FAMILY MEDICINE

## 2023-03-15 RX ORDER — CYCLOBENZAPRINE HCL 10 MG
TABLET ORAL
COMMUNITY

## 2023-03-15 RX ORDER — ACETAMINOPHEN AND CODEINE PHOSPHATE 300; 30 MG/1; MG/1
1 TABLET ORAL
COMMUNITY

## 2023-03-15 RX ORDER — CEPHALEXIN 500 MG/1
500 CAPSULE ORAL 2 TIMES DAILY
COMMUNITY

## 2023-03-15 RX ORDER — GABAPENTIN 300 MG/1
300 CAPSULE ORAL DAILY
COMMUNITY

## 2023-03-15 RX ORDER — SEMAGLUTIDE 1 MG/.5ML
1 INJECTION, SOLUTION SUBCUTANEOUS
Qty: 4 EACH | Refills: 0 | Status: SHIPPED | OUTPATIENT
Start: 2023-03-15

## 2023-03-15 RX ORDER — ACETAMINOPHEN 500 MG
1000 TABLET ORAL
COMMUNITY

## 2023-03-15 NOTE — PROGRESS NOTES
Gris Centeno is a 28 y.o. female who was seen by synchronous (real-time) audio-video technology on 3/15/2023. Consent:  She and/or her healthcare decision maker is aware that this patient-initiated Telehealth encounter is a billable service, with coverage as determined by her insurance carrier. She is aware that she may receive a bill and has provided verbal consent to proceed: Yes    I was at home while conducting this encounter. Gris Centeno is a 28 y.o. female  who is here for her follow up  Evaluation for the medical bariatric care. CC: I want to be healthier    Last Tuesday she had cosmetic surgery  She had a mommy makeover  Her legs were swollen after surgery  Her weight is up 5 lbs  Weight History  Current weight 135 (+5)and BMI is There is no height or weight on file to calculate BMI. Goal weight BMI 24,   Highest weight 174   (See weight gain time line scanned into media section of chart)      Hunger control: poor after the surgery  She has been off of wegovy for 6 weeks  As required by the surgeons    She is taking gabapentin for post surg pain    Significant Medical History    Update on sleep apnea and  CPAP no    Ever had bariatric surgery: no    Pregnant or planning on becoming pregnant w/in 6 months: no         Significant Psychosocial History     Current Major Lifestyle Changes: no  Any potential unsupportive people: no          Social History  Social History     Tobacco Use    Smoking status: Never    Smokeless tobacco: Never   Substance Use Topics    Alcohol use: Yes     Comment: OCCASIONALLY     How many times a week do you eat out? 1    Do you drink any EtOH?  no   If so, how much? Do you have upcoming any travel in the next 6 weeks?  no   If so, what do you have planned? Exercise  How many days a week do you currently exercise?   5000 steps a  days   Have you ever been told by a physician not to exercise?  no      Objective  There were no vitals taken for this visit. Waist Circumference: See Weight Management Doc Flowsheet  Neck Circumference: See Weight Management Doc Flowsheet  Percent Body Fat: See Weight Management Doc Flowsheet  Weight Metrics 3/13/2023 2/13/2023 10/19/2022 9/13/2022 8/17/2022 8/17/2022 6/16/2022   Weight 135 lb 118 lb 3.2 oz 132 lb 138 lb - 144 lb 1.6 oz -   Neck Circ (inches) - - - - 13.5 - 13.75   Waist Measure Inches - - - - 32 - 33.5   Body Fat % - - - - 30.3 - 32.8   BMI 25.51 kg/m2 22.33 kg/m2 25.78 kg/m2 26.95 kg/m2 - 28.14 kg/m2 -         Labs: done in february    Review of Systems  Complete ROS negative except where noted above          712  Subjective:   Forest Persaud was seen for Weight Management      Prior to Admission medications    Medication Sig Start Date End Date Taking? Authorizing Provider   cyclobenzaprine (FLEXERIL) 10 mg tablet Take  by mouth three (3) times daily as needed for Muscle Spasm(s). Yes Provider, Historical   gabapentin (NEURONTIN) 300 mg capsule Take 300 mg by mouth daily. Yes Provider, Historical   cephALEXin (KEFLEX) 500 mg capsule Take 500 mg by mouth two (2) times a day. Yes Provider, Historical   acetaminophen-codeine (Tylenol-Codeine #3) 300-30 mg per tablet Take 1 Tablet by mouth every four (4) hours as needed for Pain. Yes Provider, Historical   acetaminophen (Tylenol Extra Strength) 500 mg tablet Take 1,000 mg by mouth every six (6) hours as needed for Pain. Yes Provider, Historical   OTHER \"Blood Builder multivitamin with Iron\"   Yes Provider, Historical   semaglutide, weight loss, (Wegovy) 1 mg/0.5 mL pnij 1 mg by SubCUTAneous route every seven (7) days. 3/15/23  Yes Topher Davies MD   ascorbic acid, vitamin C, (VITAMIN C) 500 mg tablet Take 1,000 mg by mouth daily. Yes Provider, Historical   citalopram (CELEXA) 10 mg tablet Take 15 mg by mouth daily.  10/28/22  Yes Provider, Historical   ferrous sulfate 325 mg (65 mg iron) tablet Take  by mouth Daily (before breakfast). Patient not taking: Reported on 3/15/2023    Provider, Historical   Biotin 2,500 mcg cap Take  by mouth daily. Patient not taking: No sig reported    Provider, Historical   busPIRone (BUSPAR) 5 mg tablet Take 1 Tablet by mouth two (2) times a day. Patient not taking: No sig reported 5/31/22   Sydnie Mcdonnell, YANIRA   calcium citrate/vitamin D3 (CALCIUM CITRATE + D PO) Take 2 Tablets by mouth daily. Patient not taking: No sig reported    Provider, Historical   cholecalciferol (VITAMIN D3) (1000 Units /25 mcg) tablet Take 5,000 Units by mouth daily. Patient not taking: No sig reported    Provider, Historical   iron,carbonyl-FA-multivit-min (Opurity Multivitamin) 30 mg iron- 800 mcg chew Take 2 Tablets by mouth daily. Provider, Historical   B.infantis-B.ani-B.long-B.bifi (Probiotic 4X) 10-15 mg TbEC Take 1 Capsule by mouth daily. Patient not taking: No sig reported    Provider, Historical   calcium-cholecalciferol, d3, (CALCIUM 600 + D) 600-125 mg-unit tab Take  by mouth daily. Patient not taking: No sig reported    Provider, Historical     Allergies   Allergen Reactions    Pcn [Penicillins] Rash       Patient Active Problem List    Diagnosis Date Noted    Anxiety and depression 06/16/2022    Degenerative lumbar spinal stenosis 03/31/2022    S/P gastric sleeve procedure 03/31/2022    High-risk sexual behavior 02/23/2017    TASIA (generalized anxiety disorder) 01/19/2017    Insulinoma 04/06/2016    Thyromegaly 03/30/2016    Chronic back pain greater than 3 months duration 07/29/2015    Incompetent cervix 09/13/2013     Current Outpatient Medications   Medication Sig Dispense Refill    cyclobenzaprine (FLEXERIL) 10 mg tablet Take  by mouth three (3) times daily as needed for Muscle Spasm(s). gabapentin (NEURONTIN) 300 mg capsule Take 300 mg by mouth daily. cephALEXin (KEFLEX) 500 mg capsule Take 500 mg by mouth two (2) times a day.       acetaminophen-codeine (Tylenol-Codeine #3) 300-30 mg per tablet Take 1 Tablet by mouth every four (4) hours as needed for Pain. acetaminophen (Tylenol Extra Strength) 500 mg tablet Take 1,000 mg by mouth every six (6) hours as needed for Pain. OTHER \"Blood Builder multivitamin with Iron\"      semaglutide, weight loss, (Wegovy) 1 mg/0.5 mL pnij 1 mg by SubCUTAneous route every seven (7) days. 4 Each 0    ascorbic acid, vitamin C, (VITAMIN C) 500 mg tablet Take 1,000 mg by mouth daily. citalopram (CELEXA) 10 mg tablet Take 15 mg by mouth daily. ferrous sulfate 325 mg (65 mg iron) tablet Take  by mouth Daily (before breakfast). (Patient not taking: Reported on 3/15/2023)      Biotin 2,500 mcg cap Take  by mouth daily. (Patient not taking: No sig reported)      busPIRone (BUSPAR) 5 mg tablet Take 1 Tablet by mouth two (2) times a day. (Patient not taking: No sig reported) 60 Tablet 5    calcium citrate/vitamin D3 (CALCIUM CITRATE + D PO) Take 2 Tablets by mouth daily. (Patient not taking: No sig reported)      cholecalciferol (VITAMIN D3) (1000 Units /25 mcg) tablet Take 5,000 Units by mouth daily. (Patient not taking: No sig reported)      iron,carbonyl-FA-multivit-min (Opurity Multivitamin) 30 mg iron- 800 mcg chew Take 2 Tablets by mouth daily. B.infantis-B.ani-B.long-B.bifi (Probiotic 4X) 10-15 mg TbEC Take 1 Capsule by mouth daily. (Patient not taking: No sig reported)      calcium-cholecalciferol, d3, (CALCIUM 600 + D) 600-125 mg-unit tab Take  by mouth daily. (Patient not taking: No sig reported)         ROS    PHYSICAL EXAMINATION:  [ INSTRUCTIONS:  \"[x]\" Indicates a positive item  \"[]\" Indicates a negative item  -- DELETE ALL ITEMS NOT EXAMINED]  Vital Signs: (As obtained by patient/caregiver at home)  There were no vitals taken for this visit.      Constitutional: [x] Appears well-developed and well-nourished [x] No apparent distress      [] Abnormal -     Mental status: [x] Alert and awake  [x] Oriented to person/place/time [x] Able to follow commands    [] Abnormal -     Eyes:   EOM    [x]  Normal    [] Abnormal -   Sclera  [x]  Normal    [] Abnormal -          Discharge [x]  None visible   [] Abnormal -     HENT: [x] Normocephalic, atraumatic  [] Abnormal -   [x] Mouth/Throat: Mucous membranes are moist    External Ears [x] Normal  [] Abnormal -    Neck: [x] No visualized mass [] Abnormal -     Pulmonary/Chest: [x] Respiratory effort normal   [x] No visualized signs of difficulty breathing or respiratory distress        [] Abnormal -      Musculoskeletal:   [x] Normal gait with no signs of ataxia         [x] Normal range of motion of neck        [] Abnormal -     Neurological:        [x] No Facial Asymmetry (Cranial nerve 7 motor function) (limited exam due to video visit)          [x] No gaze palsy        [] Abnormal -          Skin:        [x] No significant exanthematous lesions or discoloration noted on facial skin         [] Abnormal -            Psychiatric:       [x] Normal Affect [] Abnormal -        [x] No Hallucinations    Other pertinent observable physical exam findings:-      Assessment & Plan  Diagnoses and all orders for this visit:    1. Overweight (BMI 25.0-29.9)  -     semaglutide, weight loss, (Wegovy) 1 mg/0.5 mL pnij; 1 mg by SubCUTAneous route every seven (7) days. Diet:restart at 1 mg of wegovy. She wants to restart back at 1.7 mg per week , I explained she needs to restart at a lower dose, not higher than 1 mg  Avoid sodium  Avoid eating out      Activity: she will continue walking the 5000 steps 5 days a week    Medication:  Stop taking the gabapentin  Restart wegovy  2. Blood glucose elevated  The wegovy will help lower the blood sugar and prevent diabetes  3. Vitamin D deficiency  Continue to monitor  4. S/P gastric sleeve procedure    5.  B12 deficiency  Continue to monitor and treat as indicated          Based on his history and exam, Araceli Pennington is a good candidate for the  [facility] Loss [facility]     There are no Patient Instructions on file for this visit. Coding Help - Use CPT Codes 86684-52161, [id number] for Established and New Patients respectively, either employing EM elements or Time rules. Other codes (example consult codes) may also apply. The primary encounter diagnosis was Overweight (BMI 25.0-29.9). Diagnoses of Blood glucose elevated, Vitamin D deficiency, S/P gastric sleeve procedure, and B12 deficiency were also pertinent to this visit. The patient verbalizes understanding and agrees with the plan of care      We discussed the expected course, resolution and complications of the diagnosis(es) in detail. Medication risks, benefits, costs, interactions, and alternatives were discussed as indicated. I advised her to contact the office if her condition worsens, changes or fails to improve as anticipated. She expressed understanding with the diagnosis(es) and plan. Pursuant to the emergency declaration under the [facility], [facility] waiver authority and the [employer], this Virtual  Visit was conducted, with patient's consent, to reduce the patient's risk of exposure to COVID-19 and provide continuity of care for an established patient. Services were provided through a video synchronous discussion virtually to substitute for in-person clinic visit.     Magdalena Delaney MD

## 2023-03-15 NOTE — PROGRESS NOTES
Weight Management. 1 month follow up. 1. Have you been to the ER, urgent care clinic since your last visit? Hospitalized since your last visit? Yes, for follow up for plastic surgery. 2. Have you seen or consulted any other health care providers outside of the [address] [place] [address] since your last visit? Include any pap smears or colon screening. [facility]geons to d/c AMADO drains; Plastic Surgeon in [facility] for tummy tuck and breast augmentation.

## 2023-03-29 ENCOUNTER — TELEPHONE (OUTPATIENT)
Dept: SURGERY | Age: 33
End: 2023-03-29

## 2023-03-29 NOTE — TELEPHONE ENCOUNTER
Patient called in regards to message she left for nurse in [place], has not heard back, regarding lab orders.

## 2023-03-29 NOTE — TELEPHONE ENCOUNTER
Returned patient's call. Advised her that I forwarded her the [employer] message she sent yesterday to Dr. Vianey Montiel. We allow 24-48 hours for messages to be responded to. I will let Dr. Vianey Montiel know to read the message and respond to her. The patient verbalized understanding and thanked me.

## 2023-04-22 DIAGNOSIS — Z01.818 PRE-OP EVALUATION: Primary | ICD-10-CM

## 2023-04-22 DIAGNOSIS — Z01.818 PRE-OPERATIVE CLEARANCE: Primary | ICD-10-CM

## 2023-04-23 DIAGNOSIS — Z01.818 PRE-OPERATIVE CLEARANCE: Primary | ICD-10-CM

## 2023-04-25 ENCOUNTER — OFFICE VISIT (OUTPATIENT)
Dept: SURGERY | Age: 33
End: 2023-04-25

## 2023-04-25 ENCOUNTER — OFFICE VISIT (OUTPATIENT)
Dept: FAMILY MEDICINE CLINIC | Age: 33
End: 2023-04-25

## 2023-04-25 VITALS
SYSTOLIC BLOOD PRESSURE: 111 MMHG | HEART RATE: 77 BPM | BODY MASS INDEX: 24.17 KG/M2 | DIASTOLIC BLOOD PRESSURE: 76 MMHG | OXYGEN SATURATION: 99 % | TEMPERATURE: 98.4 F | HEIGHT: 61 IN | RESPIRATION RATE: 20 BRPM | WEIGHT: 128 LBS

## 2023-04-25 VITALS
DIASTOLIC BLOOD PRESSURE: 66 MMHG | TEMPERATURE: 98.4 F | OXYGEN SATURATION: 98 % | HEART RATE: 94 BPM | SYSTOLIC BLOOD PRESSURE: 107 MMHG | BODY MASS INDEX: 24.45 KG/M2 | WEIGHT: 129.5 LBS | HEIGHT: 61 IN | RESPIRATION RATE: 18 BRPM

## 2023-04-25 DIAGNOSIS — E55.9 VITAMIN D DEFICIENCY: ICD-10-CM

## 2023-04-25 DIAGNOSIS — F41.1 GAD (GENERALIZED ANXIETY DISORDER): ICD-10-CM

## 2023-04-25 DIAGNOSIS — R53.83 LOW ENERGY: ICD-10-CM

## 2023-04-25 DIAGNOSIS — E53.8 VITAMIN B 12 DEFICIENCY: ICD-10-CM

## 2023-04-25 DIAGNOSIS — R73.9 BLOOD GLUCOSE ELEVATED: ICD-10-CM

## 2023-04-25 DIAGNOSIS — E11.9 DIET-CONTROLLED DIABETES MELLITUS (HCC): ICD-10-CM

## 2023-04-25 DIAGNOSIS — E01.0 THYROMEGALY: ICD-10-CM

## 2023-04-25 DIAGNOSIS — F32.A ANXIETY AND DEPRESSION: ICD-10-CM

## 2023-04-25 DIAGNOSIS — Z00.00 MEDICARE ANNUAL WELLNESS VISIT, SUBSEQUENT: Primary | ICD-10-CM

## 2023-04-25 DIAGNOSIS — G47.00 INSOMNIA, UNSPECIFIED TYPE: ICD-10-CM

## 2023-04-25 DIAGNOSIS — R53.83 OTHER FATIGUE: ICD-10-CM

## 2023-04-25 DIAGNOSIS — M48.061 DEGENERATIVE LUMBAR SPINAL STENOSIS: ICD-10-CM

## 2023-04-25 DIAGNOSIS — E55.9 VITAMIN D DEFICIENCY DISEASE: ICD-10-CM

## 2023-04-25 DIAGNOSIS — Z76.89 ENCOUNTER FOR WEIGHT MANAGEMENT: Primary | ICD-10-CM

## 2023-04-25 DIAGNOSIS — Z86.39 H/O: OBESITY: ICD-10-CM

## 2023-04-25 DIAGNOSIS — F41.9 ANXIETY AND DEPRESSION: ICD-10-CM

## 2023-04-25 PROBLEM — Z72.51 HIGH-RISK SEXUAL BEHAVIOR: Status: RESOLVED | Noted: 2017-02-23 | Resolved: 2023-04-25

## 2023-04-25 PROBLEM — E65 ABDOMINAL PANNICULUS, SYMPTOMATIC: Status: ACTIVE | Noted: 2022-10-11

## 2023-04-25 PROCEDURE — G0439 PPPS, SUBSEQ VISIT: HCPCS | Performed by: FAMILY MEDICINE

## 2023-04-25 PROCEDURE — 3044F HG A1C LEVEL LT 7.0%: CPT | Performed by: FAMILY MEDICINE

## 2023-04-25 NOTE — PROGRESS NOTES
This is the Subsequent Medicare Annual Wellness Exam, performed 12 months or more after the Initial AWV or the last Subsequent AWV    I have reviewed the patient's medical history in detail and updated the computerized patient record. Assessment/Plan   Education and counseling provided:  Are appropriate based on today's review and evaluation  End-of-Life planning (with patient's consent)  Influenza Vaccine    1. Medicare annual wellness visit, subsequent  2. Thyromegaly  -     CBC W/O DIFF; Future  -     LIPID PANEL; Future  -     METABOLIC PANEL, COMPREHENSIVE; Future  -     TSH 3RD GENERATION; Future  -     URINALYSIS W/ RFLX MICROSCOPIC; Future  -     VITAMIN B6; Future  -     VITAMIN D, 25 HYDROXY; Future  -     VITAMIN B1, WHOLE BLOOD; Future  -     VITAMIN B12 & FOLATE; Future  3. Diet-controlled diabetes mellitus (HCC)  -     CBC W/O DIFF; Future  -     LIPID PANEL; Future  -     METABOLIC PANEL, COMPREHENSIVE; Future  -     TSH 3RD GENERATION; Future  -     URINALYSIS W/ RFLX MICROSCOPIC; Future  -     VITAMIN B6; Future  -     VITAMIN D, 25 HYDROXY; Future  -     VITAMIN B1, WHOLE BLOOD; Future  -     VITAMIN B12 & FOLATE; Future  4. TASIA (generalized anxiety disorder)  -     CBC W/O DIFF; Future  -     LIPID PANEL; Future  -     METABOLIC PANEL, COMPREHENSIVE; Future  -     TSH 3RD GENERATION; Future  -     URINALYSIS W/ RFLX MICROSCOPIC; Future  -     VITAMIN B6; Future  -     VITAMIN D, 25 HYDROXY; Future  -     VITAMIN B1, WHOLE BLOOD; Future  -     VITAMIN B12 & FOLATE; Future  5. Degenerative lumbar spinal stenosis  -     CBC W/O DIFF; Future  -     LIPID PANEL; Future  -     METABOLIC PANEL, COMPREHENSIVE; Future  -     TSH 3RD GENERATION; Future  -     URINALYSIS W/ RFLX MICROSCOPIC; Future  -     VITAMIN B6; Future  -     VITAMIN D, 25 HYDROXY; Future  -     VITAMIN B1, WHOLE BLOOD; Future  -     VITAMIN B12 & FOLATE; Future  6. Anxiety and depression  -     CBC W/O DIFF;  Future  -     LIPID PANEL; Future  -     METABOLIC PANEL, COMPREHENSIVE; Future  -     TSH 3RD GENERATION; Future  -     URINALYSIS W/ RFLX MICROSCOPIC; Future  -     VITAMIN B6; Future  -     VITAMIN D, 25 HYDROXY; Future  -     VITAMIN B1, WHOLE BLOOD; Future  -     VITAMIN B12 & FOLATE; Future  7. Low energy  -     CBC W/O DIFF; Future  -     LIPID PANEL; Future  -     METABOLIC PANEL, COMPREHENSIVE; Future  -     TSH 3RD GENERATION; Future  -     URINALYSIS W/ RFLX MICROSCOPIC; Future  -     VITAMIN B6; Future  -     VITAMIN D, 25 HYDROXY; Future  -     VITAMIN B1, WHOLE BLOOD; Future  -     VITAMIN B12 & FOLATE; Future  8. Other fatigue  -     CBC W/O DIFF; Future  -     LIPID PANEL; Future  -     METABOLIC PANEL, COMPREHENSIVE; Future  -     TSH 3RD GENERATION; Future  -     URINALYSIS W/ RFLX MICROSCOPIC; Future  -     VITAMIN B6; Future  -     VITAMIN D, 25 HYDROXY; Future  -     VITAMIN B1, WHOLE BLOOD; Future  -     VITAMIN B12 & FOLATE; Future  9. Vitamin D deficiency disease  -     CBC W/O DIFF; Future  -     LIPID PANEL; Future  -     METABOLIC PANEL, COMPREHENSIVE; Future  -     TSH 3RD GENERATION; Future  -     URINALYSIS W/ RFLX MICROSCOPIC; Future  -     VITAMIN B6; Future  -     VITAMIN D, 25 HYDROXY; Future  -     VITAMIN B1, WHOLE BLOOD; Future  -     VITAMIN B12 & FOLATE; Future  10. Vitamin B 12 deficiency  -     CBC W/O DIFF; Future  -     LIPID PANEL; Future  -     METABOLIC PANEL, COMPREHENSIVE; Future  -     TSH 3RD GENERATION; Future  -     URINALYSIS W/ RFLX MICROSCOPIC; Future  -     VITAMIN B6; Future  -     VITAMIN D, 25 HYDROXY; Future  -     VITAMIN B1, WHOLE BLOOD; Future  -     VITAMIN B12 & FOLATE;  Future     Varicella offered but opted  Depression Risk Factor Screening     3 most recent PHQ Screens 4/25/2023   PHQ Not Done -   Little interest or pleasure in doing things Not at all   Feeling down, depressed, irritable, or hopeless Not at all   Total Score PHQ 2 0   Trouble falling or staying asleep, or sleeping too much -   Feeling tired or having little energy -   Poor appetite, weight loss, or overeating -   Feeling bad about yourself - or that you are a failure or have let yourself or your family down -   Trouble concentrating on things such as school, work, reading, or watching TV -   Moving or speaking so slowly that other people could have noticed; or the opposite being so fidgety that others notice -   Thoughts of being better off dead, or hurting yourself in some way -   PHQ 9 Score -       Alcohol & Drug Abuse Risk Screen    Do you average more than 1 drink per night or more than 7 drinks a week:  No    On any one occasion in the past three months have you have had more than 3 drinks containing alcohol:  No       Opioid Risk: (Low risk score <55, High risk score ?55)  Opioid risk score: 30      Click here to complete the Controlled Substance Monitoring SmartForm    Last PDMP Steve as Reviewed:  Review User Review Instant Review Result              Functional Ability and Level of Safety    Hearing: Hearing is good. Activities of Daily Living: The home contains: handrails, grab bars, and rugs  Patient does total self care      Ambulation: with no difficulty     Fall Risk:  Fall Risk Assessment, last 12 mths 3/31/2021   Able to walk? Yes   Fall in past 12 months? 0   Do you feel unsteady?  0   Are you worried about falling 0      Abuse Screen:  Patient is not abused       Cognitive Screening    Has your family/caregiver stated any concerns about your memory: no     Cognitive Screening: Normal - MMSE (Mini Mental Status Exam)    Health Maintenance Due     Health Maintenance Due   Topic Date Due    Varicella Vaccine (1 of 2 - 2-dose childhood series) Never done       Patient Care Team   Patient Care Team:  Hesham Rankin MD as PCP - General (Family Medicine)  Hesham Rankin MD as PCP - [facility] Empaneled Provider  Vero Ballard RN as Nurse Navigator  [facility] as Obstetrician (Obstetrics & Gynecology)  Sean Markham MD (General Surgery)  Bernardo Huertas NP (Nurse Practitioner)  Marylou España MD as Physician [facility])  Shelby Erwin NP as Nurse Practitioner [facility])    History     Patient Active Problem List   Diagnosis Code    Incompetent cervix N88.3    Chronic back pain greater than 3 months duration M54.9, G89.29    Thyromegaly E01.0    Insulinoma D13.7    TASIA (generalized anxiety disorder) F41.1    High-risk sexual behavior Z72.51    Degenerative lumbar spinal stenosis M48.061    S/P gastric sleeve procedure Z90.3    Anxiety and depression F41.9, F32. A     Past Medical History:   Diagnosis Date    BMI 38.0-38.9,adult 4/3/2018    BMI 40.0-44.9, adult ([address] 2/19/2018    Chronic back pain greater than 3 months duration 7/29/2015    COVID-19 vaccine series not completed     [address] JUNE 2021, SECOND DOSE DUE JULY 17, 2021. SHE DOES NOT HAVE CARD    TASIA (generalized anxiety disorder) 1/19/2017    Heat stroke     High-risk sexual behavior 2/23/2017    HX OTHER MEDICAL     chlamydia age 24, treated & negative now    Hypoglycemia 3/30/2016    Insulinoma 4/6/2016    Morbid obesity ([address]     Obesity (BMI 35.0-39.9 without comorbidity) 9/12/2019    Psychiatric disorder     ANXIETY AND DEPRESSION    Stroke ([address] 2020    NO RESIDUAL    Syncopal seizure ([address] 11/19/2015    Thyromegaly 3/30/2016    Urinary frequency 11/19/2015      Past Surgical History:   Procedure Laterality Date    HX CHOLECYSTECTOMY  10/6/2014    lap.  tracee    HX DILATION AND CURETTAGE  6/2011    HX GASTRECTOMY  07/27/2021    laparoscopic sleeve gastrectomy    HX HEENT      WISDOM TEETH REMOVED    HX OTHER SURGICAL      D&C with SAB 6/2011    UT CERCLAGE CERVIX PREGNANCY VAGINAL  2013     Current Outpatient Medications   Medication Sig Dispense Refill    OTHER \"Blood Builder multivitamin with Iron\"      semaglutide, weight loss, (Wegovy) 1 mg/0.5 mL pnij 1 mg by SubCUTAneous route every seven (7) days. 4 Each 0    ascorbic acid, vitamin C, (VITAMIN C) 500 mg tablet Take 2 Tablets by mouth daily. citalopram (CELEXA) 10 mg tablet Take 1.5 Tablets by mouth daily. cholecalciferol (VITAMIN D3) (1000 Units /25 mcg) tablet Take 5 Tablets by mouth daily. B.infantis-B.ani-B.long-B.bifi (Probiotic 4X) 10-15 mg TbEC Take 1 Capsule by mouth daily. cyclobenzaprine (FLEXERIL) 10 mg tablet Take  by mouth three (3) times daily as needed for Muscle Spasm(s). (Patient not taking: Reported on 4/25/2023)      gabapentin (NEURONTIN) 300 mg capsule Take 300 mg by mouth daily. (Patient not taking: Reported on 4/25/2023)      cephALEXin (KEFLEX) 500 mg capsule Take 500 mg by mouth two (2) times a day. (Patient not taking: Reported on 4/25/2023)      acetaminophen-codeine (TYLENOL #3) 300-30 mg per tablet Take 1 Tablet by mouth every four (4) hours as needed for Pain. (Patient not taking: Reported on 4/25/2023)      acetaminophen (TYLENOL) 500 mg tablet Take 1,000 mg by mouth every six (6) hours as needed for Pain. (Patient not taking: Reported on 4/25/2023)      ferrous sulfate 325 mg (65 mg iron) tablet Take  by mouth Daily (before breakfast). (Patient not taking: Reported on 3/15/2023)      Biotin 2,500 mcg cap Take  by mouth daily. (Patient not taking: Reported on 2/13/2023)      busPIRone (BUSPAR) 5 mg tablet Take 1 Tablet by mouth two (2) times a day. (Patient not taking: No sig reported) 60 Tablet 5    calcium citrate/vitamin D3 (CALCIUM CITRATE + D PO) Take 2 Tablets by mouth daily. (Patient not taking: Reported on 2/13/2023)      iron,carbonyl-FA-multivit-min (Opurity Multivitamin) 30 mg iron- 800 mcg chew Take 2 Tablets by mouth daily. calcium-cholecalciferol, d3, (CALCIUM 600 + D) 600-125 mg-unit tab Take  by mouth daily.  (Patient not taking: Reported on 8/17/2022)       Allergies   Allergen Reactions    Pcn [Penicillins] Rash       Family History   Problem Relation Age of Onset Hypertension Mother     Other Mother         gastric bypass    High Cholesterol Father     Heart Disease Father     Hypertension Father     Diabetes Paternal Aunt     Hypertension Maternal Grandmother     COPD Maternal Grandmother     Emphysema Maternal Grandmother     Stroke Maternal Grandmother     Diabetes Paternal Grandmother     Stroke Paternal Grandmother     No Known Problems Sister     No Known Problems Brother     Anesth Problems Neg Hx      Social History     Tobacco Use    Smoking status: Never    Smokeless tobacco: Never   Substance Use Topics    Alcohol use: Yes     Comment: OCCASIONALLY         Yumiko Rodriges MD

## 2023-04-25 NOTE — PROGRESS NOTES
Weight Loss Progress Note: follow up Physician Visit      Billie Agudelo is a 28 y.o. female  who is here for her follow up  Evaluation for the medical bariatric care. CC: I want to be healthier    Weight History  Current weight 129 lb  ( march 135)and BMI is Body mass index is 24.47 kg/m². Goal weight BMI 24,   Highest weight 174   (See weight gain time line scanned into media section of chart)    Hunger control: good    Significant Medical History    Update on sleep apnea and  CPAP no cpap, frequent interruptions    Ever had bariatric surgery: no    Pregnant or planning on becoming pregnant w/in 6 months: no         Significant Psychosocial History     Current Major Lifestyle Changes: no  Any potential unsupportive people: no          Social History  Social History     Tobacco Use    Smoking status: Never    Smokeless tobacco: Never   Substance Use Topics    Alcohol use: Yes     Comment: OCCASIONALLY     How many times a week do you eat out?  0-1    Do you drink any EtOH?  no   If so, how much? Do you have upcoming any travel in the next 6 weeks?  no   If so, what do you have planned? Exercise  How many days a week do you currently exercise? 5 days  Have you ever been told by a physician not to exercise?         Objective  Visit Vitals  /66 (BP 1 Location: Right arm, BP Patient Position: Sitting, BP Cuff Size: Adult)   Pulse 94   Temp 98.4 °F (36.9 °C) (Oral)   Resp 18   Ht 5' 1\" (1.549 m)   Wt 129 lb 8 oz (58.7 kg)   LMP 04/20/2023 (Exact Date)   SpO2 98%   BMI 24.47 kg/m²         Waist Circumference: See Weight Management Doc Flowsheet  Neck Circumference: See Weight Management Doc Flowsheet  Percent Body Fat: See Weight Management Doc Flowsheet  Weight Metrics 4/25/2023 4/25/2023 4/25/2023 3/13/2023 2/13/2023 10/19/2022 9/13/2022   Weight - 128 lb 129 lb 8 oz 135 lb 118 lb 3.2 oz 132 lb 138 lb   Neck Circ (inches) 13.5 - - - - - -   Waist Measure Inches 30 - - - - - -   Body Fat % 25.1 - - - - - -   BMI - 24.19 kg/m2 24.47 kg/m2 25.51 kg/m2 22.33 kg/m2 25.78 kg/m2 26.95 kg/m2         Labs: had blood drawn by pcp today    Review of Systems  Complete ROS negative except where noted above      Physical Exam    Vital Signs Reviewed  Weight Management Metrics Reviewed    Vital Signs Reviewed  Appearance: Obese, A&O x 3, NAD  HEENT:  NC/AT, EOMI, PERRL, No scleral icterus, malampatti score:  Skin:    Skin tags - no   Acanthosis Nigricans - no  Neck:  No nodes, thyromegaly   Heart:  RRR without M/R/G  Lungs:  CTAB, no rhonchi, rales, or wheezes with good air exchange   Abdomen:  Non-tender, pos bowel sounds; hepatomegaly -   Ext:  No C/C/E        Assessment & Plan  Diagnoses and all orders for this visit:    1. Encounter for weight management  Diet: contnue the low carb low allyn diet maintenance     Activity: exercise 300 mins a week    Medication: continue semaglutide  2. H/O: obesity    3. Insomnia, unspecified type  Doing better, sleeping 6-7 hours now  4. Blood glucose elevated  Continue semaglutide ,  1 mg per week  5. Vitamin D deficiency  Monitor and treat as indicated            Based on his history and exam, Lizy Doe is a good candidate for the  [facility]     There are no Patient Instructions on file for this visit. The primary encounter diagnosis was Encounter for weight management. Diagnoses of H/O: obesity, Insomnia, unspecified type, Blood glucose elevated, and Vitamin D deficiency were also pertinent to this visit.   The patient verbalizes understanding and agrees with the plan of care

## 2023-04-25 NOTE — PATIENT INSTRUCTIONS
Medicare Wellness Visit, Female     The best way to live healthy is to have a lifestyle where you eat a well-balanced diet, exercise regularly, limit alcohol use, and quit all forms of tobacco/nicotine, if applicable. Regular preventive services are another way to keep healthy. Preventive services (vaccines, screening tests, monitoring & exams) can help personalize your care plan, which helps you manage your own care. Screening tests can find health problems at the earliest stages, when they are easiest to treat. [facility] follows the current, evidence-based guidelines published by the [facility] Tito Espinoza ([facility]) when recommending preventive services for our patients. Because we follow these guidelines, sometimes recommendations change over time as research supports it. (For example, mammograms used to be recommended annually. Even though Medicare will still pay for an annual mammogram, the newer guidelines recommend a mammogram every two years for women of average risk). Of course, you and your doctor may decide to screen more often for some diseases, based on your risk and your co-morbidities (chronic disease you are already diagnosed with). Preventive services for you include:  - Medicare offers their members a free annual wellness visit, which is time for you and your primary care provider to discuss and plan for your preventive service needs.  Take advantage of this benefit every year!    -Over the age of 72 should receive the recommended pneumonia vaccines.    -All adults should have a flu vaccine yearly.  -All adults should have a tetanus vaccine every 10 years.   -Over the age 48 should receive the shingles vaccines.        -All adults should be screened once for Hepatitis C.  -All adults age 38-68 who are overweight should have a diabetes screening test once every three years.   -Other screening tests and preventive services for persons with diabetes include: an eye exam to screen for diabetic retinopathy, a kidney function test, a foot exam, and stricter control over your cholesterol.   -Cardiovascular screening for adults with routine risk involves an electrocardiogram (ECG) at intervals determined by your doctor.     -Colorectal cancer screenings should be done for adults age 39-70 with no increased risk factors for colorectal cancer. There are a number of acceptable methods of screening for this type of cancer. Each test has its own benefits and drawbacks. Discuss with your doctor what is most appropriate for you during your annual wellness visit. The different tests include: colonoscopy (considered the best screening method), a fecal occult blood test, a fecal DNA test, and sigmoidoscopy.    -Lung cancer screening is recommended annually with a low dose CT scan for adults between age 54 and 68, who have smoked at least 30 pack years (equivalent of 1 pack per day for 30 days), and who is a current smoker or quit less than 15 years ago.    -A bone mass density test is recommended when a woman turns 65 to screen for osteoporosis. This test is only recommended one time, as a screening. Some providers will use this same test as a disease monitoring tool if you already have osteoporosis. -Breast cancer screenings are recommended every other year for women of normal risk, age 54-69.    -Cervical cancer screenings for women over age 72 are only recommended with certain risk factors.      Here is a list of your current Health Maintenance items (your personalized list of preventive services) with a due date:  Health Maintenance Due   Topic Date Due    Chickenpox Vaccine (1 of 2 - 2-dose childhood series) Never done

## 2023-04-25 NOTE — PROGRESS NOTES
Identified pt with two pt identifiers (name and ). Reviewed chart in preparation for visit and have obtained necessary documentation. Koik Larson is a 28 y.o. female  Chief Complaint   Patient presents with    Weight Management   1 month follow up. Visit Vitals  /66 (BP 1 Location: Right arm, BP Patient Position: Sitting, BP Cuff Size: Adult)   Pulse 94   Temp 98.4 °F (36.9 °C) (Oral)   Resp 18   Ht 5' 1\" (1.549 m)   Wt 129 lb 8 oz (58.7 kg)   LMP 2023 (Exact Date)   SpO2 98%   BMI 24.47 kg/m²       1. Have you been to the ER, urgent care clinic since your last visit? Hospitalized since your last visit? No    2. Have you seen or consulted any other health care providers outside of the [address] [place] [address] since your last visit? Include any pap smears or colon screening.  No    BMI - 24.3

## 2023-04-25 NOTE — PROGRESS NOTES
Chief Complaint   Patient presents with    Physical    Annual Wellness Visit       1. \"Have you been to the ER, urgent care clinic since your last visit? Hospitalized since your last visit? \" No    2. \"Have you seen or consulted any other health care providers outside of the [address] [place] [address] since your last visit? \" No     3. For patients aged 39-70: Has the patient had a colonoscopy / FIT/ Cologuard? NA - based on age      If the patient is female:    4. For patients aged 41-77: Has the patient had a mammogram within the past 2 years? NA - based on age or sex      11. For patients aged 21-65: Has the patient had a pap smear? Yes - no Care Gap present    Health Maintenance Due   Topic Date Due    Varicella Vaccine (1 of 2 - 2-dose childhood series) Never done    Cervical cancer screen  01/28/2021   Verified patient with two type of identifiers.

## 2023-04-26 LAB
25(OH)D3 SERPL-MCNC: 19.3 NG/ML (ref 30–100)
ALBUMIN SERPL-MCNC: 3.8 G/DL (ref 3.5–5)
ALBUMIN/GLOB SERPL: 1.2 (ref 1.1–2.2)
ALP SERPL-CCNC: 71 U/L (ref 45–117)
ALT SERPL-CCNC: 24 U/L (ref 12–78)
ANION GAP SERPL CALC-SCNC: 3 MMOL/L (ref 5–15)
APPEARANCE UR: CLEAR
AST SERPL-CCNC: 24 U/L (ref 15–37)
BACTERIA URNS QL MICRO: NEGATIVE /HPF
BILIRUB SERPL-MCNC: 0.2 MG/DL (ref 0.2–1)
BILIRUB UR QL: NEGATIVE
BUN SERPL-MCNC: 8 MG/DL (ref 6–20)
BUN/CREAT SERPL: 11 (ref 12–20)
CALCIUM SERPL-MCNC: 9.4 MG/DL (ref 8.5–10.1)
CHLORIDE SERPL-SCNC: 106 MMOL/L (ref 97–108)
CHOLEST SERPL-MCNC: 192 MG/DL
CO2 SERPL-SCNC: 28 MMOL/L (ref 21–32)
COLOR UR: ABNORMAL
CREAT SERPL-MCNC: 0.7 MG/DL (ref 0.55–1.02)
EPITH CASTS URNS QL MICRO: ABNORMAL /LPF
ERYTHROCYTE [DISTWIDTH] IN BLOOD BY AUTOMATED COUNT: 13.2 % (ref 11.5–14.5)
FOLATE SERPL-MCNC: 32.2 NG/ML (ref 5–21)
GLOBULIN SER CALC-MCNC: 3.3 G/DL (ref 2–4)
GLUCOSE SERPL-MCNC: 76 MG/DL (ref 65–100)
GLUCOSE UR STRIP.AUTO-MCNC: NEGATIVE MG/DL
HCT VFR BLD AUTO: 41.6 % (ref 35–47)
HDLC SERPL-MCNC: 73 MG/DL
HDLC SERPL: 2.6 (ref 0–5)
HGB BLD-MCNC: 13 G/DL (ref 11.5–16)
HGB UR QL STRIP: ABNORMAL
HYALINE CASTS URNS QL MICRO: ABNORMAL /LPF (ref 0–5)
KETONES UR QL STRIP.AUTO: NEGATIVE MG/DL
LDLC SERPL CALC-MCNC: 107.6 MG/DL (ref 0–100)
LEUKOCYTE ESTERASE UR QL STRIP.AUTO: NEGATIVE
MCH RBC QN AUTO: 26.6 PG (ref 26–34)
MCHC RBC AUTO-ENTMCNC: 31.3 G/DL (ref 30–36.5)
MCV RBC AUTO: 85.1 FL (ref 80–99)
NITRITE UR QL STRIP.AUTO: NEGATIVE
NRBC # BLD: 0 K/UL (ref 0–0.01)
NRBC BLD-RTO: 0 PER 100 WBC
PH UR STRIP: 7.5 (ref 5–8)
PLATELET # BLD AUTO: 348 K/UL (ref 150–400)
PMV BLD AUTO: 10.2 FL (ref 8.9–12.9)
POTASSIUM SERPL-SCNC: 4 MMOL/L (ref 3.5–5.1)
PROT SERPL-MCNC: 7.1 G/DL (ref 6.4–8.2)
PROT UR STRIP-MCNC: NEGATIVE MG/DL
RBC # BLD AUTO: 4.89 M/UL (ref 3.8–5.2)
RBC #/AREA URNS HPF: ABNORMAL /HPF (ref 0–5)
SODIUM SERPL-SCNC: 137 MMOL/L (ref 136–145)
SP GR UR REFRACTOMETRY: 1.01 (ref 1–1.03)
TRIGL SERPL-MCNC: 57 MG/DL (ref ?–150)
TSH SERPL DL<=0.05 MIU/L-ACNC: 2.2 UIU/ML (ref 0.36–3.74)
UROBILINOGEN UR QL STRIP.AUTO: 0.2 EU/DL (ref 0.2–1)
VIT B12 SERPL-MCNC: >2000 PG/ML (ref 193–986)
VLDLC SERPL CALC-MCNC: 11.4 MG/DL
WBC # BLD AUTO: 3.4 K/UL (ref 3.6–11)
WBC URNS QL MICRO: ABNORMAL /HPF (ref 0–4)

## 2023-04-28 ENCOUNTER — PATIENT MESSAGE (OUTPATIENT)
Dept: SURGERY | Age: 33
End: 2023-04-28

## 2023-04-28 DIAGNOSIS — E55.9 VITAMIN D DEFICIENCY: Primary | ICD-10-CM

## 2023-04-28 LAB — VIT B1 BLD-SCNC: 87.7 NMOL/L (ref 66.5–200)

## 2023-04-28 RX ORDER — ERGOCALCIFEROL 1.25 MG/1
50000 CAPSULE ORAL
Qty: 4 CAPSULE | Refills: 11 | Status: SHIPPED | OUTPATIENT
Start: 2023-04-28

## 2023-05-01 LAB — VIT B6 SERPL-MCNC: 4.4 UG/L (ref 3.4–65.2)

## 2023-05-02 DIAGNOSIS — Z86.39 H/O: OBESITY: Primary | ICD-10-CM

## 2023-05-02 RX ORDER — SEMAGLUTIDE 1.7 MG/.75ML
1.7 INJECTION, SOLUTION SUBCUTANEOUS
Qty: 4 EACH | Refills: 0 | Status: SHIPPED | OUTPATIENT
Start: 2023-05-02 | End: 2023-05-02

## 2023-05-02 RX ORDER — SEMAGLUTIDE 1.7 MG/.75ML
1.7 INJECTION, SOLUTION SUBCUTANEOUS
Qty: 4 EACH | Refills: 0 | Status: SHIPPED | OUTPATIENT
Start: 2023-05-02

## 2023-05-17 RX ORDER — ERGOCALCIFEROL 1.25 MG/1
50000 CAPSULE ORAL
COMMUNITY
Start: 2023-04-28

## 2023-05-17 RX ORDER — FERROUS SULFATE 325(65) MG
TABLET ORAL
COMMUNITY
End: 2023-07-15

## 2023-05-17 RX ORDER — SEMAGLUTIDE 1.7 MG/.75ML
1.7 INJECTION, SOLUTION SUBCUTANEOUS
COMMUNITY
Start: 2023-05-02 | End: 2023-06-01 | Stop reason: SDUPTHER

## 2023-05-17 RX ORDER — ASCORBIC ACID 500 MG
1000 TABLET ORAL DAILY
COMMUNITY
End: 2023-07-05

## 2023-05-17 RX ORDER — CITALOPRAM 10 MG/1
15 TABLET ORAL DAILY
COMMUNITY
Start: 2022-10-28

## 2023-05-31 ENCOUNTER — PATIENT MESSAGE (OUTPATIENT)
Age: 33
End: 2023-05-31

## 2023-05-31 NOTE — TELEPHONE ENCOUNTER
Pt is requesting a refill of the Wegovy 1.7 mg to be send to [facility] that is listed in the chart. Forwarded request to Provider.

## 2023-06-01 RX ORDER — SEMAGLUTIDE 1.7 MG/.75ML
1.7 INJECTION, SOLUTION SUBCUTANEOUS
Qty: 3 ML | Refills: 0 | OUTPATIENT
Start: 2023-06-01

## 2023-06-05 RX ORDER — SEMAGLUTIDE 1.7 MG/.75ML
1.7 INJECTION, SOLUTION SUBCUTANEOUS
Qty: 3 ML | Refills: 0 | Status: CANCELLED | OUTPATIENT
Start: 2023-06-05

## 2023-06-05 NOTE — TELEPHONE ENCOUNTER
Dr. Brady Real requested to phone in the prescription Wegovy 1.7 mg subq every 7 days to the [facility] as per order from 06/01/2023. Spoke with Namita SCHWARTZ Pharmacist verified using 2 patient identifiers. Phoned in the Rx Wegovy 1.7 mg  subq every 7 days with no refills.

## 2023-06-05 NOTE — TELEPHONE ENCOUNTER
----- Message from [facility] sent at 6/5/2023  8:11 AM EDT -----  Regarding: Refills   Contact: [phone]  Good morning   I called the pharmacy Friday they said they have not received a prescription.

## 2023-07-05 RX ORDER — SEMAGLUTIDE 1.7 MG/.75ML
1.7 INJECTION, SOLUTION SUBCUTANEOUS
Qty: 3 ML | Refills: 0 | OUTPATIENT
Start: 2023-07-05

## 2023-07-06 ENCOUNTER — ANESTHESIA (OUTPATIENT)
Facility: HOSPITAL | Age: 33
End: 2023-07-06
Payer: COMMERCIAL

## 2023-07-06 ENCOUNTER — HOSPITAL ENCOUNTER (OUTPATIENT)
Facility: HOSPITAL | Age: 33
Setting detail: OUTPATIENT SURGERY
Discharge: HOME | End: 2023-07-06
Attending: INTERNAL MEDICINE | Admitting: INTERNAL MEDICINE
Payer: COMMERCIAL

## 2023-07-06 ENCOUNTER — ANESTHESIA EVENT (OUTPATIENT)
Facility: HOSPITAL | Age: 33
End: 2023-07-06
Payer: COMMERCIAL

## 2023-07-06 VITALS
HEART RATE: 84 BPM | DIASTOLIC BLOOD PRESSURE: 71 MMHG | RESPIRATION RATE: 15 BRPM | SYSTOLIC BLOOD PRESSURE: 130 MMHG | TEMPERATURE: 97.8 F | WEIGHT: 125 LBS | BODY MASS INDEX: 24.54 KG/M2 | OXYGEN SATURATION: 100 % | HEIGHT: 60 IN

## 2023-07-06 LAB — HCG UR QL: NEGATIVE

## 2023-07-06 PROCEDURE — 7100000011 HC PHASE II RECOVERY - ADDTL 15 MIN: Performed by: INTERNAL MEDICINE

## 2023-07-06 PROCEDURE — C1726 CATH, BAL DIL, NON-VASCULAR: HCPCS | Performed by: INTERNAL MEDICINE

## 2023-07-06 PROCEDURE — 2580000003 HC RX 258: Performed by: NURSE ANESTHETIST, CERTIFIED REGISTERED

## 2023-07-06 PROCEDURE — 7100000010 HC PHASE II RECOVERY - FIRST 15 MIN: Performed by: INTERNAL MEDICINE

## 2023-07-06 PROCEDURE — 3600007502: Performed by: INTERNAL MEDICINE

## 2023-07-06 PROCEDURE — 3700000000 HC ANESTHESIA ATTENDED CARE: Performed by: INTERNAL MEDICINE

## 2023-07-06 PROCEDURE — 6360000002 HC RX W HCPCS: Performed by: NURSE ANESTHETIST, CERTIFIED REGISTERED

## 2023-07-06 PROCEDURE — 88305 TISSUE EXAM BY PATHOLOGIST: CPT

## 2023-07-06 PROCEDURE — 3600007512: Performed by: INTERNAL MEDICINE

## 2023-07-06 PROCEDURE — 3700000001 HC ADD 15 MINUTES (ANESTHESIA): Performed by: INTERNAL MEDICINE

## 2023-07-06 PROCEDURE — 2580000003 HC RX 258: Performed by: INTERNAL MEDICINE

## 2023-07-06 PROCEDURE — 2500000003 HC RX 250 WO HCPCS: Performed by: NURSE ANESTHETIST, CERTIFIED REGISTERED

## 2023-07-06 PROCEDURE — 2709999900 HC NON-CHARGEABLE SUPPLY: Performed by: INTERNAL MEDICINE

## 2023-07-06 PROCEDURE — 81025 URINE PREGNANCY TEST: CPT

## 2023-07-06 RX ORDER — DICYCLOMINE HYDROCHLORIDE 10 MG/1
CAPSULE ORAL
COMMUNITY
Start: 2023-06-14

## 2023-07-06 RX ORDER — SODIUM CHLORIDE 9 MG/ML
25 INJECTION, SOLUTION INTRAVENOUS PRN
Status: DISCONTINUED | OUTPATIENT
Start: 2023-07-06 | End: 2023-07-06 | Stop reason: HOSPADM

## 2023-07-06 RX ORDER — SODIUM CHLORIDE 0.9 % (FLUSH) 0.9 %
5-40 SYRINGE (ML) INJECTION PRN
Status: DISCONTINUED | OUTPATIENT
Start: 2023-07-06 | End: 2023-07-06 | Stop reason: HOSPADM

## 2023-07-06 RX ORDER — LIDOCAINE HYDROCHLORIDE 20 MG/ML
INJECTION, SOLUTION EPIDURAL; INFILTRATION; INTRACAUDAL; PERINEURAL PRN
Status: DISCONTINUED | OUTPATIENT
Start: 2023-07-06 | End: 2023-07-06 | Stop reason: SDUPTHER

## 2023-07-06 RX ORDER — VALACYCLOVIR HYDROCHLORIDE 500 MG/1
TABLET, FILM COATED ORAL
COMMUNITY
Start: 2021-02-04

## 2023-07-06 RX ORDER — SODIUM CHLORIDE 0.9 % (FLUSH) 0.9 %
5-40 SYRINGE (ML) INJECTION EVERY 12 HOURS SCHEDULED
Status: DISCONTINUED | OUTPATIENT
Start: 2023-07-06 | End: 2023-07-06 | Stop reason: HOSPADM

## 2023-07-06 RX ORDER — HYDROXYZINE HYDROCHLORIDE 25 MG/1
TABLET, FILM COATED ORAL
COMMUNITY
Start: 2023-06-08

## 2023-07-06 RX ORDER — 0.9 % SODIUM CHLORIDE 0.9 %
INTRAVENOUS SOLUTION INTRAVENOUS PRN
Status: DISCONTINUED | OUTPATIENT
Start: 2023-07-06 | End: 2023-07-06 | Stop reason: SDUPTHER

## 2023-07-06 RX ADMIN — PROPOFOL 50 MG: 10 INJECTION, EMULSION INTRAVENOUS at 14:30

## 2023-07-06 RX ADMIN — PROPOFOL 50 MG: 10 INJECTION, EMULSION INTRAVENOUS at 14:44

## 2023-07-06 RX ADMIN — LIDOCAINE HYDROCHLORIDE 100 MG: 20 INJECTION, SOLUTION EPIDURAL; INFILTRATION; INTRACAUDAL; PERINEURAL at 14:27

## 2023-07-06 RX ADMIN — PROPOFOL 50 MG: 10 INJECTION, EMULSION INTRAVENOUS at 14:34

## 2023-07-06 RX ADMIN — SODIUM CHLORIDE 500 ML: 900 INJECTION, SOLUTION INTRAVENOUS at 14:47

## 2023-07-06 RX ADMIN — PROPOFOL 50 MG: 10 INJECTION, EMULSION INTRAVENOUS at 14:39

## 2023-07-06 RX ADMIN — SODIUM CHLORIDE 25 ML: 9 INJECTION, SOLUTION INTRAVENOUS at 12:57

## 2023-07-06 RX ADMIN — PROPOFOL 150 MG: 10 INJECTION, EMULSION INTRAVENOUS at 14:27

## 2023-07-06 ASSESSMENT — PAIN - FUNCTIONAL ASSESSMENT
PAIN_FUNCTIONAL_ASSESSMENT: NONE - DENIES PAIN
PAIN_FUNCTIONAL_ASSESSMENT: NONE - DENIES PAIN

## 2023-07-06 ASSESSMENT — PAIN SCALES - GENERAL: PAINLEVEL_OUTOF10: 0

## 2023-07-06 NOTE — OP NOTE
[place] Office: [phone]      Esophagogastroduodenoscopy Procedure Note      Erasmo Partida  1990  [id number]    Indication:  Change in bowel habits/diarrhea, hx of gastric sleeve surgery      : Leonora Becerril MD    Referring Provider:  Jasmin Prater MD    Sedation:  MAC anesthesia    Procedure Details:  After detailed informed consent was obtained for the procedure, with all risks and benefits of procedure explained the patient was taken to the endoscopy suite and placed in the left lateral decubitus position. Following sequential administration of sedation as per above, the endoscope was inserted into the mouth and advanced under direct vision to second portion of the duodenum. A careful inspection was made as the gastroscope was withdrawn, including a retroflexed view of the proximal stomach; findings and interventions are described below. Findings:     Esophagus: The esophageal mucosa in the proximal, mid and distal esophagus is normal.   The squamo-columnar junction is at 40 cm where the Z-line was noted. Mild GEJ erythema note and biopsied. Stomach:   Stomach has a tubular appearance with nodularity in  linear manner where the greater curvature should have been c/w hx of a previous gastric sleeve  The gastric mucosa has erythema in the body and antrum. Biopsies taken  The fundus was found to be normal with no lesions noted on retroflexion. The angularis is normal as well. Duodenum:   The bulb and post bulbar mucosa is normal in appearance. The duodenal folds are normal. Biopsies obtained. Therapies:  biopsy of esophagus  biopsy of stomach body, antrum  biopsy of duodenal distal bulb, second portion    Specimen:     Specimens were collected as described and send to the laboratory. Complications:   None were encountered during the procedure. EBL:  None. Recommendations:     -Await pathology. ,   -Follow symptoms. ,   -Follow up with primary

## 2023-07-06 NOTE — OP NOTE
Colonoscopy Procedure Note    Erasmo Partida  1990  [id number]    Indications:  Please see below. 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 Upon sequential sedation as per above, a digital rectal exam was performed  and was normal.  The Olympus videocolonoscope  was inserted in the rectum and carefully advanced to the cecum, which was identified by the ileocecal valve and appendiceal orifice. The quality of preparation was good  [place] Bowel Preparation Score:2/3/3:8 . The colonoscope was slowly withdrawn with careful evaluation between folds. Retroflexion in the rectum was performed. Findings:   The Olympus video high-definition colonoscope was advanced to the cecum, identified by its typical land marks, with ease. TI was normal to 5 cm: biopsies taken  The mucosa of the colon is normal appearing to the cecum with no obvious mucosal pathology (polyp,mass lesion, colitis etc) noted on this colonoscopy. Mucosal biopsies taken to r/o colitis. Small non-bleeding internal hemorrhoids    Therapies:  biopsy of colon : random coon and of the TI    Specimen/s:   Specimens were collected as described above and sent to pathology. Complications: None were encountered during the procedure. EBL:  None. Recommendations:     -Await pathology.  -Follow up with primary care physician. Demetra Lucia MD  7/6/2023  2:47 PM

## 2023-07-06 NOTE — H&P
Pre-endoscopy H and P    The patient was seen and examined in the room/pre-op holding area. The airway was assessed and documented. The problem list, past medical history, and medications were reviewed. Patient Active Problem List   Diagnosis    MARTA (generalized anxiety disorder)    Incompetent cervix    Thyromegaly    Insulinoma    Chronic back pain greater than 3 months duration    Degenerative lumbar spinal stenosis    S/P gastric sleeve procedure    Anxiety and depression    Abdominal panniculus, symptomatic     Social History     Socioeconomic History    Marital status: Single     Spouse name: Not on file    Number of children: Not on file    Years of education: Not on file    Highest education level: Not on file   Occupational History    Not on file   Tobacco Use    Smoking status: Never    Smokeless tobacco: Never   Vaping Use    Vaping Use: Never used   Substance and Sexual Activity    Alcohol use: Yes     Alcohol/week: 2.0 standard drinks     Types: 2 Shots of liquor per week    Drug use: Yes     Types: Marijuana (Weed)     Comment: uses 3 times a week    Sexual activity: Not on file   Other Topics Concern    Not on file   Social History Narrative    In the home with 9year-old daughter whom is well. Mother will be assisting after surgery. 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      Social Determinants of Health     Financial Resource Strain: Low Risk     Difficulty of Paying Living Expenses: Not hard at all   Food Insecurity: No Food Insecurity    Worried About Running Out of Food in the Last Year: Never true    Ran Out of Food in the Last Year: Never true   Transportation Needs: Not on file   Physical Activity: Not on file   Stress: Not on file   Social Connections: Not on file   Intimate Partner Violence: Not on file   Housing Stability: Not on file     Past Medical History:   Diagnosis Date    Chronic back pain greater than 3 months duration 7/29/2015    COVID-19

## 2023-07-06 NOTE — PROGRESS NOTES
12:21 PM    Manometry procedure finished. Able to expel balloon. Will transport pt to pre-procedure via stretcher.

## 2023-07-07 NOTE — ANESTHESIA POSTPROCEDURE EVALUATION
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     Solomon Phase I: Solomon Score: 10    Solomon Phase II: Solomon Score: 10      Anesthesia Post Evaluation    Patient location during evaluation: PACU  Patient participation: complete - patient participated  Level of consciousness: awake and alert  Airway patency: patent  Nausea & Vomiting: no nausea  Complications: no  Cardiovascular status: hemodynamically stable  Respiratory status: acceptable  Hydration status: euvolemic

## 2023-07-12 ENCOUNTER — TELEMEDICINE (OUTPATIENT)
Age: 33
End: 2023-07-12
Payer: COMMERCIAL

## 2023-07-12 ENCOUNTER — TELEPHONE (OUTPATIENT)
Age: 33
End: 2023-07-12

## 2023-07-12 VITALS
SYSTOLIC BLOOD PRESSURE: 130 MMHG | DIASTOLIC BLOOD PRESSURE: 71 MMHG | WEIGHT: 125 LBS | BODY MASS INDEX: 24.54 KG/M2 | HEART RATE: 84 BPM | HEIGHT: 60 IN

## 2023-07-12 DIAGNOSIS — Z90.3 ACQUIRED ABSENCE OF STOMACH (PART OF): ICD-10-CM

## 2023-07-12 DIAGNOSIS — R73.9 HYPERGLYCEMIA, UNSPECIFIED: ICD-10-CM

## 2023-07-12 DIAGNOSIS — E66.09 CLASS 1 OBESITY DUE TO EXCESS CALORIES WITH SERIOUS COMORBIDITY AND BODY MASS INDEX (BMI) OF 30.0 TO 30.9 IN ADULT: Primary | ICD-10-CM

## 2023-07-12 PROCEDURE — 99214 OFFICE O/P EST MOD 30 MIN: CPT | Performed by: FAMILY MEDICINE

## 2023-07-12 RX ORDER — SEMAGLUTIDE 1.7 MG/.75ML
1.7 INJECTION, SOLUTION SUBCUTANEOUS
Qty: 3 ML | Refills: 2 | Status: SHIPPED | OUTPATIENT
Start: 2023-07-12

## 2023-07-12 ASSESSMENT — PATIENT HEALTH QUESTIONNAIRE - PHQ9
SUM OF ALL RESPONSES TO PHQ QUESTIONS 1-9: 0
9. THOUGHTS THAT YOU WOULD BE BETTER OFF DEAD, OR OF HURTING YOURSELF: 0
1. LITTLE INTEREST OR PLEASURE IN DOING THINGS: 0
5. POOR APPETITE OR OVEREATING: 0
2. FEELING DOWN, DEPRESSED OR HOPELESS: 0
SUM OF ALL RESPONSES TO PHQ QUESTIONS 1-9: 0
SUM OF ALL RESPONSES TO PHQ QUESTIONS 1-9: 0
6. FEELING BAD ABOUT YOURSELF - OR THAT YOU ARE A FAILURE OR HAVE LET YOURSELF OR YOUR FAMILY DOWN: 0
3. TROUBLE FALLING OR STAYING ASLEEP: 0
4. FEELING TIRED OR HAVING LITTLE ENERGY: 0
7. TROUBLE CONCENTRATING ON THINGS, SUCH AS READING THE NEWSPAPER OR WATCHING TELEVISION: 0
8. MOVING OR SPEAKING SO SLOWLY THAT OTHER PEOPLE COULD HAVE NOTICED. OR THE OPPOSITE, BEING SO FIGETY OR RESTLESS THAT YOU HAVE BEEN MOVING AROUND A LOT MORE THAN USUAL: 0
SUM OF ALL RESPONSES TO PHQ QUESTIONS 1-9: 0
SUM OF ALL RESPONSES TO PHQ9 QUESTIONS 1 & 2: 0

## 2023-08-02 ENCOUNTER — CLINICAL DOCUMENTATION (OUTPATIENT)
Age: 33
End: 2023-08-02

## 2023-08-02 NOTE — PROGRESS NOTES
Prior Authorization for [place]win 1.7mg/0.75ml sent to Jorge Miller via Cover  Meds. Key # [id number]    ID # [id number]    Awaiting determination.

## 2023-08-03 NOTE — PROGRESS NOTES
Prior Authorization for [place] 1.7mg/0.75ml APPROVED by [employer].     Duration:  6 months  8/3/2023 - 2/3/2024    PA# [id number] [facility] [place] [facility]) [id number]     Patient notified

## 2023-08-22 ENCOUNTER — TELEMEDICINE (OUTPATIENT)
Age: 33
End: 2023-08-22
Payer: COMMERCIAL

## 2023-08-22 DIAGNOSIS — L65.9 ALOPECIA: Primary | ICD-10-CM

## 2023-08-22 PROCEDURE — 99213 OFFICE O/P EST LOW 20 MIN: CPT | Performed by: FAMILY MEDICINE

## 2023-08-22 RX ORDER — SPIRONOLACTONE 100 MG/1
100 TABLET, FILM COATED ORAL 2 TIMES DAILY
Qty: 60 TABLET | Refills: 1 | Status: SHIPPED | OUTPATIENT
Start: 2023-08-22

## 2023-08-22 RX ORDER — ATOMOXETINE 40 MG/1
40 CAPSULE ORAL DAILY
COMMUNITY
Start: 2023-08-10

## 2023-08-22 NOTE — PROGRESS NOTES
Chief Complaint   Patient presents with    Alopecia       1. Have you been to the ER, urgent care clinic since your last visit? Hospitalized since your last visit? No    2. Have you seen or consulted any other health care providers outside of the [address] [place] [address] Avenue since your last visit? Include any pap smears or colon screening.  No       Health Maintenance Due   Topic Date Due    Varicella vaccine (1 of 2 - 2-dose childhood series) Never done    Pneumococcal 0-64 years Vaccine (1 - PCV) Never done    Diabetic foot exam  Never done    Diabetic Alb to Cr ratio (uACR) test  Never done    Diabetic retinal exam  Never done    Hepatitis B vaccine (1 of 3 - Risk 3-dose series) Never done    COVID-19 Vaccine (4 - Booster for Moderna series) 2022    Flu vaccine (1) 2023      The patient, Vanesa Fultondillon Negrete, identity was verified by name and

## 2023-08-30 ENCOUNTER — OFFICE VISIT (OUTPATIENT)
Age: 33
End: 2023-08-30

## 2023-08-30 VITALS
SYSTOLIC BLOOD PRESSURE: 117 MMHG | HEIGHT: 60 IN | TEMPERATURE: 98.5 F | BODY MASS INDEX: 24.42 KG/M2 | DIASTOLIC BLOOD PRESSURE: 85 MMHG | WEIGHT: 124.4 LBS | OXYGEN SATURATION: 98 % | RESPIRATION RATE: 18 BRPM | HEART RATE: 94 BPM

## 2023-08-30 DIAGNOSIS — Z20.2 STD EXPOSURE: ICD-10-CM

## 2023-08-30 DIAGNOSIS — N30.01 ACUTE CYSTITIS WITH HEMATURIA: Primary | ICD-10-CM

## 2023-08-30 LAB
BILIRUBIN, URINE, POC: ABNORMAL
BLOOD URINE, POC: ABNORMAL
GLUCOSE URINE, POC: NEGATIVE
KETONES, URINE, POC: ABNORMAL
LEUKOCYTE ESTERASE, URINE, POC: ABNORMAL
NITRITE, URINE, POC: NEGATIVE
PH, URINE, POC: 7 (ref 4.6–8)
PROTEIN,URINE, POC: ABNORMAL
SPECIFIC GRAVITY, URINE, POC: 1.02 (ref 1–1.03)
URINALYSIS CLARITY, POC: ABNORMAL
URINALYSIS COLOR, POC: YELLOW
UROBILINOGEN, POC: ABNORMAL

## 2023-08-30 RX ORDER — PHENAZOPYRIDINE HYDROCHLORIDE 200 MG/1
200 TABLET, FILM COATED ORAL 3 TIMES DAILY PRN
Qty: 20 TABLET | Refills: 0 | Status: SHIPPED | OUTPATIENT
Start: 2023-08-30 | End: 2023-09-06

## 2023-08-30 RX ORDER — SULFAMETHOXAZOLE AND TRIMETHOPRIM 800; 160 MG/1; MG/1
1 TABLET ORAL 2 TIMES DAILY
Qty: 20 TABLET | Refills: 0 | Status: SHIPPED | OUTPATIENT
Start: 2023-08-30 | End: 2023-09-09

## 2023-08-30 ASSESSMENT — ENCOUNTER SYMPTOMS
GASTROINTESTINAL NEGATIVE: 1
EYES NEGATIVE: 1
ALLERGIC/IMMUNOLOGIC NEGATIVE: 1
RESPIRATORY NEGATIVE: 1

## 2023-09-01 ENCOUNTER — TELEPHONE (OUTPATIENT)
Age: 33
End: 2023-09-01

## 2023-09-01 NOTE — TELEPHONE ENCOUNTER
Returning patient call about test results; adv that specimen is still in testing and once there is a result we would be reaching out.

## 2023-09-02 LAB
BACTERIA SPEC CULT: ABNORMAL
CC UR VC: ABNORMAL
SERVICE CMNT-IMP: ABNORMAL

## 2023-09-03 LAB
C TRACH RRNA SPEC QL NAA+PROBE: NEGATIVE
N GONORRHOEA RRNA SPEC QL NAA+PROBE: NEGATIVE
SPECIMEN SOURCE: NORMAL
T VAGINALIS RRNA SPEC QL NAA+PROBE: NEGATIVE

## 2023-09-04 ENCOUNTER — TELEPHONE (OUTPATIENT)
Age: 33
End: 2023-09-04

## 2023-09-04 NOTE — TELEPHONE ENCOUNTER
Pt called wanted to know if her culture was sensitive to the sulfamethoxazole-trimethoprim given , inf yes pt states her cycle is soon to come she no longer has burning but thinks her cycle is the cause of feeling a little off down there , pt inf to call back if not better

## 2023-09-18 ENCOUNTER — HOSPITAL ENCOUNTER (OUTPATIENT)
Facility: HOSPITAL | Age: 33
Discharge: HOME | End: 2023-09-21
Payer: COMMERCIAL

## 2023-09-18 ENCOUNTER — OFFICE VISIT (OUTPATIENT)
Age: 33
End: 2023-09-18

## 2023-09-18 VITALS
DIASTOLIC BLOOD PRESSURE: 77 MMHG | HEART RATE: 81 BPM | OXYGEN SATURATION: 99 % | SYSTOLIC BLOOD PRESSURE: 113 MMHG | WEIGHT: 123.8 LBS | TEMPERATURE: 98.6 F | HEIGHT: 60 IN | RESPIRATION RATE: 18 BRPM | BODY MASS INDEX: 24.3 KG/M2

## 2023-09-18 DIAGNOSIS — E78.00 HYPERCHOLESTEROLEMIA: ICD-10-CM

## 2023-09-18 DIAGNOSIS — E66.3 OVERWEIGHT: Primary | ICD-10-CM

## 2023-09-18 DIAGNOSIS — E66.09 CLASS 1 OBESITY DUE TO EXCESS CALORIES WITH SERIOUS COMORBIDITY AND BODY MASS INDEX (BMI) OF 30.0 TO 30.9 IN ADULT: ICD-10-CM

## 2023-09-18 DIAGNOSIS — Z90.3 S/P GASTRIC SLEEVE PROCEDURE: ICD-10-CM

## 2023-09-18 DIAGNOSIS — E66.3 OVERWEIGHT: ICD-10-CM

## 2023-09-18 DIAGNOSIS — E55.9 VITAMIN D DEFICIENCY: ICD-10-CM

## 2023-09-18 LAB
ALBUMIN SERPL-MCNC: 3.3 G/DL (ref 3.5–5)
ALBUMIN/GLOB SERPL: 1 (ref 1.1–2.2)
ALP SERPL-CCNC: 68 U/L (ref 45–117)
ALT SERPL-CCNC: 15 U/L (ref 12–78)
ANION GAP SERPL CALC-SCNC: 2 MMOL/L (ref 5–15)
AST SERPL W P-5'-P-CCNC: 21 U/L (ref 15–37)
BILIRUB SERPL-MCNC: 0.2 MG/DL (ref 0.2–1)
BUN SERPL-MCNC: 7 MG/DL (ref 6–20)
BUN/CREAT SERPL: 10 (ref 12–20)
CA-I BLD-MCNC: 8.6 MG/DL (ref 8.5–10.1)
CHLORIDE SERPL-SCNC: 106 MMOL/L (ref 97–108)
CHOLEST SERPL-MCNC: 142 MG/DL
CO2 SERPL-SCNC: 30 MMOL/L (ref 21–32)
CREAT SERPL-MCNC: 0.7 MG/DL (ref 0.55–1.02)
GLOBULIN SER CALC-MCNC: 3.3 G/DL (ref 2–4)
GLUCOSE SERPL-MCNC: 79 MG/DL (ref 65–100)
HDLC SERPL-MCNC: 69 MG/DL
HDLC SERPL: 2.1 (ref 0–5)
LDLC SERPL CALC-MCNC: 65.4 MG/DL (ref 0–100)
LIPID PANEL: NORMAL
POTASSIUM SERPL-SCNC: 3.6 MMOL/L (ref 3.5–5.1)
PROT SERPL-MCNC: 6.6 G/DL (ref 6.4–8.2)
SODIUM SERPL-SCNC: 138 MMOL/L (ref 136–145)
TRIGL SERPL-MCNC: 38 MG/DL
VLDLC SERPL CALC-MCNC: 7.6 MG/DL

## 2023-09-18 PROCEDURE — 80053 COMPREHEN METABOLIC PANEL: CPT

## 2023-09-18 PROCEDURE — 82306 VITAMIN D 25 HYDROXY: CPT

## 2023-09-18 PROCEDURE — 80061 LIPID PANEL: CPT

## 2023-09-18 PROCEDURE — 36415 COLL VENOUS BLD VENIPUNCTURE: CPT

## 2023-09-18 RX ORDER — ARIPIPRAZOLE 5 MG/1
5 TABLET ORAL DAILY
COMMUNITY
Start: 2023-09-11

## 2023-09-18 RX ORDER — SEMAGLUTIDE 1.7 MG/.75ML
1.7 INJECTION, SOLUTION SUBCUTANEOUS
Qty: 3 ML | Refills: 2 | Status: SHIPPED | OUTPATIENT
Start: 2023-09-18

## 2023-09-18 ASSESSMENT — PATIENT HEALTH QUESTIONNAIRE - PHQ9
SUM OF ALL RESPONSES TO PHQ9 QUESTIONS 1 & 2: 0
1. LITTLE INTEREST OR PLEASURE IN DOING THINGS: 0
SUM OF ALL RESPONSES TO PHQ QUESTIONS 1-9: 0
2. FEELING DOWN, DEPRESSED OR HOPELESS: 0

## 2023-09-19 LAB — 25(OH)D3 SERPL-MCNC: 46.3 NG/ML (ref 30–100)

## 2023-09-26 DIAGNOSIS — E66.09 CLASS 1 OBESITY DUE TO EXCESS CALORIES WITH SERIOUS COMORBIDITY AND BODY MASS INDEX (BMI) OF 30.0 TO 30.9 IN ADULT: ICD-10-CM

## 2023-09-26 RX ORDER — SEMAGLUTIDE 1.7 MG/.75ML
1.7 INJECTION, SOLUTION SUBCUTANEOUS
Qty: 3 ML | Refills: 2 | OUTPATIENT
Start: 2023-09-26

## 2023-10-03 ENCOUNTER — TELEPHONE (OUTPATIENT)
Age: 33
End: 2023-10-03

## 2023-10-03 NOTE — TELEPHONE ENCOUNTER
Received a fax request for Prior Auth for Wegovy 1.7 mg. Noted an approval letter from 08/03 that the medication is approved through 02/03/2024. [facility] and spoke with Kimo Cornejo verified patient using 2 patient identifiers. She was informed of the above information. She states that she is not sure why that PA came through but she can see where the pt is approved for the medication. It is just too soon for it to be filled.  She has spoken with the patient to make her aware that it should be ready for fill by Friday so disregard that fax request.

## 2023-10-23 NOTE — TELEPHONE ENCOUNTER
Returned call to pt. Verified using 2 pt identifiers. Pt states that she was set up with the [facility] [employer] Program but her D. O.B information is incorrect and she was told to contact this office to have the information corrected. She was asked if this was related to [employer] and she informed no that she has a [employer] account and this was something different. This account is related to the surgical procedure that she is scheduled for. Informed her that I will try to reach out the Bariatric Program to see if they are aware of this and if they can assist with getting this corrected. Once I find out something I will get back in contact with her. She will also reach out to them as well. Cimetidine Counseling:  I discussed with the patient the risks of Cimetidine including but not limited to gynecomastia, headache, diarrhea, nausea, drowsiness, arrhythmias, pancreatitis, skin rashes, psychosis, bone marrow suppression and kidney toxicity.

## 2023-10-30 ENCOUNTER — CLINICAL DOCUMENTATION (OUTPATIENT)
Age: 33
End: 2023-10-30

## 2023-10-30 NOTE — PROGRESS NOTES
Prior Authorization for Santo 1.7mg/0.75ml  Sent via EPIC. Case ID# [id number]    Awaiting determination.

## 2023-10-31 ENCOUNTER — TELEPHONE (OUTPATIENT)
Age: 33
End: 2023-10-31

## 2023-10-31 NOTE — TELEPHONE ENCOUNTER
Received a  notification that the Prior Authorization for the [facility] 1.7 mg is already on file. Wegovy 1.7 mg is approved from 08/2/2023- 02/03/2024. Spoke with the Pharmacist Shell verified patient using 2 patient identifiers and made her aware of this information. She states that they did not request a prior authorization. Was informed that the patient had requested to transfer the prescription to another [facility] that is located in [facility] and at the time that the patient requested a refill it was too soon. That may have been the reason the Prior Authorization was triggered but the patient is good and should not need another prior authorization.

## 2023-12-15 ENCOUNTER — TELEPHONE (OUTPATIENT)
Age: 33
End: 2023-12-15

## 2023-12-15 NOTE — TELEPHONE ENCOUNTER
----- Message from Donalshekhar Avery sent at 12/14/2023  4:17 PM EST -----  Subject: Message to Provider    QUESTIONS  Information for Provider? Pt called and wanted to know can she start med   LIltfulo instead of spironolactone because it is not working anymore   ---------------------------------------------------------------------------  --------------  Reyes Santos [facility]  [phone]; OK to leave message on voicemail,OK to respond with electronic   message via [employer] portal (only for patients who have registered [employer]   account)  ---------------------------------------------------------------------------  --------------  SCRIPT ANSWERS  Relationship to Patient?  Self

## 2023-12-20 DIAGNOSIS — Z90.3 S/P GASTRIC SLEEVE PROCEDURE: ICD-10-CM

## 2023-12-20 DIAGNOSIS — E66.3 OVERWEIGHT: ICD-10-CM

## 2023-12-20 DIAGNOSIS — E55.9 VITAMIN D DEFICIENCY: ICD-10-CM

## 2024-01-08 ENCOUNTER — TELEMEDICINE (OUTPATIENT)
Age: 34
End: 2024-01-08
Payer: COMMERCIAL

## 2024-01-08 DIAGNOSIS — L65.8 TRACTION ALOPECIA: Primary | ICD-10-CM

## 2024-01-08 PROCEDURE — 99213 OFFICE O/P EST LOW 20 MIN: CPT | Performed by: FAMILY MEDICINE

## 2024-01-08 RX ORDER — MINOXIDIL 5 %
SOLUTION, NON-ORAL TOPICAL
Qty: 60 ML | Refills: 2 | Status: SHIPPED | OUTPATIENT
Start: 2024-01-08

## 2024-01-08 SDOH — ECONOMIC STABILITY: FOOD INSECURITY: WITHIN THE PAST 12 MONTHS, THE FOOD YOU BOUGHT JUST DIDN'T LAST AND YOU DIDN'T HAVE MONEY TO GET MORE.: NEVER TRUE

## 2024-01-08 SDOH — ECONOMIC STABILITY: FOOD INSECURITY: WITHIN THE PAST 12 MONTHS, YOU WORRIED THAT YOUR FOOD WOULD RUN OUT BEFORE YOU GOT MONEY TO BUY MORE.: NEVER TRUE

## 2024-01-08 SDOH — ECONOMIC STABILITY: HOUSING INSECURITY
IN THE LAST 12 MONTHS, WAS THERE A TIME WHEN YOU DID NOT HAVE A STEADY PLACE TO SLEEP OR SLEPT IN A SHELTER (INCLUDING NOW)?: NO

## 2024-01-08 SDOH — ECONOMIC STABILITY: INCOME INSECURITY: HOW HARD IS IT FOR YOU TO PAY FOR THE VERY BASICS LIKE FOOD, HOUSING, MEDICAL CARE, AND HEATING?: NOT HARD AT ALL

## 2024-01-08 ASSESSMENT — PATIENT HEALTH QUESTIONNAIRE - PHQ9
6. FEELING BAD ABOUT YOURSELF - OR THAT YOU ARE A FAILURE OR HAVE LET YOURSELF OR YOUR FAMILY DOWN: 0
9. THOUGHTS THAT YOU WOULD BE BETTER OFF DEAD, OR OF HURTING YOURSELF: 0
1. LITTLE INTEREST OR PLEASURE IN DOING THINGS: 0
4. FEELING TIRED OR HAVING LITTLE ENERGY: 0
5. POOR APPETITE OR OVEREATING: 0
3. TROUBLE FALLING OR STAYING ASLEEP: 0
SUM OF ALL RESPONSES TO PHQ9 QUESTIONS 1 & 2: 0
7. TROUBLE CONCENTRATING ON THINGS, SUCH AS READING THE NEWSPAPER OR WATCHING TELEVISION: 0
SUM OF ALL RESPONSES TO PHQ QUESTIONS 1-9: 0
10. IF YOU CHECKED OFF ANY PROBLEMS, HOW DIFFICULT HAVE THESE PROBLEMS MADE IT FOR YOU TO DO YOUR WORK, TAKE CARE OF THINGS AT HOME, OR GET ALONG WITH OTHER PEOPLE: 0
8. MOVING OR SPEAKING SO SLOWLY THAT OTHER PEOPLE COULD HAVE NOTICED. OR THE OPPOSITE, BEING SO FIGETY OR RESTLESS THAT YOU HAVE BEEN MOVING AROUND A LOT MORE THAN USUAL: 0
SUM OF ALL RESPONSES TO PHQ QUESTIONS 1-9: 0
2. FEELING DOWN, DEPRESSED OR HOPELESS: 0

## 2024-01-08 NOTE — PROGRESS NOTES
Erasmo Partida, was evaluated through a synchronous (real-time) audio-video encounter. The patient (or guardian if applicable) is aware that this is a billable service, which includes applicable co-pays. This Virtual Visit was conducted with patient's (and/or legal guardian's) consent. Patient identification was verified, and a caregiver was present when appropriate.   The patient was located at Home: [address] [place] [id number]  Provider was located at Facility (Appt Dept): [address] [place] [id number]      Erasmo Partida (:  1990) is a Established patient, presenting virtually for evaluation of the following:    Patient presents stating that has been experiencing hair loss circular patche,  not multiple for which has not been seeing a dermatologist , patient states that the patient is not Getting better at this time and requesting a  therapy for the current condition patient is emotionally upset about such situation , patient states that is mostly frontal and vertex area in addition patient states that she has family history of hair loss, patient denies any itchiness any discharge no painful skin problem      Constitutional: no chills and fever,  , nad     HENT: no ear pain or nosebleeds. No blurred vision  Respiratory: no shortness of breath, wheezing cough   Cardiovascular: Has no chest pain, ,and racing heart .   Gastrointestinal: No constipation, diarrhea, nausea and vomiting.   Genitourinary: No frequency.   Musculoskeletal: Negative for joint pain.   Skin: no itching, no rash.   Neurological: Negative for dizziness, no tremors  Psychiatric/Behavioral: Negative for depression, is not nervous/anxious.        Assessment & Plan   Below is the assessment and plan developed based on review of pertinent history, physical exam, labs, studies, and medications.  1. Traction alopecia  -     minoxidil (MINOXIDIL FOR WOMEN) 2 % external solution; Apply topically 2 times

## 2024-01-08 NOTE — PROGRESS NOTES
Chief Complaint   Patient presents with    Alopecia     Discuss medication      \"Have you been to the ER, urgent care clinic since your last visit?  Hospitalized since your last visit?\"    NO    “Have you seen or consulted any other health care providers outside of [facility] since your last visit?”    NO

## 2024-02-26 ENCOUNTER — TELEPHONE (OUTPATIENT)
Age: 34
End: 2024-02-26

## 2024-02-26 DIAGNOSIS — E66.09 CLASS 1 OBESITY DUE TO EXCESS CALORIES WITH SERIOUS COMORBIDITY AND BODY MASS INDEX (BMI) OF 30.0 TO 30.9 IN ADULT: ICD-10-CM

## 2024-02-26 RX ORDER — SEMAGLUTIDE 1.7 MG/.75ML
1.7 INJECTION, SOLUTION SUBCUTANEOUS
Qty: 3 ML | Refills: 0 | Status: SHIPPED | OUTPATIENT
Start: 2024-02-26

## 2024-02-26 NOTE — TELEPHONE ENCOUNTER
----- Message from Erasmo Partida sent at 2024  4:05 PM EST -----  Regarding: PA  Contact: [phone]  I just checked it has  as of the  of this month. My next injection for the week is due Thursday. Could the same PA be resent to the insurance company. Thank you

## 2024-02-26 NOTE — TELEPHONE ENCOUNTER
Pt called verified using 2 patient identifiers. Pt states that she took her last Wegovy 1.7 mg last Thursday and she tried to get a refill and was told needed to have another Prior Authorization because it  on 02/3. Pt states that she is on the maintenance dose 1.7 mg and her follow up appointments are every 3 months now. They had discussed at some point decreasing the dose but she will be out by the time of her next appointment which is .    Pt was informed that Dr. Shirley was not in the office today or tomorrow but I can touch base with another Provider to see if they would approve the refill on the Wegovy 1.7 mg and follow up with her.  Pt states that she has seen Dr. Gage in the past as well but voiced understandings.     Will forward to Dr. Gage for refill request approval.

## 2024-02-26 NOTE — TELEPHONE ENCOUNTER
Dr. Gage reviewed message and will approve 1 refill until her upcoming appointment with Dr. Shirley.

## 2024-02-27 ENCOUNTER — CLINICAL DOCUMENTATION (OUTPATIENT)
Age: 34
End: 2024-02-27

## 2024-02-27 NOTE — PROGRESS NOTES
Prior Authorization request (with office note) for Wegovy 1.7 mg/0.75 ml faxed to [facility].    ID # [id number]    Awaiting determination.

## 2024-02-27 NOTE — PROGRESS NOTES
Prior Authorization for Wegovy 2.4 mg/0.75 ml DENIED by [facility].      Several reasons provided:    Already been approved for two 6-month approvals.  Doctor must re-address all of the reasons given.    - BMI must be equal to or greater than 27 with two or more risk factors OR BMI is equal to or greater than 30  - 30 day trail and failure of one non-GLP-1 weight loss drug  - No history of eating of eating disorder  - Participating in nutritional counseling  - Participating in physical activity program, unless medically contraindicated  - Doctor attests that your obesity is disabling and life threatening    Patient and Dr. Shirley notified.

## 2024-03-22 ENCOUNTER — TELEMEDICINE (OUTPATIENT)
Age: 34
End: 2024-03-22
Payer: COMMERCIAL

## 2024-03-22 VITALS
WEIGHT: 125 LBS | TEMPERATURE: 98 F | DIASTOLIC BLOOD PRESSURE: 82 MMHG | SYSTOLIC BLOOD PRESSURE: 118 MMHG | HEART RATE: 90 BPM | BODY MASS INDEX: 24.54 KG/M2 | HEIGHT: 60 IN

## 2024-03-22 DIAGNOSIS — E55.9 VITAMIN D DEFICIENCY: ICD-10-CM

## 2024-03-22 DIAGNOSIS — Z90.3 S/P GASTRIC SLEEVE PROCEDURE: ICD-10-CM

## 2024-03-22 DIAGNOSIS — Z76.89 ENCOUNTER FOR WEIGHT MANAGEMENT: Primary | ICD-10-CM

## 2024-03-22 DIAGNOSIS — E78.00 HYPERCHOLESTEROLEMIA: ICD-10-CM

## 2024-03-22 PROCEDURE — 99214 OFFICE O/P EST MOD 30 MIN: CPT | Performed by: FAMILY MEDICINE

## 2024-03-22 RX ORDER — DEXTROAMPHETAMINE/AMPHETAMINE 10 MG
10 CAPSULE, EXT RELEASE 24 HR ORAL AS NEEDED
COMMUNITY
Start: 2024-03-06

## 2024-03-22 ASSESSMENT — PATIENT HEALTH QUESTIONNAIRE - PHQ9
SUM OF ALL RESPONSES TO PHQ QUESTIONS 1-9: 0
3. TROUBLE FALLING OR STAYING ASLEEP: NOT AT ALL
7. TROUBLE CONCENTRATING ON THINGS, SUCH AS READING THE NEWSPAPER OR WATCHING TELEVISION: NOT AT ALL
8. MOVING OR SPEAKING SO SLOWLY THAT OTHER PEOPLE COULD HAVE NOTICED. OR THE OPPOSITE, BEING SO FIGETY OR RESTLESS THAT YOU HAVE BEEN MOVING AROUND A LOT MORE THAN USUAL: NOT AT ALL
5. POOR APPETITE OR OVEREATING: NOT AT ALL
SUM OF ALL RESPONSES TO PHQ QUESTIONS 1-9: 0
2. FEELING DOWN, DEPRESSED OR HOPELESS: NOT AT ALL
SUM OF ALL RESPONSES TO PHQ9 QUESTIONS 1 & 2: 0
SUM OF ALL RESPONSES TO PHQ QUESTIONS 1-9: 0
SUM OF ALL RESPONSES TO PHQ QUESTIONS 1-9: 0
9. THOUGHTS THAT YOU WOULD BE BETTER OFF DEAD, OR OF HURTING YOURSELF: NOT AT ALL
6. FEELING BAD ABOUT YOURSELF - OR THAT YOU ARE A FAILURE OR HAVE LET YOURSELF OR YOUR FAMILY DOWN: NOT AT ALL
4. FEELING TIRED OR HAVING LITTLE ENERGY: NOT AT ALL
1. LITTLE INTEREST OR PLEASURE IN DOING THINGS: NOT AT ALL

## 2024-03-22 NOTE — PROGRESS NOTES
Identified pt with two pt identifiers (name and ). Reviewed chart in preparation for visit and have obtained necessary documentation.    Erasmo Partida is a 34 y.o. female  Chief Complaint   Patient presents with    Weight Management     1 month    Medication Management     /82   Pulse 90   Temp 98 °F (36.7 °C)   Ht 1.524 m (5')   Wt 56.7 kg (125 lb)   BMI 24.41 kg/m²     1. Have you been to the ER, urgent care clinic since your last visit?  Hospitalized since your last visit?no    2. Have you seen or consulted any other health care providers outside of the [facility] since your last visit?  Include any pap smears or colon screening. no   
no    Review of Systems  Since your last visit, have you experienced any complications? no  If yes, please list:       Are you taking an appetite suppressant? No, cannot get a refill on wegovy. The last shot was 3 weeks ago  If so, is there any Chest Pain, Palpitations or Dizziness?      HUNGER CONTROL:with meds- good     BP Readings from Last 3 Encounters:   03/22/24 118/82   12/20/23 119/79   09/18/23 113/77       SLEEP:    Have you received any other medical care this week? no  If yes, where and for what?       Have you discontinued or changed any medicine or dose of your medicine since your last visit with Dr Shirley? no  If yes, where and for what?         Diet  How many ounces of calorie-free liquids did you consume each day?  60 or more oz    How many meal replacements did you take each day? 1 every other day    Did you have any problems adhering to the program?  no If yes, please explain:      Exercise  Aerobic exercise: resistance and no cardio min  Resistance exercise: 4 workouts / week for 2 hrs ea  Any discomfort?       If yes, where?      Objective  /82   Pulse 90   Temp 98 °F (36.7 °C)   Ht 1.524 m (5')   Wt 56.7 kg (125 lb)   BMI 24.41 kg/m²   No LMP recorded.      Physical Exam      Constitutional: [x] Appears well-developed and well-nourished [x] No apparent distress                            [] Abnormal -      Mental status: [x] Alert and awake  [x] Oriented to person/place/time [x] Able to follow commands    [] Abnormal -      Eyes:   EOM    [x]  Normal                  [] Abnormal -   Sclera  [x]  Normal                  [] Abnormal -              Discharge [x]  None visible     [] Abnormal -      HENT: [x] Normocephalic, atraumatic  [] Abnormal -   [x] Mouth/Throat: Mucous membranes are moist     External Ears [x] Normal  [] Abnormal -     Neck: [x] No visualized mass [] Abnormal -      Pulmonary/Chest: [x] Respiratory effort normal   [x] No visualized signs of difficulty breathing or

## 2024-04-04 LAB
25(OH)D3+25(OH)D2 SERPL-MCNC: 60.8 NG/ML (ref 30–100)
ALBUMIN SERPL-MCNC: 4.1 G/DL (ref 3.9–4.9)
ALBUMIN/GLOB SERPL: 1.6 {RATIO} (ref 1.2–2.2)
ALP SERPL-CCNC: 66 IU/L (ref 44–121)
ALT SERPL-CCNC: 27 IU/L (ref 0–32)
AST SERPL-CCNC: 28 IU/L (ref 0–40)
BILIRUB SERPL-MCNC: 0.3 MG/DL (ref 0–1.2)
BUN SERPL-MCNC: 6 MG/DL (ref 6–20)
BUN/CREAT SERPL: 10 (ref 9–23)
CALCIUM SERPL-MCNC: 9.6 MG/DL (ref 8.7–10.2)
CHLORIDE SERPL-SCNC: 103 MMOL/L (ref 96–106)
CO2 SERPL-SCNC: 19 MMOL/L (ref 20–29)
CREAT SERPL-MCNC: 0.59 MG/DL (ref 0.57–1)
EGFRCR SERPLBLD CKD-EPI 2021: 120 ML/MIN/1.73
GLOBULIN SER CALC-MCNC: 2.6 G/DL (ref 1.5–4.5)
GLUCOSE SERPL-MCNC: 81 MG/DL (ref 70–99)
POTASSIUM SERPL-SCNC: 4.1 MMOL/L (ref 3.5–5.2)
PROT SERPL-MCNC: 6.7 G/DL (ref 6–8.5)
SODIUM SERPL-SCNC: 138 MMOL/L (ref 134–144)

## 2024-04-08 ENCOUNTER — CLINICAL DOCUMENTATION (OUTPATIENT)
Age: 34
End: 2024-04-08

## 2024-04-08 NOTE — PROGRESS NOTES
Prior Authorization for Wegovy 1.7 mg/0.75 ml DENIED by [facility].      Trying to schedule a [id number].  After s/w many reps at the Plan, finally got the correct phone number for Pharmacy-side Auth Dept.      Finally s/w Namita marinelli who transferred the PA to the Benefit Coverage Review Team b/c she could not find the patient's new coverage that went into effect on April 1st.    They will call me within 72 hours to process a new PA verbally.    Patient notified.

## 2024-04-10 ENCOUNTER — OFFICE VISIT (OUTPATIENT)
Age: 34
End: 2024-04-10
Payer: COMMERCIAL

## 2024-04-10 VITALS
HEIGHT: 60 IN | OXYGEN SATURATION: 99 % | WEIGHT: 124 LBS | SYSTOLIC BLOOD PRESSURE: 112 MMHG | TEMPERATURE: 97.1 F | HEART RATE: 98 BPM | BODY MASS INDEX: 24.35 KG/M2 | RESPIRATION RATE: 20 BRPM | DIASTOLIC BLOOD PRESSURE: 76 MMHG

## 2024-04-10 DIAGNOSIS — M54.9 CHRONIC BACK PAIN GREATER THAN 3 MONTHS DURATION: Primary | ICD-10-CM

## 2024-04-10 DIAGNOSIS — G89.29 CHRONIC BACK PAIN GREATER THAN 3 MONTHS DURATION: Primary | ICD-10-CM

## 2024-04-10 DIAGNOSIS — Z02.89 ENCOUNTER FOR COMPLETION OF FORM WITH PATIENT: ICD-10-CM

## 2024-04-10 DIAGNOSIS — M48.061 DEGENERATIVE LUMBAR SPINAL STENOSIS: ICD-10-CM

## 2024-04-10 PROCEDURE — 99213 OFFICE O/P EST LOW 20 MIN: CPT | Performed by: FAMILY MEDICINE

## 2024-04-10 RX ORDER — IPRATROPIUM BROMIDE 21 UG/1
2 SPRAY, METERED NASAL EVERY 12 HOURS
Qty: 30 ML | Refills: 3 | Status: SHIPPED | OUTPATIENT
Start: 2024-04-10

## 2024-04-10 ASSESSMENT — PATIENT HEALTH QUESTIONNAIRE - PHQ9
SUM OF ALL RESPONSES TO PHQ QUESTIONS 1-9: 0
9. THOUGHTS THAT YOU WOULD BE BETTER OFF DEAD, OR OF HURTING YOURSELF: NOT AT ALL
1. LITTLE INTEREST OR PLEASURE IN DOING THINGS: NOT AT ALL
2. FEELING DOWN, DEPRESSED OR HOPELESS: NOT AT ALL
10. IF YOU CHECKED OFF ANY PROBLEMS, HOW DIFFICULT HAVE THESE PROBLEMS MADE IT FOR YOU TO DO YOUR WORK, TAKE CARE OF THINGS AT HOME, OR GET ALONG WITH OTHER PEOPLE: NOT DIFFICULT AT ALL
SUM OF ALL RESPONSES TO PHQ QUESTIONS 1-9: 0
6. FEELING BAD ABOUT YOURSELF - OR THAT YOU ARE A FAILURE OR HAVE LET YOURSELF OR YOUR FAMILY DOWN: NOT AT ALL
SUM OF ALL RESPONSES TO PHQ9 QUESTIONS 1 & 2: 0
3. TROUBLE FALLING OR STAYING ASLEEP: NOT AT ALL
SUM OF ALL RESPONSES TO PHQ QUESTIONS 1-9: 0
8. MOVING OR SPEAKING SO SLOWLY THAT OTHER PEOPLE COULD HAVE NOTICED. OR THE OPPOSITE, BEING SO FIGETY OR RESTLESS THAT YOU HAVE BEEN MOVING AROUND A LOT MORE THAN USUAL: NOT AT ALL
4. FEELING TIRED OR HAVING LITTLE ENERGY: NOT AT ALL
5. POOR APPETITE OR OVEREATING: NOT AT ALL
7. TROUBLE CONCENTRATING ON THINGS, SUCH AS READING THE NEWSPAPER OR WATCHING TELEVISION: NOT AT ALL
SUM OF ALL RESPONSES TO PHQ QUESTIONS 1-9: 0

## 2024-04-10 NOTE — PROGRESS NOTES
Erasmo Partida (:  1990) is a 34 y.o. 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 No blurred vision  Respiratory: no shortness of breath, wheezing cough   Cardiovascular: Has no chest pain, ,and racing heart .   Gastrointestinal: No constipation, diarrhea, nausea and vomiting.   Genitourinary: No frequency.   Musculoskeletal: +++ for joint pain.   Skin: no itching, no rash.   Neurological: Negative for dizziness, no tremors  Psychiatric/Behavioral: Negative for depression, is not nervous/anxious.        General:  alert cooperative appears stated for the age  HEENT; normocephalic and atraumatic PERRLA extraocular motor intact normal tympanic membrane normal external ear bilaterally, mucosal normal no drainage  Neck: supple no adenopathy no JVD no bruit  Lungs: Clear to auscultation bilaterally no rales rhonchi or wheezes  Heart: Normal regular S1-S2 ,  no murmur  Breast exam deferred  Abdomen: Soft nontender normal bowel sounds   Extremities: Normal range of motion no point tenderness normal pulses no edema  Pelvic/: No lesion, no lymphadenopathy  Skin: Normal no lesion no rash         ASSESSMENT/PLAN:  1. Chronic back pain greater than 3 months duration  2. Degenerative lumbar spinal stenosis  3. Encounter for completion of form with patient      because of the patient chronic medical condition including multiple joint pain patient handicap

## 2024-04-10 NOTE — PROGRESS NOTES
Chief Complaint   Patient presents with    Back Pain    documentation      DMV placard       \"Have you been to the ER, urgent care clinic since your last visit?  Hospitalized since your last visit?\"    NO    “Have you seen or consulted any other health care providers outside of [facility] since your last visit?”    NO            Vitals:    04/10/24 1003   BP: 112/76   Pulse: 98   Resp: 20   Temp: 97.1 °F (36.2 °C)   TempSrc: Infrared   SpO2: 99%   Weight: 56.2 kg (124 lb)   Height: 1.524 m (5')        There are no preventive care reminders to display for this patient.     The patient, Erasmo Partida, identity was verified by name and

## 2024-04-11 ENCOUNTER — TELEPHONE (OUTPATIENT)
Age: 34
End: 2024-04-11

## 2024-04-11 NOTE — TELEPHONE ENCOUNTER
Returned call to patient and verified using 2 patient identifiers. Pt states that things have been resolved.  She and Aury had been trying to get things straight regarding her Prior Authorization and her insurance company. She was calling to make her aware that the Prior Authorization can now be submitted.    Informed her that Aury was not in but will make her aware of this call and have her reach out to her when she returns. Pt states that is fine and she will wait until she returns on Monday to discuss.

## 2024-04-11 NOTE — TELEPHONE ENCOUNTER
Miah [facility] rep called in with Ms.  on the line. Miah stated that Ms.  benefits are up to date and there should not be a problem submitting the prior authorization now. Please submit prior auth for Wegovy.

## 2024-04-12 RX ORDER — FLUTICASONE PROPIONATE 50 MCG
2 SPRAY, SUSPENSION (ML) NASAL DAILY
Qty: 16 G | Refills: 5 | Status: SHIPPED | OUTPATIENT
Start: 2024-04-12

## 2024-04-18 DIAGNOSIS — R11.0 NAUSEA: Primary | ICD-10-CM

## 2024-04-18 RX ORDER — ONDANSETRON 4 MG/1
4 TABLET, FILM COATED ORAL EVERY 12 HOURS PRN
Qty: 30 TABLET | Refills: 1 | Status: SHIPPED | OUTPATIENT
Start: 2024-04-18

## 2024-06-17 ENCOUNTER — OFFICE VISIT (OUTPATIENT)
Age: 34
End: 2024-06-17
Payer: COMMERCIAL

## 2024-06-17 VITALS
WEIGHT: 121 LBS | SYSTOLIC BLOOD PRESSURE: 112 MMHG | BODY MASS INDEX: 23.75 KG/M2 | DIASTOLIC BLOOD PRESSURE: 77 MMHG | HEIGHT: 60 IN | HEART RATE: 80 BPM | TEMPERATURE: 97.1 F | RESPIRATION RATE: 18 BRPM | OXYGEN SATURATION: 99 %

## 2024-06-17 DIAGNOSIS — F41.1 GAD (GENERALIZED ANXIETY DISORDER): ICD-10-CM

## 2024-06-17 DIAGNOSIS — Z23 ENCOUNTER FOR IMMUNIZATION: ICD-10-CM

## 2024-06-17 DIAGNOSIS — K58.0 IRRITABLE BOWEL SYNDROME WITH DIARRHEA: ICD-10-CM

## 2024-06-17 DIAGNOSIS — E01.0 THYROMEGALY: ICD-10-CM

## 2024-06-17 DIAGNOSIS — F32.A ANXIETY AND DEPRESSION: ICD-10-CM

## 2024-06-17 DIAGNOSIS — Z00.00 ENCOUNTER FOR WELL ADULT EXAM WITHOUT ABNORMAL FINDINGS: Primary | ICD-10-CM

## 2024-06-17 DIAGNOSIS — F41.9 ANXIETY AND DEPRESSION: ICD-10-CM

## 2024-06-17 LAB
25(OH)D3 SERPL-MCNC: 27.8 NG/ML (ref 30–100)
ALBUMIN SERPL-MCNC: 3.2 G/DL (ref 3.5–5)
ALBUMIN/GLOB SERPL: 0.9 (ref 1.1–2.2)
ALP SERPL-CCNC: 73 U/L (ref 45–117)
ALT SERPL-CCNC: 20 U/L (ref 12–78)
ANION GAP SERPL CALC-SCNC: 2 MMOL/L (ref 5–15)
AST SERPL-CCNC: 20 U/L (ref 15–37)
BILIRUB SERPL-MCNC: 0.3 MG/DL (ref 0.2–1)
BUN SERPL-MCNC: 9 MG/DL (ref 6–20)
BUN/CREAT SERPL: 13 (ref 12–20)
CALCIUM SERPL-MCNC: 8.8 MG/DL (ref 8.5–10.1)
CHLORIDE SERPL-SCNC: 109 MMOL/L (ref 97–108)
CO2 SERPL-SCNC: 29 MMOL/L (ref 21–32)
ERYTHROCYTE [DISTWIDTH] IN BLOOD BY AUTOMATED COUNT: 14.3 % (ref 11.5–14.5)
FERRITIN SERPL-MCNC: 15 NG/ML (ref 26–388)
GLOBULIN SER CALC-MCNC: 3.4 G/DL (ref 2–4)
GLUCOSE SERPL-MCNC: 67 MG/DL (ref 65–100)
HDLC SERPL-MCNC: 82 MG/DL
HDLC SERPL: 1.8 (ref 0–5)
HGB BLD-MCNC: 11.7 G/DL (ref 11.5–16)
LDLC SERPL CALC-MCNC: 61.2 MG/DL (ref 0–100)
MCHC RBC AUTO-ENTMCNC: 32 G/DL (ref 30–36.5)
MCV RBC AUTO: 81.3 FL (ref 80–99)
NRBC # BLD: 0 K/UL (ref 0–0.01)
NRBC BLD-RTO: 0 PER 100 WBC
PLATELET # BLD AUTO: 310 K/UL (ref 150–400)
PMV BLD AUTO: 9.7 FL (ref 8.9–12.9)
POTASSIUM SERPL-SCNC: 4.2 MMOL/L (ref 3.5–5.1)
PROT SERPL-MCNC: 6.6 G/DL (ref 6.4–8.2)
RBC # BLD AUTO: 4.5 M/UL (ref 3.8–5.2)
SODIUM SERPL-SCNC: 140 MMOL/L (ref 136–145)
TRIGL SERPL-MCNC: 34 MG/DL
VLDLC SERPL CALC-MCNC: 6.8 MG/DL
WBC # BLD AUTO: 4 K/UL (ref 3.6–11)

## 2024-06-17 PROCEDURE — 90471 IMMUNIZATION ADMIN: CPT | Performed by: FAMILY MEDICINE

## 2024-06-17 PROCEDURE — 99395 PREV VISIT EST AGE 18-39: CPT | Performed by: FAMILY MEDICINE

## 2024-06-17 PROCEDURE — 90715 TDAP VACCINE 7 YRS/> IM: CPT | Performed by: FAMILY MEDICINE

## 2024-06-17 RX ORDER — ERGOCALCIFEROL 1.25 MG/1
50000 CAPSULE ORAL WEEKLY
Qty: 12 CAPSULE | Refills: 1 | Status: SHIPPED | OUTPATIENT
Start: 2024-06-17

## 2024-06-17 ASSESSMENT — PATIENT HEALTH QUESTIONNAIRE - PHQ9
2. FEELING DOWN, DEPRESSED OR HOPELESS: NOT AT ALL
6. FEELING BAD ABOUT YOURSELF - OR THAT YOU ARE A FAILURE OR HAVE LET YOURSELF OR YOUR FAMILY DOWN: NOT AT ALL
4. FEELING TIRED OR HAVING LITTLE ENERGY: NOT AT ALL
3. TROUBLE FALLING OR STAYING ASLEEP: NOT AT ALL
SUM OF ALL RESPONSES TO PHQ QUESTIONS 1-9: 0
10. IF YOU CHECKED OFF ANY PROBLEMS, HOW DIFFICULT HAVE THESE PROBLEMS MADE IT FOR YOU TO DO YOUR WORK, TAKE CARE OF THINGS AT HOME, OR GET ALONG WITH OTHER PEOPLE: NOT DIFFICULT AT ALL
SUM OF ALL RESPONSES TO PHQ QUESTIONS 1-9: 0
7. TROUBLE CONCENTRATING ON THINGS, SUCH AS READING THE NEWSPAPER OR WATCHING TELEVISION: NOT AT ALL
SUM OF ALL RESPONSES TO PHQ QUESTIONS 1-9: 0
1. LITTLE INTEREST OR PLEASURE IN DOING THINGS: NOT AT ALL
8. MOVING OR SPEAKING SO SLOWLY THAT OTHER PEOPLE COULD HAVE NOTICED. OR THE OPPOSITE, BEING SO FIGETY OR RESTLESS THAT YOU HAVE BEEN MOVING AROUND A LOT MORE THAN USUAL: NOT AT ALL
SUM OF ALL RESPONSES TO PHQ9 QUESTIONS 1 & 2: 0
5. POOR APPETITE OR OVEREATING: NOT AT ALL
SUM OF ALL RESPONSES TO PHQ QUESTIONS 1-9: 0
9. THOUGHTS THAT YOU WOULD BE BETTER OFF DEAD, OR OF HURTING YOURSELF: NOT AT ALL

## 2024-06-17 NOTE — PATIENT INSTRUCTIONS
is normal and important to have some cholesterol in your bloodstream. But too much cholesterol can cause plaque to build up within your arteries, which can eventually lead to a heart attack or stroke.   The two types of cholesterol that are most commonly referred to are:   Low-density lipoprotein (LDL) cholesterol  Also known as bad cholesterol, this is the cholesterol that tends to build up along your arteries. Bad cholesterol levels are increased by eating fats that are saturated or hydrogenated. Optimal level of this cholesterol is less than 100. Over 130 starts to get risky for heart disease.   High-density lipoprotein (HDL) cholesterol  Also known as good cholesterol, this type of cholesterol actually carries cholesterol away from your arteries and may, therefore, help lower your risk of having a heart attack. You want this level to be high (ideally greater than 60). It is a risk to have a level less than 40. You can raise this good cholesterol by eating olive oil, canola oil, avocados, or nuts. Exercise raises this level, too.   Fat    Fat is calorie dense and packs a lot of calories into a small amount of food. Even though fats should be limited due to their high calorie content, not all fats are bad. In fact, some fats are quite healthful. Fat can be broken down into four main types.   The good-for-you fats are:   Monounsaturated fat  found in oils such as olive and canola, avocados, and nuts and natural nut butters; can decrease cholesterol levels, while keeping levels of HDL cholesterol high   Polyunsaturated fat  found in oils such as safflower, sunflower, soybean, corn, and sesame; can decrease total cholesterol and LDL cholesterol   Omega-3 fatty acids  particularly those found in fatty fish (such as salmon, trout, tuna, mackerel, herring, and sardines); can decrease risk of arrhythmias, decrease triglyceride levels, and slightly lower blood pressure   The fats that you want to limit are:   Saturated

## 2024-06-17 NOTE — PROGRESS NOTES
Chief Complaint   Patient presents with    Annual Exam       \"Have you been to the ER, urgent care clinic since your last visit?  Hospitalized since your last visit?\"    NO    “Have you seen or consulted any other health care providers outside of [facility] since your last visit?”    NO       Vitals:    24 1048   BP: 112/77   Pulse: 80   Resp: 18   Temp: 97.1 °F (36.2 °C)   TempSrc: Infrared   SpO2: 99%   Weight: 54.9 kg (121 lb)   Height: 1.524 m (5')        There are no preventive care reminders to display for this patient.     The patient, Erasmo Partida, identity was verified by name and    
99%   BMI 23.63 kg/m²   Wt Readings from Last 3 Encounters:   06/17/24 54.9 kg (121 lb)   04/10/24 56.2 kg (124 lb)   03/22/24 56.7 kg (125 lb)       Waist Circumference  There were no vitals filed for this visit.    Physical Exam      Constitutional: Well developed, well nourished.  non-toxic in appearance, not diaphoretic.   HEENT: PERRL. EOMI. The left TM is unremarkable. The right TM is unremarkable. No nasal  erythema noted.  THROAT: Posterior pharynx has no erythema, no exudates.    Neck:  no cervical lymphadenopathy. Neck is supple   Cardiovascular: Regular rate and rhythm, no murmurs, rubs, or gallops.   Pulmonary: Clear to auscultation bilaterally. Has no wheezing, rales or rhonchi.,  speaking in full sentences, has no accessory muscle used.  Abdomen: Bowel sounds are normal. Having no distension, no palpable mass. Soft,  No tenderness, rebound or guarding.   Musculoskeletal: No peripheral edema, having normal ROM  Skin:   warm and dry. No diaphoresis, rashes, or lesions.   Neurological: Alert, awake,  oriented x3 ,  normal speech.     Assessment   Plan   1. Encounter for well adult exam without abnormal findings  2. Encounter for immunization  -     Tdap, ADACEL, (age 10y-64y), IM  3. Thyromegaly  -     CBC; Future  -     Comprehensive Metabolic Panel; Future  -     Lipid Panel; Future  -     TSH + Free T4 Panel; Future  -     Vitamin D 25 Hydroxy; Future  -     Ferritin; Future  4. MARTA (generalized anxiety disorder)  -     CBC; Future  -     Comprehensive Metabolic Panel; Future  -     Lipid Panel; Future  -     TSH + Free T4 Panel; Future  -     Vitamin D 25 Hydroxy; Future  -     Ferritin; Future  5. Anxiety and depression  -     CBC; Future  -     Comprehensive Metabolic Panel; Future  -     Lipid Panel; Future  -     TSH + Free T4 Panel; Future  -     Vitamin D 25 Hydroxy; Future  -     Ferritin; Future  6. Irritable bowel syndrome with diarrhea  -     rifAXIMin (XIFAXAN) 550 MG tablet; Take 1 tablet

## 2024-07-02 ENCOUNTER — TELEPHONE (OUTPATIENT)
Age: 34
End: 2024-07-02

## 2024-07-02 DIAGNOSIS — D50.8 OTHER IRON DEFICIENCY ANEMIA: ICD-10-CM

## 2024-07-02 DIAGNOSIS — E55.9 VITAMIN D DEFICIENCY, UNSPECIFIED: Primary | ICD-10-CM

## 2024-07-02 RX ORDER — FERROUS SULFATE 325(65) MG
325 TABLET ORAL
Qty: 90 TABLET | Refills: 1 | Status: SHIPPED | OUTPATIENT
Start: 2024-07-02

## 2024-07-02 RX ORDER — ERGOCALCIFEROL 1.25 MG/1
50000 CAPSULE ORAL WEEKLY
Qty: 12 CAPSULE | Refills: 1 | Status: SHIPPED | OUTPATIENT
Start: 2024-07-02

## 2024-07-02 NOTE — TELEPHONE ENCOUNTER
Patient states that she has been calling for a while she has questions about her labs please call her as soon as possible she can be reached @ [phone]

## 2024-07-05 ENCOUNTER — TELEPHONE (OUTPATIENT)
Age: 34
End: 2024-07-05

## 2024-07-05 RX ORDER — LIDOCAINE 50 MG/G
1 PATCH TOPICAL DAILY
Qty: 30 PATCH | Refills: 0 | Status: SHIPPED | OUTPATIENT
Start: 2024-07-05 | End: 2024-08-04

## 2024-07-05 NOTE — TELEPHONE ENCOUNTER
Called patient in regards to an appointment reminder for 7/8/24. Patient did not answer, left voicemail instructing patient to call back.

## 2024-07-08 ENCOUNTER — OFFICE VISIT (OUTPATIENT)
Age: 34
End: 2024-07-08
Payer: COMMERCIAL

## 2024-07-08 VITALS
RESPIRATION RATE: 16 BRPM | SYSTOLIC BLOOD PRESSURE: 108 MMHG | BODY MASS INDEX: 24.7 KG/M2 | TEMPERATURE: 98.2 F | HEART RATE: 75 BPM | WEIGHT: 125.8 LBS | DIASTOLIC BLOOD PRESSURE: 71 MMHG | OXYGEN SATURATION: 97 % | HEIGHT: 60 IN

## 2024-07-08 DIAGNOSIS — Z76.89 ENCOUNTER FOR WEIGHT MANAGEMENT: Primary | ICD-10-CM

## 2024-07-08 DIAGNOSIS — E55.9 VITAMIN D DEFICIENCY: ICD-10-CM

## 2024-07-08 DIAGNOSIS — E78.00 HYPERCHOLESTEROLEMIA: ICD-10-CM

## 2024-07-08 PROCEDURE — 99214 OFFICE O/P EST MOD 30 MIN: CPT | Performed by: FAMILY MEDICINE

## 2024-07-08 RX ORDER — SEMAGLUTIDE 1.7 MG/.75ML
1.7 INJECTION, SOLUTION SUBCUTANEOUS
Qty: 6 ML | Refills: 2 | Status: SHIPPED | OUTPATIENT
Start: 2024-07-08

## 2024-07-08 RX ORDER — SEMAGLUTIDE 1.7 MG/.75ML
1.7 INJECTION, SOLUTION SUBCUTANEOUS
COMMUNITY
End: 2024-07-08 | Stop reason: SDUPTHER

## 2024-07-08 ASSESSMENT — PATIENT HEALTH QUESTIONNAIRE - PHQ9
7. TROUBLE CONCENTRATING ON THINGS, SUCH AS READING THE NEWSPAPER OR WATCHING TELEVISION: NOT AT ALL
9. THOUGHTS THAT YOU WOULD BE BETTER OFF DEAD, OR OF HURTING YOURSELF: NOT AT ALL
SUM OF ALL RESPONSES TO PHQ QUESTIONS 1-9: 0
5. POOR APPETITE OR OVEREATING: NOT AT ALL
SUM OF ALL RESPONSES TO PHQ QUESTIONS 1-9: 0
SUM OF ALL RESPONSES TO PHQ9 QUESTIONS 1 & 2: 0
8. MOVING OR SPEAKING SO SLOWLY THAT OTHER PEOPLE COULD HAVE NOTICED. OR THE OPPOSITE, BEING SO FIGETY OR RESTLESS THAT YOU HAVE BEEN MOVING AROUND A LOT MORE THAN USUAL: NOT AT ALL
2. FEELING DOWN, DEPRESSED OR HOPELESS: NOT AT ALL
SUM OF ALL RESPONSES TO PHQ QUESTIONS 1-9: 0
3. TROUBLE FALLING OR STAYING ASLEEP: NOT AT ALL
1. LITTLE INTEREST OR PLEASURE IN DOING THINGS: NOT AT ALL
4. FEELING TIRED OR HAVING LITTLE ENERGY: NOT AT ALL
SUM OF ALL RESPONSES TO PHQ QUESTIONS 1-9: 0
6. FEELING BAD ABOUT YOURSELF - OR THAT YOU ARE A FAILURE OR HAVE LET YOURSELF OR YOUR FAMILY DOWN: NOT AT ALL

## 2024-07-08 NOTE — PROGRESS NOTES
Identified pt with two pt identifiers (name and ). Reviewed chart in preparation for visit and have obtained necessary documentation.    Eramso Partida is a 34 y.o. female  Chief Complaint   Patient presents with    Weight Management    Medication Management     /71 (Site: Left Upper Arm, Position: Sitting, Cuff Size: Small Adult)   Pulse 75   Temp 98.2 °F (36.8 °C) (Oral)   Resp 16   Ht 1.524 m (5')   Wt 57.1 kg (125 lb 12.8 oz)   SpO2 97%   BMI 24.57 kg/m²     1. Have you been to the ER, urgent care clinic since your last visit?  Hospitalized since your last visit?no    2. Have you seen or consulted any other health care providers outside of the [facility] since your last visit?  Include any pap smears or colon screening. no

## 2024-07-08 NOTE — PROGRESS NOTES
Weight Loss Progress Note: follow up Physician Visit  MAINTENANCE    Erasmo Partida is a 34 y.o. female  who is here for her follow up  Evaluation for the medical bariatric care.      CC: I want to be healthier  Taking wegovy for weight maintenance        Weight History  Current weight 125( 125) and BMI is Body mass index is 24.57 kg/m².  Goal weight 125,   Highest weight 174   (See weight gain time line scanned into media section of chart)      Post GBP  Hunger control: with meds good    Significant Medical History    Update on sleep apnea and  CPAP 6-7    Ever had bariatric surgery: yes GBP    Pregnant or planning on becoming pregnant w/in 6 months: no         Significant Psychosocial History     Current Major Lifestyle Changes: no  Any potential unsupportive people: no          Social History  Social History     Tobacco Use    Smoking status: Never    Smokeless tobacco: Never   Substance Use Topics    Alcohol use: Yes     Alcohol/week: 2.0 standard drinks of alcohol     Types: 2 Shots of liquor per week     How many times a week do you eat out?  1-2 times a month    Do you drink any EtOH?  2 times a week   If so, how much?      Do you have upcoming any travel in the next 6 weeks?  no   If so, what do you have planned?          Exercise  How many days a week do you currently exercise?  Resistance workouts using body weight days  Have you ever been told by a physician not to exercise?  no      Objective  /71 (Site: Left Upper Arm, Position: Sitting, Cuff Size: Small Adult)   Pulse 75   Temp 98.2 °F (36.8 °C) (Oral)   Resp 16   Ht 1.524 m (5')   Wt 57.1 kg (125 lb 12.8 oz)   SpO2 97%   BMI 24.57 kg/m²   Current Outpatient Medications   Medication Sig Dispense Refill    Semaglutide-Weight Management (WEGOVY) 1.7 MG/0.75ML SOAJ SC injection Inject 1.7 mg into the skin every 7 days 6 mL 2    lidocaine (LIDODERM) 5 % Place 1 patch onto the skin daily 12 hours on, 12 hours off. 30 patch 0    ferrous

## 2024-07-22 ENCOUNTER — TELEPHONE (OUTPATIENT)
Age: 34
End: 2024-07-22

## 2024-07-22 NOTE — TELEPHONE ENCOUNTER
LM for pt to call and schedule annual bariatric exam.  Letter sent. [facility]

## 2024-09-03 ENCOUNTER — TELEMEDICINE (OUTPATIENT)
Age: 34
End: 2024-09-03
Payer: COMMERCIAL

## 2024-09-03 ENCOUNTER — TELEPHONE (OUTPATIENT)
Age: 34
End: 2024-09-03

## 2024-09-03 VITALS — HEIGHT: 60 IN | WEIGHT: 136 LBS | BODY MASS INDEX: 26.7 KG/M2

## 2024-09-03 DIAGNOSIS — E66.01 MORBID OBESITY (HCC): Primary | ICD-10-CM

## 2024-09-03 DIAGNOSIS — Z98.84 S/P LAPAROSCOPIC SLEEVE GASTRECTOMY: ICD-10-CM

## 2024-09-03 PROCEDURE — 99212 OFFICE O/P EST SF 10 MIN: CPT | Performed by: NURSE PRACTITIONER

## 2024-09-03 ASSESSMENT — PATIENT HEALTH QUESTIONNAIRE - PHQ9
SUM OF ALL RESPONSES TO PHQ QUESTIONS 1-9: 0
SUM OF ALL RESPONSES TO PHQ QUESTIONS 1-9: 0
SUM OF ALL RESPONSES TO PHQ9 QUESTIONS 1 & 2: 0
SUM OF ALL RESPONSES TO PHQ QUESTIONS 1-9: 0
1. LITTLE INTEREST OR PLEASURE IN DOING THINGS: NOT AT ALL
2. FEELING DOWN, DEPRESSED OR HOPELESS: NOT AT ALL
SUM OF ALL RESPONSES TO PHQ QUESTIONS 1-9: 0

## 2024-09-03 ASSESSMENT — ENCOUNTER SYMPTOMS
SHORTNESS OF BREATH: 0
NAUSEA: 0
BACK PAIN: 0
VOMITING: 0
ABDOMINAL PAIN: 0
CHEST TIGHTNESS: 0

## 2024-09-03 NOTE — PROGRESS NOTES
Identified pt with two pt identifiers (name and ). Reviewed chart in preparation for visit and have obtained necessary documentation.    Erasmo Partida is a 34 y.o. female  Chief Complaint   Patient presents with    Other     Annual Bariatric Exam     Ht 1.524 m (5')   Wt 61.7 kg (136 lb)   BMI 26.56 kg/m²     1. Have you been to the ER, urgent care clinic since your last visit?  Hospitalized since your last visit?no    2. Have you seen or consulted any other health care providers outside of the [facility] since your last visit?  Include any pap smears or colon screening. no

## 2024-09-03 NOTE — PROGRESS NOTES
Erasmo Partida (:  1990) is a 34 y.o. female,Established patient, here for evaluation of the following chief complaint(s):  Other (Annual Bariatric Exam)        SUBJECTIVE/OBJECTIVE:    HPI:  Erasmo Partida is a 34 y.o. female with previous Sleeve gastrectomy surgery on 3 years ago. . She has lost a total of 73.5 pounds since surgery. She has lost down 10 lbs more but regained it after stopping Wegovy. She just restarted it yesterday. Body mass index is 26.56 kg/m².. Nausea, no vomiting.  . Denies Acid reflux/heartburn.. Drinking  64 ounces of water daily. 50 grams protein intake daily. + BM's.    Dietary recall -    Breakfast- intermittent fasting  Lunch- yogurt, fruit, nuts  Dinner- meat and veg    She is  snacking between meals; yogurt.      Vitamins:  MVI : yes  Calcium : yes  B-Vit 12: yes  Vit D: Yes        Ms. Partida has a reminder for a \"due or due soon\" health maintenance. I have asked that she contact her primary care provider for follow-up on this health maintenance.        COMORBIDITY     SLEEP APNEA                 NO        GERD  (req.meds)            NO  HYPERLIPIDEMIA            NO  HYPERTENSION              NO         DIABETES                         NO           Current Outpatient Medications:     Semaglutide-Weight Management (WEGOVY) 1.7 MG/0.75ML SOAJ SC injection, Inject 1.7 mg into the skin every 7 days, Disp: 6 mL, Rfl: 2    ferrous sulfate (IRON 325) 325 (65 Fe) MG tablet, Take 1 tablet by mouth daily (with breakfast), Disp: 90 tablet, Rfl: 1    vitamin D (ERGOCALCIFEROL) 1.25 MG (80865 UT) CAPS capsule, Take 1 capsule by mouth once a week, Disp: 12 capsule, Rfl: 1    ondansetron (ZOFRAN) 4 MG tablet, Take 1 tablet by mouth every 12 hours as needed for Nausea or Vomiting, Disp: 30 tablet, Rfl: 1    fluticasone (FLONASE) 50 MCG/ACT nasal spray, 2 sprays by Each Nostril route daily, Disp: 16 g, Rfl: 5    ADDERALL XR 10 MG capsule, Take 1 capsule by mouth as needed., Disp:

## 2024-09-03 NOTE — PATIENT INSTRUCTIONS
Vitamin and Mineral Supplements for Gastric Bypass & Sleeve Gastrectomy  As part of the care plan for weight loss surgery you will be required to purchase and take vitamins for the rest of your life. Below are the current products we recommend. If you choose to use products that are not listed below, please consult with your dietitian or nurse practitioner as that may result in different dosing recommendations and schedules.    Bariatric Multivitamin: Pick One   Bariatric Pal: Multivitamin ONE with 45 mg iron 1 per day ([email or web address].bariatricpal.c-crowd)-15% off first order  Capsule Chewable            Pro [employer] Health: Bariatric Multivitamin with 45 mg iron 1 per day (www.procarenow.com)- EasyOne=10% off  Capsule Chewable         Unjury: Bariatric Fusion 1 per Day Bariatric Capsules with 45 mg iron  ([employer].unjury.com) - [id number] = 10% off first purchase       Calcium Citrate 7511-3665 mg per day: Pick One   Citracal Maximum Plus - 2 pills, 2 times per day = 1300 mg (online or retail stores)  Unjury Chewable Calcium Citrate - 2 chewable pills, 2 times per day = 1200 mg (Unjury.com)    Blue Bonnet Liquid Calcium Citrate- 1 Tablespoon, 2 times per day = 1200 mg ([employer]Â®)  Sample Schedule  Morning Mid-Day/Afternoon Evening/Bedtime   Bariatric Multivitamin with iron 600 mg calcium citrate   600 mg calcium citrate     *Wait at least 2 hours between taking multivitamin and calcium to allow for absorption.  *Calcium can only be absorbed 500-600 mg at a time. Space doses by at least 2 hours each.

## 2024-09-25 ENCOUNTER — OFFICE VISIT (OUTPATIENT)
Age: 34
End: 2024-09-25
Payer: COMMERCIAL

## 2024-09-25 VITALS
RESPIRATION RATE: 17 BRPM | DIASTOLIC BLOOD PRESSURE: 80 MMHG | TEMPERATURE: 97.8 F | BODY MASS INDEX: 25 KG/M2 | HEART RATE: 100 BPM | OXYGEN SATURATION: 99 % | SYSTOLIC BLOOD PRESSURE: 119 MMHG | WEIGHT: 128 LBS

## 2024-09-25 DIAGNOSIS — M54.9 CHRONIC BACK PAIN GREATER THAN 3 MONTHS DURATION: Primary | ICD-10-CM

## 2024-09-25 DIAGNOSIS — G89.29 CHRONIC BACK PAIN GREATER THAN 3 MONTHS DURATION: Primary | ICD-10-CM

## 2024-09-25 DIAGNOSIS — Z02.89 ENCOUNTER FOR COMPLETION OF FORM WITH PATIENT: ICD-10-CM

## 2024-09-25 PROCEDURE — 99213 OFFICE O/P EST LOW 20 MIN: CPT | Performed by: FAMILY MEDICINE

## 2024-09-25 ASSESSMENT — PATIENT HEALTH QUESTIONNAIRE - PHQ9
1. LITTLE INTEREST OR PLEASURE IN DOING THINGS: NOT AT ALL
SUM OF ALL RESPONSES TO PHQ QUESTIONS 1-9: 0
2. FEELING DOWN, DEPRESSED OR HOPELESS: NOT AT ALL
SUM OF ALL RESPONSES TO PHQ9 QUESTIONS 1 & 2: 0

## 2024-10-04 ENCOUNTER — TELEMEDICINE (OUTPATIENT)
Age: 34
End: 2024-10-04
Payer: COMMERCIAL

## 2024-10-04 ENCOUNTER — TELEPHONE (OUTPATIENT)
Age: 34
End: 2024-10-04

## 2024-10-04 VITALS
SYSTOLIC BLOOD PRESSURE: 123 MMHG | BODY MASS INDEX: 24.54 KG/M2 | HEIGHT: 60 IN | TEMPERATURE: 98 F | DIASTOLIC BLOOD PRESSURE: 73 MMHG | WEIGHT: 125 LBS

## 2024-10-04 DIAGNOSIS — E55.9 VITAMIN D DEFICIENCY: ICD-10-CM

## 2024-10-04 DIAGNOSIS — Z90.3 S/P GASTRIC SLEEVE PROCEDURE: ICD-10-CM

## 2024-10-04 DIAGNOSIS — R79.0 LOW FERRITIN: ICD-10-CM

## 2024-10-04 DIAGNOSIS — Z86.39 H/O: OBESITY: ICD-10-CM

## 2024-10-04 DIAGNOSIS — Z76.89 ENCOUNTER FOR WEIGHT MANAGEMENT: Primary | ICD-10-CM

## 2024-10-04 PROCEDURE — 99214 OFFICE O/P EST MOD 30 MIN: CPT | Performed by: FAMILY MEDICINE

## 2024-10-04 ASSESSMENT — PATIENT HEALTH QUESTIONNAIRE - PHQ9
3. TROUBLE FALLING OR STAYING ASLEEP: NOT AT ALL
9. THOUGHTS THAT YOU WOULD BE BETTER OFF DEAD, OR OF HURTING YOURSELF: NOT AT ALL
SUM OF ALL RESPONSES TO PHQ QUESTIONS 1-9: 3
SUM OF ALL RESPONSES TO PHQ QUESTIONS 1-9: 3
4. FEELING TIRED OR HAVING LITTLE ENERGY: NOT AT ALL
SUM OF ALL RESPONSES TO PHQ QUESTIONS 1-9: 3
6. FEELING BAD ABOUT YOURSELF - OR THAT YOU ARE A FAILURE OR HAVE LET YOURSELF OR YOUR FAMILY DOWN: SEVERAL DAYS
SUM OF ALL RESPONSES TO PHQ QUESTIONS 1-9: 3
SUM OF ALL RESPONSES TO PHQ9 QUESTIONS 1 & 2: 1
7. TROUBLE CONCENTRATING ON THINGS, SUCH AS READING THE NEWSPAPER OR WATCHING TELEVISION: SEVERAL DAYS
1. LITTLE INTEREST OR PLEASURE IN DOING THINGS: NOT AT ALL
8. MOVING OR SPEAKING SO SLOWLY THAT OTHER PEOPLE COULD HAVE NOTICED. OR THE OPPOSITE, BEING SO FIGETY OR RESTLESS THAT YOU HAVE BEEN MOVING AROUND A LOT MORE THAN USUAL: NOT AT ALL
5. POOR APPETITE OR OVEREATING: NOT AT ALL
2. FEELING DOWN, DEPRESSED OR HOPELESS: SEVERAL DAYS

## 2024-10-04 NOTE — PROGRESS NOTES
Weight Loss Progress Note: follow up Physician Visit  MAINTENANCE      Erasmo Partida is a 34 y.o. female who was seen by synchronous (real-time) audio-video technology on 10/4/2024.      Consent:  She and/or her healthcare decision maker is aware that this patient-initiated Telehealth encounter is a billable service, with coverage as determined by her insurance carrier. She is aware that she may receive a bill and has provided verbal consent to proceed: Yes    I was at home while conducting this encounter.    Erasmo Partida is a 34 y.o. female  who is here for her follow up  Evaluation for the medical bariatric care.    Erasmo Partida, was evaluated through a synchronous (real-time) audio-video encounter. The patient (or guardian if applicable) is aware that this is a billable service, which includes applicable co-pays. This Virtual Visit was conducted with patient's (and/or legal guardian's) consent. Patient identification was verified, and a caregiver was present when appropriate.   The patient was located at Home: [address] [place] [id number]  Provider was located at Home (Appt Dept State): [place]  Confirm you are appropriately licensed, registered, or certified to deliver care in the state where the patient is located as indicated above. If you are not or unsure, please re-schedule the visit: Yes, I confirm.        T    --Mae Shirley MD on 10/6/2024 at 2:05 PM    An electronic signature was used to authenticate this note.   CC: I want to be healthier  A month ago she got up to 136  She had started drinking a lot of diet soda  She stopped that and the weight came back down  Weight History  Current weight 125( 125) and BMI is Body mass index is 24.41 kg/m².  Goal weight 125,   Highest weight 174   (See weight gain time line scanned into media section of chart)      Hunger control: good      Significant Medical History    Update on sleep apnea and  CPAP 6-7    Ever had bariatric surgery: yes

## 2024-10-04 NOTE — PROGRESS NOTES
Identified pt with two pt identifiers (name and ). Reviewed chart in preparation for visit and have obtained necessary documentation.    Erasmo Partida is a 34 y.o. female  Chief Complaint   Patient presents with    Weight Management    Medication Management     /73   Temp 98 °F (36.7 °C)   Ht 1.524 m (5')   Wt 56.7 kg (125 lb)   BMI 24.41 kg/m²     1. Have you been to the ER, urgent care clinic since your last visit?  Hospitalized since your last visit?no    2. Have you seen or consulted any other health care providers outside of the [facility] since your last visit?  Include any pap smears or colon screening. no

## 2024-10-22 ENCOUNTER — CLINICAL DOCUMENTATION (OUTPATIENT)
Age: 34
End: 2024-10-22

## 2024-10-22 NOTE — PROGRESS NOTES
Prior Authorization form and office note for Wegovy 1.7 mg/0.75 ml faxed to [employer] [place] [employer].    Awaiting determination.

## 2024-10-23 LAB
ERYTHROCYTE [DISTWIDTH] IN BLOOD BY AUTOMATED COUNT: 13.9 % (ref 11.7–15.4)
HCT VFR BLD AUTO: 38.5 % (ref 34–46.6)
HGB BLD-MCNC: 12.4 G/DL (ref 11.1–15.9)
MCH RBC QN AUTO: 26.3 PG (ref 26.6–33)
MCHC RBC AUTO-ENTMCNC: 32.2 G/DL (ref 31.5–35.7)
MCV RBC AUTO: 82 FL (ref 79–97)
PLATELET # BLD AUTO: 371 X10E3/UL (ref 150–450)
RBC # BLD AUTO: 4.72 X10E6/UL (ref 3.77–5.28)
WBC # BLD AUTO: 4.2 X10E3/UL (ref 3.4–10.8)

## 2024-11-03 DIAGNOSIS — E55.9 VITAMIN D DEFICIENCY, UNSPECIFIED: ICD-10-CM

## 2024-11-03 DIAGNOSIS — D50.8 OTHER IRON DEFICIENCY ANEMIA: ICD-10-CM

## 2024-11-05 DIAGNOSIS — D50.8 OTHER IRON DEFICIENCY ANEMIA: ICD-10-CM

## 2024-11-05 DIAGNOSIS — E55.9 VITAMIN D DEFICIENCY, UNSPECIFIED: ICD-10-CM

## 2024-11-05 NOTE — TELEPHONE ENCOUNTER
Last appointment: 9/25/24  Next appointment: 5/5/25  Previous refill encounter(s): 7/2/24 90 d/s with 1 refill    Requested Prescriptions     Pending Prescriptions Disp Refills    ferrous sulfate (IRON 325) 325 (65 Fe) MG tablet 90 tablet 1     Sig: Take 1 tablet by mouth daily (with breakfast)    vitamin D (ERGOCALCIFEROL) 1.25 MG (72929 UT) CAPS capsule 12 capsule 1     Sig: Take 1 capsule by mouth once a week         For Pharmacy Admin Tracking Only    Program: Medication Refill  CPA in place:    Recommendation Provided To:   Intervention Detail: New Rx: 2, reason: Patient Preference  Intervention Accepted By:   Gap Closed?:    Time Spent (min): 5

## 2024-11-06 RX ORDER — ERGOCALCIFEROL 1.25 MG/1
50000 CAPSULE, LIQUID FILLED ORAL WEEKLY
Qty: 12 CAPSULE | Refills: 1 | Status: SHIPPED | OUTPATIENT
Start: 2024-11-06

## 2024-11-06 RX ORDER — FERROUS SULFATE 325(65) MG
325 TABLET ORAL
Qty: 90 TABLET | Refills: 1 | Status: SHIPPED | OUTPATIENT
Start: 2024-11-06

## 2024-12-02 ENCOUNTER — COMMUNITY OUTREACH (OUTPATIENT)
Age: 34
End: 2024-12-02

## (undated) DEVICE — SYSTEM REPROC CBL 3 LD DISPOSABLE

## (undated) DEVICE — VISIGI 3D®  CALIBRATION SYSTEM  SIZE 40FR STD W/ BULB: Brand: BOEHRINGER® VISIGI 3D™ SLEEVE GASTRECTOMY CALIBRATION SYSTEM, SIZE 40FR W/BULB

## (undated) DEVICE — TIP SUCT TRNSPAR RIB SURF STD BLB RIG NVENT W/ 5IN1 CONN DYND50138] MEDLINE INDUSTRIES INC]

## (undated) DEVICE — SUTURE SZ 0 27IN 5/8 CIR UR-6  TAPER PT VIOLET ABSRB VICRYL J603H

## (undated) DEVICE — LAPAROSCOPIC TROCAR SLEEVE/SINGLE USE: Brand: KII® OPTICAL ACCESS SYSTEM

## (undated) DEVICE — AIR SHEET,LAT,COMFORT GLIDE, BLEND 40X80: Brand: MEDLINE

## (undated) DEVICE — Device

## (undated) DEVICE — SYR 50ML LR LCK 1ML GRAD NSAF --

## (undated) DEVICE — SINGLE-USE BIOPSY FORCEPS: Brand: RADIAL JAW 4

## (undated) DEVICE — CATHETER BALLOON DIL 26X48 MMX60 CM ANORECT BAROSTAT

## (undated) DEVICE — SUTURE MCRYL SZ 4-0 L27IN ABSRB UD L24MM PS-1 3/8 CIR PRIM Y935H

## (undated) DEVICE — CATHETER IV 20GA L1.16IN OD1.0414-1.1176MM ID0.762-0.8382MM

## (undated) DEVICE — GOWN,SIRUS,FABRNF,XL,20/CS: Brand: MEDLINE

## (undated) DEVICE — VISUALIZATION SYSTEM: Brand: CLEARIFY

## (undated) DEVICE — TROCAR SITE CLOSURE DEVICE: Brand: ENDO CLOSE

## (undated) DEVICE — SUTURE DEV SZ 2-0 WND CLSR ABSRB GS-22 VLOC COVIDIEN VLOCM2145

## (undated) DEVICE — TROCAR: Brand: KII® SLEEVE

## (undated) DEVICE — DRAPE,UTILTY,TAPE,15X26, 4EA/PK: Brand: MEDLINE

## (undated) DEVICE — BNDG ADH FABRIC 2X4IN ST LF --

## (undated) DEVICE — NEEDLE HYPO 22GA L1.5IN BLK S STL HUB POLYPR SHLD REG BVL

## (undated) DEVICE — BLACK REINFORCED INTELLIGENT RELOAD, FOR USE WITH SIGNIA STAPLING SYSTEM: Brand: TRI-STAPLE 2.0

## (undated) DEVICE — POWER SHELL: Brand: SIGNIA

## (undated) DEVICE — 4-PORT MANIFOLD: Brand: NEPTUNE 2

## (undated) DEVICE — ENDOSCOPIC KIT COMPLIANCE ENDOKIT

## (undated) DEVICE — DISSECTOR CRV JAW 48CM CRDLS -- SONICISION

## (undated) DEVICE — CLICKLINE SCISSORS INSERT: Brand: CLICKLINE

## (undated) DEVICE — CUFF BLD PRSS AD CLTH SGL TB W/ BAYNT CONN ROUNDED CORNER

## (undated) DEVICE — STERILE POLYISOPRENE POWDER-FREE SURGICAL GLOVES WITH EMOLLIENT COATING: Brand: PROTEXIS

## (undated) DEVICE — GENERAL LAPAROSCOPY - SMH: Brand: MEDLINE INDUSTRIES, INC.

## (undated) DEVICE — DERMABOND SKIN ADH 0.7ML -- DERMABOND ADVANCED 12/BX

## (undated) DEVICE — IV START KIT: Brand: MEDLINE

## (undated) DEVICE — TUBING, SUCTION, 1/4" X 12', STRAIGHT: Brand: MEDLINE

## (undated) DEVICE — FILTER SMK EVAC FLO CLR MEGADYNE

## (undated) DEVICE — INJECTION THERAPY NEEDLE CATHETER: Brand: INTERJECT

## (undated) DEVICE — SHEATH CATH ANORECT MNOMTR

## (undated) DEVICE — Z INACTIVE USE 2240337 DRAPE SURG PT TRANSFER TRAWAY SHT

## (undated) DEVICE — GARMENT,MEDLINE,DVT,INT,CALF,MED, GEN2: Brand: MEDLINE

## (undated) DEVICE — CONTAINER SPEC 20 ML LID NEUT BUFF FORMALIN 10 % POLYPR STS

## (undated) DEVICE — ENDO CARRY-ON PROCEDURE KIT INCLUDES ENZYMATIC SPONGE, GAUZE, BIOHAZARD LABEL, TRAY, LUBRICANT, DIRTY SCOPE LABEL, WATER LABEL, TRAY, DRAWSTRING PAD, AND DEFENDO 4-PIECE KIT.: Brand: ENDO CARRY-ON PROCEDURE KIT

## (undated) DEVICE — SOLUTION IRRIG 1000ML 0.9% SOD CHL USP POUR PLAS BTL

## (undated) DEVICE — REINFORCED INTELLIGENT RELOAD, FOR USE WITH SIGNIA STAPLING SYSTEM: Brand: TRI-STAPLE 2.0

## (undated) DEVICE — COVER LT HNDL PLAS RIG 1 PER PK

## (undated) DEVICE — TROCARS: Brand: KII® OPTICAL ACCESS SYSTEM